# Patient Record
Sex: MALE | Race: ASIAN | Employment: FULL TIME | URBAN - METROPOLITAN AREA
[De-identification: names, ages, dates, MRNs, and addresses within clinical notes are randomized per-mention and may not be internally consistent; named-entity substitution may affect disease eponyms.]

---

## 2017-01-20 ENCOUNTER — TELEPHONE (OUTPATIENT)
Dept: ONCOLOGY | Age: 57
End: 2017-01-20

## 2017-01-20 ENCOUNTER — TELEPHONE (OUTPATIENT)
Dept: CARDIOLOGY CLINIC | Age: 57
End: 2017-01-20

## 2017-01-20 NOTE — TELEPHONE ENCOUNTER
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 Patient said he was hoping to take care of this by next Tue, if possible.   When form is ready, pt said call him at home [phone]

## 2017-05-12 ENCOUNTER — OFFICE VISIT (OUTPATIENT)
Dept: ONCOLOGY | Age: 57
End: 2017-05-12

## 2017-05-12 ENCOUNTER — HOSPITAL ENCOUNTER (OUTPATIENT)
Dept: LAB | Age: 57
Discharge: HOME | End: 2017-05-12
Payer: COMMERCIAL

## 2017-05-12 ENCOUNTER — HOSPITAL ENCOUNTER (OUTPATIENT)
Dept: ONCOLOGY | Age: 57
Discharge: HOME | End: 2017-05-12

## 2017-05-12 VITALS
HEART RATE: 66 BPM | DIASTOLIC BLOOD PRESSURE: 69 MMHG | SYSTOLIC BLOOD PRESSURE: 138 MMHG | TEMPERATURE: 98.3 F | HEIGHT: 69 IN | BODY MASS INDEX: 27.4 KG/M2 | WEIGHT: 185 LBS

## 2017-05-12 DIAGNOSIS — E55.9 VITAMIN D DEFICIENCY: ICD-10-CM

## 2017-05-12 DIAGNOSIS — D72.820 LYMPHOCYTOSIS: ICD-10-CM

## 2017-05-12 DIAGNOSIS — C91.10 CLL (CHRONIC LYMPHOCYTIC LEUKEMIA) (HCC): ICD-10-CM

## 2017-05-12 DIAGNOSIS — C91.10 CLL (CHRONIC LYMPHOCYTIC LEUKEMIA) (HCC): Primary | ICD-10-CM

## 2017-05-12 LAB
ALBUMIN SERPL BCP-MCNC: 4.3 G/DL (ref 3.4–5)
ALBUMIN/GLOB SERPL: 1.3 {RATIO} (ref 0.8–1.7)
ALP SERPL-CCNC: 61 U/L (ref 45–117)
ALT SERPL-CCNC: 23 U/L (ref 16–61)
ANION GAP BLD CALC-SCNC: 7 MMOL/L (ref 3–18)
AST SERPL W P-5'-P-CCNC: 19 U/L (ref 15–37)
BASOPHILS # BLD AUTO: 0 K/UL (ref 0–0.06)
BASOPHILS # BLD: 0 % (ref 0–3)
BILIRUB SERPL-MCNC: 0.3 MG/DL (ref 0.2–1)
BUN SERPL-MCNC: 16 MG/DL (ref 7–18)
BUN/CREAT SERPL: 16 (ref 12–20)
CALCIUM SERPL-MCNC: 9.3 MG/DL (ref 8.5–10.1)
CHLORIDE SERPL-SCNC: 101 MMOL/L (ref 100–108)
CO2 SERPL-SCNC: 31 MMOL/L (ref 21–32)
CREAT SERPL-MCNC: 1 MG/DL (ref 0.6–1.3)
DIFFERENTIAL METHOD BLD: ABNORMAL
EOSINOPHIL # BLD: 0.1 K/UL (ref 0–0.4)
EOSINOPHIL NFR BLD: 1 % (ref 0–5)
ERYTHROCYTE [DISTWIDTH] IN BLOOD BY AUTOMATED COUNT: 16.1 % (ref 11.6–14.5)
ERYTHROCYTE [SEDIMENTATION RATE] IN BLOOD: 14 MM/HR (ref 0–20)
GLOBULIN SER CALC-MCNC: 3.4 G/DL (ref 2–4)
GLUCOSE SERPL-MCNC: 108 MG/DL (ref 74–99)
HCT VFR BLD AUTO: 38.4 % (ref 36–48)
HGB BLD-MCNC: 12.4 G/DL (ref 13–16)
LYMPHOCYTES # BLD AUTO: 76 % (ref 20–51)
LYMPHOCYTES # BLD: 11.2 K/UL (ref 0.8–3.5)
MCH RBC QN AUTO: 25.5 PG (ref 24–34)
MCHC RBC AUTO-ENTMCNC: 32.3 G/DL (ref 31–37)
MCV RBC AUTO: 79 FL (ref 74–97)
MONOCYTES # BLD: 0.3 K/UL (ref 0–1)
MONOCYTES NFR BLD AUTO: 2 % (ref 2–9)
NEUTS BAND NFR BLD MANUAL: 1 % (ref 0–5)
NEUTS SEG # BLD: 3.1 K/UL (ref 1.8–8)
NEUTS SEG NFR BLD AUTO: 20 % (ref 42–75)
PLATELET # BLD AUTO: 187 K/UL (ref 135–420)
PLATELET COMMENTS,PCOM: ABNORMAL
PMV BLD AUTO: 11.4 FL (ref 9.2–11.8)
POTASSIUM SERPL-SCNC: 4.5 MMOL/L (ref 3.5–5.5)
PROT SERPL-MCNC: 7.7 G/DL (ref 6.4–8.2)
RBC # BLD AUTO: 4.86 M/UL (ref 4.7–5.5)
RBC MORPH BLD: ABNORMAL
SODIUM SERPL-SCNC: 139 MMOL/L (ref 136–145)
WBC # BLD AUTO: 14.7 K/UL (ref 4.6–13.2)
WBC MORPH BLD: ABNORMAL

## 2017-05-12 PROCEDURE — 36415 COLL VENOUS BLD VENIPUNCTURE: CPT | Performed by: NURSE PRACTITIONER

## 2017-05-12 PROCEDURE — 82306 VITAMIN D 25 HYDROXY: CPT | Performed by: NURSE PRACTITIONER

## 2017-05-12 PROCEDURE — 80053 COMPREHEN METABOLIC PANEL: CPT | Performed by: NURSE PRACTITIONER

## 2017-05-12 PROCEDURE — 85652 RBC SED RATE AUTOMATED: CPT | Performed by: NURSE PRACTITIONER

## 2017-05-12 NOTE — MR AVS SNAPSHOT
Visit Information Date & Time Provider Department Dept. Phone Encounter #  
 5/12/2017  3:15 PM [facility] Office [phone] Follow-up Instructions Return in about 4 months (around 9/12/2017). Your Appointments 6/29/2017  8:20 AM  
Follow Up with Dina Koehler MD  
[facility] ([address]) Appt Note: 1 year with an ekg Luis Alberto Finn [phone] [facility] [place] [address] 9/15/2017  4:15 PM  
Office Visit with Andrea Brunson MD  
[place] [address] ([address]) Appt Note: 4 mo fu  
 [facility] [address] [facility] [address] [place] [address]  
[phone]  
  
   
 [place] [address] [place] [address] [place] Upcoming Health Maintenance Date Due Hepatitis C Screening 1960 Pneumococcal 19-64 Highest Risk (1 of 3 - PCV13) 11/17/1978 DTaP/Tdap/Td series (1 - Tdap) 11/17/1980 FOBT Q 1 YEAR AGE 50-75 11/17/2009 INFLUENZA AGE 9 TO ADULT 8/1/2017 Allergies as of 5/12/2017  Review Complete On: 5/12/2017 By: Andrea Brunson MD  
 No Known Allergies Current Immunizations  Never Reviewed No immunizations on file. Not reviewed this visit You Were Diagnosed With   
  
 Codes Comments CLL (chronic lymphocytic leukemia) ([facility]    -  Primary ICD-10-CM: C91.10 ICD-9-CM: 204.10 Lymphocytosis     ICD-10-CM: E98.901 ICD-9-CM: 288.61 Vitamin D deficiency     ICD-10-CM: E55.9 ICD-9-CM: 268.9 Vitals BP Pulse Temp Height(growth percentile) Weight(growth percentile) BMI  
 138/69 66 98.3 °F (36.8 °C) 5' 9\" (1.753 m) 185 lb (83.9 kg) 27.32 kg/m2 Smoking Status Former Smoker BMI and BSA Data Body Mass Index Body Surface Area  
 27.32 kg/m 2 2.02 m 2 Preferred Pharmacy Pharmacy Name Phone [facility] [address], [address] [place] [address] [phone] Your Updated Medication List  
  
   
This list is accurate as of: 5/12/17  4:11 PM.  Always use your most recent med list.  
  
  
  
  
 aspirin delayed-release 81 mg tablet Take 81 mg by mouth daily. carvedilol 12.5 mg tablet Commonly known as:  Ciaraalexi Philippehire Take 1 Tab by mouth two (2) times a day. ergocalciferol 50,000 unit capsule Commonly known as:  ERGOCALCIFEROL Take 1 Cap by mouth every seven (7) days. Indications: VITAMIN D DEFICIENCY  
  
 lisinopril 5 mg tablet Commonly known as:  Marcelo Apo Take 1 Tab by mouth daily. simvastatin 10 mg tablet Commonly known as:  ZOCOR Take  by mouth daily. We Performed the Following COMPLETE CBC & AUTO DIFF WBC [65210 CPT(R)] Follow-up Instructions Return in about 4 months (around 9/12/2017). To-Do List   
 05/12/2017 Lab:  CBC WITH 3 PART DIFF   
  
 05/12/2017 Lab:  SED RATE (ESR)   
  
 05/12/2017 Lab:  VITAMIN D, 25 HYDROXY   
  
 05/13/2017 Lab:  METABOLIC PANEL, COMPREHENSIVE Patient Instructions Chronic Lymphocytic Leukemia: Care Instructions Your Care Instructions Leukemia is a type of cancer that causes your body to make too many blood cells, especially white blood cells. White blood cells are a part of your immune system, which helps protect you from infection and disease. In chronic lymphocytic leukemia (CLL), your body makes large numbers of white blood cells called lymphocytes. The cells may work as they should, but your body makes too many of them. Over time, these cells may not work as well, and they may cause symptoms as they begin to crowd out healthy white blood cells and other parts of your blood. There are several treatments for CLL, including chemotherapy, targeted therapy, or radiation therapy.  But because CLL may get worse slowly, sometimes treatment can wait. Your doctor will follow your progress and let you know if or when you need treatment. When you find out that you have cancer, you may feel many emotions and may need some help coping. Seek out family, friends, and counselors for support. You also can do things at home to make yourself feel better while you go through treatment. Call the Radha Mach[facility] (3-816.684.1378) or visit its website at 0359 Takes for more information. Follow-up care is a key part of your treatment and safety. Be sure to make and go to all appointments, and call your doctor if you are having problems. It's also a good idea to know your test results and keep a list of the medicines you take. How can you care for yourself at home? · You may get medicine for nausea, vomiting, or pain (although leukemia rarely causes pain). Take your medicines exactly as prescribed. Call your doctor if you think you are having a problem with your medicine. You will get more details on the specific medicines your doctor prescribes. · Eat healthy food. If you do not feel like eating, try to eat food that has protein and extra calories to keep up your strength and prevent weight loss. Drink liquid meal replacements for extra calories and protein. Try to eat your main meal early. · Get some physical activity every day, but do not get too tired. Keep doing the hobbies you enjoy as your energy allows. · Take steps to control your stress and workload. Learn relaxation techniques. ¨ Share your feelings. Stress and tension affect our emotions. By expressing your feelings to others, you may be able to understand and cope with them. ¨ Consider joining a support group. Talking about a problem with your spouse, a good friend, or other people with similar problems is a good way to reduce tension and stress. ¨ Express yourself through art.  Try writing, dance, art, or crafts to relieve tension. Some dance, writing, or art groups may be available just for people who have cancer. ¨ Be kind to your body and mind. Getting enough sleep, eating a nutritious diet, and taking time to do things you enjoy can contribute to an overall feeling of balance in your life and help reduce stress. ¨ Get help if you need it. Discuss your concerns with your doctor or counselor. · If you are vomiting or have diarrhea: ¨ Drink plenty of fluids (enough so that your urine is light yellow or clear like water) to prevent dehydration. Choose water and other caffeine-free clear liquids. If you have kidney, heart, or liver disease and have to limit fluids, talk with your doctor before you increase the amount of fluids you drink. ¨ When you are able to eat, try clear soups, mild foods, and liquids until all symptoms are gone for 12 to 48 hours. Other good choices include dry toast, crackers, cooked cereal, and gelatin dessert, such as Jell-O. · Avoid colds and flu. Get a pneumococcal vaccine shot. If you have had one before, ask your doctor whether you need another dose. Get a flu shot every year. If you must be around people with colds or flu, wash your hands often. · Do not smoke. If you need help quitting, talk to your doctor about stop-smoking programs and medicines. These can increase your chances of quitting for good. When should you call for help? Call 911 anytime you think you may need emergency care. For example, call if: 
· You passed out (lost consciousness). · You vomit blood or what looks like coffee grounds. · You pass maroon or very bloody stools. · You have a fever and feel very weak. Call your doctor now or seek immediate medical care if: 
· You have any unusual bleeding, such as: ¨ Blood spots under the skin. ¨ A nosebleed that you cannot stop. ¨ Bleeding gums when you brush your teeth. ¨ Blood in your urine.  
¨ Vaginal bleeding when you are not having your period, or heavy period bleeding. · You are dizzy or lightheaded, or you feel like you may faint. · You have signs of infection, such as a fever or chills. · Your stools are black and tarlike or have streaks of blood. · You have signs of needing more fluids. You have sunken eyes and a dry mouth, and you pass only a little dark urine. · Vomiting has lasted longer than 24 hours and you are not able to keep fluids down. · You have severe diarrhea (large, loose bowel movements every 1 to 2 hours). Watch closely for changes in your health, and be sure to contact your doctor if: 
· You feel much more tired than usual. 
· You have weakness that is getting worse. · You feel sad or anxious for a long period of time, or your feelings interfere with your normal activities or relationships. Where can you learn more? Go to http://yajaira-jose.info/. Enter R901 in the search box to learn more about \"Chronic Lymphocytic Leukemia: Care Instructions. \" Current as of: July 26, 2016 Content Version: 11.2 © 4894-3462 [employer]. Care instructions adapted under license by [employer] (which disclaims liability or warranty for this information). If you have questions about a medical condition or this instruction, always ask your healthcare professional. [address] any warranty or liability for your use of this information. Introducing [facility] [place] [facility]! George Bertrand introduces [employer] patient portal. Now you can access parts of your medical record, email your doctor's office, and request medication refills online. 1. In your internet browser, go to [email or web address] 2. Click on the First Time User? Click Here link in the Sign In box. You will see the New Member Sign Up page. 3. Enter your [employer] Access Code exactly as it appears below. You will not need to use this code after youve completed the sign-up process.  If you do not sign up before the expiration date, you must request a new code. · [employer] Access Code: [email or web address] Expires: 8/10/2017  4:11 PM 
 
4. Enter the last four digits of your Social Security Number (xxxx) and Date of Birth (mm/dd/yyyy) as indicated and click Submit. You will be taken to the next sign-up page. 5. Create a [employer] ID. This will be your [employer] login ID and cannot be changed, so think of one that is secure and easy to remember. 6. Create a [employer] password. You can change your password at any time. 7. Enter your Password Reset Question and Answer. This can be used at a later time if you forget your password. 8. Enter your e-mail address. You will receive e-mail notification when new information is available in [address] 9. Click Sign Up. You can now view and download portions of your medical record. 10. Click the Download Summary menu link to download a portable copy of your medical information. If you have questions, please visit the Frequently Asked Questions section of the [employer] website. Remember, [employer] is NOT to be used for urgent needs. For medical emergencies, dial 911. Now available from your iPhone and Android! Please provide this summary of care documentation to your next provider. Your primary care clinician is listed as Nette Valdivia. If you have any questions after today's visit, please call [phone].

## 2017-05-12 NOTE — PATIENT INSTRUCTIONS
Chronic Lymphocytic Leukemia: Care Instructions  Your Care Instructions  Leukemia is a type of cancer that causes your body to make too many blood cells, especially white blood cells. White blood cells are a part of your immune system, which helps protect you from infection and disease. In chronic lymphocytic leukemia (CLL), your body makes large numbers of white blood cells called lymphocytes. The cells may work as they should, but your body makes too many of them. Over time, these cells may not work as well, and they may cause symptoms as they begin to crowd out healthy white blood cells and other parts of your blood. There are several treatments for CLL, including chemotherapy, targeted therapy, or radiation therapy. But because CLL may get worse slowly, sometimes treatment can wait. Your doctor will follow your progress and let you know if or when you need treatment. When you find out that you have cancer, you may feel many emotions and may need some help coping. Seek out family, friends, and counselors for support. You also can do things at home to make yourself feel better while you go through treatment. Call the [address] [place], [place] [address]alaina [employer] ([phone]) or visit its website at 2973 frenting. KaChing! for more information. Follow-up care is a key part of your treatment and safety. Be sure to make and go to all appointments, and call your doctor if you are having problems. It's also a good idea to know your test results and keep a list of the medicines you take. How can you care for yourself at home? · You may get medicine for nausea, vomiting, or pain (although leukemia rarely causes pain). Take your medicines exactly as prescribed. Call your doctor if you think you are having a problem with your medicine. You will get more details on the specific medicines your doctor prescribes. · Eat healthy food.  If you do not feel like eating, try to eat food that has protein and extra calories to keep up your strength and prevent weight loss. Drink liquid meal replacements for extra calories and protein. Try to eat your main meal early. · Get some physical activity every day, but do not get too tired. Keep doing the hobbies you enjoy as your energy allows. · Take steps to control your stress and workload. Learn relaxation techniques. ¨ Share your feelings. Stress and tension affect our emotions. By expressing your feelings to others, you may be able to understand and cope with them. ¨ Consider joining a support group. Talking about a problem with your spouse, a good friend, or other people with similar problems is a good way to reduce tension and stress. ¨ Express yourself through art. Try writing, dance, art, or crafts to relieve tension. Some dance, writing, or art groups may be available just for people who have cancer. ¨ Be kind to your body and mind. Getting enough sleep, eating a nutritious diet, and taking time to do things you enjoy can contribute to an overall feeling of balance in your life and help reduce stress. ¨ Get help if you need it. Discuss your concerns with your doctor or counselor. · If you are vomiting or have diarrhea:  ¨ Drink plenty of fluids (enough so that your urine is light yellow or clear like water) to prevent dehydration. Choose water and other caffeine-free clear liquids. If you have kidney, heart, or liver disease and have to limit fluids, talk with your doctor before you increase the amount of fluids you drink. ¨ When you are able to eat, try clear soups, mild foods, and liquids until all symptoms are gone for 12 to 48 hours. Other good choices include dry toast, crackers, cooked cereal, and gelatin dessert, such as Jell-O.  · Avoid colds and flu. Get a pneumococcal vaccine shot. If you have had one before, ask your doctor whether you need another dose. Get a flu shot every year. If you must be around people with colds or flu, wash your hands often. · Do not smoke.  If you need help quitting, talk to your doctor about stop-smoking programs and medicines. These can increase your chances of quitting for good. When should you call for help? Call 911 anytime you think you may need emergency care. For example, call if:  · You passed out (lost consciousness). · You vomit blood or what looks like coffee grounds. · You pass maroon or very bloody stools. · You have a fever and feel very weak. Call your doctor now or seek immediate medical care if:  · You have any unusual bleeding, such as:  ¨ Blood spots under the skin. ¨ A nosebleed that you cannot stop. ¨ Bleeding gums when you brush your teeth. ¨ Blood in your urine. ¨ Vaginal bleeding when you are not having your period, or heavy period bleeding. · You are dizzy or lightheaded, or you feel like you may faint. · You have signs of infection, such as a fever or chills. · Your stools are black and tarlike or have streaks of blood. · You have signs of needing more fluids. You have sunken eyes and a dry mouth, and you pass only a little dark urine. · Vomiting has lasted longer than 24 hours and you are not able to keep fluids down. · You have severe diarrhea (large, loose bowel movements every 1 to 2 hours). Watch closely for changes in your health, and be sure to contact your doctor if:  · You feel much more tired than usual.  · You have weakness that is getting worse. · You feel sad or anxious for a long period of time, or your feelings interfere with your normal activities or relationships. Where can you learn more? Go to http://yajaira-jose.info/. Enter Z331 in the search box to learn more about \"Chronic Lymphocytic Leukemia: Care Instructions. \"  Current as of: July 26, 2016  Content Version: 11.2  © 7983-1869 [employer]. Care instructions adapted under license by [employer] (which disclaims liability or warranty for this information).  If you have questions about a medical condition or this instruction, always ask your healthcare professional. [place] [address] any warranty or liability for your use of this information.

## 2017-05-12 NOTE — PROGRESS NOTES
Hematology/Oncology  Progress Note    Name: Kari Reid  Date: 2017  : 1960    PCP: Imelda Barlow MD     Mr. Africa Berg is a 61year old male who was seen for management of his chronic lymphocytic leukemia. Current therapy: Surveillance    Subjective:     Mr. Africa Berg is a 48-year-old [place] American American man who has a stage I chronic lymphocytic leukemia. The patient reports that he has been doing well. He has no complaints of fevers, night sweats, or weight loss. The patient states that his energy level has been good. His appetite is excellent and he has no new physical complaints or concerns to report. Additionally he denies having any problems with unexplained bruising or bleeding. Past medical history, family history, and social history: these were reviewed and remains unchanged. Past Medical History:   Diagnosis Date    Abnormal nuclear cardiac imaging test 2006    Mild anterior ischemia. Inferior scar w/border zone ischemia. LVE. EF 26%. (Gated imaging may be inaccurate.)  Global hypk. Neg EKG on max EST. Ex time 10:30.  Aorto-iliac duplex 09/10/2012    No AAA identified.  Carotid duplex 09/10/2012    No significant occlusive disease bilaterallly.  Echocardiogram 2015    EF 55%. No WMA. Mild LVH. Gr 1 DDfx. IMELDA. No significant chg from study of 5/17/10.  History of echocardiogram 2011    EF 50-55%. Gr 2 DDfx. Mild RVE. Mild LAE. Mild-mod IMELDA.  Hypercholesterolemia     Nonischemic cardiomyopathy (HCC)     s/p right coronary cusp PVC ablation  () without complication    S/P cardiac cath 2006    Patent coronary arteries. LVEDP 12.  EF 25-30%. Mod-significant global hypk. Past Surgical History:   Procedure Laterality Date    HX HEART CATHETERIZATION  06    The right coronary artery is dominant. It is widely patent. The left main coronary artery is widely patent. The circumflex artery is widely patent. The left anterior descending coronary artery is widely patent. The LVEDP is 12 mmHg. The overall left ventricular systolic function is significantly diminished with an estimated ejection fraction of 25-30%. ** See report. Social History     Social History    Marital status:      Spouse name: N/A    Number of children: N/A    Years of education: N/A     Occupational History    Not on file. Social History Main Topics    Smoking status: Former Smoker     Packs/day: 1.00    Smokeless tobacco: Not on file    Alcohol use Yes    Drug use: Not on file    Sexual activity: Not on file     Other Topics Concern    Not on file     Social History Narrative     No family history on file. Current Outpatient Prescriptions   Medication Sig Dispense Refill    ergocalciferol (ERGOCALCIFEROL) 50,000 unit capsule Take 1 Cap by mouth every seven (7) days. Indications: VITAMIN D DEFICIENCY 12 Cap 1    lisinopril (PRINIVIL, ZESTRIL) 5 mg tablet Take 1 Tab by mouth daily. 30 Tab 6    carvedilol (COREG) 12.5 mg tablet Take 1 Tab by mouth two (2) times a day. 60 Tab 6    simvastatin (ZOCOR) 10 mg tablet Take  by mouth daily.  aspirin delayed-release 81 mg tablet Take 81 mg by mouth daily. Review of Systems  Constitutional: The patient has no acute distress or discomfort. HEENT: The patient denies recent head trauma, eye pain, blurred vision,  hearing deficit, oropharyngeal mucosal pain or lesions, and the patient denies throat pain or discomfort. Lymphatics: The patient denies palpable peripheral lymphadenopathy. Hematologic: The patient denies having bruising, bleeding, or progressive fatigue. Respiratory: Patient denies having shortness of breath, cough, sputum production, fever, or dyspnea on exertion. Cardiovascular: The patient denies having leg pain, leg swelling, heart palpitations, chest permit, chest pain, or lightheadedness.   The patient denies having dyspnea on exertion. Gastrointestinal: The patient denies having nausea, emesis, or diarrhea. The patient denies having any hematemesis or blood in the stool. Genitourinary: Patient denies having urinary urgency, frequency, or dysuria. The patient denies having blood in the urine. Psychological: The patient denies having symptoms of nervousness, anxiety, depression, or thoughts of harming himself some of this. Skin: Patient denies having skin ulcerations. The patient is complaining of a new rash on his arms and back. He also complains of some itching. Musculoskeletal: The patient denies having pain in the joints or bones. Objective:     Visit Vitals    /69    Pulse 66    Temp 98.3 °F (36.8 °C)    Ht 5' 9\" (1.753 m)    Wt 83.9 kg (185 lb)    BMI 27.32 kg/m2     ECOG PS=0     Physical Exam:   Gen. Appearance: The patient is in no acute distress. Skin: There is a macular and papular scaling rash on her arms and back concerning for possible psoriasis. HEENT: The exam is unremarkable. Neck: Supple without lymphadenopathy or thyromegaly. Lungs: Clear to auscultation and percussion; there are no wheezes or rhonchi. Heart: Regular rate and rhythm; there are no murmurs, gallops, or rubs. Abdomen: Bowel sounds are present and normal.  There is no guarding, tenderness, or hepatosplenomegaly. Extremities: There is no clubbing, cyanosis, or edema. Neurologic: There are no focal neurologic deficits. Lymphatics: There is palpable cervical and supraclavicular lymphadenopathy only. Musculoskeletal: The patient has full range of motion at all joints. There is no evidence of joint deformity or effusions. There is no focal joint tenderness. Psychological/psychiatric: There is no clinical evidence of anxiety, depression, or melancholy.     Lab data:      Results for orders placed or performed during the hospital encounter of 12/16/16   CBC WITH 3 PART DIFF     Status: Abnormal   Result Value Ref Range Status WBC 11.1 4.5 - 13.0 K/uL Final    RBC 5.04 4.10 - 5.10 M/uL Final    HGB 12.5 12.0 - 16.0 g/dL Final    HCT 40.4 36 - 48 % Final    MCV 80.2 78 - 102 FL Final    MCH 24.8 (L) 25.0 - 35.0 PG Final    MCHC 30.9 (L) 31 - 37 g/dL Final    RDW 15.2 (H) 11.5 - 14.5 % Final    PLATELET 457 219 - 589 K/uL Final    NEUTROPHILS 22 (L) 40 - 70 % Final    MIXED CELLS 5 0.1 - 17 % Final    LYMPHOCYTES 73 (H) 14 - 44 % Final    ABS. NEUTROPHILS 2.4 1.8 - 9.5 K/UL Final    ABS. MIXED CELLS 0.6 0.0 - 2.3 K/uL Final    ABS. LYMPHOCYTES 8.1 (H) 1.1 - 5.9 K/UL Final     Comment: Test performed at [address]. Results Reviewed by Medical Director. DF AUTOMATED   Final           Assessment:     1. CLL (chronic lymphocytic leukemia) ([facility] [address]      Plan:   CLL: I have explained to the patient and his current clinical status is stable. He has stable lymphadenopathy. The CBC from today shows that his lymphocyte count is 76.1%. His total WBC count is 14.1. He has a normal hemoglobin of 12.5 g/dL with hematocrit of 39.4%. We will continue active surveillance. Systemic chemotherapy is still not recommended at this stage of his illness. Lymphocytosis: I have explained to the patient that he has 76.1% lymphocytes on his peripheral blood count. The absolute lymphocyte count is elevated at 11 with a reference range of 1.1-5.9. These will continue to be monitored every 4 months. Vitamin d deficiency: I will check a vitamin D level today. If deficient, we will start a weekly supplement for 8-12 weeks,       The patient will return to the clinic for a complete assessment again in 4 months.   Orders Placed This Encounter    COMPLETE CBC & AUTO DIFF WBC    InHouse CBC ("[facility]")     Standing Status:   Future     Number of Occurrences:   1     Standing Expiration Date:   5/19/2017       Angela Manriquez MD  5/12/2017

## 2017-05-13 LAB — 25(OH)D3 SERPL-MCNC: 13.7 NG/ML (ref 30–100)

## 2017-06-29 ENCOUNTER — OFFICE VISIT (OUTPATIENT)
Dept: CARDIOLOGY CLINIC | Age: 57
End: 2017-06-29

## 2017-06-29 VITALS
SYSTOLIC BLOOD PRESSURE: 120 MMHG | OXYGEN SATURATION: 95 % | BODY MASS INDEX: 27.99 KG/M2 | HEART RATE: 65 BPM | WEIGHT: 189 LBS | HEIGHT: 69 IN | DIASTOLIC BLOOD PRESSURE: 78 MMHG

## 2017-06-29 DIAGNOSIS — Z86.79 S/P ABLATION OPERATION FOR ARRHYTHMIA: Primary | ICD-10-CM

## 2017-06-29 DIAGNOSIS — I42.8 NONISCHEMIC CARDIOMYOPATHY (HCC): ICD-10-CM

## 2017-06-29 DIAGNOSIS — I10 ESSENTIAL HYPERTENSION: ICD-10-CM

## 2017-06-29 DIAGNOSIS — I49.3 PVC (PREMATURE VENTRICULAR CONTRACTION): ICD-10-CM

## 2017-06-29 DIAGNOSIS — Z98.890 S/P ABLATION OPERATION FOR ARRHYTHMIA: Primary | ICD-10-CM

## 2017-06-29 NOTE — PROGRESS NOTES
1. Have you been to the ER, urgent care clinic since your last visit? Hospitalized since your last visit? No     2. Have you seen or consulted any other health care providers outside of the [address] [place] [address] since your last visit? Include any pap smears or colon screening.  No

## 2017-06-29 NOTE — PROGRESS NOTES
History of Present Illness: A 61 y.o. male here for follow up. 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 Monday through Friday he does get a bit tired due to his sleep habits but on weekends he does well. He denies any new chest pain, dyspnea, presyncope, syncope, PND, orthopnea or edema. He had a PVC ablation January 0061 without complications. Impression:   History of right coronary cusp PVC ablation January 2010 with 1% burden after follow up, doing well. History of transient nonischemic cardiomyopathy 2006, improving to normal by most recent echocardiogram June 2015. Remote alcohol use. History of familial aortic aneurysm with abdominal ultrasound a few years ago, unremarkable. History of hypertension controlled on ACE inhibitor and beta-blocker. Borderline dyslipidemia on Zocor. From a cardiac standpoint, he is doing well. He has no evidence of decompensated heart failure. His blood pressure is well controlled. No evidence of PVCs on EKG. An echocardiogram a few years ago showed normal function. He does have some issues with sleep hygiene due to his occupation and this was discussed. He was screened for abdominal aortic aneurysm a few years ago and that was normal.  I will see him back in one year. Past Medical History:   Diagnosis Date    Abnormal nuclear cardiac imaging test 11/30/2006    Mild anterior ischemia. Inferior scar w/border zone ischemia. LVE. EF 26%. (Gated imaging may be inaccurate.)  Global hypk. Neg EKG on max EST. Ex time 10:30.  Aorto-iliac duplex 09/10/2012    No AAA identified.  Carotid duplex 09/10/2012    No significant occlusive disease bilaterallly.  Echocardiogram 06/02/2015    EF 55%. No WMA. Mild LVH. Gr 1 DDfx. IMELDA. No significant chg from study of 5/17/10.  History of echocardiogram 06/20/2011    EF 50-55%. Gr 2 DDfx. Mild RVE. Mild LAE. Mild-mod IMELDA.      Hypercholesterolemia     Nonischemic cardiomyopathy ([address] s/p right coronary cusp PVC ablation  (8/06/11) without complication    S/P cardiac cath 12/11/2006    Patent coronary arteries. LVEDP 12.  EF 25-30%. Mod-significant global hypk. Current Outpatient Prescriptions   Medication Sig Dispense Refill    ergocalciferol (ERGOCALCIFEROL) 50,000 unit capsule Take 1 Cap by mouth every seven (7) days. Indications: VITAMIN D DEFICIENCY 12 Cap 1    lisinopril (PRINIVIL, ZESTRIL) 5 mg tablet Take 1 Tab by mouth daily. 30 Tab 6    carvedilol (COREG) 12.5 mg tablet Take 1 Tab by mouth two (2) times a day. 60 Tab 6    simvastatin (ZOCOR) 10 mg tablet Take  by mouth daily.  aspirin delayed-release 81 mg tablet Take 81 mg by mouth daily. Social History   reports that he has quit smoking. He smoked 1.00 pack per day. He does not have any smokeless tobacco history on file. reports that he drinks alcohol. Family History  family history is not on file. Review of Systems  Except as stated above include:  Constitutional: Negative for fever, chills and malaise/fatigue. HEENT: No congestion or recent URI. Gastrointestinal: No nausea, vomiting, abdominal pain, bloody stools. Pulmonary:  Negative except as stated above. Cardiac:  Negative except as stated above. Musculoskeletal: Negative except as stated above. Neurological:  No localized symptoms. Skin:  Negative except as stated above. Psych:  No mood swings. Endocrine:  No heat/cold intolerance. PHYSICAL EXAM  BP Readings from Last 3 Encounters:   06/29/17 120/78   05/12/17 138/69   12/16/16 139/86     Pulse Readings from Last 3 Encounters:   06/29/17 65   05/12/17 66   12/16/16 (!) 58     Wt Readings from Last 3 Encounters:   06/29/17 85.7 kg (189 lb)   05/12/17 83.9 kg (185 lb)   12/16/16 84.8 kg (187 lb)     General:   Well developed, well groomed. Head/Neck:   No jugular venous distention     No carotid bruits. No evidence of xanthelasma. Lungs:   No respiratory distress. Clear bilaterally. Heart:    Regular rate and rhythm. Normal S1/S2. Palpation of heart with normal point of maximum impulse. No significant murmurs, rubs or gallops. Abdomen:   Soft and nontender. No palpable abdominal mass or bruits. Extremities:   Intact peripheral pulses. No significant edema. Neurological:   Alert and oriented to person, place, time. No focal neurological deficit visually.

## 2017-06-29 NOTE — MR AVS SNAPSHOT
Visit Information Date & Time Provider Department Dept. Phone Encounter #  
 6/29/2017  8:20 AM Baylee Johnson MD [facility] [id number] Follow-up Instructions Return in about 1 year (around 6/29/2018). Your Appointments 9/15/2017  4:15 PM  
Office Visit with MD Naida Rodríguezjarethfarooq  ([facility]) Appt Note: 4 mo fu  
 [facility] [address] [place] [address]  
[phone]  
  
   
 [address] Upcoming Health Maintenance Date Due Hepatitis C Screening 1960 Pneumococcal 19-64 Highest Risk (1 of 3 - PCV13) 11/17/1978 DTaP/Tdap/Td series (1 - Tdap) 11/17/1980 FOBT Q 1 YEAR AGE 50-75 11/17/2009 INFLUENZA AGE 9 TO ADULT 8/1/2017 Allergies as of 6/29/2017  Review Complete On: 6/29/2017 By: Baylee Johnson MD  
 No Known Allergies Current Immunizations  Never Reviewed No immunizations on file. Not reviewed this visit You Were Diagnosed With   
  
 Codes Comments S/P ablation operation for arrhythmia    -  Primary ICD-10-CM: Z98.890, Z86.79 
ICD-9-CM: V45.89 PVC (premature ventricular contraction)     ICD-10-CM: I49.3 ICD-9-CM: 427.69 Nonischemic cardiomyopathy ([facility] [address]     ICD-10-CM: I42.8 ICD-9-CM: 425.4 Essential hypertension     ICD-10-CM: I10 
ICD-9-CM: 401.9 Vitals BP Pulse Height(growth percentile) Weight(growth percentile) SpO2 BMI  
 120/78 65 5' 9\" (1.753 m) 189 lb (85.7 kg) 95% 27.91 kg/m2 Smoking Status Former Smoker Vitals History BMI and BSA Data Body Mass Index Body Surface Area  
 27.91 kg/m 2 2.04 m 2 Preferred Pharmacy Pharmacy Name Phone [facility] [address] [place] [address] [phone] Your Updated Medication List  
  
   
This list is accurate as of: 6/29/17  8:52 AM.  Always use your most recent med list.  
  
  
  
  
 aspirin delayed-release 81 mg tablet Take 81 mg by mouth daily. carvedilol 12.5 mg tablet Commonly known as:  Pardeep Bloom Take 1 Tab by mouth two (2) times a day. ergocalciferol 50,000 unit capsule Commonly known as:  ERGOCALCIFEROL Take 1 Cap by mouth every seven (7) days. Indications: VITAMIN D DEFICIENCY  
  
 lisinopril 5 mg tablet Commonly known as:  Joie Gear Take 1 Tab by mouth daily. simvastatin 10 mg tablet Commonly known as:  ZOCOR Take  by mouth daily. We Performed the Following AMB POC EKG ROUTINE W/ 12 LEADS, INTER & REP [08144 CPT(R)] Follow-up Instructions Return in about 1 year (around 6/29/2018). Introducing [facility]! [facility] introduces [employer] patient portal. Now you can access parts of your medical record, email your doctor's office, and request medication refills online. 1. In your internet browser, go to [email or web address] 2. Click on the First Time User? Click Here link in the Sign In box. You will see the New Member Sign Up page. 3. Enter your Fairphone Access Code exactly as it appears below. You will not need to use this code after youve completed the sign-up process. If you do not sign up before the expiration date, you must request a new code. · Fairphone Access Code: [email or web address] Expires: 8/10/2017  4:11 PM 
 
4. Enter the last four digits of your Social Security Number (xxxx) and Date of Birth (mm/dd/yyyy) as indicated and click Submit. You will be taken to the next sign-up page. 5. Create a Fairphone ID. This will be your Fairphone login ID and cannot be changed, so think of one that is secure and easy to remember. 6. Create a Fairphone password. You can change your password at any time. 7. Enter your Password Reset Question and Answer. This can be used at a later time if you forget your password. 8. Enter your e-mail address. You will receive e-mail notification when new information is available in [address] 9. Click Sign Up. You can now view and download portions of your medical record. 10. Click the Download Summary menu link to download a portable copy of your medical information. If you have questions, please visit the Frequently Asked Questions section of the Zite website. Remember, Zite is NOT to be used for urgent needs. For medical emergencies, dial 911. Now available from your iPhone and Android! Please provide this summary of care documentation to your next provider. Your primary care clinician is listed as Uyen Jain. If you have any questions after today's visit, please call [phone].

## 2018-01-31 ENCOUNTER — TELEPHONE (OUTPATIENT)
Dept: ONCOLOGY | Age: 58
End: 2018-01-31

## 2018-01-31 NOTE — TELEPHONE ENCOUNTER
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 He needs the letter by Friday 2/2/17. He will come pick it up when I call him at [phone].     Placed a copy of his NOW CARE paperwork in the [place] inbox up front, which directs the patient on what is needed from us and from his cardiologist.

## 2018-04-17 ENCOUNTER — OFFICE VISIT (OUTPATIENT)
Dept: ONCOLOGY | Age: 58
End: 2018-04-17

## 2018-04-17 ENCOUNTER — HOSPITAL ENCOUNTER (OUTPATIENT)
Dept: ONCOLOGY | Age: 58
Discharge: HOME | End: 2018-04-17

## 2018-04-17 VITALS
WEIGHT: 184 LBS | HEART RATE: 66 BPM | SYSTOLIC BLOOD PRESSURE: 106 MMHG | BODY MASS INDEX: 27.17 KG/M2 | TEMPERATURE: 99.2 F | DIASTOLIC BLOOD PRESSURE: 64 MMHG

## 2018-04-17 DIAGNOSIS — D72.820 LYMPHOCYTOSIS: ICD-10-CM

## 2018-04-17 DIAGNOSIS — C91.10 CLL (CHRONIC LYMPHOCYTIC LEUKEMIA) (HCC): ICD-10-CM

## 2018-04-17 DIAGNOSIS — C91.10 CLL (CHRONIC LYMPHOCYTIC LEUKEMIA) (HCC): Primary | ICD-10-CM

## 2018-04-17 DIAGNOSIS — E55.9 VITAMIN D DEFICIENCY: ICD-10-CM

## 2018-04-17 LAB
BASOPHILS # BLD: 0 K/UL (ref 0–0.06)
BASOPHILS NFR BLD: 0 % (ref 0–2)
DIFFERENTIAL METHOD BLD: ABNORMAL
EOSINOPHIL # BLD: 0.1 K/UL (ref 0–0.4)
EOSINOPHIL NFR BLD: 1 % (ref 0–5)
ERYTHROCYTE [DISTWIDTH] IN BLOOD BY AUTOMATED COUNT: 15.7 % (ref 11.6–14.5)
HCT VFR BLD AUTO: 38.2 % (ref 36–48)
HGB BLD-MCNC: 12.3 G/DL (ref 13–16)
LYMPHOCYTES # BLD: 10.6 K/UL (ref 0.9–3.6)
LYMPHOCYTES NFR BLD: 76 % (ref 21–52)
MCH RBC QN AUTO: 25.1 PG (ref 24–34)
MCHC RBC AUTO-ENTMCNC: 32.2 G/DL (ref 31–37)
MCV RBC AUTO: 78 FL (ref 74–97)
MONOCYTES # BLD: 0.4 K/UL (ref 0.05–1.2)
MONOCYTES NFR BLD: 3 % (ref 3–10)
NEUTS SEG # BLD: 2.8 K/UL (ref 1.8–8)
NEUTS SEG NFR BLD: 20 % (ref 40–73)
PLATELET # BLD AUTO: 176 K/UL (ref 135–420)
PMV BLD AUTO: 11.5 FL (ref 9.2–11.8)
RBC # BLD AUTO: 4.9 M/UL (ref 4.7–5.5)
WBC # BLD AUTO: 13.9 K/UL (ref 4.6–13.2)

## 2018-04-17 NOTE — PATIENT INSTRUCTIONS
Chronic Lymphocytic Leukemia: Care Instructions  Your Care Instructions    Leukemia is a type of cancer that causes your body to make too many blood cells, especially white blood cells. White blood cells are a part of your immune system, which helps protect you from infection and disease. In chronic lymphocytic leukemia (CLL), your body makes large numbers of white blood cells called lymphocytes. The cells may work as they should, but your body makes too many of them. Over time, these cells may not work as well, and they may cause symptoms as they begin to crowd out healthy white blood cells and other parts of your blood. There are several treatments for CLL, including chemotherapy, targeted therapy, or radiation therapy. But because CLL may get worse slowly, sometimes treatment can wait. Your doctor will follow your progress and let you know if or when you need treatment. When you find out that you have cancer, you may feel many emotions and may need some help coping. Seek out family, friends, and counselors for support. You also can do things at home to make yourself feel better while you go through treatment. Call the [employer] (0-559.785.8639) or visit its website at [address] for more information. Follow-up care is a key part of your treatment and safety. Be sure to make and go to all appointments, and call your doctor if you are having problems. It's also a good idea to know your test results and keep a list of the medicines you take. How can you care for yourself at home? · You may get medicine for nausea, vomiting, or pain (although leukemia rarely causes pain). Take your medicines exactly as prescribed. Call your doctor if you think you are having a problem with your medicine. You will get more details on the specific medicines your doctor prescribes. · Eat healthy food.  If you do not feel like eating, try to eat food that has protein and extra calories to keep up your strength and prevent weight loss. Drink liquid meal replacements for extra calories and protein. Try to eat your main meal early. · Get some physical activity every day, but do not get too tired. Keep doing the hobbies you enjoy as your energy allows. · Take steps to control your stress and workload. Learn relaxation techniques. ¨ Share your feelings. Stress and tension affect our emotions. By expressing your feelings to others, you may be able to understand and cope with them. ¨ Consider joining a support group. Talking about a problem with your spouse, a good friend, or other people with similar problems is a good way to reduce tension and stress. ¨ Express yourself through art. Try writing, dance, art, or crafts to relieve tension. Some dance, writing, or art groups may be available just for people who have cancer. ¨ Be kind to your body and mind. Getting enough sleep, eating a nutritious diet, and taking time to do things you enjoy can contribute to an overall feeling of balance in your life and help reduce stress. ¨ Get help if you need it. Discuss your concerns with your doctor or counselor. · If you are vomiting or have diarrhea:  ¨ Drink plenty of fluids (enough so that your urine is light yellow or clear like water) to prevent dehydration. Choose water and other caffeine-free clear liquids. If you have kidney, heart, or liver disease and have to limit fluids, talk with your doctor before you increase the amount of fluids you drink. ¨ When you are able to eat, try clear soups, mild foods, and liquids until all symptoms are gone for 12 to 48 hours. Other good choices include dry toast, crackers, cooked cereal, and gelatin dessert, such as Jell-O.  · Avoid colds and flu. Get a pneumococcal vaccine shot. If you have had one before, ask your doctor whether you need another dose. Get a flu shot every year. If you must be around people with colds or flu, wash your hands often. · Do not smoke.  If you need help quitting, talk to your doctor about stop-smoking programs and medicines. These can increase your chances of quitting for good. When should you call for help? Call 911 anytime you think you may need emergency care. For example, call if:  · You passed out (lost consciousness). Call your doctor now or seek immediate medical care if:  · You have a fever. · You have abnormal bleeding. · You think you have an infection. · You have new or worse pain. · You have new symptoms, such as a cough, belly pain, vomiting, diarrhea, or a rash. Watch closely for changes in your health, and be sure to contact your doctor if:  · You are much more tired than usual.  · You have swollen glands in your armpits, groin, or neck. · You do not get better as expected. Where can you learn more? Go to http://yajaira-jose.info/. Enter R599 in the search box to learn more about \"Chronic Lymphocytic Leukemia: Care Instructions. \"  Current as of: May 12, 2017  Content Version: 11.4  © 6617-5586 [employer]. Care instructions adapted under license by [employer] (which disclaims liability or warranty for this information). If you have questions about a medical condition or this instruction, always ask your healthcare professional. [address] any warranty or liability for your use of this information.

## 2018-04-17 NOTE — PROGRESS NOTES
Hematology/Oncology  Progress Note    Name: Jose Manuel Lopez  Date: 2018  : 1960    PCP: Kingsley Dolan MD     Mr. Fany Selby is a 61year old male who was seen for management of his chronic lymphocytic leukemia. Current therapy: Surveillance    Subjective:     Mr. Fany Selby is a 58-year-old [place] American American man who has a stage I chronic lymphocytic leukemia. The patient reports that he has been doing well. He has no complaints of fevers, night sweats, or weight loss. The patient states that his energy level has been good. His appetite is excellent and he has no new physical complaints or concerns to report. Additionally he denies having any problems with unexplained bruising or bleeding. Past medical history, family history, and social history: these were reviewed and remains unchanged. Past Medical History:   Diagnosis Date    Abnormal nuclear cardiac imaging test 2006    Mild anterior ischemia. Inferior scar w/border zone ischemia. LVE. EF 26%. (Gated imaging may be inaccurate.)  Global hypk. Neg EKG on max EST. Ex time 10:30.  Aorto-iliac duplex 09/10/2012    No AAA identified.  Carotid duplex 09/10/2012    No significant occlusive disease bilaterallly.  Echocardiogram 2015    EF 55%. No WMA. Mild LVH. Gr 1 DDfx. IMELDA. No significant chg from study of 5/17/10.  History of echocardiogram 2011    EF 50-55%. Gr 2 DDfx. Mild RVE. Mild LAE. Mild-mod IMELDA.  Hypercholesterolemia     Nonischemic cardiomyopathy (HCC)     s/p right coronary cusp PVC ablation  () without complication    S/P cardiac cath 2006    Patent coronary arteries. LVEDP 12.  EF 25-30%. Mod-significant global hypk. Past Surgical History:   Procedure Laterality Date    HX HEART CATHETERIZATION  06    The right coronary artery is dominant. It is widely patent. The left main coronary artery is widely patent. The circumflex artery is widely patent. The left anterior descending coronary artery is widely patent. The LVEDP is 12 mmHg. The overall left ventricular systolic function is significantly diminished with an estimated ejection fraction of 25-30%. ** See report. Social History     Social History    Marital status:      Spouse name: N/A    Number of children: N/A    Years of education: N/A     Occupational History    Not on file. Social History Main Topics    Smoking status: Former Smoker     Packs/day: 1.00    Smokeless tobacco: Not on file    Alcohol use Yes    Drug use: Not on file    Sexual activity: Not on file     Other Topics Concern    Not on file     Social History Narrative     No family history on file. Current Outpatient Prescriptions   Medication Sig Dispense Refill    ergocalciferol (ERGOCALCIFEROL) 50,000 unit capsule Take 1 Cap by mouth every seven (7) days. Indications: VITAMIN D DEFICIENCY 12 Cap 1    lisinopril (PRINIVIL, ZESTRIL) 5 mg tablet Take 1 Tab by mouth daily. 30 Tab 6    carvedilol (COREG) 12.5 mg tablet Take 1 Tab by mouth two (2) times a day. 60 Tab 6    simvastatin (ZOCOR) 10 mg tablet Take  by mouth daily.  aspirin delayed-release 81 mg tablet Take 81 mg by mouth daily. Review of Systems  Constitutional: The patient has no acute distress or discomfort. HEENT: The patient denies recent head trauma, eye pain, blurred vision,  hearing deficit, oropharyngeal mucosal pain or lesions, and the patient denies throat pain or discomfort. Lymphatics: The patient denies palpable peripheral lymphadenopathy. Hematologic: The patient denies having bruising, bleeding, or progressive fatigue. Respiratory: Patient denies having shortness of breath, cough, sputum production, fever, or dyspnea on exertion. Cardiovascular: The patient denies having leg pain, leg swelling, heart palpitations, chest permit, chest pain, or lightheadedness.   The patient denies having dyspnea on exertion. Gastrointestinal: The patient denies having nausea, emesis, or diarrhea. The patient denies having any hematemesis or blood in the stool. Genitourinary: Patient denies having urinary urgency, frequency, or dysuria. The patient denies having blood in the urine. Psychological: The patient denies having symptoms of nervousness, anxiety, depression, or thoughts of harming himself some of this. Skin: Patient denies having skin ulcerations. The patient is complaining of a new rash on his arms and back. He also complains of some itching. Musculoskeletal: The patient denies having pain in the joints or bones. Objective:     Visit Vitals    /64    Pulse 66    Temp 99.2 °F (37.3 °C) (Oral)    Wt 83.5 kg (184 lb)    BMI 27.17 kg/m2     ECOG PS=0     Physical Exam:   Gen. Appearance: The patient is in no acute distress. Skin: There is a macular and papular scaling rash on her arms and back concerning for possible psoriasis. HEENT: The exam is unremarkable. Neck: Supple without lymphadenopathy or thyromegaly. Lungs: Clear to auscultation and percussion; there are no wheezes or rhonchi. Heart: Regular rate and rhythm; there are no murmurs, gallops, or rubs. Abdomen: Bowel sounds are present and normal.  There is no guarding, tenderness, or hepatosplenomegaly. Extremities: There is no clubbing, cyanosis, or edema. Neurologic: There are no focal neurologic deficits. Lymphatics: There is palpable cervical and supraclavicular lymphadenopathy only. Musculoskeletal: The patient has full range of motion at all joints. There is no evidence of joint deformity or effusions. There is no focal joint tenderness. Psychological/psychiatric: There is no clinical evidence of anxiety, depression, or melancholy.     Lab data:      Results for orders placed or performed during the hospital encounter of 12/16/16   CBC WITH 3 PART DIFF     Status: Abnormal   Result Value Ref Range Status    WBC 11.1 4.5 - 13.0 K/uL Final    RBC 5.04 4.10 - 5.10 M/uL Final    HGB 12.5 12.0 - 16.0 g/dL Final    HCT 40.4 36 - 48 % Final    MCV 80.2 78 - 102 FL Final    MCH 24.8 (L) 25.0 - 35.0 PG Final    MCHC 30.9 (L) 31 - 37 g/dL Final    RDW 15.2 (H) 11.5 - 14.5 % Final    PLATELET 222 954 - 453 K/uL Final    NEUTROPHILS 22 (L) 40 - 70 % Final    MIXED CELLS 5 0.1 - 17 % Final    LYMPHOCYTES 73 (H) 14 - 44 % Final    ABS. NEUTROPHILS 2.4 1.8 - 9.5 K/UL Final    ABS. MIXED CELLS 0.6 0.0 - 2.3 K/uL Final    ABS. LYMPHOCYTES 8.1 (H) 1.1 - 5.9 K/UL Final     Comment: Test performed at [address] Results Reviewed by Medical Director. DF AUTOMATED   Final           Assessment:     1. CLL (chronic lymphocytic leukemia) (HCC)    2. Lymphocytosis    3. Vitamin D deficiency      Plan:   CLL: I have explained to the patient and his current clinical status is stable. He has stable lymphadenopathy. The CBC from today shows that his lymphocyte count is 77.3 %. His total WBC count is 13.4. He has a normal hemoglobin of 12.5 g/dL with hematocrit of 39.4%. We will continue active surveillance. Systemic chemotherapy is still not recommended at this stage of his illness. Lymphocytosis: I have explained to the patient that he has 77.3 % lymphocytes on his peripheral blood count. The absolute lymphocyte count is elevated at 10.4 with a reference range of 1.1-5.9. These will continue to be monitored every 4 months. Vitamin d deficiency: I will check a vitamin D level today. If deficient, we will start a weekly supplement for 8-12 weeks,       The patient will return to the clinic for a complete assessment again in 4 months.   Orders Placed This Encounter    COMPLETE CBC & AUTO DIFF WBC    InHouse CBC ([email or web address])     Standing Status:   Future     Number of Occurrences:   1     Standing Expiration Date:   4/24/2018    VITAMIN D, 25 HYDROXY     Standing Status:   Future     Number of Occurrences:   1     Standing Expiration Date:   6/49/4094    METABOLIC PANEL, COMPREHENSIVE     Standing Status:   Future     Number of Occurrences:   1     Standing Expiration Date:   4/18/2019       Jacek Miller MD  4/17/2018

## 2018-04-18 LAB
25(OH)D3+25(OH)D2 SERPL-MCNC: 44.3 NG/ML (ref 30–100)
ALBUMIN SERPL-MCNC: 4.5 G/DL (ref 3.5–5.5)
ALBUMIN/GLOB SERPL: 1.7 {RATIO} (ref 1.2–2.2)
ALP SERPL-CCNC: 47 IU/L (ref 39–117)
ALT SERPL-CCNC: 16 IU/L (ref 0–44)
AST SERPL-CCNC: 13 IU/L (ref 0–40)
BILIRUB SERPL-MCNC: 0.3 MG/DL (ref 0–1.2)
BUN SERPL-MCNC: 10 MG/DL (ref 6–24)
BUN/CREAT SERPL: 10 (ref 9–20)
CALCIUM SERPL-MCNC: 9.1 MG/DL (ref 8.7–10.2)
CHLORIDE SERPL-SCNC: 99 MMOL/L (ref 96–106)
CO2 SERPL-SCNC: 22 MMOL/L (ref 18–29)
CREAT SERPL-MCNC: 1.03 MG/DL (ref 0.76–1.27)
GFR SERPLBLD CREATININE-BSD FMLA CKD-EPI: 80 ML/MIN/1.73
GFR SERPLBLD CREATININE-BSD FMLA CKD-EPI: 92 ML/MIN/1.73
GLOBULIN SER CALC-MCNC: 2.6 G/DL (ref 1.5–4.5)
GLUCOSE SERPL-MCNC: 171 MG/DL (ref 65–99)
POTASSIUM SERPL-SCNC: 4.2 MMOL/L (ref 3.5–5.2)
PROT SERPL-MCNC: 7.1 G/DL (ref 6–8.5)
SODIUM SERPL-SCNC: 140 MMOL/L (ref 134–144)

## 2018-06-28 ENCOUNTER — OFFICE VISIT (OUTPATIENT)
Dept: CARDIOLOGY CLINIC | Age: 58
End: 2018-06-28

## 2018-06-28 VITALS
BODY MASS INDEX: 26.96 KG/M2 | HEIGHT: 69 IN | DIASTOLIC BLOOD PRESSURE: 80 MMHG | WEIGHT: 182 LBS | OXYGEN SATURATION: 98 % | HEART RATE: 63 BPM | SYSTOLIC BLOOD PRESSURE: 110 MMHG

## 2018-06-28 DIAGNOSIS — I42.8 NONISCHEMIC CARDIOMYOPATHY (HCC): ICD-10-CM

## 2018-06-28 DIAGNOSIS — Z98.890 S/P ABLATION OPERATION FOR ARRHYTHMIA: ICD-10-CM

## 2018-06-28 DIAGNOSIS — I49.3 PVC (PREMATURE VENTRICULAR CONTRACTION): Primary | ICD-10-CM

## 2018-06-28 DIAGNOSIS — I10 ESSENTIAL HYPERTENSION: ICD-10-CM

## 2018-06-28 DIAGNOSIS — Z86.79 S/P ABLATION OPERATION FOR ARRHYTHMIA: ICD-10-CM

## 2018-06-28 NOTE — PROGRESS NOTES
History of Present Illness: A 61 y.o. male here for follow up. 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 He does get a bit tired at times but doing quite well. He has lot 5-6 pounds. He remains active. He denies any new chest pain, palpitations, dyspnea, presyncope, syncope, PND, orthopnea or edema. He had a PVC ablation January 2010 and has done very well.      Impression:   History of right coronary cusp PVC ablation January 2010 with follow up 1% burden, doing well. History of transient nonischemic cardiomyopathy improving to normal by most recent echocardiogram June 2016. No new symptoms of congestive heart failure. Remote alcohol use, discontinued. History of familial aortic aneurysm with abdominal ultrasound a few years ago, unremarkable. History of hypertension controlled on ACE inhibitor and beta-blocker. Borderline dyslipidemia on Zocor. His blood pressure is controlled. He has lost some weight. He remains active. He continues to work without any limitations as a . He gets a good night's sleep at this point and his sleep hygiene has improved through the years. He is doing quite well. I will plan to see him back in one year unless he has any changes clinically.        Past Medical History:   Diagnosis Date    Abnormal nuclear cardiac imaging test 11/30/2006    Mild anterior ischemia. Inferior scar w/border zone ischemia. LVE. EF 26%. (Gated imaging may be inaccurate.)  Global hypk. Neg EKG on max EST. Ex time 10:30.  Aorto-iliac duplex 09/10/2012    No AAA identified.  Carotid duplex 09/10/2012    No significant occlusive disease bilaterallly.  Echocardiogram 06/02/2015    EF 55%. No WMA. Mild LVH. Gr 1 DDfx. IMELDA. No significant chg from study of 5/17/10.  History of echocardiogram 06/20/2011    EF 50-55%. Gr 2 DDfx. Mild RVE. Mild LAE. Mild-mod IMELDA.      Hypercholesterolemia     Nonischemic cardiomyopathy [facility])     s/p right coronary cusp PVC ablation  (2/88/41) without complication    S/P cardiac cath 12/11/2006    Patent coronary arteries. LVEDP 12.  EF 25-30%. Mod-significant global hypk. Current Outpatient Prescriptions   Medication Sig Dispense Refill    ergocalciferol (ERGOCALCIFEROL) 50,000 unit capsule Take 1 Cap by mouth every seven (7) days. Indications: VITAMIN D DEFICIENCY 12 Cap 1    lisinopril (PRINIVIL, ZESTRIL) 5 mg tablet Take 1 Tab by mouth daily. 30 Tab 6    carvedilol (COREG) 12.5 mg tablet Take 1 Tab by mouth two (2) times a day. 60 Tab 6    simvastatin (ZOCOR) 10 mg tablet Take  by mouth daily.  aspirin delayed-release 81 mg tablet Take 81 mg by mouth daily. Social History   reports that he has quit smoking. He smoked 1.00 pack per day. He does not have any smokeless tobacco history on file. reports that he drinks alcohol. Family History  family history is not on file. Review of Systems  Except as stated above include:  Constitutional: Negative for fever, chills and malaise/fatigue. HEENT: No congestion or recent URI. Gastrointestinal: No nausea, vomiting, abdominal pain, bloody stools. Pulmonary:  Negative except as stated above. Cardiac:  Negative except as stated above. Musculoskeletal: Negative except as stated above. Neurological:  No localized symptoms. Skin:  Negative except as stated above. Psych:  Negative except as stated above. Endocrine:  Negative except as stated above. PHYSICAL EXAM  BP Readings from Last 3 Encounters:   06/28/18 110/80   04/17/18 106/64   06/29/17 120/78     Pulse Readings from Last 3 Encounters:   06/28/18 63   04/17/18 66   06/29/17 65     Wt Readings from Last 3 Encounters:   06/28/18 82.6 kg (182 lb)   04/17/18 83.5 kg (184 lb)   06/29/17 85.7 kg (189 lb)     General:   Well developed, well groomed. Head/Neck:   No jugular venous distention     No carotid bruits.     No evidence of xanthelasma. Lungs:   No respiratory distress. Clear bilaterally. Heart:    Regular rate and rhythm. Normal S1/S2. Palpation of heart with normal point of maximum impulse. No significant murmurs, rubs or gallops. Abdomen:   Soft and nontender. No palpable abdominal mass or bruits. Extremities:   Intact peripheral pulses. No significant edema. Neurological:   Alert and oriented to person, place, time. No focal neurological deficit visually.   Skin:   No obvious rash    Blood Pressure Metric:  Mayra Henson has been given the following recommendations today due to his elevated BP reading: controlled

## 2018-06-28 NOTE — PROGRESS NOTES
1. Have you been to the ER, urgent care clinic since your last visit? Hospitalized since your last visit? No     2. Have you seen or consulted any other health care providers outside of the [facility] since your last visit? Include any pap smears or colon screening.  No

## 2018-06-28 NOTE — MR AVS SNAPSHOT
[address] [address] [address] [address] [phone] Patient: Titi Miller MRN: [id number] Visit Information Date & Time Provider Department Dept. Phone Encounter #  
 6/28/2018  8:40 AM Rico Leyden, MD [facility] [phone] Your Appointments 8/17/2018  1:15 PM  
Office Visit with Mitzy Mcfarlane MD  
[address] [facility]) Appt Note: [address] [place] [address]  
[phone]  
  
   
 [facility] [address] [facility] [address] [place] [place] Upcoming Health Maintenance Date Due Hepatitis C Screening 1960 Pneumococcal 19-64 Highest Risk (1 of 3 - PCV13) 11/17/1978 DTaP/Tdap/Td series (1 - Tdap) 11/17/1980 FOBT Q 1 YEAR AGE 50-75 11/17/2009 Influenza Age 5 to Adult 8/1/2018 Allergies as of 6/28/2018  Review Complete On: 6/28/2018 By: Rico Leyden, MD  
 No Known Allergies Current Immunizations  Never Reviewed No immunizations on file. Not reviewed this visit You Were Diagnosed With   
  
 Codes Comments Nonischemic cardiomyopathy ([facility] [address]    -  Primary ICD-10-CM: I42.8 ICD-9-CM: 425.4 PVC (premature ventricular contraction)     ICD-10-CM: I49.3 ICD-9-CM: 427.69 S/P ablation operation for arrhythmia     ICD-10-CM: Z98.890, Z86.79 
ICD-9-CM: V45.89 Essential hypertension     ICD-10-CM: I10 
ICD-9-CM: 401.9 Vitals BP Pulse Height(growth percentile) Weight(growth percentile) SpO2 BMI  
 110/80 63 5' 9\" (1.753 m) 182 lb (82.6 kg) 98% 26.88 kg/m2 Smoking Status Former Smoker Vitals History BMI and BSA Data Body Mass Index Body Surface Area  
 26.88 kg/m 2 2.01 m 2 Preferred Pharmacy Pharmacy Name Phone [address] [facility] [address] [place], [address] [facility] [phone] Your Updated Medication List  
  
   
This list is accurate as of 6/28/18  9:08 AM.  Always use your most recent med list.  
  
  
  
  
 aspirin delayed-release 81 mg tablet Take 81 mg by mouth daily. carvedilol 12.5 mg tablet Commonly known as:  Kristen Ready Take 1 Tab by mouth two (2) times a day. ergocalciferol 50,000 unit capsule Commonly known as:  ERGOCALCIFEROL Take 1 Cap by mouth every seven (7) days. Indications: VITAMIN D DEFICIENCY  
  
 lisinopril 5 mg tablet Commonly known as:  Lollie Jump Take 1 Tab by mouth daily. simvastatin 10 mg tablet Commonly known as:  ZOCOR Take  by mouth daily. We Performed the Following AMB POC EKG ROUTINE W/ 12 LEADS, INTER & REP [98013 CPT(R)] Introducing [facility]! [employer] introduces [employer] patient portal. Now you can access parts of your medical record, email your doctor's office, and request medication refills online. 1. In your internet browser, go to [email or web address] 2. Click on the First Time User? Click Here link in the Sign In box. You will see the New Member Sign Up page. 3. Enter your Book[employer] Access Code exactly as it appears below. You will not need to use this code after youve completed the sign-up process. If you do not sign up before the expiration date, you must request a new code. · Book[employer] Access Code: [email or web address] Expires: 9/26/2018  9:08 AM 
 
4. Enter the last four digits of your Social Security Number (xxxx) and Date of Birth (mm/dd/yyyy) as indicated and click Submit. You will be taken to the next sign-up page. 5. Create a [employer] ID. This will be your Book[employer] login ID and cannot be changed, so think of one that is secure and easy to remember. 6. Create a [employer] password. You can change your password at any time. 7. Enter your Password Reset Question and Answer. This can be used at a later time if you forget your password. 8. Enter your e-mail address. You will receive e-mail notification when new information is available in [address] 9. Click Sign Up. You can now view and download portions of your medical record. 10. Click the Download Summary menu link to download a portable copy of your medical information. If you have questions, please visit the Frequently Asked Questions section of the [employer] website. Remember, [employer]verse is NOT to be used for urgent needs. For medical emergencies, dial 911. Now available from your iPhone and Android! Please provide this summary of care documentation to your next provider. Your primary care clinician is listed as Purvis Lennox. If you have any questions after today's visit, please call [phone].

## 2018-09-07 ENCOUNTER — HOSPITAL ENCOUNTER (OUTPATIENT)
Dept: ONCOLOGY | Age: 58
Discharge: HOME | End: 2018-09-07

## 2018-09-07 ENCOUNTER — OFFICE VISIT (OUTPATIENT)
Dept: ONCOLOGY | Age: 58
End: 2018-09-07

## 2018-09-07 VITALS
DIASTOLIC BLOOD PRESSURE: 66 MMHG | HEART RATE: 77 BPM | BODY MASS INDEX: 27.4 KG/M2 | SYSTOLIC BLOOD PRESSURE: 111 MMHG | WEIGHT: 185 LBS | TEMPERATURE: 98.5 F | RESPIRATION RATE: 18 BRPM | HEIGHT: 69 IN

## 2018-09-07 DIAGNOSIS — C91.10 CLL (CHRONIC LYMPHOCYTIC LEUKEMIA) (HCC): Primary | ICD-10-CM

## 2018-09-07 DIAGNOSIS — D72.820 LYMPHOCYTOSIS: ICD-10-CM

## 2018-09-07 DIAGNOSIS — E55.9 VITAMIN D DEFICIENCY: ICD-10-CM

## 2018-09-07 DIAGNOSIS — C91.10 CLL (CHRONIC LYMPHOCYTIC LEUKEMIA) (HCC): ICD-10-CM

## 2018-09-07 LAB
BASOPHILS # BLD: 0 K/UL (ref 0–0.06)
BASOPHILS NFR BLD: 0 % (ref 0–3)
DIFFERENTIAL METHOD BLD: ABNORMAL
EOSINOPHIL # BLD: 0 K/UL (ref 0–0.4)
EOSINOPHIL NFR BLD: 0 % (ref 0–5)
ERYTHROCYTE [DISTWIDTH] IN BLOOD BY AUTOMATED COUNT: 15.5 % (ref 11.6–14.5)
HCT VFR BLD AUTO: 39.3 % (ref 36–48)
HGB BLD-MCNC: 12.9 G/DL (ref 13–16)
LYMPHOCYTES # BLD: 12.7 K/UL (ref 0.8–3.5)
LYMPHOCYTES NFR BLD: 74 % (ref 20–51)
MCH RBC QN AUTO: 25.8 PG (ref 24–34)
MCHC RBC AUTO-ENTMCNC: 32.8 G/DL (ref 31–37)
MCV RBC AUTO: 78.6 FL (ref 74–97)
MONOCYTES # BLD: 0.3 K/UL (ref 0–1)
MONOCYTES NFR BLD: 2 % (ref 2–9)
NEUTS SEG # BLD: 4.1 K/UL (ref 1.8–8)
NEUTS SEG NFR BLD: 24 % (ref 42–75)
PLATELET # BLD AUTO: 166 K/UL (ref 135–420)
PLATELET COMMENTS,PCOM: ABNORMAL
PMV BLD AUTO: 11 FL (ref 9.2–11.8)
RBC # BLD AUTO: 5 M/UL (ref 4.7–5.5)
RBC MORPH BLD: ABNORMAL
WBC # BLD AUTO: 17.1 K/UL (ref 4.6–13.2)

## 2018-09-07 NOTE — MR AVS SNAPSHOT
[address] [facility] [address] [facility] [address] 
[phone] Patient: Avis Bhagat MRN: [id number] NYP:[id number]/[id number] Visit Information Date & Time Provider Department Dept. Phone Encounter #  
 9/7/2018  2:30 PM MD [facility]  [phone] [id number] Follow-up Instructions Return in about 4 months (around 1/7/2019). Your Appointments 1/11/2019  3:00 PM  
Office Visit with MD [facility]  [facility]) Appt Note: [address] [facility] [address] [facility] [address]  
[phone]  
  
   
 [facility] [address] [facility] [address]  
  
    
 6/27/2019  8:20 AM  
Follow Up with Keyonna Fraga MD  
[facility] Appt Note: 1 yr f/up [facility] [address] [address] [address] [phone] [address] [place] [address] [address] Upcoming Health Maintenance Date Due Hepatitis C Screening 1960 Pneumococcal 19-64 Highest Risk (1 of 3 - PCV13) 11/17/1978 DTaP/Tdap/Td series (1 - Tdap) 11/17/1980 FOBT Q 1 YEAR AGE 50-75 11/17/2009 Influenza Age 5 to Adult 8/1/2018 Allergies as of 9/7/2018  Review Complete On: 9/7/2018 By: Jabari Torres MD  
 No Known Allergies Current Immunizations  Never Reviewed No immunizations on file. Not reviewed this visit You Were Diagnosed With   
  
 Codes Comments [facility] lymph[facility]    -  Primary ICD-10-CM: C91.90 ICD-9-CM: 204.10 Vitamin D deficiency     ICD-10-CM: E55.9 ICD-9-CM: 268.9 Lymphocytosis     ICD-10-CM: G86.333 ICD-9-CM: 288.61 Vitals BP Pulse Temp Resp Height(growth percentile) Weight(growth percentile) 111/66 (BP 1 Location: Left arm, BP Patient Position: Sitting) 77 98.5 °F (36.9 °C) (Oral) 18 5' 9\" (1.753 m) 185 lb (83.9 kg) BMI Smoking Status 27.32 kg/m2 Former Smoker BMI and BSA Data Body Mass Index Body Surface Area  
 27.32 kg/m 2 2.02 m 2 Preferred Pharmacy Pharmacy Name Phone [address] [place] [address] [phone] Your Updated Medication List  
  
   
This list is accurate as of 9/7/18  3:20 PM.  Always use your most recent med list.  
  
  
  
  
 aspirin delayed-release 81 mg tablet Take 81 mg by mouth daily. carvedilol 12.5 mg tablet Commonly known as:  Jestine Pellet Take 1 Tab by mouth two (2) times a day. ergocalciferol 50,000 unit capsule Commonly known as:  ERGOCALCIFEROL Take 1 Cap by mouth every seven (7) days. Indications: VITAMIN D DEFICIENCY  
  
 lisinopril 5 mg tablet Commonly known as:  Patricia Loss Take 1 Tab by mouth daily. simvastatin 10 mg tablet Commonly known as:  ZOCOR Take  by mouth daily. We Performed the Following COMPLETE CBC & AUTO DIFF WBC [09076 CPT(R)] Follow-up Instructions Return in about 4 months (around 1/7/2019). To-Do List   
 09/07/2018 Lab:  CBC WITH 3 PART DIFF Patient Instructions Chronic Lymphocytic Leukemia: Care Instructions Your Care Instructions Leukemia is a type of cancer that causes your body to make too many blood cells, especially white blood cells. White blood cells are a part of your immune system, which helps protect you from infection and disease. In chronic lymphocytic leukemia (CLL), your body makes large numbers of white blood cells called lymphocytes. The cells may work as they should, but your body makes too many of them.  Over time, these cells may not work as well, and they may cause symptoms as they begin to crowd out healthy white blood cells and other parts of your blood. There are several treatments for CLL, including chemotherapy, targeted therapy, or radiation therapy. But because CLL may get worse slowly, sometimes treatment can wait. Your doctor will follow your progress and let you know if or when you need treatment. When you find out that you have cancer, you may feel many emotions and may need some help coping. Seek out family, friends, and counselors for support. You also can do things at home to make yourself feel better while you go through treatment. Call the [employer] [facility] [facility] ([phone]) or visit its website at 26[address] [employer]. [employer] for more information. Follow-up care is a key part of your treatment and safety. Be sure to make and go to all appointments, and call your doctor if you are having problems. It's also a good idea to know your test results and keep a list of the medicines you take. How can you care for yourself at home? · You may get medicine for nausea, vomiting, or pain (although leukemia rarely causes pain). Take your medicines exactly as prescribed. Call your doctor if you think you are having a problem with your medicine. You will get more details on the specific medicines your doctor prescribes. · Eat healthy food. If you do not feel like eating, try to eat food that has protein and extra calories to keep up your strength and prevent weight loss. Drink liquid meal replacements for extra calories and protein. Try to eat your main meal early. · Get some physical activity every day, but do not get too tired. Keep doing the hobbies you enjoy as your energy allows. · Take steps to control your stress and workload. Learn relaxation techniques. ¨ Share your feelings. Stress and tension affect our emotions. By expressing your feelings to others, you may be able to understand and cope with them. ¨ Consider joining a support group.  Talking about a problem with your spouse, a good friend, or other people with similar problems is a good way to reduce tension and stress. ¨ Express yourself through art. Try writing, dance, art, or crafts to relieve tension. Some dance, writing, or art groups may be available just for people who have cancer. ¨ Be kind to your body and mind. Getting enough sleep, eating a nutritious diet, and taking time to do things you enjoy can contribute to an overall feeling of balance in your life and help reduce stress. ¨ Get help if you need it. Discuss your concerns with your doctor or counselor. · If you are vomiting or have diarrhea: ¨ Drink plenty of fluids (enough so that your urine is light yellow or clear like water) to prevent dehydration. Choose water and other caffeine-free clear liquids. If you have kidney, heart, or liver disease and have to limit fluids, talk with your doctor before you increase the amount of fluids you drink. ¨ When you are able to eat, try clear soups, mild foods, and liquids until all symptoms are gone for 12 to 48 hours. Other good choices include dry toast, crackers, cooked cereal, and gelatin dessert, such as Jell-O. · Avoid colds and flu. Get a pneumococcal vaccine shot. If you have had one before, ask your doctor whether you need another dose. Get a flu shot every year. If you must be around people with colds or flu, wash your hands often. · Do not smoke. If you need help quitting, talk to your doctor about stop-smoking programs and medicines. These can increase your chances of quitting for good. When should you call for help? Call 911 anytime you think you may need emergency care.  For example, call if: 
  · You passed out (lost consciousness).  
 Call your doctor now or seek immediate medical care if: 
  · You have a fever.  
  · You have abnormal bleeding.  
  · You think you have an infection.  
  · You have new or worse pain.  
  · You have new symptoms, such as a cough, belly pain, vomiting, diarrhea, or a rash.  
 Watch closely for changes in your health, and be sure to contact your doctor if: 
  · You are much more tired than usual.  
  · You have swollen glands in your armpits, groin, or neck.  
  · You do not get better as expected. Where can you learn more? Go to http://yajaira-jose.info/. Enter V912 in the search box to learn more about \"Chronic Lymphocytic Leukemia: Care Instructions. \" Current as of: May 12, 2017 Content Version: 11.7 © 6426-4364 LookAcross. Care instructions adapted under license by [employer] (which disclaims liability or warranty for this information). If you have questions about a medical condition or this instruction, always ask your healthcare professional. [address] any warranty or liability for your use of this information. Introducing [facility]! Haris Morris introduces [employer] patient portal. Now you can access parts of your medical record, email your doctor's office, and request medication refills online. 1. In your internet browser, go to [email or web address] 2. Click on the First Time User? Click Here link in the Sign In box. You will see the New Member Sign Up page. 3. Enter your [employer] Access Code exactly as it appears below. You will not need to use this code after youve completed the sign-up process. If you do not sign up before the expiration date, you must request a new code. · [employer] Access Code: [email or web address] Expires: 9/26/2018  9:08 AM 
 
4. Enter the last four digits of your Social Security Number (xxxx) and Date of Birth (mm/dd/yyyy) as indicated and click Submit. You will be taken to the next sign-up page. 5. Create a Handyt ID. This will be your [employer] login ID and cannot be changed, so think of one that is secure and easy to remember. 6. Create a Handyt password. You can change your password at any time. 7. Enter your Password Reset Question and Answer. This can be used at a later time if you forget your password. 8. Enter your e-mail address. You will receive e-mail notification when new information is available in [address] 9. Click Sign Up. You can now view and download portions of your medical record. 10. Click the Download Summary menu link to download a portable copy of your medical information. If you have questions, please visit the Frequently Asked Questions section of the [employer] website. Remember, [employer] is NOT to be used for urgent needs. For medical emergencies, dial 911. Now available from your iPhone and Android! Please provide this summary of care documentation to your next provider. Your primary care clinician is listed as Charles Nolan. If you have any questions after today's visit, please call [phone].

## 2018-09-07 NOTE — PROGRESS NOTES
Hematology/Oncology  Progress Note    Name: Shelby Devi  Date: 2018  : 1960    PCP: Barbraa Omer MD     Mr. Deysi Vizcarra is a 61year old male who was seen for management of his chronic lymphocytic leukemia. Current therapy: Surveillance    Subjective:     Mr. Deysi Vizcarra is a 50-year-old [place] American American man who has a stage I chronic lymphocytic leukemia. The patient reports that he has been doing well. He has no complaints of fevers, night sweats, or weight loss. The patient states that his energy level has been good. His appetite is excellent and he has no new physical complaints or concerns to report. Additionally he denies having any problems with unexplained bruising or bleeding. Past medical history, family history, and social history: these were reviewed and remains unchanged. Past Medical History:   Diagnosis Date    Abnormal nuclear cardiac imaging test 2006    Mild anterior ischemia. Inferior scar w/border zone ischemia. LVE. EF 26%. (Gated imaging may be inaccurate.)  Global hypk. Neg EKG on max EST. Ex time 10:30.  Aorto-iliac duplex 09/10/2012    No AAA identified.  Carotid duplex 09/10/2012    No significant occlusive disease bilaterallly.  Echocardiogram 2015    EF 55%. No WMA. Mild LVH. Gr 1 DDfx. IMELDA. No significant chg from study of 5/17/10.  History of echocardiogram 2011    EF 50-55%. Gr 2 DDfx. Mild RVE. Mild LAE. Mild-mod IMELDA.  Hypercholesterolemia     Nonischemic cardiomyopathy (HCC)     s/p right coronary cusp PVC ablation  (1/15/45) without complication    S/P cardiac cath 2006    Patent coronary arteries. LVEDP 12.  EF 25-30%. Mod-significant global hypk. Past Surgical History:   Procedure Laterality Date    HX HEART CATHETERIZATION  06    The right coronary artery is dominant. It is widely patent. The left main coronary artery is widely patent. The circumflex artery is widely patent. The left anterior descending coronary artery is widely patent. The LVEDP is 12 mmHg. The overall left ventricular systolic function is significantly diminished with an estimated ejection fraction of 25-30%. ** See report. Social History     Social History    Marital status:      Spouse name: N/A    Number of children: N/A    Years of education: N/A     Occupational History    Not on file. Social History Main Topics    Smoking status: Former Smoker     Packs/day: 1.00    Smokeless tobacco: Not on file    Alcohol use Yes    Drug use: Not on file    Sexual activity: Not on file     Other Topics Concern    Not on file     Social History Narrative     No family history on file. Current Outpatient Prescriptions   Medication Sig Dispense Refill    ergocalciferol (ERGOCALCIFEROL) 50,000 unit capsule Take 1 Cap by mouth every seven (7) days. Indications: VITAMIN D DEFICIENCY 12 Cap 1    lisinopril (PRINIVIL, ZESTRIL) 5 mg tablet Take 1 Tab by mouth daily. 30 Tab 6    carvedilol (COREG) 12.5 mg tablet Take 1 Tab by mouth two (2) times a day. 60 Tab 6    simvastatin (ZOCOR) 10 mg tablet Take  by mouth daily.  aspirin delayed-release 81 mg tablet Take 81 mg by mouth daily. Review of Systems  Constitutional: The patient has no acute distress or discomfort. HEENT: The patient denies recent head trauma, eye pain, blurred vision,  hearing deficit, oropharyngeal mucosal pain or lesions, and the patient denies throat pain or discomfort. Lymphatics: The patient denies palpable peripheral lymphadenopathy. Hematologic: The patient denies having bruising, bleeding, or progressive fatigue. Respiratory: Patient denies having shortness of breath, cough, sputum production, fever, or dyspnea on exertion. Cardiovascular: The patient denies having leg pain, leg swelling, heart palpitations, chest permit, chest pain, or lightheadedness.   The patient denies having dyspnea on exertion. Gastrointestinal: The patient denies having nausea, emesis, or diarrhea. The patient denies having any hematemesis or blood in the stool. Genitourinary: Patient denies having urinary urgency, frequency, or dysuria. The patient denies having blood in the urine. Psychological: The patient denies having symptoms of nervousness, anxiety, depression, or thoughts of harming himself some of this. Skin: Patient denies having skin ulcerations. The patient is complaining of a new rash on his arms and back. He also complains of some itching. Musculoskeletal: The patient denies having pain in the joints or bones. Objective:     Visit Vitals    /66 (BP 1 Location: Left arm, BP Patient Position: Sitting)    Pulse 77    Temp 98.5 °F (36.9 °C) (Oral)    Resp 18    Ht 5' 9\" (1.753 m)    Wt 83.9 kg (185 lb)    BMI 27.32 kg/m2     ECOG PS=0     Physical Exam:   Gen. Appearance: The patient is in no acute distress. Skin: There is a macular and papular scaling rash on her arms and back concerning for possible psoriasis. HEENT: The exam is unremarkable. Neck: Supple without lymphadenopathy or thyromegaly. Lungs: Clear to auscultation and percussion; there are no wheezes or rhonchi. Heart: Regular rate and rhythm; there are no murmurs, gallops, or rubs. Abdomen: Bowel sounds are present and normal.  There is no guarding, tenderness, or hepatosplenomegaly. Extremities: There is no clubbing, cyanosis, or edema. Neurologic: There are no focal neurologic deficits. Lymphatics: There is palpable cervical and supraclavicular lymphadenopathy only. Musculoskeletal: The patient has full range of motion at all joints. There is no evidence of joint deformity or effusions. There is no focal joint tenderness. Psychological/psychiatric: There is no clinical evidence of anxiety, depression, or melancholy.     Lab data:      Results for orders placed or performed during the hospital encounter of 12/16/16   CBC WITH 3 PART DIFF     Status: Abnormal   Result Value Ref Range Status    WBC 11.1 4.5 - 13.0 K/uL Final    RBC 5.04 4.10 - 5.10 M/uL Final    HGB 12.5 12.0 - 16.0 g/dL Final    HCT 40.4 36 - 48 % Final    MCV 80.2 78 - 102 FL Final    MCH 24.8 (L) 25.0 - 35.0 PG Final    MCHC 30.9 (L) 31 - 37 g/dL Final    RDW 15.2 (H) 11.5 - 14.5 % Final    PLATELET 089 664 - 275 K/uL Final    NEUTROPHILS 22 (L) 40 - 70 % Final    MIXED CELLS 5 0.1 - 17 % Final    LYMPHOCYTES 73 (H) 14 - 44 % Final    ABS. NEUTROPHILS 2.4 1.8 - 9.5 K/UL Final    ABS. MIXED CELLS 0.6 0.0 - 2.3 K/uL Final    ABS. LYMPHOCYTES 8.1 (H) 1.1 - 5.9 K/UL Final     Comment: Test performed at [place] [address] Location. Results Reviewed by Medical Director. DF AUTOMATED   Final           Assessment:     1. CLL (chronic lymphocytic leukemia) ([facility]    2. Vitamin D deficiency    3. Lymphocytosis      Plan:   CLL: I have explained to the patient and his current clinical status is stable. He has stable lymphadenopathy. The CBC from today shows that his lymphocyte count is 79.6 %. His total WBC count is 16.9. He has a normal hemoglobin of 12.6 g/dL with hematocrit of 39.5 %. We will continue active surveillance. Systemic chemotherapy is still not recommended at this stage of his illness. Lymphocytosis: I have explained to the patient that he has 79.6 % lymphocytes on his peripheral blood count. The absolute lymphocyte count is elevated at 13.5 with a reference range of 1.1-5.9. These will continue to be monitored every 4 months. Vitamin d deficiency: I will check a vitamin D level today. If deficient, we will start a weekly supplement for 8-12 weeks,       The patient will return to the clinic for a complete assessment again in 4 months.   Orders Placed This Encounter    COMPLETE CBC & AUTO DIFF WBC    InHouse CBC (Balloon)     Standing Status:   Future     Number of Occurrences:   1     Standing Expiration Date:   9/14/2018   Destiney Avalos METABOLIC PANEL, COMPREHENSIVE     Standing Status:   Future     Number of Occurrences:   1     Standing Expiration Date:   9/8/2019    VITAMIN D, 25 HYDROXY     Standing Status:   Future     Number of Occurrences:   1     Standing Expiration Date:   9/8/2019       Gina Be MD  9/7/2018

## 2018-09-07 NOTE — PATIENT INSTRUCTIONS
Chronic Lymphocytic Leukemia: Care Instructions Your Care Instructions Leukemia is a type of cancer that causes your body to make too many blood cells, especially white blood cells. White blood cells are a part of your immune system, which helps protect you from infection and disease. In chronic lymphocytic leukemia (CLL), your body makes large numbers of white blood cells called lymphocytes. The cells may work as they should, but your body makes too many of them. Over time, these cells may not work as well, and they may cause symptoms as they begin to crowd out healthy white blood cells and other parts of your blood. There are several treatments for CLL, including chemotherapy, targeted therapy, or radiation therapy. But because CLL may get worse slowly, sometimes treatment can wait. Your doctor will follow your progress and let you know if or when you need treatment. When you find out that you have cancer, you may feel many emotions and may need some help coping. Seek out family, friends, and counselors for support. You also can do things at home to make yourself feel better while you go through treatment. Call the [address] [place], [place] [address] ([phone]) or visit its website at 9737 [employer]. SpotFodo for more information. Follow-up care is a key part of your treatment and safety. Be sure to make and go to all appointments, and call your doctor if you are having problems. It's also a good idea to know your test results and keep a list of the medicines you take. How can you care for yourself at home? · You may get medicine for nausea, vomiting, or pain (although leukemia rarely causes pain). Take your medicines exactly as prescribed. Call your doctor if you think you are having a problem with your medicine. You will get more details on the specific medicines your doctor prescribes. · Eat healthy food.  If you do not feel like eating, try to eat food that has protein and extra calories to keep up your strength and prevent weight loss. Drink liquid meal replacements for extra calories and protein. Try to eat your main meal early. · Get some physical activity every day, but do not get too tired. Keep doing the hobbies you enjoy as your energy allows. · Take steps to control your stress and workload. Learn relaxation techniques. ¨ Share your feelings. Stress and tension affect our emotions. By expressing your feelings to others, you may be able to understand and cope with them. ¨ Consider joining a support group. Talking about a problem with your spouse, a good friend, or other people with similar problems is a good way to reduce tension and stress. ¨ Express yourself through art. Try writing, dance, art, or crafts to relieve tension. Some dance, writing, or art groups may be available just for people who have cancer. ¨ Be kind to your body and mind. Getting enough sleep, eating a nutritious diet, and taking time to do things you enjoy can contribute to an overall feeling of balance in your life and help reduce stress. ¨ Get help if you need it. Discuss your concerns with your doctor or counselor. · If you are vomiting or have diarrhea: ¨ Drink plenty of fluids (enough so that your urine is light yellow or clear like water) to prevent dehydration. Choose water and other caffeine-free clear liquids. If you have kidney, heart, or liver disease and have to limit fluids, talk with your doctor before you increase the amount of fluids you drink. ¨ When you are able to eat, try clear soups, mild foods, and liquids until all symptoms are gone for 12 to 48 hours. Other good choices include dry toast, crackers, cooked cereal, and gelatin dessert, such as Jell-O. · Avoid colds and flu. Get a pneumococcal vaccine shot. If you have had one before, ask your doctor whether you need another dose.  Get a flu shot every year. If you must be around people with colds or flu, wash your hands often. · Do not smoke. If you need help quitting, talk to your doctor about stop-smoking programs and medicines. These can increase your chances of quitting for good. When should you call for help? Call 911 anytime you think you may need emergency care. For example, call if: 
  · You passed out (lost consciousness).  
 Call your doctor now or seek immediate medical care if: 
  · You have a fever.  
  · You have abnormal bleeding.  
  · You think you have an infection.  
  · You have new or worse pain.  
  · You have new symptoms, such as a cough, belly pain, vomiting, diarrhea, or a rash.  
 Watch closely for changes in your health, and be sure to contact your doctor if: 
  · You are much more tired than usual.  
  · You have swollen glands in your armpits, groin, or neck.  
  · You do not get better as expected. Where can you learn more? Go to http://yajaira-jose.info/. Enter G179 in the search box to learn more about \"Chronic Lymphocytic Leukemia: Care Instructions. \" Current as of: May 12, 2017 Content Version: 11.7 © 3237-9205 [employer]. Care instructions adapted under license by [employer] (which disclaims liability or warranty for this information). If you have questions about a medical condition or this instruction, always ask your healthcare professional. [address] any warranty or liability for your use of this information.

## 2018-09-10 LAB
25(OH)D3+25(OH)D2 SERPL-MCNC: 19.8 NG/ML (ref 30–100)
ALBUMIN SERPL-MCNC: 4.7 G/DL (ref 3.5–5.5)
ALBUMIN/GLOB SERPL: 1.6 {RATIO} (ref 1.2–2.2)
ALP SERPL-CCNC: 55 IU/L (ref 39–117)
ALT SERPL-CCNC: 17 IU/L (ref 0–44)
AST SERPL-CCNC: 18 IU/L (ref 0–40)
BILIRUB SERPL-MCNC: 0.3 MG/DL (ref 0–1.2)
BUN SERPL-MCNC: 21 MG/DL (ref 6–24)
BUN/CREAT SERPL: 16 (ref 9–20)
CALCIUM SERPL-MCNC: 9.7 MG/DL (ref 8.7–10.2)
CHLORIDE SERPL-SCNC: 99 MMOL/L (ref 96–106)
CO2 SERPL-SCNC: 22 MMOL/L (ref 20–29)
CREAT SERPL-MCNC: 1.3 MG/DL (ref 0.76–1.27)
GLOBULIN SER CALC-MCNC: 2.9 G/DL (ref 1.5–4.5)
GLUCOSE SERPL-MCNC: 124 MG/DL (ref 65–99)
POTASSIUM SERPL-SCNC: 4.6 MMOL/L (ref 3.5–5.2)
PROT SERPL-MCNC: 7.6 G/DL (ref 6–8.5)
SODIUM SERPL-SCNC: 140 MMOL/L (ref 134–144)

## 2018-09-11 DIAGNOSIS — E55.9 VITAMIN D DEFICIENCY: Primary | ICD-10-CM

## 2018-09-11 RX ORDER — ERGOCALCIFEROL 1.25 MG/1
50000 CAPSULE ORAL
Qty: 12 CAP | Refills: 1 | Status: SHIPPED | OUTPATIENT
Start: 2018-09-11 | End: 2019-01-07 | Stop reason: SDUPTHER

## 2018-10-09 ENCOUNTER — OFFICE VISIT (OUTPATIENT)
Dept: UROLOGY | Age: 58
End: 2018-10-09

## 2018-10-09 ENCOUNTER — HOSPITAL ENCOUNTER (OUTPATIENT)
Dept: LAB | Age: 58
Discharge: HOME | End: 2018-10-09
Payer: COMMERCIAL

## 2018-10-09 VITALS
SYSTOLIC BLOOD PRESSURE: 137 MMHG | BODY MASS INDEX: 27.4 KG/M2 | HEIGHT: 69 IN | HEART RATE: 82 BPM | DIASTOLIC BLOOD PRESSURE: 84 MMHG | OXYGEN SATURATION: 95 % | WEIGHT: 185 LBS

## 2018-10-09 DIAGNOSIS — N40.0 BENIGN PROSTATIC HYPERPLASIA WITHOUT LOWER URINARY TRACT SYMPTOMS: ICD-10-CM

## 2018-10-09 DIAGNOSIS — R97.20 PSA ELEVATION: ICD-10-CM

## 2018-10-09 DIAGNOSIS — R97.20 PSA ELEVATION: Primary | ICD-10-CM

## 2018-10-09 LAB — PSA SERPL-MCNC: 5.1 NG/ML (ref 0–4)

## 2018-10-09 PROCEDURE — 84153 ASSAY OF PSA TOTAL: CPT | Performed by: UROLOGY

## 2018-10-09 NOTE — PROGRESS NOTES
Mr. Christina Munroe has a reminder for a \"due or due soon\" health maintenance. I have asked that he contact his primary care provider for follow-up on this health maintenance.

## 2018-10-09 NOTE — PATIENT INSTRUCTIONS

## 2018-10-09 NOTE — MR AVS SNAPSHOT
[address] [facility] [place] [address] [address] 
[phone] Patient: Reyna Marquez MRN: [id number] Visit Information Date & Time Provider Department Dept. Phone Encounter #  
 10/9/2018  2:15 PM Rico Gomes, [facility] [phone] [id number] Your Appointments 1/11/2019  3:00 PM  
Office Visit with Daniel Childers MD  
[address] [facility] Appt Note: 100[address] [facility] [address] [facility] [address]  
[phone]  
  
   
 [place] [address] [facility] [address]  
  
    
 6/27/2019  8:20 AM  
Follow Up with Dae Ga MD  
[facility] Appt Note: 1 yr f/up Luis Alberto [place] Flow [id number] [phone] [address] Upcoming Health Maintenance Date Due Hepatitis C Screening 1960 Pneumococcal 19-64 Highest Risk (1 of 3 - PCV13) 11/17/1978 DTaP/Tdap/Td series (1 - Tdap) 11/17/1980 Shingrix Vaccine Age 50> (1 of 2) 11/17/2009 FOBT Q 1 YEAR AGE 50-75 11/17/2009 Influenza Age 5 to Adult 8/1/2018 Allergies as of 10/9/2018  Review Complete On: 10/9/2018 By: Jer Yanes LPN No Known Allergies Current Immunizations  Never Reviewed No immunizations on file. Not reviewed this visit You Were Diagnosed With   
  
 Codes Comments Benign prostatic hyperplasia without lower urinary tract symptoms    -  Primary ICD-10-CM: N40.0 ICD-9-CM: 600.00   
 PSA elevation     ICD-10-CM: R97.20 ICD-9-CM: 790.93 Vitals BP Pulse Height(growth percentile) Weight(growth percentile) SpO2 BMI  
 137/84 (BP 1 Location: Left arm, BP Patient Position: Sitting) 82 5' 9\" (1.753 m) 185 lb (83.9 kg) 95% 27.32 kg/m2 Smoking Status Former Smoker Vitals History BMI and BSA Data Body Mass Index Body Surface Area  
 27.32 kg/m 2 2.02 m 2 Preferred Pharmacy Pharmacy Name Phone [address] [facility] [address] [phone] Your Updated Medication List  
  
   
This list is accurate as of 10/9/18  4:06 PM.  Always use your most recent med list.  
  
  
  
  
 aspirin delayed-release 81 mg tablet Take 81 mg by mouth daily. carvedilol 12.5 mg tablet Commonly known as:  Elijah Gram Take 1 Tab by mouth two (2) times a day. ergocalciferol 50,000 unit capsule Commonly known as:  ERGOCALCIFEROL Take 1 Cap by mouth every seven (7) days. Indications: Vitamin D Deficiency JANUMET PO Take  by mouth.  
  
 lisinopril 5 mg tablet Commonly known as:  Elnita Waelder Take 1 Tab by mouth daily. simvastatin 10 mg tablet Commonly known as:  ZOCOR Take  by mouth daily. We Performed the Following AMB POC URINALYSIS DIP STICK AUTO W/O MICRO [94226 CPT(R)] Patient Instructions Benign Prostatic Hyperplasia: Care Instructions Your Care Instructions Benign prostatic hyperplasia, or BPH, is an enlarged prostate gland. The prostate is a small gland that makes some of the fluid in semen. Prostate enlargement happens to almost all men as they age. It is usually not serious. BPH does not cause prostate cancer. As the prostate gets bigger, it may partly block the flow of urine. You may have a hard time getting a urine stream started or completely stopped. BPH can cause dribbling. You may have a weak urine stream, or you may have to urinate more often than you used to, especially at night. Most men find these problems easy to manage. You do not need treatment unless your symptoms bother you a lot or you have other problems, such as bladder infections or stones.  In these cases, medicines may help. Surgery is not needed unless the urine flow is blocked or the symptoms do not get better with medicine. Follow-up care is a key part of your treatment and safety. Be sure to make and go to all appointments, and call your doctor if you are having problems. It's also a good idea to know your test results and keep a list of the medicines you take. How can you care for yourself at home? · Take plenty of time to urinate. Try to relax. · Try \"double voiding. \" Urinate as much you can, relax for a few moments, and then try to urinate again. · Sit on the toilet to urinate. · Read or think of other things while you are waiting. · Turn on a faucet, or try to picture running water. Some men find that this helps get their urine flowing. · If dribbling is a problem, wash your penis daily to avoid skin irritation and infection. · Avoid caffeine and alcohol. These drinks will increase how often you need to urinate. Spread your fluid intake throughout the day. If the urge to urinate often wakes you at night, limit your fluid intake in the evening. Urinate right before you go to bed. · Many over-the-counter cold and allergy medicines can make the symptoms of BPH worse. Avoid antihistamines, decongestants, and allergy pills, if you can. Read the warnings on the package. · If you take any prescription medicines, especially tranquilizers or antidepressants, ask your doctor or pharmacist whether they can cause urination problems. There may be other medicines you can use that do not cause urinary problems. · Be safe with medicines. Take your medicines exactly as prescribed. Call your doctor if you think you are having a problem with your medicine. When should you call for help? Call your doctor now or seek immediate medical care if: 
  · You cannot urinate at all.  
  · You have symptoms of a urinary infection. For example: ¨ You have blood or pus in your urine. ¨ You have pain in your back just below your rib cage. This is called flank pain. ¨ You have a fever, chills, or body aches. ¨ It hurts to urinate. ¨ You have groin or belly pain.  
 Watch closely for changes in your health, and be sure to contact your doctor if: 
  · It hurts when you ejaculate.  
  · Your urinary problems get a lot worse or bother you a lot. Where can you learn more? Go to http://yajaira-jose.info/. Enter R980 in the search box to learn more about \"Benign Prostatic Hyperplasia: Care Instructions. \" Current as of: December 3, 2017 Content Version: 11.8 © 0067-1940 [employer]. Care instructions adapted under license by [employer] (which disclaims liability or warranty for this information). If you have questions about a medical condition or this instruction, always ask your healthcare professional. [place] [address] any warranty or liability for your use of this information. Introducing [facility]! Cecil Corral introduces Sprio patient portal. Now you can access parts of your medical record, email your doctor's office, and request medication refills online. 1. In your internet browser, go to [email or web address] 2. Click on the First Time User? Click Here link in the Sign In box. You will see the New Member Sign Up page. 3. Enter your Sprio Access Code exactly as it appears below. You will not need to use this code after youve completed the sign-up process. If you do not sign up before the expiration date, you must request a new code. · Sprio Access Code: [email or web address] Expires: 1/7/2019  4:06 PM 
 
4. Enter the last four digits of your Social Security Number (xxxx) and Date of Birth (mm/dd/yyyy) as indicated and click Submit. You will be taken to the next sign-up page. 5. Create a Sprio ID.  This will be your Sprio login ID and cannot be changed, so think of one that is secure and easy to remember. 6. Create a stylemarks password. You can change your password at any time. 7. Enter your Password Reset Question and Answer. This can be used at a later time if you forget your password. 8. Enter your e-mail address. You will receive e-mail notification when new information is available in [address] 9. Click Sign Up. You can now view and download portions of your medical record. 10. Click the Download Summary menu link to download a portable copy of your medical information. If you have questions, please visit the Frequently Asked Questions section of the stylemarks website. Remember, stylemarks is NOT to be used for urgent needs. For medical emergencies, dial 911. Now available from your iPhone and Android! Please provide this summary of care documentation to your next provider. Your primary care clinician is listed as Karen Amato. If you have any questions after today's visit, please call [phone].

## 2018-10-10 LAB
BILIRUB UR QL STRIP: NEGATIVE
GLUCOSE UR-MCNC: NEGATIVE MG/DL
KETONES P FAST UR STRIP-MCNC: NEGATIVE MG/DL
PH UR STRIP: 5.5 [PH] (ref 4.6–8)
PROT UR QL STRIP: NORMAL
SP GR UR STRIP: 1.03 (ref 1–1.03)
UA UROBILINOGEN AMB POC: NORMAL (ref 0.2–1)
URINALYSIS CLARITY POC: CLEAR
URINALYSIS COLOR POC: YELLOW
URINE BLOOD POC: NEGATIVE
URINE LEUKOCYTES POC: NEGATIVE
URINE NITRITES POC: NEGATIVE

## 2018-10-10 NOTE — PROGRESS NOTES
PSA 5.1 on 9 October 2018 Prostate biopsy is indicated and recommended patient is scheduled to come back to the office later this month for scheduling of prostate biopsy Darrius Gomez MD

## 2018-10-10 NOTE — PROGRESS NOTES
Ana Garcia 61 y.o. male Mr. Velasquez Best seen today for evaluation of elevated PSA found on routine testing Medical record shows no PSA testing results Review of Systems:  
CNS: No seizure syncope headaches dizziness or visual changes Respiratory: No wheezing shortness of breath chest pain or coughing Cardiovascular: No angina no palpitations Intestinal: No dyspepsia diarrhea or constipation Urinary: No urgency frequency or dysuria no hematuria Skeletal: No bone or joint pain Endocrine: Diabetes Other: 
 
Allergies: No Known Allergies Medications:   
Current Outpatient Prescriptions Medication Sig Dispense Refill  sitagliptin phos/metformin HCl (JANUMET PO) Take  by mouth.  ergocalciferol (ERGOCALCIFEROL) 50,000 unit capsule Take 1 Cap by mouth every seven (7) days. Indications: Vitamin D Deficiency 12 Cap 1  
 lisinopril (PRINIVIL, ZESTRIL) 5 mg tablet Take 1 Tab by mouth daily. 30 Tab 6  carvedilol (COREG) 12.5 mg tablet Take 1 Tab by mouth two (2) times a day. 60 Tab 6  
 simvastatin (ZOCOR) 10 mg tablet Take  by mouth daily.  aspirin delayed-release 81 mg tablet Take 81 mg by mouth daily. Past Medical History:  
Diagnosis Date  Abnormal nuclear cardiac imaging test 11/30/2006 Mild anterior ischemia. Inferior scar w/border zone ischemia. LVE. EF 26%. (Gated imaging may be inaccurate.)  Global hypk. Neg EKG on max EST. Ex time 10:30.  Aorto-iliac duplex 09/10/2012 No AAA identified.  Carotid duplex 09/10/2012 No significant occlusive disease bilaterallly.  Echocardiogram 06/02/2015 EF 55%. No WMA. Mild LVH. Gr 1 DDfx. IMELDA. No significant chg from study of 5/17/10.  History of echocardiogram 06/20/2011 EF 50-55%. Gr 2 DDfx. Mild RVE. Mild LAE. Mild-mod IMELDA.  Hypercholesterolemia  Nonischemic cardiomyopathy (HCC)   
 s/p right coronary cusp PVC ablation  (8/71/49) without complication  S/P cardiac cath 12/11/2006 Patent coronary arteries. LVEDP 12.  EF 25-30%. Mod-significant global hypk. Past Surgical History:  
Procedure Laterality Date  HX HEART CATHETERIZATION  12/11/06 The right coronary artery is dominant. It is widely patent. The left main coronary artery is widely patent. The circumflex artery is widely patent. The left anterior descending coronary artery is widely patent. The LVEDP is 12 mmHg. The overall left ventricular systolic function is significantly diminished with an estimated ejection fraction of 25-30%. ** See report. Social History Social History  Marital status:  Spouse name: N/A  
 Number of children: N/A  
 Years of education: N/A Occupational History  Not on file. Social History Main Topics  Smoking status: Former Smoker Packs/day: 1.00  Smokeless tobacco: Never Used  Alcohol use Yes  Drug use: Not on file  Sexual activity: Not on file Other Topics Concern  Not on file Social History Narrative History reviewed. No pertinent family history. Physical Examination: Well-nourished mature male in no apparent distress Abdomen is nontender no palpable masses no organomegaly Back-no percussion CVA tenderness on either side No inguinal hernia or adenopathy Penis is normal 
Testes are normal in size shape and consistency bilaterally-no epididymal induration or tenderness on either side Spermatic cords are palpably normal bilaterally Scrotum is normal 
Prostate by CHINO is rounded, smooth, benign in consistency and nontender-no induration no nodularity No rectal masses tenderness or induration Urinalysis: Negative dipstick/nitrite negative/heme-negative Impression: History of elevated PSA Plan: PSA testing today Described discussed indications for prostate biopsy RTC 4 weeks follow-up repeat PSA testing Annice Galeazzi, MD 
-electronically signed- 
 
 PLEASE NOTE: 
This document has been produced using voice recognition software. Unrecognized errors in transcription may be present.

## 2018-10-12 ENCOUNTER — DOCUMENTATION ONLY (OUTPATIENT)
Dept: UROLOGY | Age: 58
End: 2018-10-12

## 2018-11-06 ENCOUNTER — OFFICE VISIT (OUTPATIENT)
Dept: UROLOGY | Age: 58
End: 2018-11-06

## 2018-11-06 ENCOUNTER — HOSPITAL ENCOUNTER (OUTPATIENT)
Dept: LAB | Age: 58
Discharge: HOME | End: 2018-11-06
Payer: COMMERCIAL

## 2018-11-06 VITALS
BODY MASS INDEX: 27.4 KG/M2 | WEIGHT: 185 LBS | DIASTOLIC BLOOD PRESSURE: 80 MMHG | OXYGEN SATURATION: 95 % | HEART RATE: 65 BPM | HEIGHT: 69 IN | SYSTOLIC BLOOD PRESSURE: 120 MMHG | TEMPERATURE: 98 F

## 2018-11-06 DIAGNOSIS — R97.20 PSA ELEVATION: Primary | ICD-10-CM

## 2018-11-06 PROCEDURE — 84153 ASSAY OF PSA TOTAL: CPT | Performed by: UROLOGY

## 2018-11-06 NOTE — PATIENT INSTRUCTIONS
Prostate-Specific Antigen (PSA) Test: About This Test 
What is it? A prostate-specific antigen (PSA) test measures the amount of PSA in your blood. PSA is released by a man's prostate gland into his blood. A high PSA level may mean that you have an enlargement, infection, or cancer of the prostate. Why is this test done? You may have this test to: · Check for prostate cancer. · Watch prostate cancer and see if treatment is working. How can you prepare for the test? 
Do not ejaculate during the 2 days before your PSA blood test, either during sex or masturbation. What happens before the test? 
Tell your doctor if you have had a: 
· Test to look at your bladder (cystoscopy) in the past several weeks. · Prostate biopsy in the past several weeks. · Prostate infection or urinary tract infection that has not gone away. · Tube (catheter) inserted into your bladder to drain urine recently. What happens during the test? 
A health professional takes a sample of your blood. What happens after the test? 
You can go back to your usual activities right away. When should you call for help? Watch closely for changes in your health, and be sure to contact your doctor if you have any questions about this test. 
Follow-up care is a key part of your treatment and safety. Be sure to make and go to all appointments, and call your doctor if you are having problems. It's also a good idea to keep a list of the medicines you take. Ask your doctor when you can expect to have your test results. Where can you learn more? Go to http://yajaira-jose.info/. Enter U797 in the search box to learn more about \"Prostate-Specific Antigen (PSA) Test: About This Test.\" Current as of: March 28, 2018 Content Version: 11.8 © 4682-3211 Healthwise, [employer].  Care instructions adapted under license by [employer] (which disclaims liability or warranty for this information). If you have questions about a medical condition or this instruction, always ask your healthcare professional. [place] [address] any warranty or liability for your use of this information.

## 2018-11-06 NOTE — PROGRESS NOTES
Mr. Afsaneh Maldonado has a reminder for a \"due or due soon\" health maintenance. I have asked that he contact his primary care provider for follow-up on this health maintenance.

## 2018-11-07 ENCOUNTER — HOSPITAL ENCOUNTER (OUTPATIENT)
Dept: LAB | Age: 58
Discharge: HOME | End: 2018-11-07

## 2018-11-07 DIAGNOSIS — R97.20 PSA ELEVATION: ICD-10-CM

## 2018-11-07 LAB
BILIRUB UR QL STRIP: NEGATIVE
GLUCOSE UR-MCNC: NORMAL MG/DL
KETONES P FAST UR STRIP-MCNC: NEGATIVE MG/DL
PH UR STRIP: 6 [PH] (ref 4.6–8)
PROT UR QL STRIP: NORMAL
SP GR UR STRIP: 1.02 (ref 1–1.03)
UA UROBILINOGEN AMB POC: NORMAL (ref 0.2–1)
URINALYSIS CLARITY POC: CLEAR
URINALYSIS COLOR POC: YELLOW
URINE BLOOD POC: NEGATIVE
URINE LEUKOCYTES POC: NEGATIVE
URINE NITRITES POC: NEGATIVE

## 2018-11-07 NOTE — PROGRESS NOTES
Shekhar Gonzales 61 y.o. male Mr. Domenica Noble seen today for counseling and scheduling prostate biopsy assessing elevated PSA found on routine testing PSA 5.1 on 9 October 2018 Review of Systems:  
CNS: No seizure syncope headaches dizziness or visual changes Respiratory: No wheezing shortness of breath chest pain or coughing Cardiovascular: No angina no palpitations Intestinal: No dyspepsia diarrhea or constipation Urinary: No urgency frequency or dysuria no hematuria Skeletal: No bone or joint pain Endocrine: Diabetes Other: 
 
 
Allergies: No Known Allergies Medications:   
Current Outpatient Medications Medication Sig Dispense Refill  sitagliptin phos/metformin HCl (JANUMET PO) Take  by mouth.  ergocalciferol (ERGOCALCIFEROL) 50,000 unit capsule Take 1 Cap by mouth every seven (7) days. Indications: Vitamin D Deficiency 12 Cap 1  
 lisinopril (PRINIVIL, ZESTRIL) 5 mg tablet Take 1 Tab by mouth daily. 30 Tab 6  carvedilol (COREG) 12.5 mg tablet Take 1 Tab by mouth two (2) times a day. 60 Tab 6  
 simvastatin (ZOCOR) 10 mg tablet Take  by mouth daily.  aspirin delayed-release 81 mg tablet Take 81 mg by mouth daily. Past Medical History:  
Diagnosis Date  Abnormal nuclear cardiac imaging test 11/30/2006 Mild anterior ischemia. Inferior scar w/border zone ischemia. LVE. EF 26%. (Gated imaging may be inaccurate.)  Global hypk. Neg EKG on max EST. Ex time 10:30.  Aorto-iliac duplex 09/10/2012 No AAA identified.  Carotid duplex 09/10/2012 No significant occlusive disease bilaterallly.  Echocardiogram 06/02/2015 EF 55%. No WMA. Mild LVH. Gr 1 DDfx. IMELDA. No significant chg from study of 5/17/10.  History of echocardiogram 06/20/2011 EF 50-55%. Gr 2 DDfx. Mild RVE. Mild LAE. Mild-mod IMELDA.  Hypercholesterolemia  Nonischemic cardiomyopathy (HCC)   
 s/p right coronary cusp PVC ablation  (9/37/59) without complication  S/P cardiac cath 12/11/2006 Patent coronary arteries. LVEDP 12.  EF 25-30%. Mod-significant global hypk. Past Surgical History:  
Procedure Laterality Date  HX HEART CATHETERIZATION  12/11/06 The right coronary artery is dominant. It is widely patent. The left main coronary artery is widely patent. The circumflex artery is widely patent. The left anterior descending coronary artery is widely patent. The LVEDP is 12 mmHg. The overall left ventricular systolic function is significantly diminished with an estimated ejection fraction of 25-30%. ** See report. Social History Socioeconomic History  Marital status:  Spouse name: Not on file  Number of children: Not on file  Years of education: Not on file  Highest education level: Not on file Social Needs  Financial resource strain: Not on file  Food insecurity - worry: Not on file  Food insecurity - inability: Not on file  Transportation needs - medical: Not on file  Transportation needs - non-medical: Not on file Occupational History  Not on file Tobacco Use  Smoking status: Former Smoker Packs/day: 1.00  Smokeless tobacco: Never Used Substance and Sexual Activity  Alcohol use: Yes  Drug use: Not on file  Sexual activity: Not on file Other Topics Concern  Not on file Social History Narrative  Not on file History reviewed. No pertinent family history. Physical Examination: Well-nourished mature male in no apparent distress Normal prostate by CHINO 9 October 2018 Urinalysis: Negative dipstick/nitrite negative/heme-negative Impression: Elevated PSA with normal prostate on CHINO Plan: Prostate biopsy is indicated and recommended Counseled today regarding technique of transperineal prostate biopsy including risk of bleeding, infection, urinary retention Rx Cipro 500 mg twice daily times 3 days prep prophylaxis/attendant required RTC 3 weeks prostate biopsy More than 1/2 of this 25 minute visit was spent in counselling and coordination of care, as described above. Kedar Boyle MD 
-electronically signed- 
 
PLEASE NOTE: 
This document has been produced using voice recognition software. Unrecognized errors in transcription may be present.

## 2018-11-08 LAB — PSA SERPL-MCNC: 4.8 NG/ML (ref 0–4)

## 2018-11-09 NOTE — PROGRESS NOTES
PSA 4.8 on 7 November 2018 PSA 5.1 in October 2018 Impression elevated PSA 
 
: Prostate biopsy already scheduled Luis Arita MD

## 2018-12-13 ENCOUNTER — HOSPITAL ENCOUNTER (OUTPATIENT)
Dept: LAB | Age: 58
Discharge: HOME | End: 2018-12-13
Payer: COMMERCIAL

## 2018-12-13 ENCOUNTER — OFFICE VISIT (OUTPATIENT)
Dept: UROLOGY | Age: 58
End: 2018-12-13

## 2018-12-13 VITALS
HEART RATE: 76 BPM | DIASTOLIC BLOOD PRESSURE: 81 MMHG | WEIGHT: 188 LBS | SYSTOLIC BLOOD PRESSURE: 136 MMHG | HEIGHT: 69 IN | BODY MASS INDEX: 27.85 KG/M2 | OXYGEN SATURATION: 94 %

## 2018-12-13 DIAGNOSIS — L40.50 PSA (PSORIATIC ARTHRITIS) (HCC): Primary | ICD-10-CM

## 2018-12-13 LAB
BILIRUB UR QL STRIP: NEGATIVE
GLUCOSE UR-MCNC: NEGATIVE MG/DL
KETONES P FAST UR STRIP-MCNC: NEGATIVE MG/DL
PH UR STRIP: 5 [PH] (ref 4.6–8)
PROT UR QL STRIP: NEGATIVE
SP GR UR STRIP: 1.03 (ref 1–1.03)
UA UROBILINOGEN AMB POC: NORMAL (ref 0.2–1)
URINALYSIS CLARITY POC: CLEAR
URINALYSIS COLOR POC: YELLOW
URINE BLOOD POC: NORMAL
URINE LEUKOCYTES POC: NEGATIVE
URINE NITRITES POC: NEGATIVE

## 2018-12-13 PROCEDURE — G0416 PROSTATE BIOPSY, ANY MTHD: HCPCS

## 2018-12-13 RX ORDER — CIPROFLOXACIN 500 MG/1
500 TABLET ORAL 2 TIMES DAILY
Qty: 20 TAB | Refills: 0 | Status: SHIPPED | OUTPATIENT
Start: 2018-12-13 | End: 2018-12-20 | Stop reason: ALTCHOICE

## 2018-12-13 NOTE — PATIENT INSTRUCTIONS
Prostate Biopsy and Ultrasound: About This Test  What is it? A prostate biopsy is a type of test. Your doctor takes small tissue samples from your prostate gland. Then another doctor looks at the tissue under a microscope to see if there are cancer cells. This test is done by a doctor who specializes in men's genital and urinary problems (urologist). It can be done in your doctor's office, a day surgery clinic, or a hospital operating room. To get the tissue samples from the prostate, the doctor inserts a thin needle through the rectum, the urethra, or the area between the anus and scrotum (perineum). The most common method is through the rectum. Your doctor may use ultrasound to help guide the needle. Why is this test done? You may need a prostate biopsy if your doctor found something of concern during a digital rectal exam. Or you may need it if a blood test showed a high level of prostate-specific antigen (PSA). A biopsy can help find out if you have prostate cancer. How can you prepare for this test?  Before you have a prostate biopsy, tell your doctor if:  · You are taking any medicines or using any herbal supplements. · You are allergic to any medicines. · You have had bleeding problems, or you take aspirin or some other blood thinner. What happens before the test?  · You may need to have an enema before the test.  · You will need to take off all or most of your clothes. You will be given a cloth or paper gown to use during the test.  · You may be given a sedative through a vein (IV) in your arm. The sedative will help you relax and stay still. What happens during the test?  Through the rectum  · You may be asked to kneel, lie on your side, or lie on your back. · Your doctor may inject an anesthetic around the prostate to numb the area before the sample is taken. · Ultrasound is often used to guide the needle to the correct spot. · Your doctor may choose to use a needle guide for the biopsy. He or she will attach the guide to a finger. Your doctor will insert the finger into your rectum. · The needle will enter the prostate and take 6 to 12 samples. Through the urethra  · You will lie on your back. Your feet will rest in stirrups. · You will get anesthesia. The anesthesia may make you sleep. Or it may just numb the area being worked on.  · Your doctor will insert a lighted scope (cystoscope) into your urethra. The scope allows your doctor to look directly at the prostate. Your doctor will pass a cutting loop through the scope to remove samples of prostate tissue. Through the perineum  · You will lie on an exam table either on your side or on your back with your knees bent. You will get anesthesia. The anesthesia may make you sleep. Or it may just numb the area being worked on.  · Your doctor will make a small cut in your perineum. Then he or she will insert a finger into the rectum to hold the prostate. · Your doctor will then insert the needle through the cut and into the prostate. · The needle collects samples of tissue and is then pulled out. What else should you know about this test?  · A prostate biopsy has a slight risk of causing problems such as infection or bleeding. · If the biopsy went through your rectum, you may have a small amount of bleeding from your rectum for 2 to 3 days after the biopsy. · You may have a little pain in your pelvic area. You may also have a little blood in your urine for 1 to 5 days. · You may have some blood in your semen for a week or longer. How long will the test take? · This test will take about 15 to 45 minutes. What happens after the test?  Your doctor will tell you what to expect and watch for after your test. In general:  · If you have anesthesia that makes you sleep, you will be in a recovery room for a few hours. You will need someone to drive you home. You may feel tired for the rest of the day.   · You will probably be able to go home right away.  · If your doctor had you stop taking any medicine for the biopsy, ask him or her when you can start taking it again. If you were given antibiotics, take them as directed. · Do not do heavy work or exercise for 4 hours after the test.  · Your doctor will tell you how long it may take to get your results back. Follow-up care is a key part of your treatment and safety. Be sure to make and go to all appointments, and call your doctor if you are having problems. It's also a good idea to keep a list of the medicines you take. Ask your doctor when you can expect to have your test results. Where can you learn more? Go to http://yajaira-jose.info/. Enter Y504 in the search box to learn more about \"Prostate Biopsy and Ultrasound: About This Test.\"  Current as of: March 28, 2018  Content Version: 11.8  © 7092-1344 Healthwise, Incorporated. Care instructions adapted under license by [employer] (which disclaims liability or warranty for this information). If you have questions about a medical condition or this instruction, always ask your healthcare professional. [address] any warranty or liability for your use of this information.

## 2018-12-13 NOTE — PROGRESS NOTES
Mr. Malik Scruggs has a reminder for a \"due or due soon\" health maintenance. I have asked that he contact his primary care provider for follow-up on this health maintenance. [facility]  OFFICE PROCEDURE PROGRESS NOTE        Chart reviewed for the following:   Jovi DE SANTIAGO LPN, have reviewed the History, Physical and updated the Allergic reactions for [address] performed immediately prior to start of procedure:   Jovi DE SANTIAGO LPN, have performed the following reviews on Ynacho Waldrop prior to the start of the procedure:            * Patient was identified by name and date of birth   * Agreement on procedure being performed was verified  * Risks and Benefits explained to the patient  * Procedure site verified and marked as necessary  * Patient was positioned for comfort  * Consent was signed and verified     Time: 13:15      Date of procedure: 12/13/2018    Procedure performed by:  Dann Sahni MD    Provider assisted by: Sudha Golden LPN    Patient assisted by: wife    How tolerated by patient: tolerated the procedure well with no complications    Post Procedural Pain Scale: 0 - No Hurt    Comments: Patient verbalized understanding of procedure and post procedure instructions. RBV Per Dr. Shelton Mcmahon patient to have prostate tissue which was biopsied in office today sent out for pathology.

## 2018-12-13 NOTE — PROGRESS NOTES
Molina Melvin 60 y.o. male     Mr. Reynaldo Camejo seen today for prostate biopsy assessing elevated PSA    Patient has no irritative or obstructive voiding symptoms  No family history of prostate malignancy    PSA 5.1 on 9 October 2018   18 core transperineal prostate biopsy on 13 December 2018  volume 81.3 cc PSA density 0.06       Review of Systems:   CNS: No seizure syncope headaches dizziness or visual changes  Respiratory: No wheezing shortness of breath chest pain or coughing  Cardiovascular: No angina no palpitations  Intestinal: No dyspepsia diarrhea or constipation  Urinary: No urgency frequency or dysuria no hematuria  Skeletal: No bone or joint pain  Endocrine: Diabetes  Other:      Allergies: No Known Allergies   Medications:    Current Outpatient Medications   Medication Sig Dispense Refill    ciprofloxacin HCl (CIPRO) 500 mg tablet Take 1 Tab by mouth two (2) times a day for 10 days. 20 Tab 0    sitagliptin phos/metformin HCl (JANUMET PO) Take  by mouth.  ergocalciferol (ERGOCALCIFEROL) 50,000 unit capsule Take 1 Cap by mouth every seven (7) days. Indications: Vitamin D Deficiency 12 Cap 1    lisinopril (PRINIVIL, ZESTRIL) 5 mg tablet Take 1 Tab by mouth daily. 30 Tab 6    carvedilol (COREG) 12.5 mg tablet Take 1 Tab by mouth two (2) times a day. 60 Tab 6    simvastatin (ZOCOR) 10 mg tablet Take  by mouth daily.  aspirin delayed-release 81 mg tablet Take 81 mg by mouth daily. Past Medical History:   Diagnosis Date    Abnormal nuclear cardiac imaging test 11/30/2006    Mild anterior ischemia. Inferior scar w/border zone ischemia. LVE. EF 26%. (Gated imaging may be inaccurate.)  Global hypk. Neg EKG on max EST. Ex time 10:30.  Aorto-iliac duplex 09/10/2012    No AAA identified.  Carotid duplex 09/10/2012    No significant occlusive disease bilaterallly.  Echocardiogram 06/02/2015    EF 55%. No WMA. Mild LVH. Gr 1 DDfx. IMELDA.   No significant chg from study of 5/17/10.  History of echocardiogram 06/20/2011    EF 50-55%. Gr 2 DDfx. Mild RVE. Mild LAE. Mild-mod IMELDA.  Hypercholesterolemia     Nonischemic cardiomyopathy (HCC)     s/p right coronary cusp PVC ablation  (5/09/83) without complication    S/P cardiac cath 12/11/2006    Patent coronary arteries. LVEDP 12.  EF 25-30%. Mod-significant global hypk. Past Surgical History:   Procedure Laterality Date    HX HEART CATHETERIZATION  12/11/06    The right coronary artery is dominant. It is widely patent. The left main coronary artery is widely patent. The circumflex artery is widely patent. The left anterior descending coronary artery is widely patent. The LVEDP is 12 mmHg. The overall left ventricular systolic function is significantly diminished with an estimated ejection fraction of 25-30%. ** See report. Social History     Socioeconomic History    Marital status:      Spouse name: Not on file    Number of children: Not on file    Years of education: Not on file    Highest education level: Not on file   Social Needs    Financial resource strain: Not on file    Food insecurity - worry: Not on file    Food insecurity - inability: Not on file    Transportation needs - medical: Not on file   [employer] needs - non-medical: Not on file   Occupational History    Not on file   Tobacco Use    Smoking status: Former Smoker     Packs/day: 1.00    Smokeless tobacco: Never Used   Substance and Sexual Activity    Alcohol use: Yes    Drug use: Not on file    Sexual activity: Not on file   Other Topics Concern    Not on file   Social History Narrative    Not on file      History reviewed. No pertinent family history.        Urinalysis: Negative dipstick/nitrite negative/heme-negative    PROSTATE BIOPSY REPORT    Patient ID confirmed prior to procedure: Yes    Indications for prostate biopsy: PSA 5.1    Pre biopsy preparations:         ___x____ Cipro 500 mg PO       ________ Levaquin 500 mg PO                                                   ________ Ancef 1000mg PO      _________Valium 10 mg PO      TRANSRECTAL ULTRASOUND IMAGING OF THE PROSTATE:    Multiple transverse and longitudinal images of the prostate, seminal vesicles, and bladder revealed: Homogeneous echo pattern and outlines bilaterally-no stone densities, cysts, or hypodense segments evident on either side    Prostate Dimensions: 5.2 cm x 5.3 cm x 5.9 cm    Prostate Volume = 0.5(H)(L)(W) =   81.3  cc    PSA Density: (PSA)/Prostate Volume = 5.1/81.3  =  PSAD  =    0.06                    Biopsy Procedure    Local anesthesia of the perineum was obtained by injecting 1% Lidocaine into the skin of the perineum 1 cm anterior to the anal verge and then into the deep perineum in the direction of the prostate. Guided by a gloved finger in the rectum, a 14 gauge needle was then passed into the anesthetized region and advanced in the direction of the prostate. An 18 gauge spinal needle was then passed through the 14 gauge needle and 10 cc's of 1% Lidocaine was infiltrated periprostatically with needle placement guided by ultrasound imaging obtained by transrectal positioning of the ultrasound transducer. An 18 gauge Biopty needle was then passed through the 14 gauge conduit needle multiple times obtaining  9  cores of tissue from the left lobe and   9   cores of tissue from the right lobe, biopsy needle placement guided by real time transrectal ultrasound imaging of the needle along its path through the perineum into the prostate. The conduit needle was then removed and pressure was applied to the perineum for one minute. The patient was then given oral and written instructions regarding post biopsy precautions as well as instructions regarding activity, medication, and follow up.     Comments: Procedure was uncomplicated and well-tolerated    EBL: Minimal  Specimen removed: 9 cores of tissue from the left lobe/9 cores of tissue from the right lobe sampling the apex x3, mid x3, and base x3 on each side        Impression elevated PSA-stable status post transperineal prostate biopsy    Plan: Postbiopsy precautions           Cipro 500 mg twice daily times 2 days    RTC 1 week for prostate biopsy pathology report    Ijeoma Arita MD  -Electronically signed-    PLEASE NOTE:  This document has been produced using voice recognition software. Unrecognized errors in transcription may be present. Ijeoma Arita MD  -electronically signed-    PLEASE NOTE:  This document has been produced using voice recognition software. Unrecognized errors in transcription may be present.

## 2018-12-20 ENCOUNTER — OFFICE VISIT (OUTPATIENT)
Dept: UROLOGY | Age: 58
End: 2018-12-20

## 2018-12-20 VITALS
SYSTOLIC BLOOD PRESSURE: 129 MMHG | OXYGEN SATURATION: 95 % | HEIGHT: 69 IN | WEIGHT: 188 LBS | BODY MASS INDEX: 27.85 KG/M2 | HEART RATE: 68 BPM | DIASTOLIC BLOOD PRESSURE: 79 MMHG

## 2018-12-20 DIAGNOSIS — N41.1 CHRONIC PROSTATITIS: Primary | ICD-10-CM

## 2018-12-20 DIAGNOSIS — N40.0 BENIGN PROSTATIC HYPERPLASIA WITHOUT LOWER URINARY TRACT SYMPTOMS: ICD-10-CM

## 2018-12-20 DIAGNOSIS — R97.20 ELEVATED PSA: ICD-10-CM

## 2018-12-20 NOTE — PROGRESS NOTES
Mr. Hamlet Meade has a reminder for a \"due or due soon\" health maintenance. I have asked that he contact his primary care provider for follow-up on this health maintenance.

## 2018-12-20 NOTE — PATIENT INSTRUCTIONS
Prostate Cancer Screening: Care Instructions  Your Care Instructions    The prostate gland is an organ found just below a man's bladder. It is the size and shape of a walnut. It surrounds the tube that carries urine from the bladder out of the body through the penis. This tube is called the urethra. Prostate cancer is the abnormal growth of cells in the prostate. It is the second most common type of cancer in men. (Skin cancer is the most common.)  Most cases of prostate cancer occur in men older than 72. The disease runs in families. And it's more common in -American men. When it's found and treated early, prostate cancer may be cured. But it is not always treated. This is because prostate cancer may not shorten your life, especially if you are older and the cancer is growing slowly. Follow-up care is a key part of your treatment and safety. Be sure to make and go to all appointments, and call your doctor if you are having problems. It's also a good idea to know your test results and keep a list of the medicines you take. What are the screening tests for prostate cancer? The main screening test for prostate cancer is the prostate-specific antigen (PSA) test. This is a blood test that measures how much PSA is in your blood. A high level may mean that you have an enlargement, an infection, or cancer. Along with the PSA test, you may have a digital rectal exam. The digital (finger) rectal exam checks for anything abnormal in your prostate. To do the exam, the doctor puts a lubricated, gloved finger into your rectum. If these tests suggest cancer, you may need a prostate biopsy. How is prostate cancer diagnosed? In a biopsy, the doctor takes small tissue samples from your prostate gland. Another doctor then looks at the tissue under a microscope to see if there are cancer cells, signs of infection, or other problems. The results help diagnose prostate cancer.   What are the pros and cons of screening? Neither a PSA test nor a digital rectal exam can tell you for sure that you do or do not have cancer. But they can help you decide if you need more tests, such as a prostate biopsy. Screening tests may be useful because most men with prostate cancer don't have symptoms. It can be hard to know if you have cancer until it is more advanced. And then it's harder to treat. But having a PSA test can also cause harm. The test may show high levels of PSA that aren't caused by cancer. So you could have a prostate biopsy you didn't need. Or the PSA test might be normal when there is cancer, so a cancer might not be found early. The test can also find cancers that would never have caused a problem during your lifetime. So you might have treatment that was not needed. Prostate cancer usually develops late in life and grows slowly. For many men, it does not shorten their lives. Some experts advise screening only for men who are at high risk. Talk with your doctor to see if screening is right for you. Where can you learn more? Go to http://yajaira-jose.info/. Enter R550 in the search box to learn more about \"Prostate Cancer Screening: Care Instructions. \"  Current as of: March 28, 2018  Content Version: 11.8  © 5672-7039 Healthwise, Incorporated. Care instructions adapted under license by [employer] (which disclaims liability or warranty for this information). If you have questions about a medical condition or this instruction, always ask your healthcare professional. [place] [address] any warranty or liability for your use of this information.

## 2018-12-21 NOTE — PROGRESS NOTES
Yovany Mabry 60 y.o. male     Mr. Ej Adam seen today for prostate biopsy pathology report      prostate biopsy prompted by  finding of elevated PSA     Patient has no irritative or obstructive voiding symptoms  No family history of prostate malignancy     PSA 5.1 on 9 October 2018   18 core transperineal prostate biopsy on 13 December 2018  volume 81.3 cc PSA density 0.06     benign histology        Review of Systems:   CNS: No seizure syncope headaches dizziness or visual changes  Respiratory: No wheezing shortness of breath chest pain or coughing  Cardiovascular: No angina no palpitations  Intestinal: No dyspepsia diarrhea or constipation  Urinary: No urgency frequency or dysuria no hematuria  Skeletal: No bone or joint pain  Endocrine: Diabetes  Other:      Allergies: No Known Allergies   Medications:    Current Outpatient Medications   Medication Sig Dispense Refill    sitagliptin phos/metformin HCl (JANUMET PO) Take  by mouth.  ergocalciferol (ERGOCALCIFEROL) 50,000 unit capsule Take 1 Cap by mouth every seven (7) days. Indications: Vitamin D Deficiency 12 Cap 1    lisinopril (PRINIVIL, ZESTRIL) 5 mg tablet Take 1 Tab by mouth daily. 30 Tab 6    carvedilol (COREG) 12.5 mg tablet Take 1 Tab by mouth two (2) times a day. 60 Tab 6    simvastatin (ZOCOR) 10 mg tablet Take  by mouth daily.  aspirin delayed-release 81 mg tablet Take 81 mg by mouth daily. Past Medical History:   Diagnosis Date    Abnormal nuclear cardiac imaging test 11/30/2006    Mild anterior ischemia. Inferior scar w/border zone ischemia. LVE. EF 26%. (Gated imaging may be inaccurate.)  Global hypk. Neg EKG on max EST. Ex time 10:30.  Aorto-iliac duplex 09/10/2012    No AAA identified.  Carotid duplex 09/10/2012    No significant occlusive disease bilaterallly.  Echocardiogram 06/02/2015    EF 55%. No WMA. Mild LVH. Gr 1 DDfx. IMELDA. No significant chg from study of 5/17/10.     History of echocardiogram 06/20/2011    EF 50-55%. Gr 2 DDfx. Mild RVE. Mild LAE. Mild-mod IMELDA.  Hypercholesterolemia     Nonischemic cardiomyopathy (HCC)     s/p right coronary cusp PVC ablation  (8/72/55) without complication    S/P cardiac cath 12/11/2006    Patent coronary arteries. LVEDP 12.  EF 25-30%. Mod-significant global hypk. Past Surgical History:   Procedure Laterality Date    HX HEART CATHETERIZATION  12/11/06    The right coronary artery is dominant. It is widely patent. The left main coronary artery is widely patent. The circumflex artery is widely patent. The left anterior descending coronary artery is widely patent. The LVEDP is 12 mmHg. The overall left ventricular systolic function is significantly diminished with an estimated ejection fraction of 25-30%. ** See report. Social History     Socioeconomic History    Marital status:      Spouse name: Not on file    Number of children: Not on file    Years of education: Not on file    Highest education level: Not on file   Social Needs    Financial resource strain: Not on file    Food insecurity - worry: Not on file    Food insecurity - inability: Not on file    Transportation needs - medical: Not on file   [employer] needs - non-medical: Not on file   Occupational History    Not on file   Tobacco Use    Smoking status: Former Smoker     Packs/day: 1.00    Smokeless tobacco: Never Used   Substance and Sexual Activity    Alcohol use: Yes    Drug use: Not on file    Sexual activity: Not on file   Other Topics Concern    Not on file   Social History Narrative    Not on file      History reviewed. No pertinent family history. Physical Examination: Well - nourished mature male in no apparent distress. Pathology Report: MS  benign histologic findings, no evidence for prostate malignancy.      A. PROSTATE, LEFT APEX X 3:   - BENIGN PROSTATIC TISSUE WITH ATROPHIC CHANGES AND PATCHY CHRONIC PROSTATITIS.   - NO MALIGNANCY IS IDENTIFIED. B. PROSTATE, LEFT MID X 3:   - BENIGN PROSTATIC TISSUE WITH ATROPHIC CHANGES AND PATCHY CHRONIC   PROSTATITIS.   - NO MALIGNANCY IS IDENTIFIED. C. PROSTATE, LEFT BASE X 3:   - BENIGN PROSTATIC TISSUE WITH ATROPHIC CHANGES AND PATCHY CHRONIC   PROSTATITIS.   - NO MALIGNANCY IS IDENTIFIED. D. PROSTATE, RIGHT APEX X 3:   - BENIGN PROSTATIC TISSUE WITH ATROPHIC CHANGES AND PATCHY CHRONIC   PROSTATITIS.   - NO MALIGNANCY IS IDENTIFIED. E. PROSTATE, RIGHT MID X 3:   - BENIGN PROSTATIC TISSUE WITH ATROPHIC CHANGES AND PATCHY CHRONIC   PROSTATITIS.   - NO MALIGNANCY IS IDENTIFIED. F. PROSTATE, RIGHT BASE X 3:   - BENIGN PROSTATIC TISSUE WITH ATROPHIC CHANGES AND PATCHY CHRONIC   PROSTATITIS.   - NO MALIGNANCY IS IDENTIFIED. Impression: Elevated PSA with negative CHINO and negative prostate biopsy    Plan:  Repeat PSA in 6 months    Described and discussed indications for repeat prostate biopsy:  Elevated PSA in this case for by large prostate size and underlying chronic inflammation-normal PSAD/chronic prostatitis        More than 1/2 of this 25 minute visit was spent in counselling and coordination of care, as described above. Ree Del Castillo MD  -electronically signed-    PLEASE NOTE:  This document has been produced using voice recognition software. Unrecognized errors in transcription may be present.

## 2019-01-05 LAB
25(OH)D3+25(OH)D2 SERPL-MCNC: 12.4 NG/ML (ref 30–100)
ALBUMIN SERPL-MCNC: 4.8 G/DL (ref 3.5–5.5)
ALBUMIN/GLOB SERPL: 1.6 {RATIO} (ref 1.2–2.2)
ALP SERPL-CCNC: 52 IU/L (ref 39–117)
ALT SERPL-CCNC: 27 IU/L (ref 0–44)
AST SERPL-CCNC: 24 IU/L (ref 0–40)
BASOPHILS # BLD AUTO: 0 X10E3/UL (ref 0–0.2)
BASOPHILS NFR BLD AUTO: 0 %
BILIRUB SERPL-MCNC: 0.2 MG/DL (ref 0–1.2)
BUN SERPL-MCNC: 16 MG/DL (ref 6–24)
BUN/CREAT SERPL: 14 (ref 9–20)
CALCIUM SERPL-MCNC: 9.3 MG/DL (ref 8.7–10.2)
CHLORIDE SERPL-SCNC: 101 MMOL/L (ref 96–106)
CO2 SERPL-SCNC: 23 MMOL/L (ref 20–29)
CREAT SERPL-MCNC: 1.15 MG/DL (ref 0.76–1.27)
EOSINOPHIL # BLD AUTO: 0.1 X10E3/UL (ref 0–0.4)
EOSINOPHIL NFR BLD AUTO: 1 %
ERYTHROCYTE [DISTWIDTH] IN BLOOD BY AUTOMATED COUNT: 16.4 % (ref 12.3–15.4)
GLOBULIN SER CALC-MCNC: 3 G/DL (ref 1.5–4.5)
GLUCOSE SERPL-MCNC: 152 MG/DL (ref 65–99)
HCT VFR BLD AUTO: 39.6 % (ref 37.5–51)
HGB BLD-MCNC: 12.9 G/DL (ref 13–17.7)
IMM GRANULOCYTES # BLD AUTO: 0 X10E3/UL (ref 0–0.1)
IMM GRANULOCYTES NFR BLD AUTO: 0 %
LYMPHOCYTES # BLD AUTO: 12 X10E3/UL (ref 0.7–3.1)
LYMPHOCYTES NFR BLD AUTO: 79 %
MCH RBC QN AUTO: 25 PG (ref 26.6–33)
MCHC RBC AUTO-ENTMCNC: 32.6 G/DL (ref 31.5–35.7)
MCV RBC AUTO: 77 FL (ref 79–97)
MONOCYTES # BLD AUTO: 0.5 X10E3/UL (ref 0.1–0.9)
MONOCYTES NFR BLD AUTO: 3 %
NEUTROPHILS # BLD AUTO: 2.6 X10E3/UL (ref 1.4–7)
NEUTROPHILS NFR BLD AUTO: 17 %
PLATELET # BLD AUTO: 193 X10E3/UL (ref 150–379)
POTASSIUM SERPL-SCNC: 4.8 MMOL/L (ref 3.5–5.2)
PROT SERPL-MCNC: 7.8 G/DL (ref 6–8.5)
RBC # BLD AUTO: 5.16 X10E6/UL (ref 4.14–5.8)
SODIUM SERPL-SCNC: 140 MMOL/L (ref 134–144)
WBC # BLD AUTO: 15.3 X10E3/UL (ref 3.4–10.8)

## 2019-01-07 DIAGNOSIS — E55.9 VITAMIN D DEFICIENCY: ICD-10-CM

## 2019-01-07 RX ORDER — ERGOCALCIFEROL 1.25 MG/1
50000 CAPSULE ORAL
Qty: 24 CAP | Refills: 1 | Status: SHIPPED | OUTPATIENT
Start: 2019-01-07 | End: 2020-05-06

## 2019-01-07 NOTE — PROGRESS NOTES
Lab Results;    Vitamin D   12.4 ng/mL    Sent Rx for vitamin D 50,000 units to pharmacy. Patient notified.

## 2019-01-11 ENCOUNTER — HOSPITAL ENCOUNTER (OUTPATIENT)
Dept: ONCOLOGY | Age: 59
Discharge: HOME | End: 2019-01-11

## 2019-01-11 ENCOUNTER — OFFICE VISIT (OUTPATIENT)
Dept: ONCOLOGY | Age: 59
End: 2019-01-11

## 2019-01-11 VITALS
BODY MASS INDEX: 28.44 KG/M2 | SYSTOLIC BLOOD PRESSURE: 123 MMHG | HEIGHT: 69 IN | OXYGEN SATURATION: 100 % | RESPIRATION RATE: 16 BRPM | WEIGHT: 192 LBS | HEART RATE: 77 BPM | DIASTOLIC BLOOD PRESSURE: 79 MMHG | TEMPERATURE: 98.6 F

## 2019-01-11 DIAGNOSIS — D72.820 LYMPHOCYTOSIS: ICD-10-CM

## 2019-01-11 DIAGNOSIS — E55.9 VITAMIN D DEFICIENCY: ICD-10-CM

## 2019-01-11 DIAGNOSIS — C91.10 CLL (CHRONIC LYMPHOCYTIC LEUKEMIA) (HCC): Primary | ICD-10-CM

## 2019-01-11 DIAGNOSIS — C91.10 CLL (CHRONIC LYMPHOCYTIC LEUKEMIA) (HCC): ICD-10-CM

## 2019-01-11 NOTE — PATIENT INSTRUCTIONS
Chronic Lymphocytic Leukemia: Care Instructions Your Care Instructions Leukemia is a type of cancer that causes your body to make too many blood cells, especially white blood cells. White blood cells are a part of your immune system, which helps protect you from infection and disease. In chronic lymphocytic leukemia (CLL), your body makes large numbers of white blood cells called lymphocytes. The cells may work as they should, but your body makes too many of them. Over time, these cells may not work as well, and they may cause symptoms as they begin to crowd out healthy white blood cells and other parts of your blood. There are several treatments for CLL, including chemotherapy, targeted therapy, or radiation therapy. But because CLL may get worse slowly, sometimes treatment can wait. Your doctor will follow your progress and let you know if or when you need treatment. When you find out that you have cancer, you may feel many emotions and may need some help coping. Seek out family, friends, and counselors for support. You also can do things at home to make yourself feel better while you go through treatment. Call the [employer] (0-513.554.4267) or visit its website at 8998 ED01. [employer] for more information. Follow-up care is a key part of your treatment and safety. Be sure to make and go to all appointments, and call your doctor if you are having problems. It's also a good idea to know your test results and keep a list of the medicines you take. How can you care for yourself at home? · You may get medicine for nausea, vomiting, or pain (although leukemia rarely causes pain). Take your medicines exactly as prescribed. Call your doctor if you think you are having a problem with your medicine. You will get more details on the specific medicines your doctor prescribes. · Eat healthy food.  If you do not feel like eating, try to eat food that has protein and extra calories to keep up your strength and prevent weight loss. Drink liquid meal replacements for extra calories and protein. Try to eat your main meal early. · Get some physical activity every day, but do not get too tired. Keep doing the hobbies you enjoy as your energy allows. · Take steps to control your stress and workload. Learn relaxation techniques. ? Share your feelings. Stress and tension affect our emotions. By expressing your feelings to others, you may be able to understand and cope with them. ? Consider joining a support group. Talking about a problem with your spouse, a good friend, or other people with similar problems is a good way to reduce tension and stress. ? Express yourself through art. Try writing, dance, art, or crafts to relieve tension. Some dance, writing, or art groups may be available just for people who have cancer. ? Be kind to your body and mind. Getting enough sleep, eating a nutritious diet, and taking time to do things you enjoy can contribute to an overall feeling of balance in your life and help reduce stress. ? Get help if you need it. Discuss your concerns with your doctor or counselor. · If you are vomiting or have diarrhea: ? Drink plenty of fluids (enough so that your urine is light yellow or clear like water) to prevent dehydration. Choose water and other caffeine-free clear liquids. If you have kidney, heart, or liver disease and have to limit fluids, talk with your doctor before you increase the amount of fluids you drink. ? When you are able to eat, try clear soups, mild foods, and liquids until all symptoms are gone for 12 to 48 hours. Other good choices include dry toast, crackers, cooked cereal, and gelatin dessert, such as Jell-O. · Avoid colds and flu. Get a pneumococcal vaccine shot. If you have had one before, ask your doctor whether you need another dose.  Get a flu shot every year. If you must be around people with colds or flu, wash your hands often. · Do not smoke. If you need help quitting, talk to your doctor about stop-smoking programs and medicines. These can increase your chances of quitting for good. When should you call for help? Call 911 anytime you think you may need emergency care. For example, call if: 
  · You passed out (lost consciousness).  
 Call your doctor now or seek immediate medical care if: 
  · You have a fever.  
  · You have abnormal bleeding.  
  · You think you have an infection.  
  · You have new or worse pain.  
  · You have new symptoms, such as a cough, belly pain, vomiting, diarrhea, or a rash.  
 Watch closely for changes in your health, and be sure to contact your doctor if: 
  · You are much more tired than usual.  
  · You have swollen glands in your armpits, groin, or neck.  
  · You do not get better as expected. Where can you learn more? Go to http://yajaira-jose.info/. Enter Y137 in the search box to learn more about \"Chronic Lymphocytic Leukemia: Care Instructions. \" Current as of: March 28, 2018 Content Version: 11.8 © 5539-7153 Healthwise, Incorporated. Care instructions adapted under license by [employer] (which disclaims liability or warranty for this information). If you have questions about a medical condition or this instruction, always ask your healthcare professional. [place] [address] any warranty or liability for your use of this information.

## 2019-01-11 NOTE — PROGRESS NOTES
Hematology/Oncology  Progress Note    Name: Ami Perera  Date: 2019  : 1960    PCP: Purvis Lennox, MD     Mr. Nicola Guerrero is a 60year old male who was seen for management of his chronic lymphocytic leukemia. Current therapy: Surveillance    Subjective:     Mr. Nicola Guerrero is a 58-year-old [place] American American man who has a stage I chronic lymphocytic leukemia. The patient reports that he has been doing well. He has no complaints of fevers, night sweats, or weight loss. The patient states that his energy level has been good. His appetite is excellent and he has no new physical complaints or concerns to report. Additionally he denies having any problems with unexplained bruising or bleeding. Past medical history, family history, and social history: these were reviewed and remains unchanged. Past Medical History:   Diagnosis Date    Abnormal nuclear cardiac imaging test 2006    Mild anterior ischemia. Inferior scar w/border zone ischemia. LVE. EF 26%. (Gated imaging may be inaccurate.)  Global hypk. Neg EKG on max EST. Ex time 10:30.  Aorto-iliac duplex 09/10/2012    No AAA identified.  Carotid duplex 09/10/2012    No significant occlusive disease bilaterallly.  Echocardiogram 2015    EF 55%. No WMA. Mild LVH. Gr 1 DDfx. IMELDA. No significant chg from study of 5/17/10.  History of echocardiogram 2011    EF 50-55%. Gr 2 DDfx. Mild RVE. Mild LAE. Mild-mod IMELDA.  Hypercholesterolemia     Nonischemic cardiomyopathy (HCC)     s/p right coronary cusp PVC ablation  () without complication    S/P cardiac cath 2006    Patent coronary arteries. LVEDP 12.  EF 25-30%. Mod-significant global hypk. Past Surgical History:   Procedure Laterality Date    HX HEART CATHETERIZATION  06    The right coronary artery is dominant. It is widely patent. The left main coronary artery is widely patent.  The circumflex artery is widely patent. The left anterior descending coronary artery is widely patent. The LVEDP is 12 mmHg. The overall left ventricular systolic function is significantly diminished with an estimated ejection fraction of 25-30%. ** See report. Social History     Socioeconomic History    Marital status:      Spouse name: Not on file    Number of children: Not on file    Years of education: Not on file    Highest education level: Not on file   Social Needs    Financial resource strain: Not on file    Food insecurity - worry: Not on file    Food insecurity - inability: Not on file    Transportation needs - medical: Not on file   [employer] needs - non-medical: Not on file   Occupational History    Not on file   Tobacco Use    Smoking status: Former Smoker     Packs/day: 1.00    Smokeless tobacco: Never Used   Substance and Sexual Activity    Alcohol use: Yes    Drug use: Not on file    Sexual activity: Not on file   Other Topics Concern    Not on file   Social History Narrative    Not on file     History reviewed. No pertinent family history. Current Outpatient Medications   Medication Sig Dispense Refill    ergocalciferol (ERGOCALCIFEROL) 50,000 unit capsule Take 1 Cap by mouth every seven (7) days. 24 Cap 1    sitagliptin phos/metformin HCl (JANUMET PO) Take  by mouth.  lisinopril (PRINIVIL, ZESTRIL) 5 mg tablet Take 1 Tab by mouth daily. 30 Tab 6    carvedilol (COREG) 12.5 mg tablet Take 1 Tab by mouth two (2) times a day. 60 Tab 6    simvastatin (ZOCOR) 10 mg tablet Take  by mouth daily.  aspirin delayed-release 81 mg tablet Take 81 mg by mouth daily. Review of Systems  Constitutional: The patient has no acute distress or discomfort. HEENT: The patient denies recent head trauma, eye pain, blurred vision,  hearing deficit, oropharyngeal mucosal pain or lesions, and the patient denies throat pain or discomfort. Lymphatics:  The patient denies palpable peripheral lymphadenopathy. Hematologic: The patient denies having bruising, bleeding, or progressive fatigue. Respiratory: Patient denies having shortness of breath, cough, sputum production, fever, or dyspnea on exertion. Cardiovascular: The patient denies having leg pain, leg swelling, heart palpitations, chest permit, chest pain, or lightheadedness. The patient denies having dyspnea on exertion. Gastrointestinal: The patient denies having nausea, emesis, or diarrhea. The patient denies having any hematemesis or blood in the stool. Genitourinary: Patient denies having urinary urgency, frequency, or dysuria. The patient denies having blood in the urine. Psychological: The patient denies having symptoms of nervousness, anxiety, depression, or thoughts of harming himself some of this. Skin: Patient denies having skin ulcerations. The patient is complaining of a new rash on his arms and back. He also complains of some itching. Musculoskeletal: The patient denies having pain in the joints or bones. Objective:     Visit Vitals  /79   Pulse 77   Temp 98.6 °F (37 °C) (Oral)   Resp 16   Ht 5' 9\" (1.753 m)   Wt 87.1 kg (192 lb)   SpO2 100%   BMI 28.35 kg/m²     ECOG PS=0     Physical Exam:   Gen. Appearance: The patient is in no acute distress. Skin: There is a macular and papular scaling rash on her arms and back concerning for possible psoriasis. HEENT: The exam is unremarkable. Neck: Supple without lymphadenopathy or thyromegaly. Lungs: Clear to auscultation and percussion; there are no wheezes or rhonchi. Heart: Regular rate and rhythm; there are no murmurs, gallops, or rubs. Abdomen: Bowel sounds are present and normal.  There is no guarding, tenderness, or hepatosplenomegaly. Extremities: There is no clubbing, cyanosis, or edema. Neurologic: There are no focal neurologic deficits. Lymphatics: There is palpable cervical and supraclavicular lymphadenopathy only.  Musculoskeletal: The patient has full range of motion at all joints. There is no evidence of joint deformity or effusions. There is no focal joint tenderness. Psychological/psychiatric: There is no clinical evidence of anxiety, depression, or melancholy. Lab data:      Results for orders placed or performed during the hospital encounter of 12/16/16   CBC WITH 3 PART DIFF     Status: Abnormal   Result Value Ref Range Status    WBC 11.1 4.5 - 13.0 K/uL Final    RBC 5.04 4.10 - 5.10 M/uL Final    HGB 12.5 12.0 - 16.0 g/dL Final    HCT 40.4 36 - 48 % Final    MCV 80.2 78 - 102 FL Final    MCH 24.8 (L) 25.0 - 35.0 PG Final    MCHC 30.9 (L) 31 - 37 g/dL Final    RDW 15.2 (H) 11.5 - 14.5 % Final    PLATELET 064 211 - 018 K/uL Final    NEUTROPHILS 22 (L) 40 - 70 % Final    MIXED CELLS 5 0.1 - 17 % Final    LYMPHOCYTES 73 (H) 14 - 44 % Final    ABS. NEUTROPHILS 2.4 1.8 - 9.5 K/UL Final    ABS. MIXED CELLS 0.6 0.0 - 2.3 K/uL Final    ABS. LYMPHOCYTES 8.1 (H) 1.1 - 5.9 K/UL Final     Comment: Test performed at [address]. Results Reviewed by Medical Director. DF AUTOMATED   Final           Assessment:     1. CLL (chronic lymphocytic leukemia) ([address]    2. Vitamin D deficiency      Plan:   CLL: I have explained to the patient and his current clinical status is stable. He has stable lymphadenopathy. The CBC from today shows that his lymphocyte count is 79% %. His total WBC count is 15.3. He has a normal hemoglobin of 12.9 g/dL with hematocrit of 39.6 %. We will continue active surveillance. Systemic chemotherapy is still not recommended at this stage of his illness. Lymphocytosis: I have explained to the patient that he has 79 % lymphocytes on his peripheral blood count. The absolute lymphocyte count is elevated at 12 with a reference range of 1.1-5.9. These will continue to be monitored every 4 months. Vitamin d deficiency: I will check a vitamin D level today.  If deficient, we will start a weekly supplement for 8-12 weeks,       The patient will return to the clinic for a complete assessment again in 4 months.   Orders Placed This Encounter    COMPLETE CBC & AUTO DIFF WBC    CBC WITH 3 PART DIFF     Standing Status:   Future     Standing Expiration Date:   3/2/4715    METABOLIC PANEL, COMPREHENSIVE     Standing Status:   Future     Standing Expiration Date:   1/5/2020    VITAMIN D, 25 HYDROXY     Standing Status:   Future     Standing Expiration Date:   1/5/2020    CBC/DIFF AMBIGUOUS DEFAULT    METABOLIC PANEL, COMPREHENSIVE    VITAMIN D, 25 HYDROXY    InHouse CBC ([facility])     Standing Status:   Future     Number of Occurrences:   1     Standing Expiration Date:   1/18/2019       Liza Watts MD  1/11/2019

## 2019-01-24 ENCOUNTER — TELEPHONE (OUTPATIENT)
Dept: CARDIOLOGY CLINIC | Age: 59
End: 2019-01-24

## 2019-01-24 NOTE — LETTER
1/24/2019 Mr. 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 If you have any questions, please do not hesitate to have the patient call the office. Sincerely, Hayden Hill MD

## 2019-05-24 ENCOUNTER — HOSPITAL ENCOUNTER (OUTPATIENT)
Dept: ONCOLOGY | Age: 59
Discharge: HOME | End: 2019-05-24

## 2019-05-24 ENCOUNTER — HOSPITAL ENCOUNTER (OUTPATIENT)
Dept: LAB | Age: 59
Discharge: HOME | End: 2019-05-24
Payer: COMMERCIAL

## 2019-05-24 ENCOUNTER — OFFICE VISIT (OUTPATIENT)
Dept: ONCOLOGY | Age: 59
End: 2019-05-24

## 2019-05-24 VITALS
HEART RATE: 74 BPM | WEIGHT: 192 LBS | BODY MASS INDEX: 28.44 KG/M2 | TEMPERATURE: 98.6 F | SYSTOLIC BLOOD PRESSURE: 104 MMHG | HEIGHT: 69 IN | RESPIRATION RATE: 16 BRPM | OXYGEN SATURATION: 100 % | DIASTOLIC BLOOD PRESSURE: 68 MMHG

## 2019-05-24 DIAGNOSIS — D72.820 LYMPHOCYTOSIS: ICD-10-CM

## 2019-05-24 DIAGNOSIS — C91.10 CLL (CHRONIC LYMPHOCYTIC LEUKEMIA) (HCC): ICD-10-CM

## 2019-05-24 DIAGNOSIS — E55.9 VITAMIN D DEFICIENCY: ICD-10-CM

## 2019-05-24 DIAGNOSIS — C91.10 CLL (CHRONIC LYMPHOCYTIC LEUKEMIA) (HCC): Primary | ICD-10-CM

## 2019-05-24 LAB
ALBUMIN SERPL-MCNC: 4.5 G/DL (ref 3.4–5)
ALBUMIN/GLOB SERPL: 1.5 {RATIO} (ref 0.8–1.7)
ALP SERPL-CCNC: 55 U/L (ref 45–117)
ALT SERPL-CCNC: 32 U/L (ref 16–61)
ANION GAP SERPL CALC-SCNC: 11 MMOL/L (ref 3–18)
AST SERPL-CCNC: 20 U/L (ref 15–37)
BASO+EOS+MONOS # BLD AUTO: 0.6 K/UL (ref 0–2.3)
BASO+EOS+MONOS NFR BLD AUTO: 4 % (ref 0.1–17)
BILIRUB SERPL-MCNC: 0.5 MG/DL (ref 0.2–1)
BUN SERPL-MCNC: 25 MG/DL (ref 7–18)
BUN/CREAT SERPL: 19 (ref 12–20)
CALCIUM SERPL-MCNC: 9.3 MG/DL (ref 8.5–10.1)
CHLORIDE SERPL-SCNC: 103 MMOL/L (ref 100–108)
CO2 SERPL-SCNC: 24 MMOL/L (ref 21–32)
CREAT SERPL-MCNC: 1.34 MG/DL (ref 0.6–1.3)
DIFFERENTIAL METHOD BLD: ABNORMAL
ERYTHROCYTE [DISTWIDTH] IN BLOOD BY AUTOMATED COUNT: 16.2 % (ref 11.5–14.5)
ERYTHROCYTE [SEDIMENTATION RATE] IN BLOOD: 9 MM/HR (ref 0–20)
GLOBULIN SER CALC-MCNC: 3 G/DL (ref 2–4)
GLUCOSE SERPL-MCNC: 104 MG/DL (ref 74–99)
HCT VFR BLD AUTO: 39.6 % (ref 36–48)
HGB BLD-MCNC: 12.3 G/DL (ref 12–16)
LYMPHOCYTES # BLD: 11.5 K/UL (ref 1.1–5.9)
LYMPHOCYTES NFR BLD: 74 % (ref 14–44)
MCH RBC QN AUTO: 25.1 PG (ref 25–35)
MCHC RBC AUTO-ENTMCNC: 31.1 G/DL (ref 31–37)
MCV RBC AUTO: 80.7 FL (ref 78–102)
NEUTS SEG # BLD: 3.4 K/UL (ref 1.8–9.5)
NEUTS SEG NFR BLD: 22 % (ref 40–70)
PLATELET # BLD AUTO: 170 K/UL (ref 140–440)
POTASSIUM SERPL-SCNC: 4.3 MMOL/L (ref 3.5–5.5)
PROT SERPL-MCNC: 7.5 G/DL (ref 6.4–8.2)
RBC # BLD AUTO: 4.91 M/UL (ref 4.1–5.1)
SODIUM SERPL-SCNC: 138 MMOL/L (ref 136–145)
WBC # BLD AUTO: 15.5 K/UL (ref 4.5–13)

## 2019-05-24 PROCEDURE — 80053 COMPREHEN METABOLIC PANEL: CPT

## 2019-05-24 PROCEDURE — 85652 RBC SED RATE AUTOMATED: CPT

## 2019-05-24 PROCEDURE — 36415 COLL VENOUS BLD VENIPUNCTURE: CPT

## 2019-05-24 PROCEDURE — 82306 VITAMIN D 25 HYDROXY: CPT

## 2019-05-24 NOTE — PROGRESS NOTES
Hematology/Oncology  Progress Note    Name: Marvin Huggins  Date: 2019  : 1960    PCP: Thiago Oconnor MD     Mr. Vedia Lundborg is a 60year old male who was seen for management of his chronic lymphocytic leukemia. Current therapy: Surveillance    Subjective:     Mr. Vedia Lundborg is a 13-year-old [employer] to [facility] [place] American man who has a stage I chronic lymphocytic leukemia. The patient reports that he has been doing well. He has no complaints of fevers, night sweats, or weight loss. The patient states that his energy level has been good. His appetite is excellent and he has no new physical complaints or concerns to report. Additionally he denies having any problems with unexplained bruising or bleeding. Past medical history, family history, and social history: these were reviewed and remains unchanged. Past Medical History:   Diagnosis Date    Abnormal nuclear cardiac imaging test 2006    Mild anterior ischemia. Inferior scar w/border zone ischemia. LVE. EF 26%. (Gated imaging may be inaccurate.)  Global hypk. Neg EKG on max EST. Ex time 10:30.  Aorto-iliac duplex 09/10/2012    No AAA identified.  Carotid duplex 09/10/2012    No significant occlusive disease bilaterallly.  Echocardiogram 2015    EF 55%. No WMA. Mild LVH. Gr 1 DDfx. IMELDA. No significant chg from study of 5/17/10.  History of echocardiogram 2011    EF 50-55%. Gr 2 DDfx. Mild RVE. Mild LAE. Mild-mod IMELDA.  Hypercholesterolemia     Nonischemic cardiomyopathy (HCC)     s/p right coronary cusp PVC ablation  (3/90/63) without complication    S/P cardiac cath 2006    Patent coronary arteries. LVEDP 12.  EF 25-30%. Mod-significant global hypk. Past Surgical History:   Procedure Laterality Date    HX HEART CATHETERIZATION  06    The right coronary artery is dominant. It is widely patent. The left main coronary artery is widely patent.  The circumflex artery is widely patent. The left anterior descending coronary artery is widely patent. The LVEDP is 12 mmHg. The overall left ventricular systolic function is significantly diminished with an estimated ejection fraction of 25-30%. ** See report. Social History     Socioeconomic History    Marital status:      Spouse name: Not on file    Number of children: Not on file    Years of education: Not on file    Highest education level: Not on file   Occupational History    Not on file   Social Needs    Financial resource strain: Not on file    Food insecurity:     Worry: Not on file     Inability: Not on file    Transportation needs:     Medical: Not on file     Non-medical: Not on file   Tobacco Use    Smoking status: Former Smoker     Packs/day: 1.00    Smokeless tobacco: Never Used   Substance and Sexual Activity    Alcohol use: Yes    Drug use: Not on file    Sexual activity: Not on file   Lifestyle    Physical activity:     Days per week: Not on file     Minutes per session: Not on file    Stress: Not on file   Relationships    Social connections:     Talks on phone: Not on file     Gets together: Not on file     Attends [employer] service: Not on file     Active member of club or organization: Not on file     Attends meetings of clubs or organizations: Not on file     Relationship status: Not on file    Intimate partner violence:     Fear of current or ex partner: Not on file     Emotionally abused: Not on file     Physically abused: Not on file     Forced sexual activity: Not on file   Other Topics Concern    Not on file   Social History Narrative    Not on file     History reviewed. No pertinent family history. Current Outpatient Medications   Medication Sig Dispense Refill    ergocalciferol (ERGOCALCIFEROL) 50,000 unit capsule Take 1 Cap by mouth every seven (7) days. 24 Cap 1    sitagliptin phos/metformin HCl (JANUMET PO) Take  by mouth.       lisinopril (PRINIVIL, ZESTRIL) 5 mg tablet Take 1 Tab by mouth daily. 30 Tab 6    carvedilol (COREG) 12.5 mg tablet Take 1 Tab by mouth two (2) times a day. 60 Tab 6    simvastatin (ZOCOR) 10 mg tablet Take  by mouth daily.  aspirin delayed-release 81 mg tablet Take 81 mg by mouth daily. Review of Systems  Constitutional: The patient has no acute distress or discomfort. HEENT: The patient denies recent head trauma, eye pain, blurred vision,  hearing deficit, oropharyngeal mucosal pain or lesions, and the patient denies throat pain or discomfort. Lymphatics: The patient denies palpable peripheral lymphadenopathy. Hematologic: The patient denies having bruising, bleeding, or progressive fatigue. Respiratory: Patient denies having shortness of breath, cough, sputum production, fever, or dyspnea on exertion. Cardiovascular: The patient denies having leg pain, leg swelling, heart palpitations, chest permit, chest pain, or lightheadedness. The patient denies having dyspnea on exertion. Gastrointestinal: The patient denies having nausea, emesis, or diarrhea. The patient denies having any hematemesis or blood in the stool. Genitourinary: Patient denies having urinary urgency, frequency, or dysuria. The patient denies having blood in the urine. Psychological: The patient denies having symptoms of nervousness, anxiety, depression, or thoughts of harming himself some of this. Skin: Patient denies having skin ulcerations. The patient is complaining of a new rash on his arms and back. He also complains of some itching. Musculoskeletal: The patient denies having pain in the joints or bones. Objective:     Visit Vitals  /68   Pulse 74   Temp 98.6 °F (37 °C) (Oral)   Resp 16   Ht 5' 9\" (1.753 m)   Wt 87.1 kg (192 lb)   SpO2 100%   BMI 28.35 kg/m²     ECOG PS=0     Physical Exam:   Gen. Appearance: The patient is in no acute distress. Skin: There is a macular and papular scaling rash on her arms and back concerning for possible psoriasis. HEENT: The exam is unremarkable. Neck: Supple without lymphadenopathy or thyromegaly. Lungs: Clear to auscultation and percussion; there are no wheezes or rhonchi. Heart: Regular rate and rhythm; there are no murmurs, gallops, or rubs. Abdomen: Bowel sounds are present and normal.  There is no guarding, tenderness, or hepatosplenomegaly. Extremities: There is no clubbing, cyanosis, or edema. Neurologic: There are no focal neurologic deficits. Lymphatics: There is palpable cervical and supraclavicular lymphadenopathy only. Musculoskeletal: The patient has full range of motion at all joints. There is no evidence of joint deformity or effusions. There is no focal joint tenderness. Psychological/psychiatric: There is no clinical evidence of anxiety, depression, or melancholy. Lab data:      Results for orders placed or performed during the hospital encounter of 05/24/19   CBC WITH 3 PART DIFF     Status: Abnormal   Result Value Ref Range Status    WBC 15.5 (H) 4.5 - 13.0 K/uL Final    RBC 4.91 4.10 - 5.10 M/uL Final    HGB 12.3 12.0 - 16.0 g/dL Final    HCT 39.6 36 - 48 % Final    MCV 80.7 78 - 102 FL Final    MCH 25.1 25.0 - 35.0 PG Final    MCHC 31.1 31 - 37 g/dL Final    RDW 16.2 (H) 11.5 - 14.5 % Final    PLATELET 466 779 - 492 K/uL Final    NEUTROPHILS 22 (L) 40 - 70 % Final    MIXED CELLS 4 0.1 - 17 % Final    LYMPHOCYTES 74 (H) 14 - 44 % Final    ABS. NEUTROPHILS 3.4 1.8 - 9.5 K/UL Final    ABS. MIXED CELLS 0.6 0.0 - 2.3 K/uL Final    ABS. LYMPHOCYTES 11.5 (H) 1.1 - 5.9 K/UL Final     Comment: Test performed at [address] [place] [address] or [facility]patient [facility]fusion [facility] Location. Reviewed by Medical Director. DF AUTOMATED   Final           Assessment:     1. CLL (chronic lymphocytic leukemia) ([facility] [address] [address]    2. Vitamin D deficiency    3. Lymphocytosis      Plan:   CLL: I have explained to the patient and his current clinical status is stable. He has stable lymphadenopathy.   The CBC from today shows that his lymphocyte count is 74. His total WBC count is 15.5. He has a normal hemoglobin of 12.3 g/dL with hematocrit of 39.6 %. We will continue active surveillance. Systemic chemotherapy is still not recommended at this stage of his illness. Lymphocytosis: I have explained to the patient that he has 74 % lymphocytes on his peripheral blood count. The absolute lymphocyte count is elevated at 11.5 with a reference range of 1.1-5.9. These will continue to be monitored every 4 months. Vitamin d deficiency: I will check a vitamin D level today. If deficient, we will start a weekly supplement for 8-12 weeks,       The patient will return to the clinic for a complete assessment again in 4 months.   Orders Placed This Encounter    COMPLETE CBC & AUTO DIFF WBC    InHouse CBC ([facility])     Standing Status:   Future     Number of Occurrences:   1     Standing Expiration Date:   6/17/2903    METABOLIC PANEL, COMPREHENSIVE     Standing Status:   Future     Standing Expiration Date:   5/24/2020    VITAMIN D, 25 HYDROXY     Standing Status:   Future     Standing Expiration Date:   5/24/2020    SED RATE (ESR)     Standing Status:   Future     Standing Expiration Date:   5/24/2020       Haven Ghosh MD  5/24/2019

## 2019-05-25 LAB — 25(OH)D3 SERPL-MCNC: 62.4 NG/ML (ref 30–100)

## 2019-06-20 ENCOUNTER — OFFICE VISIT (OUTPATIENT)
Dept: UROLOGY | Age: 59
End: 2019-06-20

## 2019-06-20 ENCOUNTER — HOSPITAL ENCOUNTER (OUTPATIENT)
Dept: LAB | Age: 59
Discharge: HOME | End: 2019-06-20
Payer: COMMERCIAL

## 2019-06-20 VITALS
HEART RATE: 72 BPM | BODY MASS INDEX: 27.85 KG/M2 | DIASTOLIC BLOOD PRESSURE: 82 MMHG | OXYGEN SATURATION: 98 % | HEIGHT: 69 IN | SYSTOLIC BLOOD PRESSURE: 130 MMHG | WEIGHT: 188 LBS

## 2019-06-20 DIAGNOSIS — R97.20 ELEVATED PSA: Primary | ICD-10-CM

## 2019-06-20 DIAGNOSIS — R97.20 ELEVATED PSA: ICD-10-CM

## 2019-06-20 LAB
BILIRUB UR QL STRIP: NEGATIVE
GLUCOSE UR-MCNC: NEGATIVE MG/DL
KETONES P FAST UR STRIP-MCNC: NEGATIVE MG/DL
PH UR STRIP: 5.5 [PH] (ref 4.6–8)
PROT UR QL STRIP: NEGATIVE
PSA SERPL-MCNC: 4.3 NG/ML (ref 0–4)
SP GR UR STRIP: 1.02 (ref 1–1.03)
UA UROBILINOGEN AMB POC: NORMAL (ref 0.2–1)
URINALYSIS CLARITY POC: CLEAR
URINALYSIS COLOR POC: YELLOW
URINE BLOOD POC: NEGATIVE
URINE LEUKOCYTES POC: NEGATIVE
URINE NITRITES POC: NEGATIVE

## 2019-06-20 PROCEDURE — 84153 ASSAY OF PSA TOTAL: CPT

## 2019-06-20 RX ORDER — EXENATIDE 2 MG/.85ML
INJECTION, SUSPENSION, EXTENDED RELEASE SUBCUTANEOUS
Refills: 0 | COMMUNITY
Start: 2019-06-07

## 2019-06-20 NOTE — PROGRESS NOTES
Carla Brooke 60 y.o. male     Mr. Jon Bates seen today for elevated PSA surveillance    Voiding with a forceful stream good control nocturia 0-1 episodes per night    Patient has history of elevated PSA with negative prostate biopsy x1      prostate biopsy prompted by  finding of elevated PSA     Patient has no irritative or obstructive voiding symptoms  No family history of prostate malignancy     PSA 5.1 on 9 October 2018   18 core transperineal prostate biopsy on 13 December 2018  volume 81.3 cc PSA density 0.06     benign histology        Review of Systems:   CNS: No seizure syncope headaches dizziness or visual changes  Respiratory: No wheezing shortness of breath chest pain or coughing  Cardiovascular: No angina no palpitations  Intestinal: No dyspepsia diarrhea or constipation  Urinary: No urgency frequency or dysuria no hematuria  Skeletal: No bone or joint pain  Endocrine: Diabetes  Other:     Allergies: No Known Allergies   Medications:    Current Outpatient Medications   Medication Sig Dispense Refill    BYDUREON BCISE 2 mg/0.85 mL atIn INJECT 2MG EVERY WEEK UNDER THE SKIN  0    ergocalciferol (ERGOCALCIFEROL) 50,000 unit capsule Take 1 Cap by mouth every seven (7) days. 24 Cap 1    sitagliptin phos/metformin HCl (JANUMET PO) Take  by mouth.  lisinopril (PRINIVIL, ZESTRIL) 5 mg tablet Take 1 Tab by mouth daily. 30 Tab 6    carvedilol (COREG) 12.5 mg tablet Take 1 Tab by mouth two (2) times a day. 60 Tab 6    simvastatin (ZOCOR) 10 mg tablet Take  by mouth daily.  aspirin delayed-release 81 mg tablet Take 81 mg by mouth daily. Past Medical History:   Diagnosis Date    Abnormal nuclear cardiac imaging test 11/30/2006    Mild anterior ischemia. Inferior scar w/border zone ischemia. LVE. EF 26%. (Gated imaging may be inaccurate.)  Global hypk. Neg EKG on max EST. Ex time 10:30.  Aorto-iliac duplex 09/10/2012    No AAA identified.     Carotid duplex 09/10/2012    No significant occlusive disease bilaterallly.  Echocardiogram 06/02/2015    EF 55%. No WMA. Mild LVH. Gr 1 DDfx. IMELDA. No significant chg from study of 5/17/10.  History of echocardiogram 06/20/2011    EF 50-55%. Gr 2 DDfx. Mild RVE. Mild LAE. Mild-mod IMELDA.  Hypercholesterolemia     Nonischemic cardiomyopathy (HCC)     s/p right coronary cusp PVC ablation  (5/88/42) without complication    S/P cardiac cath 12/11/2006    Patent coronary arteries. LVEDP 12.  EF 25-30%. Mod-significant global hypk. Past Surgical History:   Procedure Laterality Date    HX HEART CATHETERIZATION  12/11/06    The right coronary artery is dominant. It is widely patent. The left main coronary artery is widely patent. The circumflex artery is widely patent. The left anterior descending coronary artery is widely patent. The LVEDP is 12 mmHg. The overall left ventricular systolic function is significantly diminished with an estimated ejection fraction of 25-30%. ** See report. Social History     Socioeconomic History    Marital status:      Spouse name: Not on file    Number of children: Not on file    Years of education: Not on file    Highest education level: Not on file   Occupational History    Not on file   Social Needs    Financial resource strain: Not on file    Food insecurity:     Worry: Not on file     Inability: Not on file    Transportation needs:     Medical: Not on file     Non-medical: Not on file   Tobacco Use    Smoking status: Former Smoker     Packs/day: 1.00    Smokeless tobacco: Never Used   Substance and Sexual Activity    Alcohol use:  Yes    Drug use: Not on file    Sexual activity: Not on file   Lifestyle    Physical activity:     Days per week: Not on file     Minutes per session: Not on file    Stress: Not on file   Relationships    Social connections:     Talks on phone: Not on file     Gets together: Not on file     Attends Christianity service: Not on file Active member of club or organization: Not on file     Attends meetings of clubs or organizations: Not on file     Relationship status: Not on file    Intimate partner violence:     Fear of current or ex partner: Not on file     Emotionally abused: Not on file     Physically abused: Not on file     Forced sexual activity: Not on file   Other Topics Concern    Not on file   Social History Narrative    Not on file      History reviewed. No pertinent family history. Physical Examination: Nourished mature male in no apparent distress  Normal prostate by CHINO    Urinalysis: Negative heme/negative nitrite    Impression: Elevated PSA with negative prostate biopsy x1        Plan: PSA today    Described discussed prospect of repeat prostate biopsy for elevated and rising PSA    RTC 6 months PSA testing       More than 1/2 of this 15 minute visit was spent in counselling and coordination of care, as described above. Angel Mariee MD  -electronically signed-    PLEASE NOTE:  This document has been produced using voice recognition software. Unrecognized errors in transcription may be present.

## 2019-06-20 NOTE — PATIENT INSTRUCTIONS
Prostate Cancer Screening: Care Instructions Your Care Instructions The prostate gland is an organ found just below a man's bladder. It is the size and shape of a walnut. It surrounds the tube that carries urine from the bladder out of the body through the penis. This tube is called the urethra. Prostate cancer is the abnormal growth of cells in the prostate. It is the second most common type of cancer in men. (Skin cancer is the most common.) Most cases of prostate cancer occur in men older than 72. The disease runs in families. And it's more common in -American men. When it's found and treated early, prostate cancer may be cured. But it is not always treated. This is because prostate cancer may not shorten your life, especially if you are older and the cancer is growing slowly. Follow-up care is a key part of your treatment and safety. Be sure to make and go to all appointments, and call your doctor if you are having problems. It's also a good idea to know your test results and keep a list of the medicines you take. What are the screening tests for prostate cancer? The main screening test for prostate cancer is the prostate-specific antigen (PSA) test. This is a blood test that measures how much PSA is in your blood. A high level may mean that you have an enlargement, an infection, or cancer. Along with the PSA test, you may have a digital rectal exam. The digital (finger) rectal exam checks for anything abnormal in your prostate. To do the exam, the doctor puts a lubricated, gloved finger into your rectum. If these tests suggest cancer, you may need a prostate biopsy. How is prostate cancer diagnosed? In a biopsy, the doctor takes small tissue samples from your prostate gland. Another doctor then looks at the tissue under a microscope to see if there are cancer cells, signs of infection, or other problems. The results help diagnose prostate cancer. What are the pros and cons of screening? Neither a PSA test nor a digital rectal exam can tell you for sure that you do or do not have cancer. But they can help you decide if you need more tests, such as a prostate biopsy. Screening tests may be useful because most men with prostate cancer don't have symptoms. It can be hard to know if you have cancer until it is more advanced. And then it's harder to treat. But having a PSA test can also cause harm. The test may show high levels of PSA that aren't caused by cancer. So you could have a prostate biopsy you didn't need. Or the PSA test might be normal when there is cancer, so a cancer might not be found early. The test can also find cancers that would never have caused a problem during your lifetime. So you might have treatment that was not needed. Prostate cancer usually develops late in life and grows slowly. For many men, it does not shorten their lives. Some experts advise screening only for men who are at high risk. Talk with your doctor to see if screening is right for you. Where can you learn more? Go to http://yajaira-jose.info/. Enter R550 in the search box to learn more about \"Prostate Cancer Screening: Care Instructions. \" Current as of: March 27, 2018 Content Version: 11.9 © 0752-6577 [employer]. Care instructions adapted under license by [employer] (which disclaims liability or warranty for this information). If you have questions about a medical condition or this instruction, always ask your healthcare professional. [place] [address] any warranty or liability for your use of this information.

## 2019-06-20 NOTE — PROGRESS NOTES
Mr. Gilberto Barrera has a reminder for a \"due or due soon\" health maintenance. I have asked that he contact his primary care provider for follow-up on this health maintenance.

## 2019-06-25 NOTE — PROGRESS NOTES
PSA 4.3 19 June 2019        PSA 5.1 on 9 October 2018   18 core transperineal prostate biopsy on 13 December 2018  volume 81.3 cc PSA density 0.06     benign histology      Impression declining PSA elevation    : Repeat PSA testing in 6 months    Justo Prado MD

## 2019-06-27 ENCOUNTER — OFFICE VISIT (OUTPATIENT)
Dept: CARDIOLOGY CLINIC | Age: 59
End: 2019-06-27

## 2019-06-27 VITALS
HEIGHT: 69 IN | HEART RATE: 73 BPM | OXYGEN SATURATION: 98 % | BODY MASS INDEX: 27.7 KG/M2 | SYSTOLIC BLOOD PRESSURE: 132 MMHG | WEIGHT: 187 LBS | DIASTOLIC BLOOD PRESSURE: 76 MMHG

## 2019-06-27 DIAGNOSIS — Z86.79 S/P ABLATION OPERATION FOR ARRHYTHMIA: ICD-10-CM

## 2019-06-27 DIAGNOSIS — I49.3 PVC (PREMATURE VENTRICULAR CONTRACTION): ICD-10-CM

## 2019-06-27 DIAGNOSIS — I42.8 NONISCHEMIC CARDIOMYOPATHY (HCC): Primary | ICD-10-CM

## 2019-06-27 DIAGNOSIS — Z98.890 S/P ABLATION OPERATION FOR ARRHYTHMIA: ICD-10-CM

## 2019-06-27 DIAGNOSIS — I10 ESSENTIAL HYPERTENSION: ICD-10-CM

## 2019-06-27 NOTE — PROGRESS NOTES
HPI: A 63-year old male here for follow up. Overall, he is doing quite well in regard to his heart. No significant palpitations, chest pain, dyspnea, PND, orthopnea, edema. 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 He had PVC ablation January 2010 and since has been doing quite well. He was under a lot of stress as he had a death in his family including his mother last year. He admits to being depressed as well as having a difficult time concentrating. He denies any suicidal ideation. He is not on any medications. Impression/Plan:  1. Suspected depression due to recent loss of his mother last year. We talked about following with Dr. Leigh Dominique to discuss an antidepressant. He is not suicidal.  2. History of right coronary cusp PVC ablation January 2010, follow up 1% burden, doing well. 3. Transient nonischemic cardiomyopathy improving to normal by echocardiogram June 2016.  4. Remote alcohol use discontinued. 5. History of familial aortic aneurysm with abdominal ultrasound a number of years ago, unremarkable without symptoms. 6. Hypertension controlled. 7. Dyslipidemia on Zocor. Overall, he is doing well. No symptoms to suggest heart failure or worsening palpitations. Blood pressure is reasonable, and weight is stable. We talked about seeing Dr. Leigh Dominique to discuss depression treatment options. I will see him back in one year. Past Medical History:   Diagnosis Date    Abnormal nuclear cardiac imaging test 11/30/2006    Mild anterior ischemia. Inferior scar w/border zone ischemia. LVE. EF 26%. (Gated imaging may be inaccurate.)  Global hypk. Neg EKG on max EST. Ex time 10:30.  Aorto-iliac duplex 09/10/2012    No AAA identified.  Carotid duplex 09/10/2012    No significant occlusive disease bilaterallly.  Echocardiogram 06/02/2015    EF 55%. No WMA. Mild LVH. Gr 1 DDfx. IMELDA. No significant chg from study of 5/17/10.     History of echocardiogram 06/20/2011    EF 50-55%. Gr 2 DDfx. Mild RVE. Mild LAE. Mild-mod IMELDA.  Hypercholesterolemia     Nonischemic cardiomyopathy (HCC)     s/p right coronary cusp PVC ablation  (8/01/86) without complication    S/P cardiac cath 12/11/2006    Patent coronary arteries. LVEDP 12.  EF 25-30%. Mod-significant global hypk. Current Outpatient Medications   Medication Sig Dispense Refill    BYDUREON BCISE 2 mg/0.85 mL atIn INJECT 2MG EVERY WEEK UNDER THE SKIN  0    ergocalciferol (ERGOCALCIFEROL) 50,000 unit capsule Take 1 Cap by mouth every seven (7) days. 24 Cap 1    sitagliptin phos/metformin HCl (JANUMET PO) Take  by mouth.  lisinopril (PRINIVIL, ZESTRIL) 5 mg tablet Take 1 Tab by mouth daily. 30 Tab 6    carvedilol (COREG) 12.5 mg tablet Take 1 Tab by mouth two (2) times a day. 60 Tab 6    simvastatin (ZOCOR) 10 mg tablet Take  by mouth daily.  aspirin delayed-release 81 mg tablet Take 81 mg by mouth daily. Social History   reports that he has quit smoking. He smoked 1.00 pack per day. He has never used smokeless tobacco.   reports that he drinks alcohol. Family History  family history is not on file. Review of Systems  Except as stated above include:  Constitutional: Negative for fever, chills and malaise/fatigue. HEENT: No congestion or recent URI. Gastrointestinal: No nausea, vomiting, abdominal pain, bloody stools. Pulmonary:  Negative except as stated above. Cardiac:  Negative except as stated above. Musculoskeletal: Negative except as stated above. Neurological:  No localized symptoms. Skin:  Negative except as stated above. Psych:  Negative except as stated above. Endocrine:  Negative except as stated above.     PHYSICAL EXAM  BP Readings from Last 3 Encounters:   06/27/19 132/76   06/20/19 130/82   05/24/19 104/68     Pulse Readings from Last 3 Encounters:   06/27/19 73   06/20/19 72   05/24/19 74     Wt Readings from Last 3 Encounters: 06/27/19 84.8 kg (187 lb)   06/20/19 85.3 kg (188 lb)   05/24/19 87.1 kg (192 lb)     General:   Well developed, well groomed. Head/Neck:   No jugular venous distention     No carotid bruits. No evidence of xanthelasma. Lungs:   No respiratory distress. Clear bilaterally. Heart:    Regular rate and rhythm. Normal S1/S2. Palpation of heart with normal point of maximum impulse. No significant murmurs, rubs or gallops. Abdomen:   Soft and nontender. No palpable abdominal mass or bruits. Extremities:   Intact peripheral pulses. No significant edema. Neurological:   Alert and oriented to person, place, time. No focal neurological deficit visually. Skin:   No obvious rash    Blood Pressure Metric:  Monitor recommended and adjustments stated if needed.

## 2019-09-20 ENCOUNTER — OFFICE VISIT (OUTPATIENT)
Dept: ONCOLOGY | Age: 59
End: 2019-09-20

## 2019-09-20 VITALS
RESPIRATION RATE: 18 BRPM | TEMPERATURE: 98.4 F | WEIGHT: 189 LBS | BODY MASS INDEX: 27.99 KG/M2 | HEART RATE: 80 BPM | DIASTOLIC BLOOD PRESSURE: 70 MMHG | SYSTOLIC BLOOD PRESSURE: 118 MMHG | HEIGHT: 69 IN | OXYGEN SATURATION: 96 %

## 2019-09-20 DIAGNOSIS — E55.9 VITAMIN D DEFICIENCY: ICD-10-CM

## 2019-09-20 DIAGNOSIS — D72.820 LYMPHOCYTOSIS: ICD-10-CM

## 2019-09-20 DIAGNOSIS — C91.10 CLL (CHRONIC LYMPHOCYTIC LEUKEMIA) (HCC): Primary | ICD-10-CM

## 2019-09-20 NOTE — PROGRESS NOTES
Gerri Guevara is a 60 y.o. male presents in office for    Chief Complaint   Patient presents with    Chronic Lymphocytic Leukemia        Visit Vitals  /70 (BP 1 Location: Left arm, BP Patient Position: Sitting)   Pulse 80   Temp 98.4 °F (36.9 °C) (Oral)   Resp 18   Ht 5' 9\" (1.753 m)   Wt 85.7 kg (189 lb)   SpO2 96%   BMI 27.91 kg/m²         Health Maintenance Due   Topic Date Due    Hepatitis C Screening  1960    Pneumococcal 0-64 years (1 of 3 - PCV13) 11/17/1965    DTaP/Tdap/Td series (1 - Tdap) 11/17/1980    Shingrix Vaccine Age 50> (1 of 2) 11/17/2009    FOBT Q 1 YEAR AGE 50-75  11/17/2009    Influenza Age 9 to Adult  08/01/2019         1. Have you been to the ER, urgent care clinic since your last visit? Hospitalized since your last visit? No    2. Have you seen or consulted any other health care providers outside of the [address] [place] [address] since your last visit? Include any pap smears or colon screening.  No     Learning Assessment 6/27/2019   PRIMARY LEARNER Patient   PRIMARY LANGUAGE ENGLISH   LEARNER PREFERENCE PRIMARY DEMONSTRATION     -   ANSWERED BY patient   RELATIONSHIP SELF

## 2019-09-20 NOTE — PROGRESS NOTES
Hematology/Oncology  Progress Note    Name: Amanda Mckeon  Date: 2019  : 1960    PCP: Frandy Grewal MD     Mr. Mark Rodriguez is a 60year old male who was seen for management of his chronic lymphocytic leukemia. Current therapy: Surveillance    Subjective:     Mr. Mark Rodriguez is a 58-year-old [facility] [place] man who has a stage I chronic lymphocytic leukemia. The patient reports that he has been doing well. He has no complaints of fevers, night sweats, or weight loss. The patient states that his energy level has been good. His appetite is excellent and he has no new physical complaints or concerns to report. Additionally he denies having any problems with unexplained bruising or bleeding. Since his last clinic visit he was seen by urologist who is monitoring his PSA. He has a follow-up in 4 months with plans for biopsies. Past medical history, family history, and social history: these were reviewed and remains unchanged. Past Medical History:   Diagnosis Date    Abnormal nuclear cardiac imaging test 2006    Mild anterior ischemia. Inferior scar w/border zone ischemia. LVE. EF 26%. (Gated imaging may be inaccurate.)  Global hypk. Neg EKG on max EST. Ex time 10:30.  Aorto-iliac duplex 09/10/2012    No AAA identified.  Carotid duplex 09/10/2012    No significant occlusive disease bilaterallly.  Echocardiogram 2015    EF 55%. No WMA. Mild LVH. Gr 1 DDfx. IMELDA. No significant chg from study of 5/17/10.  History of echocardiogram 2011    EF 50-55%. Gr 2 DDfx. Mild RVE. Mild LAE. Mild-mod IMELDA.  Hypercholesterolemia     Nonischemic cardiomyopathy (HCC)     s/p right coronary cusp PVC ablation  () without complication    S/P cardiac cath 2006    Patent coronary arteries. LVEDP 12.  EF 25-30%. Mod-significant global hypk.        Past Surgical History:   Procedure Laterality Date    HX HEART CATHETERIZATION  06    The right coronary artery is dominant. It is widely patent. The left main coronary artery is widely patent. The circumflex artery is widely patent. The left anterior descending coronary artery is widely patent. The LVEDP is 12 mmHg. The overall left ventricular systolic function is significantly diminished with an estimated ejection fraction of 25-30%. ** See report. Social History     Socioeconomic History    Marital status:      Spouse name: Not on file    Number of children: Not on file    Years of education: Not on file    Highest education level: Not on file   Occupational History    Not on file   Social Needs    Financial resource strain: Not on file    Food insecurity:     Worry: Not on file     Inability: Not on file    Transportation needs:     Medical: Not on file     Non-medical: Not on file   Tobacco Use    Smoking status: Former Smoker     Packs/day: 1.00    Smokeless tobacco: Never Used   Substance and Sexual Activity    Alcohol use: Yes    Drug use: Not on file    Sexual activity: Not on file   Lifestyle    Physical activity:     Days per week: Not on file     Minutes per session: Not on file    Stress: Not on file   Relationships    Social connections:     Talks on phone: Not on file     Gets together: Not on file     Attends Anabaptist service: Not on file     Active member of club or organization: Not on file     Attends meetings of clubs or organizations: Not on file     Relationship status: Not on file    Intimate partner violence:     Fear of current or ex partner: Not on file     Emotionally abused: Not on file     Physically abused: Not on file     Forced sexual activity: Not on file   Other Topics Concern    Not on file   Social History Narrative    Not on file     History reviewed. No pertinent family history.   Current Outpatient Medications   Medication Sig Dispense Refill    BYDUREON BCISE 2 mg/0.85 mL atIn INJECT 2MG EVERY WEEK UNDER THE SKIN  0    ergocalciferol (ERGOCALCIFEROL) 50,000 unit capsule Take 1 Cap by mouth every seven (7) days. 24 Cap 1    sitagliptin phos/metformin HCl (JANUMET PO) Take  by mouth.  lisinopril (PRINIVIL, ZESTRIL) 5 mg tablet Take 1 Tab by mouth daily. 30 Tab 6    carvedilol (COREG) 12.5 mg tablet Take 1 Tab by mouth two (2) times a day. 60 Tab 6    simvastatin (ZOCOR) 10 mg tablet Take  by mouth daily.  aspirin delayed-release 81 mg tablet Take 81 mg by mouth daily. Review of Systems  Constitutional: The patient has no acute distress or discomfort. HEENT: The patient denies recent head trauma, eye pain, blurred vision,  hearing deficit, oropharyngeal mucosal pain or lesions, and the patient denies throat pain or discomfort. Lymphatics: The patient denies palpable peripheral lymphadenopathy. Hematologic: The patient denies having bruising, bleeding, or progressive fatigue. Respiratory: Patient denies having shortness of breath, cough, sputum production, fever, or dyspnea on exertion. Cardiovascular: The patient denies having leg pain, leg swelling, heart palpitations, chest permit, chest pain, or lightheadedness. The patient denies having dyspnea on exertion. Gastrointestinal: The patient denies having nausea, emesis, or diarrhea. The patient denies having any hematemesis or blood in the stool. Genitourinary: Patient denies having urinary urgency, frequency, or dysuria. The patient denies having blood in the urine. Psychological: The patient denies having symptoms of nervousness, anxiety, depression, or thoughts of harming himself some of this. Skin: Patient denies having skin ulcerations. The patient is complaining of a new rash on his arms and back. He also complains of some itching. Musculoskeletal: The patient denies having pain in the joints or bones.       Objective:     Visit Vitals  /70 (BP 1 Location: Left arm, BP Patient Position: Sitting)   Pulse 80   Temp 98.4 °F (36.9 °C) (Oral) Resp 18   Ht 5' 9\" (1.753 m)   Wt 85.7 kg (189 lb)   SpO2 96%   BMI 27.91 kg/m²     ECOG PS=0     Physical Exam:   Gen. Appearance: The patient is in no acute distress. Skin: There is a macular and papular scaling rash on her arms and back concerning for possible psoriasis. HEENT: The exam is unremarkable. Neck: Supple without lymphadenopathy or thyromegaly. Lungs: Clear to auscultation and percussion; there are no wheezes or rhonchi. Heart: Regular rate and rhythm; there are no murmurs, gallops, or rubs. Abdomen: Bowel sounds are present and normal.  There is no guarding, tenderness, or hepatosplenomegaly. Extremities: There is no clubbing, cyanosis, or edema. Neurologic: There are no focal neurologic deficits. Lymphatics: There is palpable cervical and supraclavicular lymphadenopathy only. Musculoskeletal: The patient has full range of motion at all joints. There is no evidence of joint deformity or effusions. There is no focal joint tenderness. Psychological/psychiatric: There is no clinical evidence of anxiety, depression, or melancholy. Lab data:      Results for orders placed or performed during the hospital encounter of 05/24/19   CBC WITH 3 PART DIFF     Status: Abnormal   Result Value Ref Range Status    WBC 15.5 (H) 4.5 - 13.0 K/uL Final    RBC 4.91 4.10 - 5.10 M/uL Final    HGB 12.3 12.0 - 16.0 g/dL Final    HCT 39.6 36 - 48 % Final    MCV 80.7 78 - 102 FL Final    MCH 25.1 25.0 - 35.0 PG Final    MCHC 31.1 31 - 37 g/dL Final    RDW 16.2 (H) 11.5 - 14.5 % Final    PLATELET 081 885 - 702 K/uL Final    NEUTROPHILS 22 (L) 40 - 70 % Final    MIXED CELLS 4 0.1 - 17 % Final    LYMPHOCYTES 74 (H) 14 - 44 % Final    ABS. NEUTROPHILS 3.4 1.8 - 9.5 K/UL Final    ABS. MIXED CELLS 0.6 0.0 - 2.3 K/uL Final    ABS. LYMPHOCYTES 11.5 (H) 1.1 - 5.9 K/UL Final     Comment: Test performed at [address] or Outpatient Infusion Center Location.  Reviewed by [facility] Director. DF AUTOMATED   Final           Assessment:     1. CLL (chronic lymphocytic leukemia) ([facility] [address]    2. Vitamin D deficiency    3. Lymphocytosis      Plan:   CLL: I have explained to the patient and his current clinical status is stable. He has stable lymphadenopathy. The CBC from today is pending. I informed the patient that his last CBC from 5/24/2019 showed that his WBC count was 15.5, the hemoglobin was 12.3, hematocrit was 39.6, and the platelet count was [phone]. The CBC including a sedimentation rate will be obtained at this time. A comprehensive metabolic panel will also be requested. Vitamin D deficiency: The vitamin D level will be ordered and if the vitamin D level is low he will be offered vitamin D 50,000 units weekly for 12 weeks. Lymphocytosis: I have explained to the patient that with CLL the predominant cell type is usually lymphocytes. Fortunately he is remaining in stage 0 with no significant change in his leukocyte count. The results from today's blood draw is pending and I will contact him if we need to discuss therapeutic intervention. The patient will return to the clinic for a complete assessment again in 4 months.   Orders Placed This Encounter    CBC WITH AUTOMATED DIFF     Standing Status:   Future     Number of Occurrences:   1     Standing Expiration Date:   5/20/2751    METABOLIC PANEL, COMPREHENSIVE     Standing Status:   Future     Number of Occurrences:   1     Standing Expiration Date:   9/20/2020    VITAMIN D, 25 HYDROXY     Standing Status:   Future     Number of Occurrences:   1     Standing Expiration Date:   9/20/2020    SED RATE (ESR)     Standing Status:   Future     Number of Occurrences:   1     Standing Expiration Date:   9/20/2020       Diana Valentine MD  9/20/2019

## 2019-09-21 LAB
25(OH)D3+25(OH)D2 SERPL-MCNC: 43 NG/ML (ref 30–100)
ALBUMIN SERPL-MCNC: 4.5 G/DL (ref 3.5–5.5)
ALBUMIN/GLOB SERPL: 1.9 {RATIO} (ref 1.2–2.2)
ALP SERPL-CCNC: 66 IU/L (ref 39–117)
ALT SERPL-CCNC: 14 IU/L (ref 0–44)
AST SERPL-CCNC: 22 IU/L (ref 0–40)
BASOPHILS # BLD AUTO: 0 X10E3/UL (ref 0–0.2)
BASOPHILS NFR BLD AUTO: 0 %
BILIRUB SERPL-MCNC: 0.2 MG/DL (ref 0–1.2)
BUN SERPL-MCNC: 23 MG/DL (ref 6–24)
BUN/CREAT SERPL: 20 (ref 9–20)
CALCIUM SERPL-MCNC: 9.3 MG/DL (ref 8.7–10.2)
CHLORIDE SERPL-SCNC: 101 MMOL/L (ref 96–106)
CO2 SERPL-SCNC: 22 MMOL/L (ref 20–29)
CREAT SERPL-MCNC: 1.13 MG/DL (ref 0.76–1.27)
EOSINOPHIL # BLD AUTO: 0.1 X10E3/UL (ref 0–0.4)
EOSINOPHIL NFR BLD AUTO: 0 %
ERYTHROCYTE [DISTWIDTH] IN BLOOD BY AUTOMATED COUNT: 16.8 % (ref 12.3–15.4)
ERYTHROCYTE [SEDIMENTATION RATE] IN BLOOD BY WESTERGREN METHOD: 30 MM/HR (ref 0–30)
GLOBULIN SER CALC-MCNC: 2.4 G/DL (ref 1.5–4.5)
GLUCOSE SERPL-MCNC: 168 MG/DL (ref 65–99)
HCT VFR BLD AUTO: 36.3 % (ref 37.5–51)
HGB BLD-MCNC: 11.5 G/DL (ref 13–17.7)
IMM GRANULOCYTES # BLD AUTO: 0 X10E3/UL (ref 0–0.1)
IMM GRANULOCYTES NFR BLD AUTO: 0 %
LYMPHOCYTES # BLD AUTO: 13.9 X10E3/UL (ref 0.7–3.1)
LYMPHOCYTES NFR BLD AUTO: 81 %
MCH RBC QN AUTO: 24.7 PG (ref 26.6–33)
MCHC RBC AUTO-ENTMCNC: 31.7 G/DL (ref 31.5–35.7)
MCV RBC AUTO: 78 FL (ref 79–97)
MONOCYTES # BLD AUTO: 0.4 X10E3/UL (ref 0.1–0.9)
MONOCYTES NFR BLD AUTO: 3 %
NEUTROPHILS # BLD AUTO: 2.8 X10E3/UL (ref 1.4–7)
NEUTROPHILS NFR BLD AUTO: 16 %
PLATELET # BLD AUTO: 180 X10E3/UL (ref 150–450)
POTASSIUM SERPL-SCNC: 4.4 MMOL/L (ref 3.5–5.2)
PROT SERPL-MCNC: 6.9 G/DL (ref 6–8.5)
RBC # BLD AUTO: 4.65 X10E6/UL (ref 4.14–5.8)
SODIUM SERPL-SCNC: 142 MMOL/L (ref 134–144)
WBC # BLD AUTO: 17.1 X10E3/UL (ref 3.4–10.8)

## 2020-01-24 ENCOUNTER — HOSPITAL ENCOUNTER (OUTPATIENT)
Dept: ONCOLOGY | Age: 60
Discharge: HOME | End: 2020-01-24

## 2020-01-24 ENCOUNTER — OFFICE VISIT (OUTPATIENT)
Dept: ONCOLOGY | Age: 60
End: 2020-01-24

## 2020-01-24 VITALS
SYSTOLIC BLOOD PRESSURE: 138 MMHG | OXYGEN SATURATION: 99 % | DIASTOLIC BLOOD PRESSURE: 73 MMHG | TEMPERATURE: 98 F | HEART RATE: 78 BPM

## 2020-01-24 DIAGNOSIS — E55.9 VITAMIN D DEFICIENCY: ICD-10-CM

## 2020-01-24 DIAGNOSIS — D72.820 LYMPHOCYTOSIS: ICD-10-CM

## 2020-01-24 DIAGNOSIS — C91.10 CLL (CHRONIC LYMPHOCYTIC LEUKEMIA) (HCC): ICD-10-CM

## 2020-01-24 DIAGNOSIS — C91.10 CLL (CHRONIC LYMPHOCYTIC LEUKEMIA) (HCC): Primary | ICD-10-CM

## 2020-01-24 NOTE — PATIENT INSTRUCTIONS
Chronic Lymphocytic Leukemia: Care Instructions Your Care Instructions Leukemia is a type of cancer that causes your body to make too many blood cells, especially white blood cells. White blood cells are a part of your immune system, which helps protect you from infection and disease. In chronic lymphocytic leukemia (CLL), your body makes large numbers of white blood cells called lymphocytes. The cells may work as they should, but your body makes too many of them. Over time, these cells may not work as well, and they may cause symptoms as they begin to crowd out healthy white blood cells and other parts of your blood. There are several treatments for CLL, including chemotherapy, targeted therapy, or radiation therapy. But because CLL may get worse slowly, sometimes treatment can wait. Your doctor will follow your progress and let you know if or when you need treatment. When you find out that you have cancer, you may feel many emotions and may need some help coping. Seek out family, friends, and counselors for support. You also can do things at home to make yourself feel better while you go through treatment. Call the [employer] (5-321.430.8691) or visit its website at [address] [employer] for more information. Follow-up care is a key part of your treatment and safety. Be sure to make and go to all appointments, and call your doctor if you are having problems. It's also a good idea to know your test results and keep a list of the medicines you take. How can you care for yourself at home? · You may get medicine for nausea, vomiting, or pain (although leukemia rarely causes pain). Take your medicines exactly as prescribed. Call your doctor if you think you are having a problem with your medicine. You will get more details on the specific medicines your doctor prescribes. · Eat healthy food.  If you do not feel like eating, try to eat food that has protein and extra calories to keep up your strength and prevent weight loss. Drink liquid meal replacements for extra calories and protein. Try to eat your main meal early. · Get some physical activity every day, but do not get too tired. Keep doing the hobbies you enjoy as your energy allows. · Take steps to control your stress and workload. Learn relaxation techniques. ? Share your feelings. Stress and tension affect our emotions. By expressing your feelings to others, you may be able to understand and cope with them. ? Consider joining a support group. Talking about a problem with your spouse, a good friend, or other people with similar problems is a good way to reduce tension and stress. ? Express yourself through art. Try writing, dance, art, or crafts to relieve tension. Some dance, writing, or art groups may be available just for people who have cancer. ? Be kind to your body and mind. Getting enough sleep, eating a nutritious diet, and taking time to do things you enjoy can contribute to an overall feeling of balance in your life and help reduce stress. ? Get help if you need it. Discuss your concerns with your doctor or counselor. · If you are vomiting or have diarrhea: ? Drink plenty of fluids (enough so that your urine is light yellow or clear like water) to prevent dehydration. Choose water and other caffeine-free clear liquids. If you have kidney, heart, or liver disease and have to limit fluids, talk with your doctor before you increase the amount of fluids you drink. ? When you are able to eat, try clear soups, mild foods, and liquids until all symptoms are gone for 12 to 48 hours. Other good choices include dry toast, crackers, cooked cereal, and gelatin dessert, such as Jell-O. · Avoid colds and flu. Get a pneumococcal vaccine shot. If you have had one before, ask your doctor whether you need another dose.  Get a flu shot every year. If you must be around people with colds or flu, wash your hands often. · Do not smoke. If you need help quitting, talk to your doctor about stop-smoking programs and medicines. These can increase your chances of quitting for good. When should you call for help? Call 911 anytime you think you may need emergency care. For example, call if: 
  · You passed out (lost consciousness).  
 Call your doctor now or seek immediate medical care if: 
  · You have a fever.  
  · You have abnormal bleeding.  
  · You think you have an infection.  
  · You have new or worse pain.  
  · You have new symptoms, such as a cough, belly pain, vomiting, diarrhea, or a rash.  
 Watch closely for changes in your health, and be sure to contact your doctor if: 
  · You are much more tired than usual.  
  · You have swollen glands in your armpits, groin, or neck.  
  · You do not get better as expected. Where can you learn more? Go to http://yajaira-jose.info/. Enter S885 in the search box to learn more about \"Chronic Lymphocytic Leukemia: Care Instructions. \" Current as of: December 19, 2018 Content Version: 12.2 © 7574-4992 [employer]. Care instructions adapted under license by [employer] (which disclaims liability or warranty for this information). If you have questions about a medical condition or this instruction, always ask your healthcare professional. [address] any warranty or liability for your use of this information.

## 2020-01-24 NOTE — PROGRESS NOTES
Hematology/Oncology  Progress Note    Name: Nataly Sung  Date: 2020  : 1960    PCP: Huy Ram MD     Mr. Homero Portillo is a 60year old male who was seen for management of his chronic lymphocytic leukemia. Current therapy: Surveillance    Subjective:     Mr. Homero Portillo is a 60-year-old [facility] [employer] man who has a stage I chronic lymphocytic leukemia. He was diagnosed in 2014. The patient reports that he has been doing well. He has no complaints of fevers, night sweats, or weight loss. The patient states that his energy level has been good. His appetite is excellent and he has no new physical complaints or concerns to report. Additionally he denies having any problems with unexplained bruising or bleeding. He reports he was seen by his urologist who is monitoring his PSA. He does not have any concerns or complaints to report at this time. Past medical history, family history, and social history: these were reviewed and remains unchanged. Past Medical History:   Diagnosis Date    Abnormal nuclear cardiac imaging test 2006    Mild anterior ischemia. Inferior scar w/border zone ischemia. LVE. EF 26%. (Gated imaging may be inaccurate.)  Global hypk. Neg EKG on max EST. Ex time 10:30.  Aorto-iliac duplex 09/10/2012    No AAA identified.  Carotid duplex 09/10/2012    No significant occlusive disease bilaterallly.  Echocardiogram 2015    EF 55%. No WMA. Mild LVH. Gr 1 DDfx. IMELDA. No significant chg from study of 5/17/10.  History of echocardiogram 2011    EF 50-55%. Gr 2 DDfx. Mild RVE. Mild LAE. Mild-mod IMELDA.  Hypercholesterolemia     Nonischemic cardiomyopathy (HCC)     s/p right coronary cusp PVC ablation  () without complication    S/P cardiac cath 2006    Patent coronary arteries. LVEDP 12.  EF 25-30%. Mod-significant global hypk.        Past Surgical History:   Procedure Laterality Date    HX HEART CATHETERIZATION  12/11/06    The right coronary artery is dominant. It is widely patent. The left main coronary artery is widely patent. The circumflex artery is widely patent. The left anterior descending coronary artery is widely patent. The LVEDP is 12 mmHg. The overall left ventricular systolic function is significantly diminished with an estimated ejection fraction of 25-30%. ** See report. Social History     Socioeconomic History    Marital status:      Spouse name: Not on file    Number of children: Not on file    Years of education: Not on file    Highest education level: Not on file   Occupational History    Not on file   Social Needs    Financial resource strain: Not on file    Food insecurity:     Worry: Not on file     Inability: Not on file    Transportation needs:     Medical: Not on file     Non-medical: Not on file   Tobacco Use    Smoking status: Former Smoker     Packs/day: 1.00    Smokeless tobacco: Never Used   Substance and Sexual Activity    Alcohol use: Yes    Drug use: Not on file    Sexual activity: Not on file   Lifestyle    Physical activity:     Days per week: Not on file     Minutes per session: Not on file    Stress: Not on file   Relationships    Social connections:     Talks on phone: Not on file     Gets together: Not on file     Attends Religion service: Not on file     Active member of club or organization: Not on file     Attends meetings of clubs or organizations: Not on file     Relationship status: Not on file    Intimate partner violence:     Fear of current or ex partner: Not on file     Emotionally abused: Not on file     Physically abused: Not on file     Forced sexual activity: Not on file   Other Topics Concern    Not on file   Social History Narrative    Not on file     No family history on file.   Current Outpatient Medications   Medication Sig Dispense Refill    BYDUREON BCISE 2 mg/0.85 mL atIn INJECT 2MG EVERY WEEK UNDER THE SKIN  0    ergocalciferol (ERGOCALCIFEROL) 50,000 unit capsule Take 1 Cap by mouth every seven (7) days. 24 Cap 1    sitagliptin phos/metformin HCl (JANUMET PO) Take  by mouth.  lisinopril (PRINIVIL, ZESTRIL) 5 mg tablet Take 1 Tab by mouth daily. 30 Tab 6    carvedilol (COREG) 12.5 mg tablet Take 1 Tab by mouth two (2) times a day. 60 Tab 6    simvastatin (ZOCOR) 10 mg tablet Take  by mouth daily.  aspirin delayed-release 81 mg tablet Take 81 mg by mouth daily. Review of Systems  Constitutional: The patient has no acute distress or discomfort. HEENT: The patient denies recent head trauma, eye pain, blurred vision,  hearing deficit, oropharyngeal mucosal pain or lesions, and the patient denies throat pain or discomfort. Lymphatics: The patient denies palpable peripheral lymphadenopathy. Hematologic: The patient denies having bruising, bleeding, or progressive fatigue. Respiratory: Patient denies having shortness of breath, cough, sputum production, fever, or dyspnea on exertion. Cardiovascular: The patient denies having leg pain, leg swelling, heart palpitations, chest permit, chest pain, or lightheadedness. The patient denies having dyspnea on exertion. Gastrointestinal: The patient denies having nausea, emesis, or diarrhea. The patient denies having any hematemesis or blood in the stool. Genitourinary: Patient denies having urinary urgency, frequency, or dysuria. The patient denies having blood in the urine. Psychological: The patient denies having symptoms of nervousness, anxiety, depression, or thoughts of harming himself some of this. Skin: Patient denies having skin ulcerations. The patient is complaining of a new rash on his arms and back. He also complains of some itching. Musculoskeletal: The patient denies having pain in the joints or bones. Objective:     Visit Vitals  /73   Pulse 78   Temp 98 °F (36.7 °C)   SpO2 99%     ECOG PS=0     Physical Exam:   Gen.  Appearance: The patient is in no acute distress. Skin: There is a macular and papular scaling rash on her arms and back concerning for possible psoriasis. HEENT: The exam is unremarkable. Neck: Supple without lymphadenopathy or thyromegaly. Lungs: Clear to auscultation and percussion; there are no wheezes or rhonchi. Heart: Regular rate and rhythm; there are no murmurs, gallops, or rubs. Abdomen: Bowel sounds are present and normal.  There is no guarding, tenderness, or hepatosplenomegaly. Extremities: There is no clubbing, cyanosis, or edema. Neurologic: There are no focal neurologic deficits. Lymphatics: There is palpable cervical and supraclavicular lymphadenopathy only. Musculoskeletal: The patient has full range of motion at all joints. There is no evidence of joint deformity or effusions. There is no focal joint tenderness. Psychological/psychiatric: There is no clinical evidence of anxiety, depression, or melancholy. Lab data:      Results for orders placed or performed during the hospital encounter of 05/24/19   CBC WITH 3 PART DIFF     Status: Abnormal   Result Value Ref Range Status    WBC 15.5 (H) 4.5 - 13.0 K/uL Final    RBC 4.91 4.10 - 5.10 M/uL Final    HGB 12.3 12.0 - 16.0 g/dL Final    HCT 39.6 36 - 48 % Final    MCV 80.7 78 - 102 FL Final    MCH 25.1 25.0 - 35.0 PG Final    MCHC 31.1 31 - 37 g/dL Final    RDW 16.2 (H) 11.5 - 14.5 % Final    PLATELET 350 727 - 591 K/uL Final    NEUTROPHILS 22 (L) 40 - 70 % Final    MIXED CELLS 4 0.1 - 17 % Final    LYMPHOCYTES 74 (H) 14 - 44 % Final    ABS. NEUTROPHILS 3.4 1.8 - 9.5 K/UL Final    ABS. MIXED CELLS 0.6 0.0 - 2.3 K/uL Final    ABS. LYMPHOCYTES 11.5 (H) 1.1 - 5.9 K/UL Final     Comment: Test performed at [address] [place] [address] or [facility]patient [facility]fusion [facility] Location. Reviewed by Medical Director. DF AUTOMATED   Final           Assessment:     1. CLL (chronic lymphocytic leukemia) ([facility] [address]    2. Vitamin D deficiency    3. Lymphocytosis      Plan:   CLL: I have explained to the patient that his preliminary CBC from today shows that his WBC count was 17.3K/uL, his hemoglobin is 12.7g/dL with hematocrit of 40.9%. His platelet count is 052,402. His sedimentation rate in Sept 2019 was normal at 30mm/hr. CMP and sedimentation rate will be obtained at this time. Vitamin D deficiency: On 09/20/2019his Vitamin D level was normal at 43ng/mL. Vitamin D level will be ordered at this time. Lymphocytosis: I have explained to the patient that with CLL the predominant cell type is usually lymphocytes. Fortunately he remains at stage 1 with no significant change in his leukocyte count. Lymphocyte count is still pending. The patient will return to the clinic for a complete assessment again in 4 months or sooner if indicated. Orders Placed This Encounter    COMPLETE CBC & AUTO DIFF WBC    InHouse CBC (Get[employer])     Standing Status:   Future     Number of Occurrences:   1     Standing Expiration Date:   9/85/4448    METABOLIC PANEL, COMPREHENSIVE     Standing Status:   Future     Number of Occurrences:   1     Standing Expiration Date:   1/24/2021    VITAMIN D, 25 HYDROXY     Standing Status:   Future     Number of Occurrences:   1     Standing Expiration Date:   1/24/2021    SED RATE (ESR)     Standing Status:   Future     Number of Occurrences:   1     Standing Expiration Date:   1/24/2021       Keshav Riddle NP  1/24/2020     I have assessed the patient independently and  agree with the full assessment as outlined.   Beata Morfin MD, [address]

## 2020-01-25 LAB
25(OH)D3+25(OH)D2 SERPL-MCNC: 40.9 NG/ML (ref 30–100)
ALBUMIN SERPL-MCNC: 4.7 G/DL (ref 3.8–4.9)
ALBUMIN/GLOB SERPL: 1.8 {RATIO} (ref 1.2–2.2)
ALP SERPL-CCNC: 50 IU/L (ref 39–117)
ALT SERPL-CCNC: 13 IU/L (ref 0–44)
AST SERPL-CCNC: 14 IU/L (ref 0–40)
BASOPHILS # BLD: 0 K/UL (ref 0–0.06)
BASOPHILS NFR BLD: 0 % (ref 0–3)
BILIRUB SERPL-MCNC: <0.2 MG/DL (ref 0–1.2)
BUN SERPL-MCNC: 18 MG/DL (ref 8–27)
BUN/CREAT SERPL: 15 (ref 10–24)
CALCIUM SERPL-MCNC: 9.1 MG/DL (ref 8.6–10.2)
CHLORIDE SERPL-SCNC: 101 MMOL/L (ref 96–106)
CO2 SERPL-SCNC: 23 MMOL/L (ref 20–29)
CREAT SERPL-MCNC: 1.18 MG/DL (ref 0.76–1.27)
DIFFERENTIAL METHOD BLD: ABNORMAL
EOSINOPHIL # BLD: 0 K/UL (ref 0–0.4)
EOSINOPHIL NFR BLD: 0 % (ref 0–5)
ERYTHROCYTE [DISTWIDTH] IN BLOOD BY AUTOMATED COUNT: 16.5 % (ref 11.6–14.5)
ERYTHROCYTE [SEDIMENTATION RATE] IN BLOOD BY WESTERGREN METHOD: 26 MM/HR (ref 0–30)
GLOBULIN SER CALC-MCNC: 2.6 G/DL (ref 1.5–4.5)
GLUCOSE SERPL-MCNC: 119 MG/DL (ref 65–99)
HCT VFR BLD AUTO: 39.1 % (ref 36–48)
HGB BLD-MCNC: 12.4 G/DL (ref 13–16)
LYMPHOCYTES # BLD: 15.4 K/UL (ref 0.8–3.5)
LYMPHOCYTES NFR BLD: 85 % (ref 20–51)
MCH RBC QN AUTO: 24.8 PG (ref 24–34)
MCHC RBC AUTO-ENTMCNC: 31.7 G/DL (ref 31–37)
MCV RBC AUTO: 78 FL (ref 74–97)
MONOCYTES # BLD: 0.4 K/UL (ref 0–1)
MONOCYTES NFR BLD: 2 % (ref 2–9)
NEUTS SEG # BLD: 2.4 K/UL (ref 1.8–8)
NEUTS SEG NFR BLD: 13 % (ref 42–75)
PLATELET # BLD AUTO: 178 K/UL (ref 135–420)
PLATELET COMMENTS,PCOM: ABNORMAL
PMV BLD AUTO: 11.6 FL (ref 9.2–11.8)
POTASSIUM SERPL-SCNC: 4.3 MMOL/L (ref 3.5–5.2)
PROT SERPL-MCNC: 7.3 G/DL (ref 6–8.5)
RBC # BLD AUTO: 5.01 M/UL (ref 4.7–5.5)
RBC MORPH BLD: ABNORMAL
RBC MORPH BLD: ABNORMAL
SODIUM SERPL-SCNC: 140 MMOL/L (ref 134–144)
WBC # BLD AUTO: 18.2 K/UL (ref 4.6–13.2)

## 2020-04-16 ENCOUNTER — APPOINTMENT (OUTPATIENT)
Dept: GENERAL RADIOLOGY | Age: 60
DRG: 871 | End: 2020-04-16
Attending: EMERGENCY MEDICINE
Payer: COMMERCIAL

## 2020-04-16 ENCOUNTER — HOSPITAL ENCOUNTER (INPATIENT)
Age: 60
LOS: 20 days | Discharge: HOME | DRG: 871 | End: 2020-05-06
Attending: EMERGENCY MEDICINE | Admitting: HOSPITALIST
Payer: COMMERCIAL

## 2020-04-16 ENCOUNTER — APPOINTMENT (OUTPATIENT)
Dept: GENERAL RADIOLOGY | Age: 60
DRG: 871 | End: 2020-04-16
Attending: FAMILY MEDICINE
Payer: COMMERCIAL

## 2020-04-16 DIAGNOSIS — J18.9 PNEUMONIA DUE TO INFECTIOUS ORGANISM, UNSPECIFIED LATERALITY, UNSPECIFIED PART OF LUNG: ICD-10-CM

## 2020-04-16 DIAGNOSIS — U07.1 COVID-19 VIRUS INFECTION: Primary | ICD-10-CM

## 2020-04-16 PROBLEM — Z20.822 SUSPECTED COVID-19 VIRUS INFECTION: Status: ACTIVE | Noted: 2020-04-16

## 2020-04-16 LAB
ALBUMIN SERPL-MCNC: 3.2 G/DL (ref 3.4–5)
ALBUMIN SERPL-MCNC: 3.3 G/DL (ref 3.4–5)
ALBUMIN SERPL-MCNC: 3.8 G/DL (ref 3.4–5)
ALBUMIN/GLOB SERPL: 0.8 {RATIO} (ref 0.8–1.7)
ALBUMIN/GLOB SERPL: 0.9 {RATIO} (ref 0.8–1.7)
ALP SERPL-CCNC: 37 U/L (ref 45–117)
ALP SERPL-CCNC: 39 U/L (ref 45–117)
ALT SERPL-CCNC: 15 U/L (ref 16–61)
ALT SERPL-CCNC: 20 U/L (ref 16–61)
ANION GAP SERPL CALC-SCNC: 7 MMOL/L (ref 3–18)
ANION GAP SERPL CALC-SCNC: 9 MMOL/L (ref 3–18)
AST SERPL-CCNC: 17 U/L (ref 10–38)
AST SERPL-CCNC: 18 U/L (ref 10–38)
ATRIAL RATE: 97 BPM
B PERT DNA SPEC QL NAA+PROBE: NOT DETECTED
BASOPHILS # BLD: 0 K/UL (ref 0–0.1)
BASOPHILS NFR BLD: 0 % (ref 0–3)
BILIRUB DIRECT SERPL-MCNC: 0.1 MG/DL (ref 0–0.2)
BILIRUB SERPL-MCNC: 0.4 MG/DL (ref 0.2–1)
BILIRUB SERPL-MCNC: 0.5 MG/DL (ref 0.2–1)
BNP SERPL-MCNC: 28 PG/ML (ref 0–900)
BORDETELLA PARAPERTUSSIS PCR, BORPAR: NOT DETECTED
BUN SERPL-MCNC: 21 MG/DL (ref 7–18)
BUN SERPL-MCNC: 24 MG/DL (ref 7–18)
BUN/CREAT SERPL: 11 (ref 12–20)
BUN/CREAT SERPL: 15 (ref 12–20)
C PNEUM DNA SPEC QL NAA+PROBE: NOT DETECTED
CALCIUM SERPL-MCNC: 7.4 MG/DL (ref 8.5–10.1)
CALCIUM SERPL-MCNC: 8.7 MG/DL (ref 8.5–10.1)
CALCULATED P AXIS, ECG09: 52 DEGREES
CALCULATED R AXIS, ECG10: 20 DEGREES
CALCULATED T AXIS, ECG11: 50 DEGREES
CHLORIDE SERPL-SCNC: 105 MMOL/L (ref 100–111)
CHLORIDE SERPL-SCNC: 99 MMOL/L (ref 100–111)
CK SERPL-CCNC: 180 U/L (ref 39–308)
CO2 SERPL-SCNC: 25 MMOL/L (ref 21–32)
CO2 SERPL-SCNC: 27 MMOL/L (ref 21–32)
CREAT SERPL-MCNC: 1.36 MG/DL (ref 0.6–1.3)
CREAT SERPL-MCNC: 2.19 MG/DL (ref 0.6–1.3)
CRP SERPL HS-MCNC: >9.5 MG/L
CRP SERPL-MCNC: 6.3 MG/DL (ref 0–0.3)
D DIMER PPP FEU-MCNC: 0.75 UG/ML(FEU)
DIAGNOSIS, 93000: NORMAL
DIFFERENTIAL METHOD BLD: ABNORMAL
EOSINOPHIL # BLD: 0 K/UL (ref 0–0.4)
EOSINOPHIL NFR BLD: 0 % (ref 0–5)
ERYTHROCYTE [DISTWIDTH] IN BLOOD BY AUTOMATED COUNT: 15.9 % (ref 11.6–14.5)
FERRITIN SERPL-MCNC: 233 NG/ML (ref 8–388)
FLUAV AG NPH QL IA: NEGATIVE
FLUAV H1 2009 PAND RNA SPEC QL NAA+PROBE: NOT DETECTED
FLUAV H1 RNA SPEC QL NAA+PROBE: NOT DETECTED
FLUAV H3 RNA SPEC QL NAA+PROBE: NOT DETECTED
FLUAV SUBTYP SPEC NAA+PROBE: NOT DETECTED
FLUBV AG NOSE QL IA: NEGATIVE
FLUBV RNA SPEC QL NAA+PROBE: NOT DETECTED
GLOBULIN SER CALC-MCNC: 4.1 G/DL (ref 2–4)
GLOBULIN SER CALC-MCNC: 4.4 G/DL (ref 2–4)
GLUCOSE BLD STRIP.AUTO-MCNC: 110 MG/DL (ref 70–110)
GLUCOSE BLD STRIP.AUTO-MCNC: 120 MG/DL (ref 70–110)
GLUCOSE BLD STRIP.AUTO-MCNC: 90 MG/DL (ref 70–110)
GLUCOSE SERPL-MCNC: 143 MG/DL (ref 74–99)
GLUCOSE SERPL-MCNC: 83 MG/DL (ref 74–99)
HADV DNA SPEC QL NAA+PROBE: NOT DETECTED
HCOV 229E RNA SPEC QL NAA+PROBE: NOT DETECTED
HCOV HKU1 RNA SPEC QL NAA+PROBE: NOT DETECTED
HCOV NL63 RNA SPEC QL NAA+PROBE: NOT DETECTED
HCOV OC43 RNA SPEC QL NAA+PROBE: NOT DETECTED
HCT VFR BLD AUTO: 40.7 % (ref 36–48)
HGB BLD-MCNC: 13.3 G/DL (ref 13–16)
HMPV RNA SPEC QL NAA+PROBE: NOT DETECTED
HPIV1 RNA SPEC QL NAA+PROBE: NOT DETECTED
HPIV2 RNA SPEC QL NAA+PROBE: NOT DETECTED
HPIV3 RNA SPEC QL NAA+PROBE: NOT DETECTED
HPIV4 RNA SPEC QL NAA+PROBE: NOT DETECTED
LACTATE SERPL-SCNC: 1.3 MMOL/L (ref 0.4–2)
LDH SERPL L TO P-CCNC: 190 U/L (ref 81–234)
LYMPHOCYTES # BLD: 11.9 K/UL (ref 0.8–3.5)
LYMPHOCYTES NFR BLD: 77 % (ref 20–51)
M PNEUMO DNA SPEC QL NAA+PROBE: NOT DETECTED
MCH RBC QN AUTO: 25.1 PG (ref 24–34)
MCHC RBC AUTO-ENTMCNC: 32.7 G/DL (ref 31–37)
MCV RBC AUTO: 76.8 FL (ref 74–97)
MONOCYTES # BLD: 0.6 K/UL (ref 0–1)
MONOCYTES NFR BLD: 4 % (ref 2–9)
NEUTS BAND NFR BLD MANUAL: 4 % (ref 0–5)
NEUTS SEG # BLD: 2.3 K/UL (ref 1.8–8)
NEUTS SEG NFR BLD: 15 % (ref 42–75)
P-R INTERVAL, ECG05: 164 MS
PHOSPHATE SERPL-MCNC: 3.7 MG/DL (ref 2.5–4.9)
PLATELET # BLD AUTO: 157 K/UL (ref 135–420)
PLATELET COMMENTS,PCOM: ABNORMAL
PMV BLD AUTO: 10.5 FL (ref 9.2–11.8)
POTASSIUM SERPL-SCNC: 4.2 MMOL/L (ref 3.5–5.5)
POTASSIUM SERPL-SCNC: 4.2 MMOL/L (ref 3.5–5.5)
PROCALCITONIN SERPL-MCNC: 0.14 NG/ML
PROT SERPL-MCNC: 7.3 G/DL (ref 6.4–8.2)
PROT SERPL-MCNC: 8.2 G/DL (ref 6.4–8.2)
Q-T INTERVAL, ECG07: 328 MS
QRS DURATION, ECG06: 86 MS
QTC CALCULATION (BEZET), ECG08: 416 MS
RBC # BLD AUTO: 5.3 M/UL (ref 4.7–5.5)
RBC MORPH BLD: ABNORMAL
RSV RNA SPEC QL NAA+PROBE: NOT DETECTED
RV+EV RNA SPEC QL NAA+PROBE: NOT DETECTED
SODIUM SERPL-SCNC: 133 MMOL/L (ref 136–145)
SODIUM SERPL-SCNC: 139 MMOL/L (ref 136–145)
VENTRICULAR RATE, ECG03: 97 BPM
WBC # BLD AUTO: 15.5 K/UL (ref 4.6–13.2)

## 2020-04-16 PROCEDURE — 85025 COMPLETE CBC W/AUTO DIFF WBC: CPT

## 2020-04-16 PROCEDURE — 74011250637 HC RX REV CODE- 250/637: Performed by: EMERGENCY MEDICINE

## 2020-04-16 PROCEDURE — 87635 SARS-COV-2 COVID-19 AMP PRB: CPT

## 2020-04-16 PROCEDURE — 65660000000 HC RM CCU STEPDOWN

## 2020-04-16 PROCEDURE — 86140 C-REACTIVE PROTEIN: CPT

## 2020-04-16 PROCEDURE — 74011000250 HC RX REV CODE- 250: Performed by: EMERGENCY MEDICINE

## 2020-04-16 PROCEDURE — 82728 ASSAY OF FERRITIN: CPT

## 2020-04-16 PROCEDURE — 36415 COLL VENOUS BLD VENIPUNCTURE: CPT

## 2020-04-16 PROCEDURE — 84145 PROCALCITONIN (PCT): CPT

## 2020-04-16 PROCEDURE — 82962 GLUCOSE BLOOD TEST: CPT

## 2020-04-16 PROCEDURE — 87804 INFLUENZA ASSAY W/OPTIC: CPT

## 2020-04-16 PROCEDURE — 71045 X-RAY EXAM CHEST 1 VIEW: CPT

## 2020-04-16 PROCEDURE — 74011250637 HC RX REV CODE- 250/637: Performed by: INTERNAL MEDICINE

## 2020-04-16 PROCEDURE — 85379 FIBRIN DEGRADATION QUANT: CPT

## 2020-04-16 PROCEDURE — 99285 EMERGENCY DEPT VISIT HI MDM: CPT

## 2020-04-16 PROCEDURE — 82550 ASSAY OF CK (CPK): CPT

## 2020-04-16 PROCEDURE — 93005 ELECTROCARDIOGRAM TRACING: CPT

## 2020-04-16 PROCEDURE — 80076 HEPATIC FUNCTION PANEL: CPT

## 2020-04-16 PROCEDURE — 83605 ASSAY OF LACTIC ACID: CPT

## 2020-04-16 PROCEDURE — 0100U RESPIRATORY PANEL,PCR,NASOPHARYNGEAL: CPT

## 2020-04-16 PROCEDURE — 80053 COMPREHEN METABOLIC PANEL: CPT

## 2020-04-16 PROCEDURE — 74011250636 HC RX REV CODE- 250/636: Performed by: EMERGENCY MEDICINE

## 2020-04-16 PROCEDURE — 86141 C-REACTIVE PROTEIN HS: CPT

## 2020-04-16 PROCEDURE — 74011250637 HC RX REV CODE- 250/637: Performed by: FAMILY MEDICINE

## 2020-04-16 PROCEDURE — 83880 ASSAY OF NATRIURETIC PEPTIDE: CPT

## 2020-04-16 PROCEDURE — 83615 LACTATE (LD) (LDH) ENZYME: CPT

## 2020-04-16 PROCEDURE — 74011000250 HC RX REV CODE- 250: Performed by: HOSPITALIST

## 2020-04-16 PROCEDURE — 74011000258 HC RX REV CODE- 258: Performed by: INTERNAL MEDICINE

## 2020-04-16 PROCEDURE — 80069 RENAL FUNCTION PANEL: CPT

## 2020-04-16 PROCEDURE — 74011250636 HC RX REV CODE- 250/636: Performed by: HOSPITALIST

## 2020-04-16 PROCEDURE — 74011250637 HC RX REV CODE- 250/637: Performed by: HOSPITALIST

## 2020-04-16 PROCEDURE — 74011250636 HC RX REV CODE- 250/636: Performed by: FAMILY MEDICINE

## 2020-04-16 PROCEDURE — 77010033678 HC OXYGEN DAILY

## 2020-04-16 PROCEDURE — 87040 BLOOD CULTURE FOR BACTERIA: CPT

## 2020-04-16 RX ORDER — HYDROXYCHLOROQUINE SULFATE 200 MG/1
400 TABLET, FILM COATED ORAL 2 TIMES DAILY WITH MEALS
Status: COMPLETED | OUTPATIENT
Start: 2020-04-16 | End: 2020-04-16

## 2020-04-16 RX ORDER — DEXTROSE MONOHYDRATE 100 MG/ML
125-250 INJECTION, SOLUTION INTRAVENOUS AS NEEDED
Status: DISCONTINUED | OUTPATIENT
Start: 2020-04-16 | End: 2020-05-07 | Stop reason: HOSPADM

## 2020-04-16 RX ORDER — ASPIRIN 81 MG/1
81 TABLET ORAL DAILY
Status: DISCONTINUED | OUTPATIENT
Start: 2020-04-17 | End: 2020-05-07 | Stop reason: HOSPADM

## 2020-04-16 RX ORDER — INSULIN LISPRO 100 [IU]/ML
INJECTION, SOLUTION INTRAVENOUS; SUBCUTANEOUS
Status: DISCONTINUED | OUTPATIENT
Start: 2020-04-16 | End: 2020-04-17

## 2020-04-16 RX ORDER — CYANOCOBALAMIN (VITAMIN B-12) 500 MCG
400 TABLET ORAL DAILY
Status: DISCONTINUED | OUTPATIENT
Start: 2020-04-17 | End: 2020-05-07 | Stop reason: HOSPADM

## 2020-04-16 RX ORDER — ZINC SULFATE 50(220)MG
1 CAPSULE ORAL DAILY
Status: DISCONTINUED | OUTPATIENT
Start: 2020-04-16 | End: 2020-04-30

## 2020-04-16 RX ORDER — SODIUM CHLORIDE 0.9 % (FLUSH) 0.9 %
5-40 SYRINGE (ML) INJECTION EVERY 8 HOURS
Status: DISCONTINUED | OUTPATIENT
Start: 2020-04-16 | End: 2020-05-07 | Stop reason: HOSPADM

## 2020-04-16 RX ORDER — ACETAMINOPHEN 325 MG/1
650 TABLET ORAL
Status: DISCONTINUED | OUTPATIENT
Start: 2020-04-16 | End: 2020-05-07 | Stop reason: HOSPADM

## 2020-04-16 RX ORDER — HYDROXYCHLOROQUINE SULFATE 200 MG/1
200 TABLET, FILM COATED ORAL 2 TIMES DAILY WITH MEALS
Status: COMPLETED | OUTPATIENT
Start: 2020-04-17 | End: 2020-04-20

## 2020-04-16 RX ORDER — ATORVASTATIN CALCIUM 10 MG/1
10 TABLET, FILM COATED ORAL DAILY
Status: DISCONTINUED | OUTPATIENT
Start: 2020-04-17 | End: 2020-04-18

## 2020-04-16 RX ORDER — ACETAMINOPHEN 500 MG
1000 TABLET ORAL
Status: COMPLETED | OUTPATIENT
Start: 2020-04-16 | End: 2020-04-16

## 2020-04-16 RX ORDER — MIDODRINE HYDROCHLORIDE 2.5 MG/1
2.5 TABLET ORAL
Status: DISCONTINUED | OUTPATIENT
Start: 2020-04-16 | End: 2020-04-17

## 2020-04-16 RX ORDER — SODIUM CHLORIDE 9 MG/ML
30 INJECTION, SOLUTION INTRAVENOUS ONCE
Status: COMPLETED | OUTPATIENT
Start: 2020-04-16 | End: 2020-04-16

## 2020-04-16 RX ORDER — CARVEDILOL 12.5 MG/1
12.5 TABLET ORAL 2 TIMES DAILY
Status: DISCONTINUED | OUTPATIENT
Start: 2020-04-16 | End: 2020-05-07 | Stop reason: HOSPADM

## 2020-04-16 RX ORDER — AZITHROMYCIN 250 MG/1
500 TABLET, FILM COATED ORAL
Status: COMPLETED | OUTPATIENT
Start: 2020-04-16 | End: 2020-04-16

## 2020-04-16 RX ORDER — DEXTROSE 50 % IN WATER (D50W) INTRAVENOUS SYRINGE
25-50 AS NEEDED
Status: DISCONTINUED | OUTPATIENT
Start: 2020-04-16 | End: 2020-04-16 | Stop reason: CLARIF

## 2020-04-16 RX ORDER — ASCORBIC ACID 250 MG
500 TABLET ORAL 2 TIMES DAILY
Status: DISCONTINUED | OUTPATIENT
Start: 2020-04-16 | End: 2020-05-07 | Stop reason: HOSPADM

## 2020-04-16 RX ORDER — CEFTRIAXONE 1 G/1
1 INJECTION, POWDER, FOR SOLUTION INTRAMUSCULAR; INTRAVENOUS
Status: DISCONTINUED | OUTPATIENT
Start: 2020-04-16 | End: 2020-04-16

## 2020-04-16 RX ORDER — HEPARIN SODIUM 5000 [USP'U]/ML
5000 INJECTION, SOLUTION INTRAVENOUS; SUBCUTANEOUS EVERY 8 HOURS
Status: DISCONTINUED | OUTPATIENT
Start: 2020-04-16 | End: 2020-04-18

## 2020-04-16 RX ORDER — MAGNESIUM SULFATE 100 %
4 CRYSTALS MISCELLANEOUS AS NEEDED
Status: DISCONTINUED | OUTPATIENT
Start: 2020-04-16 | End: 2020-05-07 | Stop reason: HOSPADM

## 2020-04-16 RX ORDER — LANOLIN ALCOHOL/MO/W.PET/CERES
3 CREAM (GRAM) TOPICAL
Status: DISCONTINUED | OUTPATIENT
Start: 2020-04-16 | End: 2020-04-19

## 2020-04-16 RX ADMIN — AZITHROMYCIN MONOHYDRATE 500 MG: 250 TABLET ORAL at 02:45

## 2020-04-16 RX ADMIN — Medication 10 ML: at 23:32

## 2020-04-16 RX ADMIN — CARVEDILOL 12.5 MG: 12.5 TABLET, FILM COATED ORAL at 17:08

## 2020-04-16 RX ADMIN — AZITHROMYCIN MONOHYDRATE 500 MG: 500 INJECTION, POWDER, LYOPHILIZED, FOR SOLUTION INTRAVENOUS at 17:08

## 2020-04-16 RX ADMIN — ACETAMINOPHEN 650 MG: 325 TABLET ORAL at 12:44

## 2020-04-16 RX ADMIN — HYDROXYCHLOROQUINE SULFATE 400 MG: 200 TABLET, FILM COATED ORAL at 09:43

## 2020-04-16 RX ADMIN — HEPARIN SODIUM 5000 UNITS: 5000 INJECTION INTRAVENOUS; SUBCUTANEOUS at 19:50

## 2020-04-16 RX ADMIN — ZINC SULFATE 220 MG (50 MG) CAPSULE 1 CAPSULE: CAPSULE at 09:43

## 2020-04-16 RX ADMIN — MELATONIN TAB 3 MG 3 MG: 3 TAB at 22:46

## 2020-04-16 RX ADMIN — CEFTRIAXONE SODIUM 2 G: 2 INJECTION, POWDER, FOR SOLUTION INTRAMUSCULAR; INTRAVENOUS at 17:09

## 2020-04-16 RX ADMIN — WATER 1 G: 1 INJECTION INTRAMUSCULAR; INTRAVENOUS; SUBCUTANEOUS at 02:41

## 2020-04-16 RX ADMIN — Medication 500 MG: at 17:09

## 2020-04-16 RX ADMIN — ACETAMINOPHEN 1000 MG: 500 TABLET ORAL at 00:52

## 2020-04-16 RX ADMIN — MIDODRINE HYDROCHLORIDE 2.5 MG: 2.5 TABLET ORAL at 17:09

## 2020-04-16 RX ADMIN — Medication 500 MG: at 09:43

## 2020-04-16 RX ADMIN — ACETAMINOPHEN 650 MG: 325 TABLET ORAL at 20:07

## 2020-04-16 RX ADMIN — SODIUM CHLORIDE 2544 ML: 900 INJECTION, SOLUTION INTRAVENOUS at 01:02

## 2020-04-16 RX ADMIN — SODIUM CHLORIDE, SODIUM ACETATE ANHYDROUS, SODIUM GLUCONATE, POTASSIUM CHLORIDE, AND MAGNESIUM CHLORIDE 1000 ML: 526; 222; 502; 37; 30 INJECTION, SOLUTION INTRAVENOUS at 12:09

## 2020-04-16 RX ADMIN — MIDODRINE HYDROCHLORIDE 2.5 MG: 2.5 TABLET ORAL at 12:09

## 2020-04-16 NOTE — ROUTINE PROCESS
TRANSFER - OUT REPORT: 
 
Verbal report given to Jamila Caba RN  on [place]  being transferred to [address] [place] [address] [address] for routine progression of care Report consisted of patients Situation, Background, Assessment and  
Recommendations(SBAR). Information from the following report(s) SBAR, ED Summary, Procedure Summary, Intake/Output, MAR, Recent Results, Med Rec Status and Cardiac Rhythm Sinus Rhyhtm was reviewed with the receiving nurse. Lines:  
Peripheral IV 04/16/20 Left Antecubital (Active) Peripheral IV 04/16/20 Right Forearm (Active) Opportunity for questions and clarification was provided. Patient transported with: 
 Monitor O2 @ 2 liters

## 2020-04-16 NOTE — ED PROVIDER NOTES
HPI Pt is a 61 yo male who reports fever with shortness of breath and palpitations that began 5 days ago. Pt reports his sense of taste has gone away. His wife and daughter had similar symptoms prior to him getting sick. Past Medical History:   Diagnosis Date    Abnormal nuclear cardiac imaging test 11/30/2006    Mild anterior ischemia. Inferior scar w/border zone ischemia. LVE. EF 26%. (Gated imaging may be inaccurate.)  Global hypk. Neg EKG on max EST. Ex time 10:30.  Aorto-iliac duplex 09/10/2012    No AAA identified.  Carotid duplex 09/10/2012    No significant occlusive disease bilaterallly.  [facility]LL [facility]chronic lymphocytic [facility]) ([facility] [address]     Diabetes ([facility] [address]     Echocardiogram 06/02/2015    EF 55%. No WMA. Mild LVH. Gr 1 DDfx. IMELDA. No significant chg from study of 5/17/10.  History of echocardiogram 06/20/2011    EF 50-55%. Gr 2 DDfx. Mild RVE. Mild LAE. Mild-mod IMELDA.  Hypercholesterolemia     Hypertension     Nonischemic cardiomyopathy ([facility] [address]     s/p right coronary cusp PVC ablation  (6/46/15) without complication    Prostate CA ([facility] [address]     S/P cardiac cath 12/11/2006    Patent coronary arteries. LVEDP 12.  EF 25-30%. Mod-significant global hypk. Past Surgical History:   Procedure Laterality Date    HX HEART CATHETERIZATION  12/11/06    The right coronary artery is dominant. It is widely patent. The left main coronary artery is widely patent. The circumflex artery is widely patent. The left anterior descending coronary artery is widely patent. The LVEDP is 12 mmHg. The overall left ventricular systolic function is significantly diminished with an estimated ejection fraction of 25-30%. ** See report. History reviewed. No pertinent family history.     Social History     Socioeconomic History    Marital status: UNKNOWN     Spouse name: Not on file    Number of children: Not on file    Years of education: Not on file    Highest education level: Not on file Occupational History    Not on file   Social Needs    Financial resource strain: Not on file    Food insecurity     Worry: Not on file     Inability: Not on file    Transportation needs     Medical: Not on file     Non-medical: Not on file   Tobacco Use    Smoking status: Former Smoker     Packs/day: 1.00    Smokeless tobacco: Never Used   Substance and Sexual Activity    Alcohol use: Yes    Drug use: Not on file    Sexual activity: Not on file   Lifestyle    Physical activity     Days per week: Not on file     Minutes per session: Not on file    Stress: Not on file   Relationships    Social connections     Talks on phone: Not on file     Gets together: Not on file     Attends Denominational service: Not on file     Active member of club or organization: Not on file     Attends meetings of clubs or organizations: Not on file     Relationship status: Not on file    Intimate partner violence     Fear of current or ex partner: Not on file     Emotionally abused: Not on file     Physically abused: Not on file     Forced sexual activity: Not on file   Other Topics Concern    Not on file   Social History Narrative    Not on file         ALLERGIES: Patient has no known allergies. Review of Systems   Constitutional: Positive for fatigue and fever. HENT: Negative. Eyes: Negative. Respiratory: Positive for shortness of breath. Cardiovascular: Negative. Gastrointestinal: Negative. Endocrine: Negative. Genitourinary: Negative. Musculoskeletal: Negative. Skin: Negative. Allergic/Immunologic: Negative. Neurological: Negative. Hematological: Negative. Psychiatric/Behavioral: Negative. All other systems reviewed and are negative.       Vitals:    04/16/20 0025 04/16/20 0030 04/16/20 0045 04/16/20 0100   BP:  108/67 115/62 112/64   Pulse:  97 94 93   Resp:  (!) 42 (!) 37 (!) 32   SpO2: (!) 87% 93% 95% 91%   Weight:       Height:                Physical Exam  Vitals signs and nursing note reviewed. Constitutional:       General: He is not in acute distress. Appearance: He is well-developed. HENT:      Head: Normocephalic. Eyes:      Conjunctiva/sclera: Conjunctivae normal.      Pupils: Pupils are equal, round, and reactive to light. Neck:      Musculoskeletal: Normal range of motion and neck supple. Cardiovascular:      Rate and Rhythm: Normal rate and regular rhythm. Heart sounds: Normal heart sounds. No murmur. Pulmonary:      Effort: Pulmonary effort is normal. No respiratory distress. Breath sounds: Normal breath sounds. No wheezing or rales. Chest:      Chest wall: No tenderness. Abdominal:      General: Bowel sounds are normal. There is no distension. Palpations: Abdomen is soft. Tenderness: There is no abdominal tenderness. There is no rebound. Musculoskeletal: Normal range of motion. General: No tenderness. Skin:     General: Skin is warm and dry. Findings: No rash. Neurological:      Mental Status: He is alert and oriented to person, place, and time. Cranial Nerves: No cranial nerve deficit. Motor: No abnormal muscle tone. Coordination: Coordination normal.   Psychiatric:         Behavior: Behavior normal.         Thought Content: Thought content normal.         Judgment: Judgment normal.          MDM  Number of Diagnoses or Management Options  Risk of Complications, Morbidity, and/or Mortality  Presenting problems: high  Diagnostic procedures: high  Management options: high      Procedures    Hospital course: Patient was worked up for sepsis and coronavirus 19. Patient symptoms are compatible with having coronavirus. He was started on  antibiotics for pneumonia - rocephin and  Zithromax. Consultation: Hospitalist  Dr. Harinder Terrazas accepted patient for admission to telemetry.     Diagnosis: Acute pneumonia    Disp: Admit

## 2020-04-16 NOTE — PROGRESS NOTES
Consult Note    Consult requested by: Corby Calderon DO    ADMIT DATE: 4/16/2020    CONSULT DATE: April 16, 2020           Admission diagnosis: Suspected Covid-19 Virus Infection   Reason for Nephrology Consultation: JOSE    Assessment and plan:  #1 acute kidney injury on chronic kidney disease stage III, etiology appears to be prerenal azotemia versus ATN in the setting of suspected   CO VID 19 infection versus community-acquired pneumonia, creatinine up to 2.19 up from a baseline of 1.1. Urine studies to be ordered,   volume status does appear to be dry.   Electrolytes and acid-base in good range  #2 acute hypoxic respiratory failure, thought to be secondary to pneumonia versus COVID-19 virus infection  #3 community-acquired pneumonia versus COVID-19 viral infection  #4 history of CLL follows with Dr. Carnella Rubinstein  #5 type 2 diabetes  #6 hypertension  #7 hyponatremia, mild    Plan:  #1 holding lisinopril and Coreg and soft blood pressures  #2 does appear to be on the dry side, but until his urine studies are back and COVID virus status is cleared will use fluid sparing resuscitation, 50 cc an hour of Normosol  #3 add low-dose Midodrin to augment blood pressures and maintain endorgan perfusion  #4 management of presumed COVID 19 viral infection per ID recommendations appreciated  #5 checking urine studies especially for any proteinuria or hematuria, also urine electrolytes, BNP for  monitoring volume status  #6 follow electrolytes closely, check renal panel every 12 hours  #7 renal US     Please call with questions,    Tim Kaur MD [facility]  Cell [phone]  Pager: [phone]  Glasses and urine  HPI:   Patient is a 59-year-old -American male with history of stage I diastolic dysfunction, history of atrial fibrillation with ablation done follows with Dr. Carlota Naoples , obesity, hypertension well controlled, diabetes mellitus without any evidence of nephropathy, baseline creatinine in the 1.1 range in January 2020, no evidence of proteinuria who is been admitted with fever hypoxia and cough and shortness of breath. Patient mentions that he started having symptoms about a week ago, he does not know any contacts with covid positive patient. Patient mentions that he started having dry cough, some shortness of breath. His daughter is also having similar symptoms although she has not been tested for COVID-19. He denies any diarrhea, abdominal pain, headaches vision changes weaknesses. Denies any myalgias nausea vomiting. He has had poor intake for the last several days. He also complains of increased frequency of urination but has been urinating very less. He does have problems with his prostate and was started with treatment for BPH in the past.       Past Medical History:   Diagnosis Date    Abnormal nuclear cardiac imaging test 11/30/2006    Mild anterior ischemia. Inferior scar w/border zone ischemia. LVE. EF 26%. (Gated imaging may be inaccurate.)  Global hypk. Neg EKG on max EST. Ex time 10:30.  Aorto-iliac duplex 09/10/2012    No AAA identified.  Carotid duplex 09/10/2012    No significant occlusive disease bilaterallly.  [facility]     Diabetes ([facility]     Echocardiogram 06/02/2015    EF 55%. No WMA. Mild LVH. Gr 1 DDfx. IMELDA. No significant chg from study of 5/17/10.  History of echocardiogram 06/20/2011    EF 50-55%. Gr 2 DDfx. Mild RVE. Mild LAE. Mild-mod IMELDA.  Hypercholesterolemia     Hypertension     Nonischemic cardiomyopathy ([facility]     s/p right coronary cusp PVC ablation  (7/99/51) without complication    Prostate CA ([facility]     S/P cardiac cath 12/11/2006    Patent coronary arteries. LVEDP 12.  EF 25-30%. Mod-significant global hypk. Past Surgical History:   Procedure Laterality Date    HX HEART CATHETERIZATION  12/11/06    The right coronary artery is dominant. It is widely patent.  The left main coronary artery is widely patent. The circumflex artery is widely patent. The left anterior descending coronary artery is widely patent. The LVEDP is 12 mmHg. The overall left ventricular systolic function is significantly diminished with an estimated ejection fraction of 25-30%. ** See report. Social History     Socioeconomic History    Marital status: UNKNOWN     Spouse name: Not on file    Number of children: Not on file    Years of education: Not on file    Highest education level: Not on file   Occupational History    Not on file   Social Needs    Financial resource strain: Not on file    Food insecurity     Worry: Not on file     Inability: Not on file    Transportation needs     Medical: Not on file     Non-medical: Not on file   Tobacco Use    Smoking status: Former Smoker     Packs/day: 1.00    Smokeless tobacco: Never Used   Substance and Sexual Activity    Alcohol use: Yes    Drug use: Not on file    Sexual activity: Not on file   Lifestyle    Physical activity     Days per week: Not on file     Minutes per session: Not on file    Stress: Not on file   Relationships    Social connections     Talks on phone: Not on file     Gets together: Not on file     Attends Jain service: Not on file     Active member of club or organization: Not on file     Attends meetings of clubs or organizations: Not on file     Relationship status: Not on file    Intimate partner violence     Fear of current or ex partner: Not on file     Emotionally abused: Not on file     Physically abused: Not on file     Forced sexual activity: Not on file   Other Topics Concern    Not on file   Social History Narrative    Not on file       History reviewed. No pertinent family history.   No Known Allergies     Home Medications:     Medications Prior to Admission   Medication Sig    BYDUREON BCISE 2 mg/0.85 mL atIn INJECT 2MG EVERY WEEK UNDER THE SKIN    ergocalciferol (ERGOCALCIFEROL) 50,000 unit capsule Take 1 Cap by mouth every seven (7) days.  sitagliptin phos/metformin HCl (JANUMET PO) Take  by mouth.  lisinopril (PRINIVIL, ZESTRIL) 5 mg tablet Take 1 Tab by mouth daily.  carvedilol (COREG) 12.5 mg tablet Take 1 Tab by mouth two (2) times a day.  simvastatin (ZOCOR) 10 mg tablet Take  by mouth daily.  aspirin delayed-release 81 mg tablet Take 81 mg by mouth daily. Current Inpatient Medications:     Current Facility-Administered Medications   Medication Dose Route Frequency    sodium chloride (NS) flush 5-40 mL  5-40 mL IntraVENous Q8H    [START ON 4/17/2020] hydroxychloroquine (PLAQUENIL) tablet 200 mg  200 mg Oral BID WITH MEALS    zinc sulfate (ZINCATE) 220 mg capsule 1 Cap  1 Cap Oral DAILY    ascorbic acid (vitamin C) (VITAMIN C) tablet 500 mg  500 mg Oral BID    insulin lispro (HUMALOG) injection   SubCUTAneous ACB&D    glucose chewable tablet 16 g  4 Tab Oral PRN    glucagon (GLUCAGEN) injection 1 mg  1 mg IntraMUSCular PRN    dextrose 10% infusion 125-250 mL  125-250 mL IntraVENous PRN    midodrine (PROAMATINE) tablet 2.5 mg  2.5 mg Oral TID WITH MEALS    electrolyte-r (NORMOSOL R) infusion   IntraVENous CONTINUOUS    acetaminophen (TYLENOL) tablet 650 mg  650 mg Oral Q6H PRN       Review of Systems:    No sore throat. Nohemoptysis. No nausea, vomiting, abdominal pain, melena or hematochezia. No constipation or diarrhea. No dysuria, no gross hematuria  No ankle swelling, no joint paints. No muscle aches. No skin changes. No dizziness or lightheadedness. No headaches.        Physical Assessment:     Vitals:    04/16/20 0345 04/16/20 0544 04/16/20 0732 04/16/20 1141   BP: 106/60 106/60 136/70 119/74   Pulse: 72 73 76 85   Resp: 21 19 19 18   Temp:  98.9 °F (37.2 °C) 97.4 °F (36.3 °C) (!) 102 °F (38.9 °C)   SpO2: 94% 95% 95% 95%   Weight:       Height:         Last 3 Recorded Weights in this Encounter    04/16/20 0013   Weight: 84.8 kg (187 lb)     Admission weight: Weight: 84.8 kg (187 lb) (04/16/20 0013)      Intake/Output Summary (Last 24 hours) at 4/16/2020 1237  Last data filed at 4/16/2020 0208  Gross per 24 hour   Intake 2544 ml   Output    Net 2544 ml     Patient is in no apparent distress. NS O2   HEENT: dry mucosa   Lungs: sylvie coarse BS   Cardiovascular system: S1, S2, regular rate and rhythm. Abdomen: soft, non tender, non distended. BS+  Extremities: no edema   Integumentary: skin is grossly intact. Neurologic: Alert, oriented time three. Data Review:    Labs: Results:       Chemistry Recent Labs     04/16/20  1002 04/16/20  0032   GLU  --  143*   NA  --  133*   K  --  4.2   CL  --  99*   CO2  --  27   BUN  --  24*   CREA  --  2.19*   CA  --  8.7   AGAP  --  7   BUCR  --  11*   AP 39* 37*   TP 7.3 8.2   ALB 3.2* 3.8   GLOB 4.1* 4.4*   AGRAT 0.8 0.9         CBC w/Diff Recent Labs     04/16/20  0032   WBC 15.5*   RBC 5.30   HGB 13.3   HCT 40.7      GRANS 15*   LYMPH 77*   EOS 0         Iron/Ferritin No results for input(s): IRON in the last 72 hours. No lab exists for component: TIBCCALC   PTH/VIT D No results for input(s): PTH in the last 72 hours.     No lab exists for component: HOLLYE Richards MD  4/16/2020  12:37 PM      April 16, 2020

## 2020-04-16 NOTE — PROGRESS NOTES
Progress Note         Patient: Salima Yepez MRN: [id number]  CSN: [id number]    YOB: 1960  Age: 60 y.o. Sex: male    DOA: 4/16/2020 LOS:  LOS: 0 days                    Subjective:     Salima Yepez is a 60 y.o. male with a PMHx of CLL, Type II DM, HTN, and HLD who is admitted for community acquired pneumonia with suspicion for COVID-19 infection. C/o SOB and recurrent fever   In mild respiratory distress with increased WOB   Hypoxic w/o O2   Denies pain     Objective:     Physical Exam:  Visit Vitals  /72 (BP 1 Location: Right arm, BP Patient Position: At rest)   Pulse 81   Temp 99 °F (37.2 °C)   Resp 18   Ht 5' 9\" (1.753 m)   Wt 84.8 kg (187 lb)   SpO2 93%   BMI 27.62 kg/m²        General:         Alert, cooperative, no acute distress    HEENT: NC, Atraumatic. Anicteric sclerae. Lungs: Labored respirations, decreased sounds in bases with scattered rhonchi   Heart:  Regular  rhythm,  No murmur, No Rubs, No Gallops  Abdomen: Soft, Non distended, Non tender. +Bowel sounds, no HSM  Extremities: No c/c/e  Psych:   Not anxious or agitated. Neurologic:  Alert and oriented X 3. No acute neurological deficits. Intake and Output:  Current Shift:  No intake/output data recorded. Last three shifts:  04/14 1901 - 04/16 0700  In: 2544 [I.V.:2544]  Out: -     Labs: Results:       Chemistry Recent Labs     04/16/20  1002 04/16/20  0032   GLU 83 143*    133*   K 4.2 4.2    99*   CO2 25 27   BUN 21* 24*   CREA 1.36* 2.19*   CA 7.4* 8.7   AGAP 9 7   BUCR 15 11*   AP 39* 37*   TP 7.3 8.2   ALB 3.3*  3.2* 3.8   GLOB 4.1* 4.4*   AGRAT 0.8 0.9      CBC w/Diff Recent Labs     04/16/20  0032   WBC 15.5*   RBC 5.30   HGB 13.3   HCT 40.7      GRANS 15*   LYMPH 77*   EOS 0      Cardiac Enzymes Recent Labs     04/16/20  1002         Coagulation No results for input(s): PTP, INR, APTT, INREXT in the last 72 hours.     Lipid Panel No results found for: CHOL, CHOLPOCT, [facility], CHLST, CHOLV, [id number], HDL, HDLP, LDL, LDLC, DLDLP, [id number], VLDLC, VLDL, TGLX, TRIGL, TRIGP, TGLPOCT, CHHD, CHHDX   BNP No results for input(s): BNPP in the last 72 hours. Liver Enzymes Recent Labs     04/16/20  1002   TP 7.3   ALB 3.3*  3.2*   AP 39*   SGOT 18      Thyroid Studies No results found for: T4, T3U, TSH, TSHEXT             Assessment and Plan:     Rafat Hernandez is a 60 y.o. male with a PMHx of CLL, Type II DM, HTN, and HLD who is admitted for community acquired pneumonia with suspicion for COVID-19 infection. 1. Acute hypoxic respiratory failure- POA   2. Sepsis- POA, with fever, tachypnea, leukocytosis and JOSE   3. Community acquired pneumonia  4. Suspicion for COVID-19 infection   5. Hypoxia   6. JOSE on CKD stage III   7. Hyponatremia   8. Hx CLL   9. Type II DM   10. HTN   11. HLD     COVID-19 test sent to [facility] on 4/16/20; results should be available Sat   [facility] test ordered as well   Nephrology and ID consulted and following   Continue azithromycin, ceftriaxone   Continue hydroxychloroquine, Vit C, zinc, Vit D3, melatonin   O2 prn   Continue normosol at 50 mL/hr   Continue ASA, statin   Holding lisinopril and carvedilol   Add low-dose midodrine for soft BP's   SSI w/accuchecks   FU labs: CRP, CK, d-dimer, ferritin, LD, CMP   FU urine studies   Check HbA1c  Droplet plus isolation     Case discussed with:  [x]Patient  []Family  [x]Nursing  []Case Management  DVT prophylaxis: SQH  Diet: Diabetic   Code Status: FULL   Disposition: Continue current care; >2 nights       H.  Sharmila Schuster DO  4/16/2020

## 2020-04-16 NOTE — H&P
HISTORY & PHYSICAL      Patient: Manjit Molina MRN: [id number]  [facility]: [id number]    YOB: 1960  Age: 60 y.o. Sex: male    DOA: 4/16/2020 LOS:  LOS: 0 days        DOA: 4/16/2020        Assessment/Plan     Active Problems:    Pneumonia (4/16/2020)      COVID-19 virus infection (4/16/2020)        Plan:  1. Community-acquired pneumonia/rule out COVID-19 pneumonia broad-spectrum IV antibiotics Rocephin and Zithromax, ID consult, send stat labsferritin, CRP, pro-Spencer, LDH, LFTs. Will start hydroxychloroquine, zinc sulfate and vitamin C high-dose. 2. Supplemental oxygen for hypoxia  3. Acute renal failure gentle hydration, nephrology consult  4. History of CLL  5. Type 2 diabetesinsulin sliding scale, monitor blood sugars  6. History of hypertensionresume home medications, monitor blood pressure  DVT prophylaxis Heparin  Full code              HPI:     Manjit Molina is a 60 y.o. male who resented to the emergency room secondary to shortness of breath. Patient mentions that that he developed symptoms of cough and shortness of breath on 4/10/2020. Patient mentions that he has been at home and only went to [address] [place] [address] to do some grocery shopping. He does not remember getting in contact with anybody who has tested positive for COVID-19. No recent history of travel. Patient mentions that his wife and daughter are also sick at home. He says his daughter developed a cough with a fever but bounced back in 1 to 2 days. He mentions his wife is still sick has cough and fever but no shortness of breath. Past medical history CLL, is currently being evaluated for prostate cancer, history of atrial fibrillation with ablation, history of hypertension, history of type 2 diabetes. ER evaluation patient noted to have hypoxia which improved with 2 L nasal cannula, fever, acute renal failure.   Patient likely has symptoms of COVID-19 infection, will admit to hospital for further evaluation and treatment. Past Medical History:   Diagnosis Date    Abnormal nuclear cardiac imaging test 11/30/2006    Mild anterior ischemia. Inferior scar w/border zone ischemia. LVE. EF 26%. (Gated imaging may be inaccurate.)  Global hypk. Neg EKG on max EST. Ex time 10:30.  Aorto-iliac duplex 09/10/2012    No AAA identified.  Carotid duplex 09/10/2012    No significant occlusive disease bilaterallly.  CLL (chronic lymphocytic [facility]) ([facility]     Diabetes ([facility]     Echocardiogram 06/02/2015    EF 55%. No WMA. Mild LVH. Gr 1 DDfx. IMELDA. No significant chg from study of 5/17/10.  History of echocardiogram 06/20/2011    EF 50-55%. Gr 2 DDfx. Mild RVE. Mild LAE. Mild-mod IMELDA.  Hypercholesterolemia     Hypertension     Nonischemic cardiomyopathy ([facility]     s/p right coronary cusp PVC ablation  (0/21/29) without complication    Prostate CA ([facility]     S/P cardiac cath 12/11/2006    Patent coronary arteries. LVEDP 12.  EF 25-30%. Mod-significant global hypk. Past Surgical History:   Procedure Laterality Date    HX HEART CATHETERIZATION  12/11/06    The right coronary artery is dominant. It is widely patent. The left main coronary artery is widely patent. The circumflex artery is widely patent. The left anterior descending coronary artery is widely patent. The LVEDP is 12 mmHg. The overall left ventricular systolic function is significantly diminished with an estimated ejection fraction of 25-30%. ** See report. History reviewed. No pertinent family history.     Social History     Socioeconomic History    Marital status: UNKNOWN     Spouse name: Not on file    Number of children: Not on file    Years of education: Not on file    Highest education level: Not on file   Tobacco Use    Smoking status: Former Smoker     Packs/day: 1.00    Smokeless tobacco: Never Used   Substance and Sexual Activity    Alcohol use: Yes       Prior to Admission medications    Medication Sig Start Date End Date Taking? Authorizing Provider   SHELBY BCISE 2 mg/0.85 mL atIn INJECT 2MG EVERY WEEK UNDER THE SKIN 6/7/19   Provider, Historical   ergocalciferol (ERGOCALCIFEROL) 50,000 unit capsule Take 1 Cap by mouth every seven (7) days. 1/7/19   Ayleen MCCORD NP   sitagliptin phos/metformin HCl (JANUMET PO) Take  by mouth. Provider, Historical   lisinopril (PRINIVIL, ZESTRIL) 5 mg tablet Take 1 Tab by mouth daily. 6/29/15   Odilia Denise MD   carvedilol (COREG) 12.5 mg tablet Take 1 Tab by mouth two (2) times a day. 6/4/15   Odilia Denise MD   simvastatin (ZOCOR) 10 mg tablet Take  by mouth daily. Provider, Historical   aspirin delayed-release 81 mg tablet Take 81 mg by mouth daily. 6/9/11   Provider, Historical       No Known Allergies    Review of Systems  A comprehensive review of systems was negative except for that written in the History of Present Illness. Physical Exam:      Visit Vitals  /60 (BP 1 Location: Right arm, BP Patient Position: At rest)   Pulse 73   Temp 98.9 °F (37.2 °C)   Resp 19   Ht 5' 9\" (1.753 m)   Wt 84.8 kg (187 lb)   SpO2 95%   BMI 27.62 kg/m²       Physical Exam:    Gen: In general, this is a well nourished male in no acute distress  HEENT: Sclerae nonicteric. Oral mucous membranes moist. Dentition normal  Neck: Supple with midline trachea. CV: RRR without murmur or rub appreciated. Resp:Respirations are unlabored without use of accessory muscles. Decreased breath sounds in bases. Abd: Soft, nontender, nondistended. Extrem: Extremities are warm, without cyanosis or clubbing. No pitting pretibial edema. Neuro: Patient is alert, oriented, and cooperative. No obvious focal defects. Moves all 4 extremities.     Exam is limited since pt is PUI for COVID 19    Labs Reviewed:    Recent Results (from the past 24 hour(s))   EKG, 12 LEAD, INITIAL    Collection Time: 04/16/20 12:13 AM   Result Value Ref Range    Ventricular Rate 97 BPM    Atrial Rate 97 BPM    P-R Interval 164 ms    QRS Duration 86 ms    Q-T Interval 328 ms    QTC Calculation (Bezet) 416 ms    Calculated P Axis 52 degrees    Calculated R Axis 20 degrees    Calculated T Axis 50 degrees    Diagnosis       Normal sinus rhythm  Cannot rule out Anterior infarct , age undetermined  Abnormal ECG  When compared with ECG of 11-MAY-2010 13:34,  Vent. rate has increased BY  39 BPM  Nonspecific T wave abnormality now evident in Inferior leads     METABOLIC PANEL, COMPREHENSIVE    Collection Time: 04/16/20 12:32 AM   Result Value Ref Range    Sodium 133 (L) 136 - 145 mmol/L    Potassium 4.2 3.5 - 5.5 mmol/L    Chloride 99 (L) 100 - 111 mmol/L    CO2 27 21 - 32 mmol/L    Anion gap 7 3.0 - 18 mmol/L    Glucose 143 (H) 74 - 99 mg/dL    BUN 24 (H) 7.0 - 18 MG/DL    Creatinine 2.19 (H) 0.6 - 1.3 MG/DL    BUN/Creatinine ratio 11 (L) 12 - 20      GFR est AA 37 (L) >60 ml/min/1.73m2    GFR est non-AA 31 (L) >60 ml/min/1.73m2    Calcium 8.7 8.5 - 10.1 MG/DL    Bilirubin, total 0.5 0.2 - 1.0 MG/DL    ALT (SGPT) 20 16 - 61 U/L    AST (SGOT) 17 10 - 38 U/L    Alk. phosphatase 37 (L) 45 - 117 U/L    Protein, total 8.2 6.4 - 8.2 g/dL    Albumin 3.8 3.4 - 5.0 g/dL    Globulin 4.4 (H) 2.0 - 4.0 g/dL    A-G Ratio 0.9 0.8 - 1.7     LACTIC ACID    Collection Time: 04/16/20 12:32 AM   Result Value Ref Range    Lactic acid 1.3 0.4 - 2.0 MMOL/L   CBC WITH AUTOMATED DIFF    Collection Time: 04/16/20 12:32 AM   Result Value Ref Range    WBC 15.5 (H) 4.6 - 13.2 K/uL    RBC 5.30 4.70 - 5.50 M/uL    HGB 13.3 13.0 - 16.0 g/dL    HCT 40.7 36.0 - 48.0 %    MCV 76.8 74.0 - 97.0 FL    MCH 25.1 24.0 - 34.0 PG    MCHC 32.7 31.0 - 37.0 g/dL    RDW 15.9 (H) 11.6 - 14.5 %    PLATELET 418 409 - 858 K/uL    MPV 10.5 9.2 - 11.8 FL    NEUTROPHILS 15 (L) 42 - 75 %    BAND NEUTROPHILS 4 0 - 5 %    LYMPHOCYTES 77 (H) 20 - 51 %    MONOCYTES 4 2 - 9 %    EOSINOPHILS 0 0 - 5 %    BASOPHILS 0 0 - 3 %    ABS. NEUTROPHILS 2.3 1.8 - 8.0 K/UL    ABS. LYMPHOCYTES 11.9 (H) 0.8 - 3.5 K/UL    ABS. MONOCYTES 0.6 0 - 1.0 K/UL    ABS. EOSINOPHILS 0.0 0.0 - 0.4 K/UL    ABS. BASOPHILS 0.0 0.0 - 0.1 K/UL    PLATELET COMMENTS ADEQUATE PLATELETS      RBC COMMENTS ANISOCYTOSIS  1+        DF MANUAL     INFLUENZA A & B AG (RAPID TEST)    Collection Time: 04/16/20  1:04 AM   Result Value Ref Range    Influenza A Antigen Negative NEG      Influenza B Antigen Negative NEG         Imaging Reviewed:    Chest x-ray report pending          Apoorva Clark MD  4/16/2020, 7:29 AM        Disclaimer: Sections of this note are dictated using utilizing voice recognition software. Minor typographical errors may be present. If questions arise, please do not hesitate to contact me or call our department.

## 2020-04-16 NOTE — PROGRESS NOTES
Problem: Breathing Pattern - Ineffective  Goal: *Absence of hypoxia  Outcome: Progressing Towards Goal  Goal: *Use of effective breathing techniques  Outcome: Progressing Towards Goal  Goal: *PALLIATIVE CARE:  Alleviation of Dyspnea  Outcome: Progressing Towards Goal     Problem: Patient Education: Go to Patient Education Activity  Goal: Patient/Family Education  Outcome: Progressing Towards Goal

## 2020-04-16 NOTE — PROGRESS NOTES
Liaison for COVID Testing    Received call from Dr. Rick Barahona regarding requested outreach to [address] [place] [address]) given concern for coronavirus infection. Online [facility] request form completed. Perico Negron does meet criteria for priority testing through [address] [place] [address]. In accordance with these recommendations coronavirus testing sent to [address] [place] [address] results under send out test emergent disease panel and associated reference number for test completion is [id number]. [employer] form completed and faxed to [facility], receipt confirmed at 0932.     Diane Ward [facility]

## 2020-04-16 NOTE — PROGRESS NOTES
Obtained verbal consent from patient to allow wife, Carmen Wise, access to information/updates regarding his care. 1700 - Spoke with  in Pharmacy to explain drug interaction between Azithromycin and Plaquenil; was told that it cn cause QT prolongation but informed me that medication is okay to give due to benefit outweighing risks. 1915 - Verbal shift change report given to Isabel Hall (oncoming nurse) by Karena Chiang (offgoing nurse). Report included the following information SBAR, Kardex, ED Summary, Intake/Output, MAR and Recent Results.

## 2020-04-16 NOTE — CONSULTS
Infectious Disease Consultation Note        Reason: sepsis, COVID-19 infection    Current abx Prior abx   Ceftriaxone, azithromycin since 4/16/20  Hydroxychloroquine since 4/16/20      Lines:       Assessment :    60 y.o. male  with history of CLL, type 2 diabetes, prostate cancer, history of atrial fibrillation with ablation, hypertension, CKD stage 3 who presented to the emergency room on 4/16/2020 secondary to shortness of breath. CXR: mild opacities at lung bases. Now with increasing shortness of breath, hypoxia requiring oxygen, worsening renal function    Patient presents with a highly complex clinical picture. Clinical picture consistent with sepsis (present on admission ) due to bilateral community-acquired pneumonia - ?viral versus atypical bacterial. Clinical presentation highly suspicious for covid-19 infection. Labs 4/16 reviewed-ferritin 233, C-reactive protein 6.3, , , d-dimer 0.75    Patient does not have leukopenia or significantly elevated inflammatory markers. Concurrent immunosuppression from CLL would likely interfere with the clinical presentation. Looking at the history of sick contacts, history of CLL, diabetes, chronic renal insufficiency, hypertension which would predispose patient to clinical deterioration if he has COVID-19 infection, I agree with starting empiric treatment. Acute on chronic renal insufficiency-could be prerenal versus ATN secondary to sepsis. Nephrology consult appreciated. Recommendations:    1. Continue ceftriaxone, azithromycin, hydroxychloroquine  2. Monitor ferritin, CRP, CPK, LDH, d-dimer daily  3. Recommend daily EKG  4. Continue zinc, ascorbic acid. Add vitamin D3, melatonin  5. Recommend prone position ventilation. I discussed this with patient. 6.  Follow-up emergent disease panel  7. Follow-up nephrology recommendations  8.   Titrate oxygen as tolerated      Thank you for consultation request. Above plan was discussed in details with patient. Please call me if any further questions or concerns. Will continue to participate in the care of this patient. HPI:    60 y.o. male  with history of CLL, type 2 diabetes, prostate cancer, history of atrial fibrillation with ablation, hypertension who presented to the emergency room on 4/16/2020 secondary to shortness of breath. Patient mentions that that he developed symptoms of cough and shortness of breath on 4/10/2020. Patient mentions that he has been at home and only went to [facility] to do some grocery shopping. No recent history of travel. Patient mentions that his wife and daughter are also sick at home. He says his daughter developed a cough with a fever but bounced back in 1 to 2 days. He mentions his wife is still sick has cough and fever but no shortness of breath. ER evaluation patient noted to have hypoxia which improved with 2 L nasal cannula, fever, acute renal failure. His WBC count was 18 K. It was felt that Patient likely has symptoms of COVID-19 infection. was admitted for further evaluation and management further evaluation and treatment. He does not remember getting in contact with anybody who has tested positive for COVID-19. Past Medical History:   Diagnosis Date    Abnormal nuclear cardiac imaging test 11/30/2006    Mild anterior ischemia. Inferior scar w/border zone ischemia. LVE. EF 26%. (Gated imaging may be inaccurate.)  Global hypk. Neg EKG on max EST. Ex time 10:30.  Aorto-iliac duplex 09/10/2012    No AAA identified.  Carotid duplex 09/10/2012    No significant occlusive disease bilaterallly.  [facility]chronic [facility]ocytic [facility]) [facility] [address]     Diabetes ([facility] [address]     Echocardiogram 06/02/2015    EF 55%. No WMA. Mild LVH. Gr 1 DDfx. IMELDA. No significant chg from study of 5/17/10.  History of echocardiogram 06/20/2011    EF 50-55%. Gr 2 DDfx. Mild RVE. Mild LAE. Mild-mod IMELDA.      Hypercholesterolemia     Hypertension     Nonischemic cardiomyopathy (HCC)     s/p right coronary cusp PVC ablation  (8/88/79) without complication    Prostate CA ([facility] [address]     S/P cardiac cath 12/11/2006    Patent coronary arteries. LVEDP 12.  EF 25-30%. Mod-significant global hypk. Past Surgical History:   Procedure Laterality Date    HX HEART CATHETERIZATION  12/11/06    The right coronary artery is dominant. It is widely patent. The left main coronary artery is widely patent. The circumflex artery is widely patent. The left anterior descending coronary artery is widely patent. The LVEDP is 12 mmHg. The overall left ventricular systolic function is significantly diminished with an estimated ejection fraction of 25-30%. ** See report.       home Medication List    Details   BYDUREON BCISE 2 mg/0.85 mL atIn INJECT 2MG EVERY WEEK UNDER THE SKIN  Refills: 0      ergocalciferol (ERGOCALCIFEROL) 50,000 unit capsule Take 1 Cap by mouth every seven (7) days. Qty: 24 Cap, Refills: 1    Associated Diagnoses: Vitamin D deficiency      sitagliptin phos/metformin HCl (JANUMET PO) Take  by mouth.      lisinopril (PRINIVIL, ZESTRIL) 5 mg tablet Take 1 Tab by mouth daily. Qty: 30 Tab, Refills: 6      carvedilol (COREG) 12.5 mg tablet Take 1 Tab by mouth two (2) times a day. Qty: 60 Tab, Refills: 6      simvastatin (ZOCOR) 10 mg tablet Take  by mouth daily. aspirin delayed-release 81 mg tablet Take 81 mg by mouth daily. Current Facility-Administered Medications   Medication Dose Route Frequency    sodium chloride (NS) flush 5-40 mL  5-40 mL IntraVENous Q8H    [START ON 4/17/2020] hydroxychloroquine (PLAQUENIL) tablet 200 mg  200 mg Oral BID WITH MEALS    zinc sulfate (ZINCATE) 220 mg capsule 1 Cap  1 Cap Oral DAILY    ascorbic acid (vitamin C) (VITAMIN C) tablet 500 mg  500 mg Oral BID       Allergies: Patient has no known allergies. History reviewed. No pertinent family history.   Social History     Socioeconomic History    Marital status: UNKNOWN     Spouse name: Not on file    Number of children: Not on file    Years of education: Not on file    Highest education level: Not on file   Occupational History    Not on file   Social Needs    Financial resource strain: Not on file    Food insecurity     Worry: Not on file     Inability: Not on file    Transportation needs     Medical: Not on file     Non-medical: Not on file   Tobacco Use    Smoking status: Former Smoker     Packs/day: 1.00    Smokeless tobacco: Never Used   Substance and Sexual Activity    Alcohol use: Yes    Drug use: Not on file    Sexual activity: Not on file   Lifestyle    Physical activity     Days per week: Not on file     Minutes per session: Not on file    Stress: Not on file   Relationships    Social connections     Talks on phone: Not on file     Gets together: Not on file     Attends [employer] service: Not on file     Active member of club or organization: Not on file     Attends meetings of clubs or organizations: Not on file     Relationship status: Not on file    Intimate partner violence     Fear of current or ex partner: Not on file     Emotionally abused: Not on file     Physically abused: Not on file     Forced sexual activity: Not on file   Other Topics Concern    Not on file   Social History Narrative    Not on file     Social History     Tobacco Use   Smoking Status Former Smoker    Packs/day: 1.00   Smokeless Tobacco Never Used        Temp (24hrs), Av.8 °F (37.7 °C), Min:97.4 °F (36.3 °C), Max:102.1 °F (38.9 °C)    Visit Vitals  /70 (BP 1 Location: Left arm, BP Patient Position: At rest)   Pulse 76   Temp 97.4 °F (36.3 °C)   Resp 19   Ht 5' 9\" (1.753 m)   Wt 84.8 kg (187 lb)   SpO2 95%   BMI 27.62 kg/m²       ROS: 12 point ROS obtained in details. Pertinent positives as mentioned in HPI,   otherwise negative    Physical Exam:    General: Well developed, well nourished male laying on the bed AAOx3. Mildly dyspneic.      General: awake alert and oriented   HEENT:  Normocephalic, atraumatic, PERRL, EOMI, no scleral icterus or pallor; no conjunctival hemmohage;  nasal and oral mucous are moist and without evidence of lesions. Neck supple, no bruits. Lymph Nodes:   no cervical, axillary or inguinal adenopathy   Lungs:   non-labored, bilaterally clear to auscultation- no crackles wheezes rales or rhonchi   Heart:  RRR, s1 and s2; no rubs or gallops, no edema, + pedal pulses   Abdomen:  soft, non-distended, active bowel sounds, no hepatomegaly, no splenomegaly. Non-tender   Genitourinary:  deferred   Extremities:   no clubbing, cyanosis; no joint effusions or swelling; Full ROM of all large joints to the upper and lower extremities; muscle mass appropriate for age   Neurologic:  No gross focal sensory abnormalities; 5/5 muscle strength to upper and lower extremities. Speech appropriate. Cranial nerves intact                        Skin:  No rash or ulcers noted   Back:  no spinal or paraspinal muscle tenderness or rigidity, no CVA tenderness     Psychiatric:  No suicidal or homicidal ideations, appropriate mood and affect         Labs: Results:   Chemistry Recent Labs     04/16/20  0032   *   *   K 4.2   CL 99*   CO2 27   BUN 24*   CREA 2.19*   CA 8.7   AGAP 7   BUCR 11*   AP 37*   TP 8.2   ALB 3.8   GLOB 4.4*   AGRAT 0.9      CBC w/Diff Recent Labs     04/16/20  0032   WBC 15.5*   RBC 5.30   HGB 13.3   HCT 40.7      GRANS 15*   LYMPH 77*   EOS 0      Microbiology No results for input(s): CULT in the last 72 hours.        RADIOLOGY:    All available imaging studies/reports in [facility] for this admission were reviewed    Dr. Lucio Grossman, Infectious Disease Specialist  [phone]  April 16, 2020  9:57 AM

## 2020-04-16 NOTE — PROGRESS NOTES
Reason for Admission:  Pneumonia [J18.9]  COVID-19 virus infection [U07.1]                 RRAT Score:    11%            Plan for utilizing home health:    unlikely                      Likelihood of Readmission:   LOW                         Transition of Care Plan:              Initial assessment completed with spouse/SO, Lucila Borrego . Pt did not answer phone in room x2. Face sheet information confirmed:  yes. This patient lives in a single family home with patient, daughter and spouse. Patient is able to navigate steps as needed. Prior to hospitalization, patient was considered to be independent with ADLs/IADLS : yes . Prior to this admission, pt was working and driving. Patient has a current ACP document on file: no  The patient and wife will be available to transport patient home upon discharge. The patient already has none reported, and  medical equipment available in the home. Patient is not currently active with home health. Patient has not stayed in a skilled nursing facility or rehab. This patient is on dialysis :no     Freedom of choice signed: no. Currently, the discharge plan is Home. The patient states that he can obtain his medications from the pharmacy, and take his medications as directed. Patient's current insurance is [employer] [facility] [employer]  PCP Verified by CM:  Yes  Mode of Transport at Discharge: Self  Transition of Care Consult (CM Consult): Discharge Planning  Discharge Durable Medical Equipment: No  Physical Therapy Consult: No  Occupational Therapy Consult: No  Speech Therapy Consult: No  Current Support Network: Lives with Spouse  Confirm Follow Up Transport: Self  Discharge Location  Discharge Placement: Home with family assistance        SERENE Poole, [place] [facility]- [phone]

## 2020-04-16 NOTE — ED TRIAGE NOTES
Pt reports fever with shortness of breath and palpitations that began Friday night. Also reports pain down left leg into groin/ testicles. Pt reports his sense of taste has gone away.

## 2020-04-17 ENCOUNTER — APPOINTMENT (OUTPATIENT)
Dept: GENERAL RADIOLOGY | Age: 60
DRG: 871 | End: 2020-04-17
Attending: INTERNAL MEDICINE
Payer: COMMERCIAL

## 2020-04-17 ENCOUNTER — APPOINTMENT (OUTPATIENT)
Dept: ULTRASOUND IMAGING | Age: 60
DRG: 871 | End: 2020-04-17
Attending: INTERNAL MEDICINE
Payer: COMMERCIAL

## 2020-04-17 LAB
ALBUMIN SERPL-MCNC: 3 G/DL (ref 3.4–5)
ALBUMIN SERPL-MCNC: 3.3 G/DL (ref 3.4–5)
ALBUMIN SERPL-MCNC: 3.4 G/DL (ref 3.4–5)
ALBUMIN/GLOB SERPL: 0.8 {RATIO} (ref 0.8–1.7)
ALP SERPL-CCNC: 41 U/L (ref 45–117)
ALT SERPL-CCNC: 14 U/L (ref 16–61)
ANION GAP SERPL CALC-SCNC: 7 MMOL/L (ref 3–18)
ANION GAP SERPL CALC-SCNC: 9 MMOL/L (ref 3–18)
APPEARANCE UR: CLEAR
AST SERPL-CCNC: 18 U/L (ref 10–38)
ATRIAL RATE: 77 BPM
BACTERIA URNS QL MICRO: ABNORMAL /HPF
BILIRUB DIRECT SERPL-MCNC: <0.1 MG/DL (ref 0–0.2)
BILIRUB SERPL-MCNC: 0.6 MG/DL (ref 0.2–1)
BILIRUB UR QL: NEGATIVE
BUN SERPL-MCNC: 18 MG/DL (ref 7–18)
BUN SERPL-MCNC: 18 MG/DL (ref 7–18)
BUN/CREAT SERPL: 13 (ref 12–20)
BUN/CREAT SERPL: 14 (ref 12–20)
CALCIUM SERPL-MCNC: 7.2 MG/DL (ref 8.5–10.1)
CALCIUM SERPL-MCNC: 7.8 MG/DL (ref 8.5–10.1)
CALCULATED P AXIS, ECG09: 51 DEGREES
CALCULATED R AXIS, ECG10: 6 DEGREES
CALCULATED T AXIS, ECG11: 15 DEGREES
CHLORIDE SERPL-SCNC: 104 MMOL/L (ref 100–111)
CHLORIDE SERPL-SCNC: 104 MMOL/L (ref 100–111)
CK SERPL-CCNC: 205 U/L (ref 39–308)
CO2 SERPL-SCNC: 25 MMOL/L (ref 21–32)
CO2 SERPL-SCNC: 26 MMOL/L (ref 21–32)
COLOR UR: YELLOW
CREAT SERPL-MCNC: 1.25 MG/DL (ref 0.6–1.3)
CREAT SERPL-MCNC: 1.37 MG/DL (ref 0.6–1.3)
CREAT UR-MCNC: 208 MG/DL (ref 30–125)
CRP SERPL-MCNC: 8 MG/DL (ref 0–0.3)
D DIMER PPP FEU-MCNC: 1.2 UG/ML(FEU)
DIAGNOSIS, 93000: NORMAL
EPITH CASTS URNS QL MICRO: ABNORMAL /LPF (ref 0–5)
EST. AVERAGE GLUCOSE BLD GHB EST-MCNC: 189 MG/DL
FERRITIN SERPL-MCNC: 266 NG/ML (ref 8–388)
GLOBULIN SER CALC-MCNC: 4.2 G/DL (ref 2–4)
GLUCOSE BLD STRIP.AUTO-MCNC: 127 MG/DL (ref 70–110)
GLUCOSE BLD STRIP.AUTO-MCNC: 127 MG/DL (ref 70–110)
GLUCOSE BLD STRIP.AUTO-MCNC: 141 MG/DL (ref 70–110)
GLUCOSE BLD STRIP.AUTO-MCNC: 81 MG/DL (ref 70–110)
GLUCOSE SERPL-MCNC: 179 MG/DL (ref 74–99)
GLUCOSE SERPL-MCNC: 96 MG/DL (ref 74–99)
GLUCOSE UR STRIP.AUTO-MCNC: NEGATIVE MG/DL
HBA1C MFR BLD: 8.2 % (ref 4.2–5.6)
HGB UR QL STRIP: NEGATIVE
KETONES UR QL STRIP.AUTO: 40 MG/DL
LDH SERPL L TO P-CCNC: 219 U/L (ref 81–234)
LEUKOCYTE ESTERASE UR QL STRIP.AUTO: NEGATIVE
NITRITE UR QL STRIP.AUTO: NEGATIVE
P-R INTERVAL, ECG05: 186 MS
PH UR STRIP: 5 [PH] (ref 5–8)
PHOSPHATE SERPL-MCNC: 2.7 MG/DL (ref 2.5–4.9)
PHOSPHATE SERPL-MCNC: 3.1 MG/DL (ref 2.5–4.9)
POTASSIUM SERPL-SCNC: 4.2 MMOL/L (ref 3.5–5.5)
POTASSIUM SERPL-SCNC: 4.4 MMOL/L (ref 3.5–5.5)
PROT SERPL-MCNC: 7.6 G/DL (ref 6.4–8.2)
PROT UR STRIP-MCNC: 30 MG/DL
PROT UR-MCNC: 68 MG/DL
Q-T INTERVAL, ECG07: 364 MS
QRS DURATION, ECG06: 92 MS
QTC CALCULATION (BEZET), ECG08: 411 MS
RBC #/AREA URNS HPF: NEGATIVE /HPF (ref 0–5)
SODIUM SERPL-SCNC: 137 MMOL/L (ref 136–145)
SODIUM SERPL-SCNC: 138 MMOL/L (ref 136–145)
SODIUM UR-SCNC: 129 MMOL/L (ref 20–110)
SP GR UR REFRACTOMETRY: 1.03 (ref 1–1.03)
UROBILINOGEN UR QL STRIP.AUTO: 0.2 EU/DL (ref 0.2–1)
VENTRICULAR RATE, ECG03: 77 BPM
WBC URNS QL MICRO: ABNORMAL /HPF (ref 0–4)

## 2020-04-17 PROCEDURE — 84300 ASSAY OF URINE SODIUM: CPT

## 2020-04-17 PROCEDURE — 82962 GLUCOSE BLOOD TEST: CPT

## 2020-04-17 PROCEDURE — 85379 FIBRIN DEGRADATION QUANT: CPT

## 2020-04-17 PROCEDURE — 74011250637 HC RX REV CODE- 250/637: Performed by: INTERNAL MEDICINE

## 2020-04-17 PROCEDURE — 77010033678 HC OXYGEN DAILY

## 2020-04-17 PROCEDURE — 76770 US EXAM ABDO BACK WALL COMP: CPT

## 2020-04-17 PROCEDURE — 74011000258 HC RX REV CODE- 258: Performed by: INTERNAL MEDICINE

## 2020-04-17 PROCEDURE — 86140 C-REACTIVE PROTEIN: CPT

## 2020-04-17 PROCEDURE — 82570 ASSAY OF URINE CREATININE: CPT

## 2020-04-17 PROCEDURE — 74011000250 HC RX REV CODE- 250: Performed by: HOSPITALIST

## 2020-04-17 PROCEDURE — 74011250636 HC RX REV CODE- 250/636: Performed by: FAMILY MEDICINE

## 2020-04-17 PROCEDURE — 82550 ASSAY OF CK (CPK): CPT

## 2020-04-17 PROCEDURE — 82728 ASSAY OF FERRITIN: CPT

## 2020-04-17 PROCEDURE — 74011250637 HC RX REV CODE- 250/637: Performed by: FAMILY MEDICINE

## 2020-04-17 PROCEDURE — 83036 HEMOGLOBIN GLYCOSYLATED A1C: CPT

## 2020-04-17 PROCEDURE — 74011250636 HC RX REV CODE- 250/636: Performed by: HOSPITALIST

## 2020-04-17 PROCEDURE — 65660000000 HC RM CCU STEPDOWN

## 2020-04-17 PROCEDURE — 36600 WITHDRAWAL OF ARTERIAL BLOOD: CPT

## 2020-04-17 PROCEDURE — 82803 BLOOD GASES ANY COMBINATION: CPT

## 2020-04-17 PROCEDURE — 93005 ELECTROCARDIOGRAM TRACING: CPT

## 2020-04-17 PROCEDURE — 84156 ASSAY OF PROTEIN URINE: CPT

## 2020-04-17 PROCEDURE — 71045 X-RAY EXAM CHEST 1 VIEW: CPT

## 2020-04-17 PROCEDURE — 74011250637 HC RX REV CODE- 250/637: Performed by: HOSPITALIST

## 2020-04-17 PROCEDURE — 80069 RENAL FUNCTION PANEL: CPT

## 2020-04-17 PROCEDURE — 36415 COLL VENOUS BLD VENIPUNCTURE: CPT

## 2020-04-17 PROCEDURE — 80076 HEPATIC FUNCTION PANEL: CPT

## 2020-04-17 PROCEDURE — 81001 URINALYSIS AUTO W/SCOPE: CPT

## 2020-04-17 PROCEDURE — 83615 LACTATE (LD) (LDH) ENZYME: CPT

## 2020-04-17 RX ORDER — MIDODRINE HYDROCHLORIDE 5 MG/1
2.5 TABLET ORAL 2 TIMES DAILY WITH MEALS
Status: DISCONTINUED | OUTPATIENT
Start: 2020-04-17 | End: 2020-04-18

## 2020-04-17 RX ORDER — IBUPROFEN 600 MG/1
600 TABLET ORAL ONCE
Status: COMPLETED | OUTPATIENT
Start: 2020-04-17 | End: 2020-04-17

## 2020-04-17 RX ORDER — INSULIN LISPRO 100 [IU]/ML
INJECTION, SOLUTION INTRAVENOUS; SUBCUTANEOUS
Status: DISCONTINUED | OUTPATIENT
Start: 2020-04-17 | End: 2020-04-20

## 2020-04-17 RX ADMIN — ZINC SULFATE 220 MG (50 MG) CAPSULE 1 CAPSULE: CAPSULE at 09:01

## 2020-04-17 RX ADMIN — Medication 10 ML: at 09:02

## 2020-04-17 RX ADMIN — Medication 500 MG: at 09:01

## 2020-04-17 RX ADMIN — Medication 10 ML: at 04:00

## 2020-04-17 RX ADMIN — MELATONIN TAB 3 MG 3 MG: 3 TAB at 21:09

## 2020-04-17 RX ADMIN — SODIUM CHLORIDE, SODIUM ACETATE ANHYDROUS, SODIUM GLUCONATE, POTASSIUM CHLORIDE, AND MAGNESIUM CHLORIDE: 526; 222; 502; 37; 30 INJECTION, SOLUTION INTRAVENOUS at 11:43

## 2020-04-17 RX ADMIN — ASPIRIN 81 MG: 81 TABLET, COATED ORAL at 09:01

## 2020-04-17 RX ADMIN — CHOLECALCIFEROL (VITAMIN D3) 10 MCG (400 UNIT) TABLET 1 TABLET: at 09:01

## 2020-04-17 RX ADMIN — HEPARIN SODIUM 5000 UNITS: 5000 INJECTION INTRAVENOUS; SUBCUTANEOUS at 04:00

## 2020-04-17 RX ADMIN — HEPARIN SODIUM 5000 UNITS: 5000 INJECTION INTRAVENOUS; SUBCUTANEOUS at 11:43

## 2020-04-17 RX ADMIN — HEPARIN SODIUM 5000 UNITS: 5000 INJECTION INTRAVENOUS; SUBCUTANEOUS at 21:08

## 2020-04-17 RX ADMIN — Medication 10 ML: at 21:10

## 2020-04-17 RX ADMIN — MIDODRINE HYDROCHLORIDE 2.5 MG: 2.5 TABLET ORAL at 09:01

## 2020-04-17 RX ADMIN — AZITHROMYCIN MONOHYDRATE 500 MG: 500 INJECTION, POWDER, LYOPHILIZED, FOR SOLUTION INTRAVENOUS at 19:10

## 2020-04-17 RX ADMIN — HYDROXYCHLOROQUINE SULFATE 200 MG: 200 TABLET, FILM COATED ORAL at 09:01

## 2020-04-17 RX ADMIN — IBUPROFEN 600 MG: 600 TABLET, FILM COATED ORAL at 16:01

## 2020-04-17 RX ADMIN — MIDODRINE HYDROCHLORIDE 2.5 MG: 2.5 TABLET ORAL at 19:10

## 2020-04-17 RX ADMIN — Medication 500 MG: at 19:10

## 2020-04-17 RX ADMIN — ACETAMINOPHEN 650 MG: 325 TABLET ORAL at 09:04

## 2020-04-17 RX ADMIN — ATORVASTATIN CALCIUM 10 MG: 10 TABLET, FILM COATED ORAL at 09:01

## 2020-04-17 RX ADMIN — HYDROXYCHLOROQUINE SULFATE 200 MG: 200 TABLET, FILM COATED ORAL at 19:10

## 2020-04-17 RX ADMIN — CEFTRIAXONE SODIUM 2 G: 2 INJECTION, POWDER, FOR SOLUTION INTRAMUSCULAR; INTRAVENOUS at 19:10

## 2020-04-17 NOTE — PROGRESS NOTES
Progress Note         Patient: Carmen De Santiago MRN: [id number]  CSN: [id number]    YOB: 1960  Age: 60 y.o. Sex: male    DOA: 4/16/2020 LOS:  LOS: 1 day                    Subjective:     Carmen De Santiago is a 60 y.o. male with a PMHx of CLL, Type II DM, HTN, and HLD who is admitted for community acquired pneumonia with suspicion for COVID-19 infection. C/o SOB and recurrent fever  Feels much better than yesterday   Breathing much improved since yesterday    Hypoxic w/o O2   Denies pain     Objective:     Physical Exam:  Visit Vitals  /69 (BP 1 Location: Left arm, BP Patient Position: Sitting)   Pulse 97   Temp (!) 102.3 °F (39.1 °C)   Resp 18   Ht 5' 9\" (1.753 m)   Wt 84.8 kg (187 lb)   SpO2 97%   BMI 27.62 kg/m²        General:         Alert, cooperative, no acute distress    HEENT: NC, Atraumatic. Anicteric sclerae. Lungs: Decreased sounds in bases but clear   Heart:  Regular  rhythm,  No murmur, No Rubs, No Gallops  Abdomen: Soft, Non distended, Non tender. +Bowel sounds, no HSM  Extremities: No c/c/e  Psych:   Not anxious or agitated. Neurologic:  Alert and oriented X 3. No acute neurological deficits. Intake and Output:  Current Shift:  No intake/output data recorded.   Last three shifts:  04/15 1901 - 04/17 0700  In: 2544 [I.V.:2544]  Out: 007 [Urine:875]    Labs: Results:       Chemistry Recent Labs     04/17/20  0250 04/16/20  1002 04/16/20  0032   GLU 96 83 143*    139 133*   K 4.4 4.2 4.2    105 99*   CO2 25 25 27   BUN 18 21* 24*   CREA 1.37* 1.36* 2.19*   CA 7.8* 7.4* 8.7   AGAP 9 9 7   BUCR 13 15 11*   AP 41* 39* 37*   TP 7.6 7.3 8.2   ALB 3.3*  3.4 3.3*  3.2* 3.8   GLOB 4.2* 4.1* 4.4*   AGRAT 0.8 0.8 0.9      CBC w/Diff Recent Labs     04/16/20  0032   WBC 15.5*   RBC 5.30   HGB 13.3   HCT 40.7      GRANS 15*   LYMPH 77*   EOS 0      Cardiac Enzymes Recent Labs     04/17/20  0250 04/16/20  1002    180      Coagulation No results for input(s): PTP, INR, APTT, INREXT, INREXT in the last 72 hours. Lipid Panel No results found for: CHOL, CHOLPOCT, CHOLX, CHLST, CHOLV, 011499, HDL, HDLP, LDL, LDLC, DLDLP, 563095, VLDLC, VLDL, TGLX, TRIGL, TRIGP, TGLPOCT, CHHD, CHHDX   BNP No results for input(s): BNPP in the last 72 hours. Liver Enzymes Recent Labs     04/17/20  0250   TP 7.6   ALB 3.3*  3.4   AP 41*   SGOT 18      Thyroid Studies No results found for: T4, T3U, TSH, TSHEXT, TSHEXT             Assessment and Plan:     Nilsa Sheehan is a 60 y.o. male with a PMHx of CLL, Type II DM, HTN, and HLD who is admitted for community acquired pneumonia with suspicion for COVID-19 infection. 1. Acute hypoxic respiratory failure- POA   2. Sepsis- POA, with fever, tachypnea, leukocytosis and JOSE   3. Community acquired pneumonia  4. Suspicion for COVID-19 infection   5. Hypoxia   6. JOSE on CKD stage III   7. Hyponatremia   8. Hx CLL   9. Type II DM- HbA1c 8.2 on 4/17/20   10. HTN   11. HLD     COVID-19 test sent to [facility] on 4/16/20; results should be available Sat   [facility] test ordered as well   Nephrology and ID consulted and following   Daily EKG   Continue azithromycin, ceftriaxone   Continue hydroxychloroquine, Vit C, zinc, Vit D3, melatonin   O2 prn   Continue normosol at 50 mL/hr   Continue ASA, statin   Holding lisinopril and carvedilol   Continue low-dose midodrine for soft BP's   SSI w/accuchecks   FU labs: CRP, CK, d-dimer, ferritin, LD, CMP   FU urine studies   Droplet plus isolation     Case discussed with:  [x]Patient  []Family  [x]Nursing  []Case Management  DVT prophylaxis: SQH  Diet: Diabetic   Code Status: FULL   Disposition: Continue current care; >2 nights       H.  Ivette Wiley DO  4/17/2020

## 2020-04-17 NOTE — PROGRESS NOTES
04/16/20 2254   Vitals   Temp (!) 101.7 °F (38.7 °C)   Temp Source Oral   Pulse (Heart Rate) 85   Heart Rate Source Monitor   Resp Rate 19   O2 Sat (%) 94 %   Level of Consciousness Alert   /73   MAP (Calculated) 90   BP 1 Location Left arm   BP 1 Method Automatic   BP Patient Position At rest   MEWS Score 3   Ice packs given

## 2020-04-17 NOTE — PROGRESS NOTES
Infectious Disease progress Note        Reason: sepsis, COVID-19 infection    Current abx Prior abx   Ceftriaxone, azithromycin since 4/16/20  Hydroxychloroquine since 4/16/20      Lines:       Assessment :    60 y.o. male  with history of CLL, type 2 diabetes, prostate cancer, history of atrial fibrillation with ablation, hypertension, CKD stage 3 who presented to the emergency room on 4/16/2020 secondary to shortness of breath. CXR: mild opacities at lung bases. Now with increasing shortness of breath, hypoxia requiring oxygen, worsening renal function    Patient presents with a highly complex clinical picture. Clinical picture consistent with sepsis (present on admission ) due to bilateral community-acquired pneumonia - ?viral versus atypical bacterial. Clinical presentation highly suspicious for covid-19 infection. Labs 4/16 reviewed-ferritin 233, C-reactive protein 6.3, , , d-dimer 0.75  Labs 4/17 reviewed- ferritin 266, C-reactive protein 8.0, , LDH: 219, d-dimer 1.20     Patient does not have leukopenia or significantly elevated inflammatory markers. Concurrent immunosuppression from CLL would likely interfere with the clinical presentation. Looking at the history of sick contacts, history of CLL, diabetes, chronic renal insufficiency, hypertension which would predispose patient to clinical deterioration if he has COVID-19 infection, I agree with starting empiric treatment. Acute on chronic renal insufficiency-could be prerenal versus ATN secondary to sepsis. Nephrology consult appreciated. Creatinine stable    Subjective sense of improvement. However still with high fevers. Requiring oxygen. Oxygen dropped to 88% on 2 L. Oxygen saturation 95% on 4 L    EKG 4/16-QTc 416    Recommendations:    1. Continue ceftriaxone, azithromycin, hydroxychloroquine  2. Monitor ferritin, CRP, CPK, LDH, d-dimer daily  3. Recommend daily EKG  4. Continue zinc, ascorbic acid. vitamin D3, melatonin  5. Recommend prone position ventilation. I discussed this with patient. 6.  Follow-up emergent disease panel  7. Follow-up nephrology recommendations  8. Titrate oxygen as tolerated  9. Obtain chest x-ray  10. Will start steroids, anticoagulation if worsening hypoxia noted on subsequent exams      Above plan was discussed in details with patient, nursing staff,dr. rodriguez. Please call me if any further questions or concerns. Will continue to participate in the care of this patient. HPI:    Feels  better. Thinks that his fevers broke. Has cough with some sputum patient cannot tell the color of the sputum since his room was dark. Denies worsening shortness of breath. No chest pain, no myalgias  Patient denies headaches, visual disturbances, sore throat, runny nose, earaches, abdominal pain, diarrhea, burning micturition, pain or weakness in extremities. He denies back pain/flank pain. home Medication List    Details   BYDUREON BCISE 2 mg/0.85 mL atIn INJECT 2MG EVERY WEEK UNDER THE SKIN  Refills: 0      ergocalciferol (ERGOCALCIFEROL) 50,000 unit capsule Take 1 Cap by mouth every seven (7) days. Qty: 24 Cap, Refills: 1    Associated Diagnoses: Vitamin D deficiency      sitagliptin phos/metformin HCl (JANUMET PO) Take  by mouth.      lisinopril (PRINIVIL, ZESTRIL) 5 mg tablet Take 1 Tab by mouth daily. Qty: 30 Tab, Refills: 6      carvedilol (COREG) 12.5 mg tablet Take 1 Tab by mouth two (2) times a day. Qty: 60 Tab, Refills: 6      simvastatin (ZOCOR) 10 mg tablet Take  by mouth daily. aspirin delayed-release 81 mg tablet Take 81 mg by mouth daily.              Current Facility-Administered Medications   Medication Dose Route Frequency    sodium chloride (NS) flush 5-40 mL  5-40 mL IntraVENous Q8H    aspirin delayed-release tablet 81 mg  81 mg Oral DAILY    [Held by provider] carvediloL (COREG) tablet 12.5 mg  12.5 mg Oral BID    atorvastatin (LIPITOR) tablet 10 mg  10 mg Oral DAILY    cefTRIAXone (ROCEPHIN) 2 g in sterile water (preservative free) 20 mL IV syringe  2 g IntraVENous Q24H    azithromycin (ZITHROMAX) 500 mg in  mL  500 mg IntraVENous Q24H    hydroxychloroquine (PLAQUENIL) tablet 200 mg  200 mg Oral BID WITH MEALS    zinc sulfate (ZINCATE) 220 mg capsule 1 Cap  1 Cap Oral DAILY    ascorbic acid (vitamin C) (VITAMIN C) tablet 500 mg  500 mg Oral BID    insulin lispro (HUMALOG) injection   SubCUTAneous ACB&D    glucose chewable tablet 16 g  4 Tab Oral PRN    glucagon (GLUCAGEN) injection 1 mg  1 mg IntraMUSCular PRN    dextrose 10% infusion 125-250 mL  125-250 mL IntraVENous PRN    midodrine (PROAMATINE) tablet 2.5 mg  2.5 mg Oral TID WITH MEALS    electrolyte-r (NORMOSOL R) infusion   IntraVENous CONTINUOUS    acetaminophen (TYLENOL) tablet 650 mg  650 mg Oral Q6H PRN    cholecalciferol (VITAMIN D3) (400 Units /10 mcg) tablet 1 Tab  400 Units Oral DAILY    melatonin tablet 3 mg  3 mg Oral QHS    heparin (porcine) injection 5,000 Units  5,000 Units SubCUTAneous Q8H       Allergies: Patient has no known allergies. Temp (24hrs), Av.7 °F (38.7 °C), Min:99 °F (37.2 °C), Max:103.3 °F (39.6 °C)    Visit Vitals  /77 (BP 1 Location: Left arm, BP Patient Position: At rest)   Pulse 88   Temp (!) 102.8 °F (39.3 °C)   Resp 20   Ht 5' 9\" (1.753 m)   Wt 84.8 kg (187 lb)   SpO2 (!) 88%   BMI 27.62 kg/m²       ROS: 12 point ROS obtained in details. Pertinent positives as mentioned in HPI,   otherwise negative    Physical Exam:    General: Well developed, well nourished male sitting on the bed AAOx3. Mildly dyspneic. General:   awake alert and oriented   HEENT:  Normocephalic, atraumatic, PERRL, EOMI, no scleral icterus or pallor; no conjunctival hemmohage;  nasal and oral mucous are moist and without evidence of lesions. Neck supple, no bruits.    Lymph Nodes:   no cervical, axillary or inguinal adenopathy   Lungs:   non-labored, bilaterally clear to auscultation- no crackles wheezes rales or rhonchi   Heart:  RRR, s1 and s2; no rubs or gallops, no edema, + pedal pulses   Abdomen:  soft, non-distended, active bowel sounds, no hepatomegaly, no splenomegaly. Non-tender   Genitourinary:  deferred   Extremities:   no clubbing, cyanosis; no joint effusions or swelling; Full ROM of all large joints to the upper and lower extremities; muscle mass appropriate for age   Neurologic:  No gross focal sensory abnormalities; 5/5 muscle strength to upper and lower extremities. Speech appropriate.  Cranial nerves intact                        Skin:  No rash or ulcers noted   Back:  no spinal or paraspinal muscle tenderness or rigidity, no CVA tenderness     Psychiatric:  No suicidal or homicidal ideations, appropriate mood and affect         Labs: Results:   Chemistry Recent Labs     04/17/20  0250 04/16/20  1002 04/16/20  0032   GLU  --  83 143*   NA  --  139 133*   K  --  4.2 4.2   CL  --  105 99*   CO2  --  25 27   BUN  --  21* 24*   CREA  --  1.36* 2.19*   CA  --  7.4* 8.7   AGAP  --  9 7   BUCR  --  15 11*   AP 41* 39* 37*   TP 7.6 7.3 8.2   ALB 3.4 3.3*  3.2* 3.8   GLOB 4.2* 4.1* 4.4*   AGRAT 0.8 0.8 0.9      CBC w/Diff Recent Labs     04/16/20  0032   WBC 15.5*   RBC 5.30   HGB 13.3   HCT 40.7      GRANS 15*   LYMPH 77*   EOS 0      Microbiology Recent Labs     04/16/20  0032   CULT NO GROWTH 1 DAY  NO GROWTH 1 DAY          RADIOLOGY:    All available imaging studies/reports in [facility] for this admission were reviewed    Dr. Andriy Heard, Infectious Disease Specialist  [phone]  April 17, 2020  9:57 AM

## 2020-04-17 NOTE — PROGRESS NOTES
In Patient Progress note      Admit Date: 4/16/2020    Impression:     #1 acute kidney injury on chronic kidney disease stage III, etiology appears to be prerenal azotemia versus ATN in the setting of suspected   CO VID 19 infection ( test pending) versus community-acquired pneumonia,  baseline of 1.1, creat down to 1.3 this AM   Urine studies with minimal proteinuria , no hematuria   volume status does appear to be dry.   Electrolytes and acid-base in good range  #2 acute hypoxic respiratory failure, thought to be secondary to pneumonia versus COVID-19 virus infection  #3 community-acquired pneumonia versus COVID-19 viral infection  #4 history of CLL follows with Dr. Sandy Higgins  #5 type 2 diabetes  #6 hypertension , stable BP   #7 hyponatremia, resolved    Fever with chills overnight   Cough dry  Feels better this AM   Able to drink fluids , no Nausea vx abdominal symptoms  Urinating ok   CRP upto 8 range   Still hypoxic needing NC O2     Plan:  #1 continue holding lisinopril and Coreg and soft blood pressures  #2 gentle hydration with Normosol 50 cc/hrs   #3 wean midodrine to BID   #4 management of presumed COVID 19 viral infection per ID recommendations appreciated  #5 follow electrolytes closely, check renal panel daily  #6 renal US pending   #7 encourage po intake      Please call with questions,     Henry Beauchamp MD FASN  Cell [phone]  Pager: [phone]    Subjective:     Fever and chills overnight   Says feels better this AM   Still hypoxic   CRP trending up      Current Facility-Administered Medications:     insulin lispro (HUMALOG) injection, , SubCUTAneous, TIDAC, David, Uriel, DO    midodrine (PROAMATINE) tablet 2.5 mg, 2.5 mg, Oral, BID WITH MEALS, Quoc Roberts MD    sodium chloride (NS) flush 5-40 mL, 5-40 mL, IntraVENous, Q8H, Pamela Anderson MD, 10 mL at 04/17/20 0902    aspirin delayed-release tablet 81 mg, 81 mg, Oral, DAILY, Pamela Anderson MD, 81 mg at 04/17/20 0901   [Held by provider] carvediloL (COREG) tablet 12.5 mg, 12.5 mg, Oral, BID, Omaira Alvarado MD, 12.5 mg at 04/16/20 1708    atorvastatin (LIPITOR) tablet 10 mg, 10 mg, Oral, DAILY, Omaira Alvarado MD, 10 mg at 04/17/20 0901    cefTRIAXone (ROCEPHIN) 2 g in sterile water (preservative free) 20 mL IV syringe, 2 g, IntraVENous, Q24H, Omaira Alvarado MD, 2 g at 04/16/20 1709    azithromycin (ZITHROMAX) 500 mg in  mL, 500 mg, IntraVENous, Q24H, Omaira Alvarado MD, 500 mg at 04/16/20 1708    hydroxychloroquine (PLAQUENIL) tablet 200 mg, 200 mg, Oral, BID WITH MEALS, Omaira Alvarado MD, 200 mg at 04/17/20 0901    zinc sulfate (ZINCATE) 220 mg capsule 1 Cap, 1 Cap, Oral, DAILY, Omaira Alvarado MD, 1 Cap at 04/17/20 0901    ascorbic acid (vitamin C) (VITAMIN C) tablet 500 mg, 500 mg, Oral, BID, Omaira Alvarado MD, 500 mg at 04/17/20 0901    glucose chewable tablet 16 g, 4 Tab, Oral, PRN, Murphy Older, DO    glucagon (GLUCAGEN) injection 1 mg, 1 mg, IntraMUSCular, PRN, Garlan Older, DO    dextrose 10% infusion 125-250 mL, 125-250 mL, IntraVENous, PRN, Garlan Older, DO    acetaminophen (TYLENOL) tablet 650 mg, 650 mg, Oral, Q6H PRN, Garlan Older, DO, 650 mg at 04/17/20 7657    cholecalciferol (VITAMIN D3) (400 Units /10 mcg) tablet 1 Tab, 400 Units, Oral, DAILY, Skinny Vázquez MD, 1 Tab at 04/17/20 0901    melatonin tablet 3 mg, 3 mg, Oral, QHS, Luz COY MD, 3 mg at 04/16/20 2246    heparin (porcine) injection 5,000 Units, 5,000 Units, SubCUTAneous, Q8H, Garmichelle Larson, DO, 5,000 Units at 04/17/20 0400        Objective:     Visit Vitals  /77 (BP 1 Location: Left arm, BP Patient Position: At rest)   Pulse 88   Temp (!) 102.8 °F (39.3 °C)   Resp 20   Ht 5' 9\" (1.753 m)   Wt 84.8 kg (187 lb)   SpO2 (!) 88%   BMI 27.62 kg/m²         Intake/Output Summary (Last 24 hours) at 4/17/2020 1127  Last data filed at 4/17/2020 0500  Gross per 24 hour   Intake    Output 875 ml   Net -875 ml       Physical Exam:     Patient is in no apparent distress. NS O2   HEENT: dry mucosa   Lungs: sylvie coarse BS   Cardiovascular system: S1, S2, regular rate and rhythm. Abdomen: soft, non tender, non distended. BS+  Extremities: no edema   Integumentary: skin is grossly intact. Neurologic: Alert, oriented time three.      Data Review:    Recent Labs     04/16/20  0032   WBC 15.5*   RBC 5.30   HCT 40.7   MCV 76.8   MCH 25.1   MCHC 32.7   RDW 15.9*     Recent Labs     04/17/20  0250 04/16/20  1002 04/16/20  0032   BUN 18 21* 24*   CREA 1.37* 1.36* 2.19*   CA 7.8* 7.4* 8.7   ALB 3.3*  3.4 3.3*  3.2* 3.8   K 4.4 4.2 4.2    139 133*    105 99*   CO2 25 25 27   PHOS 2.7 3.7  --    GLU 96 83 143*       Fausto Stevens MD

## 2020-04-17 NOTE — DIABETES MGMT
GLYCEMIC CONTROL PLAN OF CARE     Assessment/Recommendations:  Pt is a 60year old male with a past medical history significant for diabetes, hypertension, hypercholesterolemia, and diabetes. Blood glucose within targets, continue correctional Lispro insulin coverage. Will continue inpatient monitoring and intervention. Most recent blood glucose values:  4/16/2020 15:51 4/16/2020 22:50 4/17/2020 08:38 4/17/2020 11:50   120 (H) 110 81 127 (H)     Current A1C of 8.2% is equivalent to average blood glucose of 189 mg/dl over the past 2-3 months. Current hospital diabetes medications:   Correctional Lispro insulin TID before meals     Previous day's insulin requirements: none    Home diabetes medications:  Bydureon once weekly   Janumet     Diet: Diabetic consistent carbohydrate     Education:  Pt is a COVID-19 ruleout, will follow up for education and assessment of home management via telephone at this time.        Jhon Byrne RD, CDE

## 2020-04-17 NOTE — ROUTINE PROCESS
Bedside shift change report given to [facility] (oncoming nurse) by Chesley Sever (offgoing nurse). Report included the following information SBAR, Intake/Output, MAR, Recent Results and Cardiac Rhythm Sinus Rhythm.

## 2020-04-18 LAB
ALBUMIN SERPL-MCNC: 3.1 G/DL (ref 3.4–5)
ALBUMIN SERPL-MCNC: 3.2 G/DL (ref 3.4–5)
ALBUMIN/GLOB SERPL: 0.8 {RATIO} (ref 0.8–1.7)
ALP SERPL-CCNC: 37 U/L (ref 45–117)
ALT SERPL-CCNC: 13 U/L (ref 16–61)
ANION GAP SERPL CALC-SCNC: 7 MMOL/L (ref 3–18)
ANION GAP SERPL CALC-SCNC: 9 MMOL/L (ref 3–18)
ARTERIAL PATENCY WRIST A: YES
AST SERPL-CCNC: 23 U/L (ref 10–38)
BASE DEFICIT BLD-SCNC: 1 MMOL/L
BASOPHILS # BLD: 0 K/UL (ref 0–0.06)
BASOPHILS NFR BLD: 0 % (ref 0–3)
BDY SITE: ABNORMAL
BILIRUB DIRECT SERPL-MCNC: 0.1 MG/DL (ref 0–0.2)
BILIRUB SERPL-MCNC: 0.5 MG/DL (ref 0.2–1)
BUN SERPL-MCNC: 15 MG/DL (ref 7–18)
BUN SERPL-MCNC: 16 MG/DL (ref 7–18)
BUN/CREAT SERPL: 14 (ref 12–20)
BUN/CREAT SERPL: 14 (ref 12–20)
CALCIUM SERPL-MCNC: 7.5 MG/DL (ref 8.5–10.1)
CALCIUM SERPL-MCNC: 7.9 MG/DL (ref 8.5–10.1)
CHLORIDE SERPL-SCNC: 103 MMOL/L (ref 100–111)
CHLORIDE SERPL-SCNC: 104 MMOL/L (ref 100–111)
CK SERPL-CCNC: 267 U/L (ref 39–308)
CO2 SERPL-SCNC: 25 MMOL/L (ref 21–32)
CO2 SERPL-SCNC: 26 MMOL/L (ref 21–32)
CREAT SERPL-MCNC: 1.07 MG/DL (ref 0.6–1.3)
CREAT SERPL-MCNC: 1.15 MG/DL (ref 0.6–1.3)
CRP SERPL-MCNC: 10.4 MG/DL (ref 0–0.3)
D DIMER PPP FEU-MCNC: 1.11 UG/ML(FEU)
DIFFERENTIAL METHOD BLD: ABNORMAL
EMERGENT DISEASE PANEL, EDPR: DETECTED
EOSINOPHIL # BLD: 0 K/UL (ref 0–0.4)
EOSINOPHIL NFR BLD: 0 % (ref 0–5)
ERYTHROCYTE [DISTWIDTH] IN BLOOD BY AUTOMATED COUNT: 15.7 % (ref 11.6–14.5)
FERRITIN SERPL-MCNC: 346 NG/ML (ref 8–388)
GAS FLOW.O2 O2 DELIVERY SYS: ABNORMAL L/MIN
GAS FLOW.O2 SETTING OXYMISER: 2 L/M
GLOBULIN SER CALC-MCNC: 4.1 G/DL (ref 2–4)
GLUCOSE BLD STRIP.AUTO-MCNC: 121 MG/DL (ref 70–110)
GLUCOSE BLD STRIP.AUTO-MCNC: 190 MG/DL (ref 70–110)
GLUCOSE BLD STRIP.AUTO-MCNC: 247 MG/DL (ref 70–110)
GLUCOSE BLD STRIP.AUTO-MCNC: 93 MG/DL (ref 70–110)
GLUCOSE SERPL-MCNC: 121 MG/DL (ref 74–99)
GLUCOSE SERPL-MCNC: 168 MG/DL (ref 74–99)
HCO3 BLD-SCNC: 23.3 MMOL/L (ref 22–26)
HCT VFR BLD AUTO: 34 % (ref 36–48)
HGB BLD-MCNC: 11 G/DL (ref 13–16)
LDH SERPL L TO P-CCNC: 256 U/L (ref 81–234)
LYMPHOCYTES # BLD: 6 K/UL (ref 0.8–3.5)
LYMPHOCYTES NFR BLD: 57 % (ref 20–51)
MCH RBC QN AUTO: 24.7 PG (ref 24–34)
MCHC RBC AUTO-ENTMCNC: 32.4 G/DL (ref 31–37)
MCV RBC AUTO: 76.4 FL (ref 74–97)
MONOCYTES # BLD: 0.3 K/UL (ref 0–1)
MONOCYTES NFR BLD: 3 % (ref 2–9)
NEUTS BAND NFR BLD MANUAL: 9 % (ref 0–5)
NEUTS SEG # BLD: 4.2 K/UL (ref 1.8–8)
NEUTS SEG NFR BLD: 31 % (ref 42–75)
PCO2 BLD: 35.8 MMHG (ref 35–45)
PH BLD: 7.42 [PH] (ref 7.35–7.45)
PHOSPHATE SERPL-MCNC: 3.1 MG/DL (ref 2.5–4.9)
PLATELET # BLD AUTO: 154 K/UL (ref 135–420)
PLATELET COMMENTS,PCOM: ABNORMAL
PMV BLD AUTO: 9.8 FL (ref 9.2–11.8)
PO2 BLD: 59 MMHG (ref 80–100)
POTASSIUM SERPL-SCNC: 4.3 MMOL/L (ref 3.5–5.5)
POTASSIUM SERPL-SCNC: 4.5 MMOL/L (ref 3.5–5.5)
PROT SERPL-MCNC: 7.2 G/DL (ref 6.4–8.2)
RBC # BLD AUTO: 4.45 M/UL (ref 4.7–5.5)
RBC MORPH BLD: ABNORMAL
RBC MORPH BLD: ABNORMAL
SAO2 % BLD: 91 % (ref 92–97)
SERVICE CMNT-IMP: ABNORMAL
SODIUM SERPL-SCNC: 136 MMOL/L (ref 136–145)
SODIUM SERPL-SCNC: 138 MMOL/L (ref 136–145)
SPECIMEN TYPE: ABNORMAL
WBC # BLD AUTO: 10.5 K/UL (ref 4.6–13.2)

## 2020-04-18 PROCEDURE — 74011250636 HC RX REV CODE- 250/636: Performed by: INTERNAL MEDICINE

## 2020-04-18 PROCEDURE — 94762 N-INVAS EAR/PLS OXIMTRY CONT: CPT

## 2020-04-18 PROCEDURE — 74011000258 HC RX REV CODE- 258: Performed by: INTERNAL MEDICINE

## 2020-04-18 PROCEDURE — 80076 HEPATIC FUNCTION PANEL: CPT

## 2020-04-18 PROCEDURE — 74011250637 HC RX REV CODE- 250/637: Performed by: HOSPITALIST

## 2020-04-18 PROCEDURE — 82728 ASSAY OF FERRITIN: CPT

## 2020-04-18 PROCEDURE — 74011000250 HC RX REV CODE- 250: Performed by: INTERNAL MEDICINE

## 2020-04-18 PROCEDURE — 36415 COLL VENOUS BLD VENIPUNCTURE: CPT

## 2020-04-18 PROCEDURE — 83615 LACTATE (LD) (LDH) ENZYME: CPT

## 2020-04-18 PROCEDURE — 87070 CULTURE OTHR SPECIMN AEROBIC: CPT

## 2020-04-18 PROCEDURE — 85379 FIBRIN DEGRADATION QUANT: CPT

## 2020-04-18 PROCEDURE — 82962 GLUCOSE BLOOD TEST: CPT

## 2020-04-18 PROCEDURE — 74011250636 HC RX REV CODE- 250/636: Performed by: FAMILY MEDICINE

## 2020-04-18 PROCEDURE — 74011250637 HC RX REV CODE- 250/637: Performed by: INTERNAL MEDICINE

## 2020-04-18 PROCEDURE — 77010033678 HC OXYGEN DAILY

## 2020-04-18 PROCEDURE — 74011250637 HC RX REV CODE- 250/637: Performed by: FAMILY MEDICINE

## 2020-04-18 PROCEDURE — 86140 C-REACTIVE PROTEIN: CPT

## 2020-04-18 PROCEDURE — 80069 RENAL FUNCTION PANEL: CPT

## 2020-04-18 PROCEDURE — 65660000000 HC RM CCU STEPDOWN

## 2020-04-18 PROCEDURE — 85025 COMPLETE CBC W/AUTO DIFF WBC: CPT

## 2020-04-18 PROCEDURE — 74011636637 HC RX REV CODE- 636/637: Performed by: FAMILY MEDICINE

## 2020-04-18 PROCEDURE — 80048 BASIC METABOLIC PNL TOTAL CA: CPT

## 2020-04-18 PROCEDURE — 82550 ASSAY OF CK (CPK): CPT

## 2020-04-18 RX ORDER — IBUPROFEN 400 MG/1
400 TABLET ORAL
Status: DISCONTINUED | OUTPATIENT
Start: 2020-04-18 | End: 2020-04-19

## 2020-04-18 RX ORDER — ATORVASTATIN CALCIUM 40 MG/1
40 TABLET, FILM COATED ORAL DAILY
Status: DISCONTINUED | OUTPATIENT
Start: 2020-04-19 | End: 2020-05-07 | Stop reason: HOSPADM

## 2020-04-18 RX ORDER — ENOXAPARIN SODIUM 100 MG/ML
80 INJECTION SUBCUTANEOUS EVERY 12 HOURS
Status: DISCONTINUED | OUTPATIENT
Start: 2020-04-18 | End: 2020-04-21

## 2020-04-18 RX ADMIN — Medication 10 ML: at 06:00

## 2020-04-18 RX ADMIN — HEPARIN SODIUM 5000 UNITS: 5000 INJECTION INTRAVENOUS; SUBCUTANEOUS at 04:02

## 2020-04-18 RX ADMIN — ACETAMINOPHEN 650 MG: 325 TABLET ORAL at 01:24

## 2020-04-18 RX ADMIN — MELATONIN TAB 3 MG 3 MG: 3 TAB at 22:26

## 2020-04-18 RX ADMIN — INSULIN LISPRO 2 UNITS: 100 INJECTION, SOLUTION INTRAVENOUS; SUBCUTANEOUS at 17:05

## 2020-04-18 RX ADMIN — ASPIRIN 81 MG: 81 TABLET, COATED ORAL at 08:25

## 2020-04-18 RX ADMIN — ENOXAPARIN SODIUM 80 MG: 80 INJECTION SUBCUTANEOUS at 14:23

## 2020-04-18 RX ADMIN — ACETAMINOPHEN 650 MG: 325 TABLET ORAL at 17:22

## 2020-04-18 RX ADMIN — Medication 10 ML: at 12:45

## 2020-04-18 RX ADMIN — CHOLECALCIFEROL (VITAMIN D3) 10 MCG (400 UNIT) TABLET 1 TABLET: at 08:25

## 2020-04-18 RX ADMIN — Medication 500 MG: at 22:26

## 2020-04-18 RX ADMIN — WATER 2 G: 1 INJECTION INTRAMUSCULAR; INTRAVENOUS; SUBCUTANEOUS at 23:41

## 2020-04-18 RX ADMIN — HYDROXYCHLOROQUINE SULFATE 200 MG: 200 TABLET, FILM COATED ORAL at 08:25

## 2020-04-18 RX ADMIN — ACETAMINOPHEN 650 MG: 325 TABLET ORAL at 08:25

## 2020-04-18 RX ADMIN — Medication 10 ML: at 22:53

## 2020-04-18 RX ADMIN — ATORVASTATIN CALCIUM 10 MG: 10 TABLET, FILM COATED ORAL at 08:25

## 2020-04-18 RX ADMIN — ZINC SULFATE 220 MG (50 MG) CAPSULE 1 CAPSULE: CAPSULE at 08:25

## 2020-04-18 RX ADMIN — SODIUM CHLORIDE, SODIUM ACETATE ANHYDROUS, SODIUM GLUCONATE, POTASSIUM CHLORIDE, AND MAGNESIUM CHLORIDE: 526; 222; 502; 37; 30 INJECTION, SOLUTION INTRAVENOUS at 04:01

## 2020-04-18 RX ADMIN — METHYLPREDNISOLONE SODIUM SUCCINATE 80 MG: 40 INJECTION, POWDER, FOR SOLUTION INTRAMUSCULAR; INTRAVENOUS at 11:18

## 2020-04-18 RX ADMIN — DOXYCYCLINE 100 MG: 100 INJECTION, POWDER, LYOPHILIZED, FOR SOLUTION INTRAVENOUS at 11:20

## 2020-04-18 RX ADMIN — Medication 500 MG: at 08:25

## 2020-04-18 RX ADMIN — WATER 2 G: 1 INJECTION INTRAMUSCULAR; INTRAVENOUS; SUBCUTANEOUS at 11:18

## 2020-04-18 RX ADMIN — HYDROXYCHLOROQUINE SULFATE 200 MG: 200 TABLET, FILM COATED ORAL at 22:26

## 2020-04-18 RX ADMIN — DOXYCYCLINE 100 MG: 100 INJECTION, POWDER, LYOPHILIZED, FOR SOLUTION INTRAVENOUS at 22:26

## 2020-04-18 NOTE — PROGRESS NOTES
Interim events noted. Patient has persistent high grade fevers. Vitals reviewed. Oxygen saturation 90% on 4L. Patient denies chest pain, increased chest discomfort. Cough with some blood streaked sputum. Labs reviewed- increasing ferritin, crp, d-dimer. Sputum cultures pending. Clinical picture likely suggestive of evolving cytokine storm. Underlying CLL also predisposes patient to thromboembolism. Positive COVID-19 test. Risk of pulmonary microthrombus due to COVID. Increasing d-dimer and worsening hypoxia concerning for this. Rec:  1. D/c ceftriaxone, azithromycin. Start cefepime, doxycycline. Continue hydroxychloroquine  2. Start therapeutic anticoagulation  3. Start iv solumedrol  4. Recommend prone position   5. Monitor inflammatory markers, oxygenation,sputum cx, clinically. Risk and benefits of above treatment was discussed with patient in details. discussed that current therapy is in view of the anecdotal evidence from case reports,clinical trials. He verbalized his understanding and wishes to proceed. Discussed with dr. Sher Otto Rn. Time spent > 25 min.

## 2020-04-18 NOTE — PROGRESS NOTES
Pt given abx and steroid before transfer.  Pt left unit in no distress with stable vital signs on 4L NC.

## 2020-04-18 NOTE — ROUTINE PROCESS
TRANSFER - IN REPORT: 
 
Verbal report received from Inessa Gardiner RN on Guerrero Gaffney  being received from [facility] for change in patient condition(covid positive and worsening respiratory condition) Report consisted of patients Situation, Background, Assessment and  
Recommendations(SBAR). Information from the following report(s) SBAR, Kardex, Med Rec Status and Alarm Parameters  was reviewed with the receiving nurse. Opportunity for questions and clarification was provided. Patient arrived on unit at 1210 am. Assessment completed upon patients arrival to unit and care assumed.

## 2020-04-18 NOTE — PROGRESS NOTES
Labs noted , creatinine stable/improved  Dr Mariana Almeida will be rounding on this patient today. Please call him with questions.     Please call with questions    Job Wei MD [facility]  Cell [phone]  Pager: [phone]

## 2020-04-18 NOTE — PROGRESS NOTES
04/18/20 0825   Vitals   Temp (!) 102.8 °F (39.3 °C)   Temp Source Oral   Pulse (Heart Rate) 90   Heart Rate Source Monitor   Resp Rate 16   O2 Sat (%) 90 %   Level of Consciousness Alert   /80   BP 1 Location Right arm   BP 1 Method Automatic   BP Patient Position At rest   MEWS Score 3     Tylenol given for fever, pt in no distress at this time.

## 2020-04-18 NOTE — PROGRESS NOTES
RENAL PROGRESS NOTE        Angeles Azalea         Assessment/Plan:     · JOSE (volume depletion/sepsis in a setting of covid pneumonia/sepsis). Resolving, continue ivf till oral intake is adequate. · COVID pneumonia/sepsis. Abx per ID. BP is stable, d/c midodrin. · Will s/o. Please call if any questions. Subjective:  Patient complaints off: Breathing is better. Is having chills. No CP/N/V. Appetite is better.        Patient Active Problem List   Diagnosis Code    Nonischemic cardiomyopathy ([facility] [address] I42.8    PVC (premature ventricular contraction) I49.3    S/P ablation operation for arrhythmia Z98.890, Z86.79    Lymphocytosis D72.820    CLL (chronic lymphocytic leukemia) ([facility] [address] C91.10    Skin rash R21    Vitamin D deficiency E55.9    Pneumonia J18.9    COVID-19 virus infection U07.1    Suspected Covid-19 Virus Infection R68.89       Current Facility-Administered Medications   Medication Dose Route Frequency Provider Last Rate Last Dose    ibuprofen (MOTRIN) tablet 400 mg  400 mg Oral Q4H PRN Dina Shabazz MD        [START ON 4/19/2020] atorvastatin (LIPITOR) tablet 40 mg  40 mg Oral DAILY Ananya Herrera MD        cefepime (MAXIPIME) 2 g in sterile water (preservative free) 10 mL IV syringe  2 g IntraVENous Q8H Gaby COY MD   2 g at 04/18/20 1118    doxycycline (VIBRAMYCIN) 100 mg in 0.9% sodium chloride (MBP/ADV) 100 mL MBP  100 mg IntraVENous Q12H Gaby COY  mL/hr at 04/18/20 1120 100 mg at 04/18/20 1120    methylPREDNISolone (PF) (SOLU-MEDROL) injection 80 mg  80 mg IntraVENous Q24H Gaby COY MD   80 mg at 04/18/20 1118    enoxaparin (LOVENOX) injection 80 mg  80 mg SubCUTAneous Q12H Tamara Curran DO   80 mg at 04/18/20 1423    insulin lispro (HUMALOG) injection   SubCUTAneous Jaycee Wagner DO   Stopped at 04/17/20 1130    midodrine (PROAMATINE) tablet 2.5 mg  2.5 mg Oral BID WITH MEALS Feli Roberts MD   Stopped at 04/18/20 0825    sodium chloride (NS) flush 5-40 mL  5-40 mL IntraVENous Q8H Bridgette Fulton MD   10 mL at 04/18/20 1245    aspirin delayed-release tablet 81 mg  81 mg Oral DAILY Bridgette Fulton MD   81 mg at 04/18/20 0825    [Held by provider] carvediloL (COREG) tablet 12.5 mg  12.5 mg Oral BID Bridgette Fulton MD   12.5 mg at 04/16/20 1708    hydroxychloroquine (PLAQUENIL) tablet 200 mg  200 mg Oral BID WITH MEALS Bridgette Fulton MD   200 mg at 04/18/20 0825    zinc sulfate (ZINCATE) 220 mg capsule 1 Cap  1 Cap Oral DAILY Bridgette Fulton MD   1 Cap at 04/18/20 0825    ascorbic acid (vitamin C) (VITAMIN C) tablet 500 mg  500 mg Oral BID Bridgette Fulton MD   500 mg at 04/18/20 0825    glucose chewable tablet 16 g  4 Tab Oral PRN Kimberley Ropes, DO        glucagon (GLUCAGEN) injection 1 mg  1 mg IntraMUSCular PRN Kimberley Ropes, DO        dextrose 10% infusion 125-250 mL  125-250 mL IntraVENous PRN Kimberley Ropes, DO        acetaminophen (TYLENOL) tablet 650 mg  650 mg Oral Q6H PRN Kimberley Ropes, DO   650 mg at 04/18/20 0825    cholecalciferol (VITAMIN D3) (400 Units /10 mcg) tablet 1 Tab  400 Units Oral DAILY Mauricio Corrales MD   1 Tab at 04/18/20 0825    melatonin tablet 3 mg  3 mg Oral QHS Epifanio COY MD   3 mg at 04/17/20 2109       Objective  Vitals:    04/18/20 0408 04/18/20 0825 04/18/20 1210 04/18/20 1415   BP: 113/72 138/80 116/63    Pulse: 86 90 88    Resp: 20 16 18    Temp: 100.2 °F (37.9 °C) (!) 102.8 °F (39.3 °C) (!) 100.8 °F (38.2 °C) 100.3 °F (37.9 °C)   SpO2: 91% 90% 97% 93%   Weight:       Height:             Intake/Output Summary (Last 24 hours) at 4/18/2020 1451  Last data filed at 4/18/2020 1210  Gross per 24 hour   Intake 1870 ml   Output 300 ml   Net 1570 ml           Admission weight: Weight: 84.8 kg (187 lb) (04/16/20 0013)  Last Weight Metrics:  Weight Loss Metrics 4/16/2020 9/20/2019 6/27/2019 6/20/2019 5/24/2019 1/11/2019 12/20/2018   Today's Wt 187 lb 189 lb 187 lb 188 lb 192 lb 192 lb 188 lb   BMI 27.62 kg/m2 27.91 kg/m2 27.62 kg/m2 27.76 kg/m2 28.35 kg/m2 28.35 kg/m2 27.76 kg/m2             Physical Assessment:     General: NAD, alert and oriented. Neck: No jvd. LUNGS: good air entry, bl exp rhonchi. CVS EXM: S1, S2  RRR, no murmurs/gallops/rubs. Abdomen: soft, non tender. Lower Extremities:  no edema. Lab    CBC w/Diff Recent Labs     04/18/20  0355 04/16/20  0032   WBC 10.5 15.5*   RBC 4.45* 5.30   HGB 11.0* 13.3   HCT 34.0* 40.7    157   GRANS 31* 15*   LYMPH 57* 77*   EOS 0 0        Chemistry Recent Labs     04/18/20  0355 04/17/20  1829 04/17/20  0250 04/16/20  1002   * 179* 96 83    137 138 139   K 4.3 4.2 4.4 4.2    104 104 105   CO2 25 26 25 25   BUN 16 18 18 21*   CREA 1.15 1.25 1.37* 1.36*   CA 7.5* 7.2* 7.8* 7.4*   AGAP 7 7 9 9   BUCR 14 14 13 15   AP 37*  --  41* 39*   TP 7.2  --  7.6 7.3   ALB 3.1* 3.0* 3.3*  3.4 3.3*  3.2*   GLOB 4.1*  --  4.2* 4.1*   AGRAT 0.8  --  0.8 0.8   PHOS  --  3.1 2.7 3.7         Lab Results   Component Value Date/Time    Ferritin 346 04/18/2020 03:55 AM      Lab Results   Component Value Date/Time    Calcium 7.5 (L) 04/18/2020 03:55 AM    Phosphorus 3.1 04/17/2020 06:29 PM        Robel Johnston M.D.   [facility]  Phone

## 2020-04-18 NOTE — ROUTINE PROCESS
Verbal shift change report given to [address] (oncoming nurse) by Francisco J Alcazar RN (offgoing nurse). Report included the following information SBAR, Kardex, Recent Results and Alarm Parameters .

## 2020-04-18 NOTE — PROGRESS NOTES
Patient is not available to be assessed at this time.       [facility].   [phone]

## 2020-04-18 NOTE — PROGRESS NOTES
Progress Note         Patient: Jose Petty MRN: [id number]  CSN: [id number]    YOB: 1960  Age: 60 y.o. Sex: male    DOA: 4/16/2020 LOS:  LOS: 2 days                    Subjective:     Jose Petty is a 60 y.o. male with a PMHx of CLL, Type II DM, HTN, and HLD who is admitted for community acquired pneumonia with suspicion for COVID-19 infection. Now having persistent high-grade fever  Spoke with pt on phone in an effort to limit exposure and conserve PPE   Feels better than yesterday   Feels like he is improving   Breathing improved since yesterday    Denies SOB, abd pain, nausea, diarrhea, pain   Hypoxic w/o O2, SpO2 in low 90's on 4 LPM     Objective:     Physical Exam:  Visit Vitals  /63 (BP 1 Location: Left arm, BP Patient Position: At rest)   Pulse 88   Temp 100.3 °F (37.9 °C)   Resp 18   Ht 5' 9\" (1.753 m)   Wt 84.8 kg (187 lb)   SpO2 93%   BMI 27.62 kg/m²        No PE today. Interview conducted by phone in an effort to limit provider exposure and conserve PPE. Intake and Output:  Current Shift:  04/18 0701 - 04/18 1900  In: 100 [I.V.:100]  Out: -   Last three shifts:  04/16 1901 - 04/18 0700  In: 3070 [P.O.:900;  I.V.:1530]  Out: 1825 [Urine:1825]    Labs: Results:       Chemistry Recent Labs     04/18/20  0355 04/17/20  1829 04/17/20  0250 04/16/20  1002   * 179* 96 83    137 138 139   K 4.3 4.2 4.4 4.2    104 104 105   CO2 25 26 25 25   BUN 16 18 18 21*   CREA 1.15 1.25 1.37* 1.36*   CA 7.5* 7.2* 7.8* 7.4*   AGAP 7 7 9 9   BUCR 14 14 13 15   AP 37*  --  41* 39*   TP 7.2  --  7.6 7.3   ALB 3.1* 3.0* 3.3*  3.4 3.3*  3.2*   GLOB 4.1*  --  4.2* 4.1*   AGRAT 0.8  --  0.8 0.8      CBC w/Diff Recent Labs     04/18/20  0355 04/16/20  0032   WBC 10.5 15.5*   RBC 4.45* 5.30   HGB 11.0* 13.3   HCT 34.0* 40.7    157   GRANS 31* 15*   LYMPH 57* 77*   EOS 0 0      Cardiac Enzymes Recent Labs     04/18/20  0355 04/17/20  0250    205      Coagulation No results for input(s): PTP, INR, APTT, INREXT, INREXT in the last 72 hours. Lipid Panel No results found for: CHOL, CHOLPOCT, CHOLX, CHLST, CHOLV, 078800, HDL, HDLP, LDL, LDLC, DLDLP, 531966, VLDLC, VLDL, TGLX, TRIGL, TRIGP, TGLPOCT, CHHD, CHHDX   BNP No results for input(s): BNPP in the last 72 hours. Liver Enzymes Recent Labs     04/18/20  0355   TP 7.2   ALB 3.1*   AP 37*   SGOT 23      Thyroid Studies No results found for: T4, T3U, TSH, TSHEXT, TSHEXT             Assessment and Plan:     Suzanne Matos is a 60 y.o. male with a PMHx of CLL, Type II DM, HTN, and HLD who is admitted for community acquired pneumonia with suspicion for COVID-19 infection. 1. COVID-19 pneumonia   2. Acute hypoxic respiratory failure- POA   3. Sepsis- POA, with fever, tachypnea, leukocytosis and JOSE   4. Hypoxia- worsening   5. Risk of pulmonary microthrombus  6. JOSE on CKD stage III   7. Hyponatremia   8. Hx CLL   9. Type II DM- HbA1c 8.2 on 4/17/20   10. HTN   11. HLD     COVID-19 test positive on 4/18/20   Nephrology and ID consulted and following   Daily EKG   Abx per ID- change cefepime and doxycycline   Continue hydroxychloroquine, Vit C, zinc, Vit D3, melatonin   Add solu-medrol   Add therapeutic lovenox as d-dimer is rising   O2 prn   Continue ASA, statin     Holding lisinopril and carvedilol   SSI w/accuchecks    FU sputum cx's   FU labs: CRP, CK, d-dimer, ferritin, LD, CMP   FU urine studies   Droplet plus isolation      Case discussed with:  [x]Patient  []Family  [x]Nursing  []Case Management  DVT prophylaxis: therapeutic lovenox   Diet: Diabetic   Code Status: FULL   Disposition: Continue current care; >2 nights       H.  Terry Chacon DO  4/18/2020

## 2020-04-18 NOTE — PROGRESS NOTES
Received report from [address] outside of room due to covid restrictions. Will do full assessment in room when clustering care. 0900 pt sat up at side of the bed to eat breakfast. CREWS. 02 sats will drop some with activity. inst pt to not try to ambulate to BR without assistance. Call bell at side. meds given. Assessment completed. remains febrile. Will medicated as ordered. MD's aware of elevated MEWS due to temp. Already being treated. Will monitor for any changes in status. 1500 sitting up in chair. Talked with pt about importance of lying prone 4 to 8 hours every 24 hrs  as instructed by MD. Also reminded not to lie prone until 2 hrs after eating. Pt also instructed on use of incentive spirometer. Expressed understanding and demonstrated use. 1600 lying in bed prone. Tolerating for short periods of time. 1920 lying prone in bed. Sat up for meds. Denied distress. 02 on. SATS 91%. Temp down to 101.3.      1945 Verbal shift change report given to Chiki Son (oncoming nurse) by Darby Vizcaino RN (offgoing nurse). Report given with Anh PERKINS and MAR.

## 2020-04-19 LAB
ALBUMIN SERPL-MCNC: 2.7 G/DL (ref 3.4–5)
ALBUMIN SERPL-MCNC: 2.9 G/DL (ref 3.4–5)
ALBUMIN/GLOB SERPL: 0.6 {RATIO} (ref 0.8–1.7)
ALP SERPL-CCNC: 37 U/L (ref 45–117)
ALT SERPL-CCNC: 13 U/L (ref 16–61)
ANION GAP SERPL CALC-SCNC: 7 MMOL/L (ref 3–18)
ANION GAP SERPL CALC-SCNC: 8 MMOL/L (ref 3–18)
AST SERPL-CCNC: 29 U/L (ref 10–38)
ATRIAL RATE: 69 BPM
BASOPHILS # BLD: 0 K/UL (ref 0–0.06)
BASOPHILS NFR BLD: 0 % (ref 0–3)
BILIRUB DIRECT SERPL-MCNC: <0.1 MG/DL (ref 0–0.2)
BILIRUB SERPL-MCNC: 0.4 MG/DL (ref 0.2–1)
BUN SERPL-MCNC: 17 MG/DL (ref 7–18)
BUN SERPL-MCNC: 22 MG/DL (ref 7–18)
BUN/CREAT SERPL: 17 (ref 12–20)
BUN/CREAT SERPL: 17 (ref 12–20)
CALCIUM SERPL-MCNC: 7.9 MG/DL (ref 8.5–10.1)
CALCIUM SERPL-MCNC: 8.2 MG/DL (ref 8.5–10.1)
CALCULATED P AXIS, ECG09: 52 DEGREES
CALCULATED R AXIS, ECG10: 14 DEGREES
CALCULATED T AXIS, ECG11: 18 DEGREES
CHLORIDE SERPL-SCNC: 104 MMOL/L (ref 100–111)
CHLORIDE SERPL-SCNC: 106 MMOL/L (ref 100–111)
CK SERPL-CCNC: 355 U/L (ref 39–308)
CO2 SERPL-SCNC: 24 MMOL/L (ref 21–32)
CO2 SERPL-SCNC: 25 MMOL/L (ref 21–32)
CREAT SERPL-MCNC: 1.02 MG/DL (ref 0.6–1.3)
CREAT SERPL-MCNC: 1.33 MG/DL (ref 0.6–1.3)
CRP SERPL-MCNC: 11.4 MG/DL (ref 0–0.3)
D DIMER PPP FEU-MCNC: 0.84 UG/ML(FEU)
DIAGNOSIS, 93000: NORMAL
DIFFERENTIAL METHOD BLD: ABNORMAL
EOSINOPHIL # BLD: 0 K/UL (ref 0–0.4)
EOSINOPHIL NFR BLD: 0 % (ref 0–5)
ERYTHROCYTE [DISTWIDTH] IN BLOOD BY AUTOMATED COUNT: 15.9 % (ref 11.6–14.5)
FERRITIN SERPL-MCNC: 514 NG/ML (ref 8–388)
GLOBULIN SER CALC-MCNC: 4.7 G/DL (ref 2–4)
GLUCOSE BLD STRIP.AUTO-MCNC: 143 MG/DL (ref 70–110)
GLUCOSE BLD STRIP.AUTO-MCNC: 209 MG/DL (ref 70–110)
GLUCOSE BLD STRIP.AUTO-MCNC: 219 MG/DL (ref 70–110)
GLUCOSE BLD STRIP.AUTO-MCNC: 247 MG/DL (ref 70–110)
GLUCOSE SERPL-MCNC: 168 MG/DL (ref 74–99)
GLUCOSE SERPL-MCNC: 168 MG/DL (ref 74–99)
HCT VFR BLD AUTO: 36.3 % (ref 36–48)
HGB BLD-MCNC: 11.4 G/DL (ref 13–16)
LDH SERPL L TO P-CCNC: 436 U/L (ref 81–234)
LYMPHOCYTES # BLD: 7.8 K/UL (ref 0.8–3.5)
LYMPHOCYTES NFR BLD: 67 % (ref 20–51)
MCH RBC QN AUTO: 24.2 PG (ref 24–34)
MCHC RBC AUTO-ENTMCNC: 31.4 G/DL (ref 31–37)
MCV RBC AUTO: 76.9 FL (ref 74–97)
MONOCYTES # BLD: 0.2 K/UL (ref 0–1)
MONOCYTES NFR BLD: 2 % (ref 2–9)
NEUTS BAND NFR BLD MANUAL: 9 % (ref 0–5)
NEUTS SEG # BLD: 3.6 K/UL (ref 1.8–8)
NEUTS SEG NFR BLD: 22 % (ref 42–75)
P-R INTERVAL, ECG05: 180 MS
PHOSPHATE SERPL-MCNC: 3.6 MG/DL (ref 2.5–4.9)
PLATELET # BLD AUTO: 191 K/UL (ref 135–420)
PLATELET COMMENTS,PCOM: ABNORMAL
PMV BLD AUTO: 10.1 FL (ref 9.2–11.8)
POTASSIUM SERPL-SCNC: 4.8 MMOL/L (ref 3.5–5.5)
POTASSIUM SERPL-SCNC: 5 MMOL/L (ref 3.5–5.5)
PROT SERPL-MCNC: 7.6 G/DL (ref 6.4–8.2)
Q-T INTERVAL, ECG07: 402 MS
QRS DURATION, ECG06: 88 MS
QTC CALCULATION (BEZET), ECG08: 430 MS
RBC # BLD AUTO: 4.72 M/UL (ref 4.7–5.5)
RBC MORPH BLD: ABNORMAL
SODIUM SERPL-SCNC: 136 MMOL/L (ref 136–145)
SODIUM SERPL-SCNC: 138 MMOL/L (ref 136–145)
VENTRICULAR RATE, ECG03: 69 BPM
WBC # BLD AUTO: 11.6 K/UL (ref 4.6–13.2)

## 2020-04-19 PROCEDURE — 74011250636 HC RX REV CODE- 250/636: Performed by: INTERNAL MEDICINE

## 2020-04-19 PROCEDURE — 80076 HEPATIC FUNCTION PANEL: CPT

## 2020-04-19 PROCEDURE — 94762 N-INVAS EAR/PLS OXIMTRY CONT: CPT

## 2020-04-19 PROCEDURE — 82550 ASSAY OF CK (CPK): CPT

## 2020-04-19 PROCEDURE — 80069 RENAL FUNCTION PANEL: CPT

## 2020-04-19 PROCEDURE — 80048 BASIC METABOLIC PNL TOTAL CA: CPT

## 2020-04-19 PROCEDURE — 83615 LACTATE (LD) (LDH) ENZYME: CPT

## 2020-04-19 PROCEDURE — 74011250637 HC RX REV CODE- 250/637: Performed by: FAMILY MEDICINE

## 2020-04-19 PROCEDURE — 65660000000 HC RM CCU STEPDOWN

## 2020-04-19 PROCEDURE — 74011000258 HC RX REV CODE- 258: Performed by: INTERNAL MEDICINE

## 2020-04-19 PROCEDURE — 74011250636 HC RX REV CODE- 250/636: Performed by: FAMILY MEDICINE

## 2020-04-19 PROCEDURE — 74011000250 HC RX REV CODE- 250: Performed by: INTERNAL MEDICINE

## 2020-04-19 PROCEDURE — 85025 COMPLETE CBC W/AUTO DIFF WBC: CPT

## 2020-04-19 PROCEDURE — 86140 C-REACTIVE PROTEIN: CPT

## 2020-04-19 PROCEDURE — 93005 ELECTROCARDIOGRAM TRACING: CPT

## 2020-04-19 PROCEDURE — 74011250637 HC RX REV CODE- 250/637: Performed by: INTERNAL MEDICINE

## 2020-04-19 PROCEDURE — 74011250637 HC RX REV CODE- 250/637: Performed by: HOSPITALIST

## 2020-04-19 PROCEDURE — 74011636637 HC RX REV CODE- 636/637: Performed by: FAMILY MEDICINE

## 2020-04-19 PROCEDURE — 77010033678 HC OXYGEN DAILY

## 2020-04-19 PROCEDURE — 82962 GLUCOSE BLOOD TEST: CPT

## 2020-04-19 PROCEDURE — 85379 FIBRIN DEGRADATION QUANT: CPT

## 2020-04-19 PROCEDURE — 82728 ASSAY OF FERRITIN: CPT

## 2020-04-19 PROCEDURE — 36415 COLL VENOUS BLD VENIPUNCTURE: CPT

## 2020-04-19 RX ORDER — LORAZEPAM 1 MG/1
1 TABLET ORAL ONCE
Status: COMPLETED | OUTPATIENT
Start: 2020-04-19 | End: 2020-04-19

## 2020-04-19 RX ORDER — LORAZEPAM 1 MG/1
1 TABLET ORAL
Status: DISCONTINUED | OUTPATIENT
Start: 2020-04-19 | End: 2020-05-07 | Stop reason: HOSPADM

## 2020-04-19 RX ORDER — GUAIFENESIN 600 MG/1
600 TABLET, EXTENDED RELEASE ORAL EVERY 12 HOURS
Status: DISCONTINUED | OUTPATIENT
Start: 2020-04-19 | End: 2020-05-07 | Stop reason: HOSPADM

## 2020-04-19 RX ORDER — LANOLIN ALCOHOL/MO/W.PET/CERES
6 CREAM (GRAM) TOPICAL
Status: DISCONTINUED | OUTPATIENT
Start: 2020-04-19 | End: 2020-05-07 | Stop reason: HOSPADM

## 2020-04-19 RX ORDER — FAMOTIDINE 20 MG/1
20 TABLET, FILM COATED ORAL 2 TIMES DAILY
Status: DISCONTINUED | OUTPATIENT
Start: 2020-04-19 | End: 2020-05-07 | Stop reason: HOSPADM

## 2020-04-19 RX ORDER — DICLOFENAC SODIUM 10 MG/G
4 GEL TOPICAL 4 TIMES DAILY
Status: DISCONTINUED | OUTPATIENT
Start: 2020-04-19 | End: 2020-05-07 | Stop reason: HOSPADM

## 2020-04-19 RX ADMIN — WATER 2 G: 1 INJECTION INTRAMUSCULAR; INTRAVENOUS; SUBCUTANEOUS at 12:09

## 2020-04-19 RX ADMIN — ENOXAPARIN SODIUM 80 MG: 80 INJECTION SUBCUTANEOUS at 03:03

## 2020-04-19 RX ADMIN — CHOLECALCIFEROL (VITAMIN D3) 10 MCG (400 UNIT) TABLET 1 TABLET: at 09:30

## 2020-04-19 RX ADMIN — MELATONIN TAB 3 MG 6 MG: 3 TAB at 21:05

## 2020-04-19 RX ADMIN — WATER 2 G: 1 INJECTION INTRAMUSCULAR; INTRAVENOUS; SUBCUTANEOUS at 21:08

## 2020-04-19 RX ADMIN — FAMOTIDINE 20 MG: 20 TABLET ORAL at 17:15

## 2020-04-19 RX ADMIN — TOCILIZUMAB 400 MG: 20 INJECTION, SOLUTION, CONCENTRATE INTRAVENOUS at 12:18

## 2020-04-19 RX ADMIN — GUAIFENESIN 600 MG: 600 TABLET, EXTENDED RELEASE ORAL at 21:07

## 2020-04-19 RX ADMIN — Medication 10 ML: at 12:30

## 2020-04-19 RX ADMIN — DICLOFENAC 4 G: 10 GEL TOPICAL at 22:00

## 2020-04-19 RX ADMIN — WATER 2 G: 1 INJECTION INTRAMUSCULAR; INTRAVENOUS; SUBCUTANEOUS at 09:30

## 2020-04-19 RX ADMIN — LORAZEPAM 1 MG: 1 TABLET ORAL at 09:33

## 2020-04-19 RX ADMIN — ATORVASTATIN CALCIUM 40 MG: 40 TABLET, FILM COATED ORAL at 09:33

## 2020-04-19 RX ADMIN — ASPIRIN 81 MG: 81 TABLET, COATED ORAL at 09:35

## 2020-04-19 RX ADMIN — INSULIN LISPRO 6 UNITS: 100 INJECTION, SOLUTION INTRAVENOUS; SUBCUTANEOUS at 12:30

## 2020-04-19 RX ADMIN — GUAIFENESIN 600 MG: 600 TABLET, EXTENDED RELEASE ORAL at 12:15

## 2020-04-19 RX ADMIN — DICLOFENAC 4 G: 10 GEL TOPICAL at 12:10

## 2020-04-19 RX ADMIN — Medication 10 ML: at 05:09

## 2020-04-19 RX ADMIN — Medication 10 ML: at 21:08

## 2020-04-19 RX ADMIN — DOXYCYCLINE 100 MG: 100 INJECTION, POWDER, LYOPHILIZED, FOR SOLUTION INTRAVENOUS at 09:32

## 2020-04-19 RX ADMIN — DOXYCYCLINE 100 MG: 100 INJECTION, POWDER, LYOPHILIZED, FOR SOLUTION INTRAVENOUS at 21:07

## 2020-04-19 RX ADMIN — CARVEDILOL 12.5 MG: 12.5 TABLET, FILM COATED ORAL at 21:06

## 2020-04-19 RX ADMIN — ACETAMINOPHEN 650 MG: 325 TABLET ORAL at 12:16

## 2020-04-19 RX ADMIN — ZINC SULFATE 220 MG (50 MG) CAPSULE 1 CAPSULE: CAPSULE at 09:30

## 2020-04-19 RX ADMIN — HYDROXYCHLOROQUINE SULFATE 200 MG: 200 TABLET, FILM COATED ORAL at 21:06

## 2020-04-19 RX ADMIN — Medication 500 MG: at 21:06

## 2020-04-19 RX ADMIN — DICLOFENAC 4 G: 10 GEL TOPICAL at 18:00

## 2020-04-19 RX ADMIN — INSULIN LISPRO 6 UNITS: 100 INJECTION, SOLUTION INTRAVENOUS; SUBCUTANEOUS at 17:24

## 2020-04-19 RX ADMIN — HYDROXYCHLOROQUINE SULFATE 200 MG: 200 TABLET, FILM COATED ORAL at 09:32

## 2020-04-19 RX ADMIN — METHYLPREDNISOLONE SODIUM SUCCINATE 80 MG: 40 INJECTION, POWDER, FOR SOLUTION INTRAMUSCULAR; INTRAVENOUS at 09:30

## 2020-04-19 RX ADMIN — ENOXAPARIN SODIUM 80 MG: 80 INJECTION SUBCUTANEOUS at 21:07

## 2020-04-19 RX ADMIN — Medication 500 MG: at 09:32

## 2020-04-19 NOTE — PROGRESS NOTES
Improved fevers s/p steroids. Still requiring 6L oxygen. Inflammatory parameters worsening. Will give tocilizumab. D/w dr. Herb Ga. He is in agreement. D/w dr. Lima Aiken. No contraindication to give tocilizumab with h/o CLL.

## 2020-04-19 NOTE — ROUTINE PROCESS
Verbal shift change report given to Roverto Harvey RN (oncoming nurse) by Zohra Akbar RN (offgoing nurse). Report included the following information SBAR, Kardex, Recent Results and Alarm Parameters .

## 2020-04-19 NOTE — ROUTINE PROCESS
Verbal shift change report given to Romana Son (oncoming nurse) by Deja Cedeño RN (offgoing nurse). Report included the following information SBAR, Kardex, Recent Results and Alarm Parameters .

## 2020-04-19 NOTE — ROUTINE PROCESS
1915 Bedside and Verbal shift change  Received from Ana Medeiros RN (outgoing nurse), to FE Perez (oncoming)  Pt. Is AOX 4. IV SL, Pt. denies  pain at this time. Report included the following information SBAR, Kardex, Procedure Summary, Intake/Output, MAR, Recent Lab Results, and  Cardiac Rhythm @ NSr. Will resume care and monitor Pt. Condition. Pt. Educated on call bell when in need of help and assistance. Pt. verbalized understanding. 1940  Pt. Head to toe Assessment Done and documented. 2030  Pt. OOB walking in room. 2130  Pt. Made no complaints, resting on reclining chair. 2230  Pt. Given night meds. No complaints made. 0000  Pt. Resting comfortably in bed, no sign of distress. 0200  Pt. Made no complaints. 0325  Pt. ECG done. 0530  Pt. Able to rest and sleep well. SOB when OOB notified RT Ed., RT came to assess Pt. 
 
0700  Pt. Made no complaints. Verbal and bedside Shift changed report given to Mehul Barajas RN (oncoming RN) on Pt. Condition. Report consisted of patients Situation, History, Activities, intake/output,  Background, Assessment and Recommendations(SBAR). Information from the following report(s) Kardex, order Summary, Lab results and MAR was reviewed with the receiving nurse. Opportunity for questions and clarification was provided.

## 2020-04-19 NOTE — ROUTINE PROCESS
1915 Bedside and Verbal shift change  Received from Sofia Mahan RN (outgoing nurse), to EF Perez (oncoming)  Pt. Is AOX 4. IV SL, Pt. denies  pain at this time. Report included the following information SBAR, Kardex, Procedure Summary, Intake/Output, MAR, Recent Lab Results, and  Cardiac Rhythm @ NSr. Will resume care and monitor Pt. Condition. Pt. Educated on call bell when in need of help and assistance. Pt. verbalized understanding. 1940  Pt. Head to toe Assessment Done and documented. 2030  Pt. OOB walking in room. 2130  Pt. Made no complaints, resting on reclining chair. 2230  Pt. Given night meds. No complaints made.

## 2020-04-19 NOTE — ROUTINE PROCESS
1915 Bedside and Verbal shift change  Received from Riley Clement, RN (outgoing nurse), to FE Wagner (oncoming)  Pt. Is AOX 4. IV SL, Pt. denies  pain at this time. Report included the following information SBAR, Kardex, Procedure Summary, Intake/Output, MAR, Recent Lab Results, and  Cardiac Rhythm @ NSr. Will resume care and monitor Pt. Condition. Pt. Educated on call bell when in need of help and assistance. Pt. verbalized understanding. 1940  Pt. Head to toe Assessment Done and documented. 2030  Pt. OOB walking in room. 2130  Pt. Made no complaints, resting on reclining chair. 2230  Pt. Given night meds. No complaints made.

## 2020-04-19 NOTE — PROGRESS NOTES
Hospitalist Progress Note    Patient: Rafat Hernandez Age: 60 y.o. : 1960 MR#: [id number] SSN: [id number]  Date/Time: 2020 9:04 AM    DOA: 2020  PCP: Normajean Apley., MD    Subjective:     Patient has more cough today, able to bring up sputum. No blood. Has not feel more shortness of breath but oxygen remains at 90% on 6 liters. Still has fever last night. Inflammatory markers increased. No chest pain. No diarrhea. Still ok with his appetite   He remains on Lovenox 80mg BID for elevated D-dimer. Cefepime/Doxycycline/Hydroxychloroquine/VitC/Zinc  Feels anxious this AM, still wants to prone         ROS: ++ fever/chills, no headache, no dizziness, no facial pain, no sinus congestion, +cough  No swallowing pain, No chest pain, no palpitation, + shortness of breath, no abd pain,  No diarrhea, no urinary complaint, no leg pain or swelling      Assessment/Plan:   1. COVID-19 pneumonia with suspected superimposed bacterial pneumonia   2. Sepsis POA due to COVID-19 infection   3. JOSE on CKD3 associated with sepsis, hypovolemia, resolved   4. Acute hypoxic respiratory failure due to COVID-19 infection   5. Risk of pulmonary microthrombus   6. Worsened inflammatory markers   7. H/O CLL, not treated previously   8. Hyperglycemia with DM2, HgbA1c 8.2%   9. Hyperlipidemia   10. Hypertension   11. Hypovolemic hyponatremia, resolved   12. Hypoalbuminemia     Spoke with ID for Tocilizumab as he seem worsend and at risk for cytokine storm   Continue cefepime/doxycycline/hydroxychloroquine/VitC/Zinc, +solumedrol   Educated on the need for prone and increase his oxygen needs. ICS encourage   Continue Lovenox for now, if will need to monitor his inflammatory markers. ID follow up. Nephrology follows   Nutrition support.    Pepcid     Full code     Additional Notes:    Time spent >30 minutes    Case discussed with:  [x]Patient  []Family  [x]Nursing  [x]Case Management  DVT Prophylaxis: [x]Lovenox  []Hep SQ  []SCDs  []Coumadin   []On Heparin gtt    Signed By: Huber Nicholas MD     2020 9:04 AM              Objective:   VS:   Visit Vitals  /63 (BP 1 Location: Left arm, BP Patient Position: At rest)   Pulse 72   Temp 99 °F (37.2 °C)   Resp 18   Ht 5' 9\" (1.753 m)   Wt 84.8 kg (187 lb)   SpO2 94%   BMI 27.62 kg/m²      Tmax/24hrs: Temp (24hrs), Av.6 °F (37.6 °C), Min:97.9 °F (36.6 °C), Max:100.9 °F (38.3 °C)      Intake/Output Summary (Last 24 hours) at 2020 0904  Last data filed at 2020 0400  Gross per 24 hour   Intake 340 ml   Output 1000 ml   Net -660 ml       Tele:   General:  Cooperative, Not in acute distress, speaks in short sentence while in bed  HEENT: PERRL, EOMI, supple neck, no JVD, dry oral mucosa  Cardiovascular: S1S2 regular, no rub/gallop   Pulmonary: air entry bilaterally, no wheezing, + crackle  GI:  Soft, non tender, non distended, +bs, no guarding   Extremities:  No pedal edema, +distal pulses appreciated   Neuro: AOx3, moving all extremities, no gross deficit.      Additional:       Current Facility-Administered Medications   Medication Dose Route Frequency    LORazepam (ATIVAN) tablet 1 mg  1 mg Oral ONCE    ibuprofen (MOTRIN) tablet 400 mg  400 mg Oral Q4H PRN    atorvastatin (LIPITOR) tablet 40 mg  40 mg Oral DAILY    cefepime (MAXIPIME) 2 g in sterile water (preservative free) 10 mL IV syringe  2 g IntraVENous Q8H    doxycycline (VIBRAMYCIN) 100 mg in 0.9% sodium chloride (MBP/ADV) 100 mL MBP  100 mg IntraVENous Q12H    methylPREDNISolone (PF) (SOLU-MEDROL) injection 80 mg  80 mg IntraVENous Q24H    enoxaparin (LOVENOX) injection 80 mg  80 mg SubCUTAneous Q12H    insulin lispro (HUMALOG) injection   SubCUTAneous TIDAC    sodium chloride (NS) flush 5-40 mL  5-40 mL IntraVENous Q8H    aspirin delayed-release tablet 81 mg  81 mg Oral DAILY    [Held by provider] carvediloL (COREG) tablet 12.5 mg  12.5 mg Oral BID    hydroxychloroquine (PLAQUENIL) tablet 200 mg  200 mg Oral BID WITH MEALS    zinc sulfate (ZINCATE) 220 mg capsule 1 Cap  1 Cap Oral DAILY    ascorbic acid (vitamin C) (VITAMIN C) tablet 500 mg  500 mg Oral BID    glucose chewable tablet 16 g  4 Tab Oral PRN    glucagon (GLUCAGEN) injection 1 mg  1 mg IntraMUSCular PRN    dextrose 10% infusion 125-250 mL  125-250 mL IntraVENous PRN    acetaminophen (TYLENOL) tablet 650 mg  650 mg Oral Q6H PRN    cholecalciferol (VITAMIN D3) (400 Units /10 mcg) tablet 1 Tab  400 Units Oral DAILY    melatonin tablet 3 mg  3 mg Oral QHS            Lab/Data Review:  Labs: Results:       Chemistry Recent Labs     04/19/20 0255 04/18/20 1732 04/18/20 0355 04/17/20  1829 04/17/20  0250   * 168* 121* 179* 96    138 136 137 138   K 5.0 4.5 4.3 4.2 4.4    103 104 104 104   CO2 24 26 25 26 25   BUN 17 15 16 18 18   CREA 1.02 1.07 1.15 1.25 1.37*   BUCR 17 14 14 14 13   AGAP 8 9 7 7 9   CA 7.9* 7.9* 7.5* 7.2* 7.8*   PHOS  --  3.1  --  3.1 2.7     Recent Labs     04/19/20 0255 04/18/20 1732 04/18/20 0355 04/17/20  0250   ALT 13*  --  13*  --  14*   SGOT 29  --  23  --  18   TP 7.6  --  7.2  --  7.6   ALB 2.9* 3.2* 3.1*   < > 3.3*  3.4   GLOB 4.7*  --  4.1*  --  4.2*   AGRAT 0.6*  --  0.8  --  0.8    < > = values in this interval not displayed. CBC w/Diff Recent Labs     04/19/20 0255 04/18/20 0355   WBC 11.6 10.5   RBC 4.72 4.45*   HGB 11.4* 11.0*   HCT 36.3 34.0*   MCV 76.9 76.4   MCH 24.2 24.7   MCHC 31.4 32.4   RDW 15.9* 15.7*    154   BANDS 9* 9*   GRANS 22* 31*   LYMPH 67* 57*   EOS 0 0      Coagulation No results for input(s): PTP, INR, APTT, INREXT in the last 72 hours.     Iron/Ferritin Lab Results   Component Value Date/Time    Ferritin 514 (H) 04/19/2020 02:55 AM       BNP    Cardiac Enzymes Lab Results   Component Value Date/Time     (H) 04/19/2020 02:55 AM        Lactic Acid    Thyroid Studies          All Micro Results     Procedure Component Value Units Date/Time    CULTURE, BLOOD [[id number]] Collected:  04/16/20 0032    Order Status:  Completed Specimen:  Blood Updated:  04/19/20 0622     Special Requests: NO SPECIAL REQUESTS        Culture result: NO GROWTH 3 DAYS       CULTURE, BLOOD [[id number]] Collected:  04/16/20 0032    Order Status:  Completed Specimen:  Blood Updated:  04/19/20 0622     Special Requests: NO SPECIAL REQUESTS        Culture result: NO GROWTH 3 DAYS       CULTURE, RESPIRATORY/SPUTUM/BRONCH Celso Suri STAIN [[id number]] Collected:  04/18/20 0125    Order Status:  Completed Specimen:  Sputum Updated:  04/18/20 1626     Special Requests: NO SPECIAL REQUESTS        GRAM STAIN FEW WBCS SEEN               RARE EPITHELIAL CELLS SEEN                  1+ APPARENT GRAM NEGATIVE RODS            FEW GRAM POSITIVE RODS               OCCASIONAL GRAM POSITIVE COCCI IN PAIRS AND CHAINS           Culture result: PENDING    EMERGENT DISEASE PANEL [[id number]]  (Abnormal) Collected:  04/16/20 0104    Order Status:  Completed Specimen:  Not Specified Updated:  04/18/20 0958     Emergent disease panel Detected        Comment: CALLED TO AND CORRECTLY REPEATED BY:  DR Suyapa Todd, 0950, 4/18/20 BY [id number]         RESPIRATORY PANEL,PCR,NASOPHARYNGEAL [[id number]] Collected:  04/16/20 0104    Order Status:  Completed Specimen:  Nasopharyngeal Updated:  04/16/20 1655     Adenovirus Not detected        Coronavirus 229E Not detected        Coronavirus HKU1 Not detected        Coronavirus CVNL63 Not detected        Coronavirus OC43 Not detected        Metapneumovirus Not detected        Rhinovirus and Enterovirus Not detected        Influenza A Not detected        Influenza A, subtype H1 Not detected        Influenza A, subtype H3 Not detected        INFLUENZA A H1N1 PCR Not detected        Influenza B Not detected        Parainfluenza 1 Not detected        Parainfluenza 2 Not detected        Parainfluenza 3 Not detected        Parainfluenza virus 4 Not detected        RSV by PCR Not detected        B. parapertussis, PCR Not detected        Bordetella pertussis - PCR Not detected        Chlamydophila pneumoniae DNA, QL, PCR Not detected        Mycoplasma pneumoniae DNA, QL, PCR Not detected       INFLUENZA A & B AG (RAPID TEST) [[id number]] Collected:  04/16/20 0104    Order Status:  Completed Specimen:  Nasopharyngeal from Nasal washing Updated:  04/16/20 0130     Influenza A Antigen Negative        Comment: A negative result does not exclude influenza virus infection, seasonal or H1N1 due to suboptimal sensitivity. If influenza is circulating in your community, a diagnosis of influenza should be considered based on a patients clinical presentation and empiric antiviral treatment should be considered, if indicated. Influenza B Antigen Negative       CULTURE, BLOOD, PAIRED [[id number]] Collected:  04/16/20 0100    Order Status:  Canceled Specimen:  Blood             Images:    CT (Most Recent). CT Results (most recent):  No results found for this or any previous visit. XRAY (Most Recent) XR Results (most recent):  Results from Hospital Encounter encounter on 04/16/20   XR CHEST PORT    Narrative CHEST TWO VIEWS    CPT CODE: 68266    INDICATION: Pneumonia. .    COMPARISON: Plain film 4/16. FINDINGS:   Patient rotated significantly for this exam. Heart size does not appear  enlarged. Minimally improved lung volumes. Persistent patchy airspace infiltrate in the lateral right upper lobe and in the  periphery of the left lower lobe with some probable basilar atelectasis on the  left. Subjectively this appears minimally improved on the right and slightly  increased on the left given difference in lung volumes. No new process. Impression IMPRESSION:  1. Persistent peripheral left lower lobe and lateral right midlung airspace  opacities given differential lung volumes as above.           EKG Results for orders placed or performed in visit on 06/27/19   AMB POC EKG ROUTINE W/ 12 LEADS, INTER & REP     Status: None (Preliminary result)    Impression    Normal sinus, normal intervals, normal ST segment/T-wave.           2D ECHO

## 2020-04-19 NOTE — PROGRESS NOTES
Problem: Breathing Pattern - Ineffective  Goal: *Absence of hypoxia  Outcome: Progressing Towards Goal  Goal: *Use of effective breathing techniques  Outcome: Progressing Towards Goal  Goal: *PALLIATIVE CARE:  Alleviation of Dyspnea  Outcome: Progressing Towards Goal     Problem: Patient Education: Go to Patient Education Activity  Goal: Patient/Family Education  Outcome: Progressing Towards Goal     Problem: Diabetes Self-Management  Goal: *Disease process and treatment process  Description: Define diabetes and identify own type of diabetes; list 3 options for treating diabetes. Outcome: Progressing Towards Goal  Goal: *Incorporating nutritional management into lifestyle  Description: Describe effect of type, amount and timing of food on blood glucose; list 3 methods for planning meals. Outcome: Progressing Towards Goal  Goal: *Incorporating physical activity into lifestyle  Description: State effect of exercise on blood glucose levels. Outcome: Progressing Towards Goal  Goal: *Developing strategies to promote health/change behavior  Description: Define the ABC's of diabetes; identify appropriate screenings, schedule and personal plan for screenings. Outcome: Progressing Towards Goal  Goal: *Using medications safely  Description: State effect of diabetes medications on diabetes; name diabetes medication taking, action and side effects. Outcome: Progressing Towards Goal  Goal: *Monitoring blood glucose, interpreting and using results  Description: Identify recommended blood glucose targets  and personal targets. Outcome: Progressing Towards Goal  Goal: *Prevention, detection, treatment of acute complications  Description: List symptoms of hyper- and hypoglycemia; describe how to treat low blood sugar and actions for lowering  high blood glucose level.   Outcome: Progressing Towards Goal  Goal: *Prevention, detection and treatment of chronic complications  Description: Define the natural course of diabetes and describe the relationship of blood glucose levels to long term complications of diabetes. Outcome: Progressing Towards Goal  Goal: *Developing strategies to address psychosocial issues  Description: Describe feelings about living with diabetes; identify support needed and support network  Outcome: Progressing Towards Goal  Goal: *Insulin pump training  Outcome: Progressing Towards Goal  Goal: *Sick day guidelines  Outcome: Progressing Towards Goal  Goal: *Patient Specific Goal (EDIT GOAL, INSERT TEXT)  Outcome: Progressing Towards Goal     Problem: Patient Education: Go to Patient Education Activity  Goal: Patient/Family Education  Outcome: Progressing Towards Goal     Problem: Falls - Risk of  Goal: *Absence of Falls  Description: Document Salvatore Fall Risk and appropriate interventions in the flowsheet.   Outcome: Progressing Towards Goal  Note: Fall Risk Interventions:            Medication Interventions: Evaluate medications/consider consulting pharmacy, Patient to call before getting OOB, Teach patient to arise slowly    Elimination Interventions: Call light in reach, Patient to call for help with toileting needs              Problem: Patient Education: Go to Patient Education Activity  Goal: Patient/Family Education  Outcome: Progressing Towards Goal

## 2020-04-20 LAB
ALBUMIN SERPL-MCNC: 2.8 G/DL (ref 3.4–5)
ALBUMIN/GLOB SERPL: 0.6 {RATIO} (ref 0.8–1.7)
ALP SERPL-CCNC: 36 U/L (ref 45–117)
ALT SERPL-CCNC: 13 U/L (ref 16–61)
AST SERPL-CCNC: 25 U/L (ref 10–38)
ATRIAL RATE: 69 BPM
BACTERIA SPEC CULT: ABNORMAL
BACTERIA SPEC CULT: ABNORMAL
BASOPHILS # BLD: 0 K/UL (ref 0–0.06)
BASOPHILS NFR BLD: 0 % (ref 0–3)
BILIRUB DIRECT SERPL-MCNC: <0.1 MG/DL (ref 0–0.2)
BILIRUB SERPL-MCNC: 0.4 MG/DL (ref 0.2–1)
CALCULATED P AXIS, ECG09: 49 DEGREES
CALCULATED R AXIS, ECG10: 1 DEGREES
CALCULATED T AXIS, ECG11: -3 DEGREES
CK SERPL-CCNC: 437 U/L (ref 39–308)
CRP SERPL-MCNC: 6.2 MG/DL (ref 0–0.3)
D DIMER PPP FEU-MCNC: 0.71 UG/ML(FEU)
DIAGNOSIS, 93000: NORMAL
DIFFERENTIAL METHOD BLD: ABNORMAL
EOSINOPHIL # BLD: 0 K/UL (ref 0–0.4)
EOSINOPHIL NFR BLD: 0 % (ref 0–5)
ERYTHROCYTE [DISTWIDTH] IN BLOOD BY AUTOMATED COUNT: 16 % (ref 11.6–14.5)
FERRITIN SERPL-MCNC: 628 NG/ML (ref 8–388)
GLOBULIN SER CALC-MCNC: 4.6 G/DL (ref 2–4)
GLUCOSE BLD STRIP.AUTO-MCNC: 135 MG/DL (ref 70–110)
GLUCOSE BLD STRIP.AUTO-MCNC: 177 MG/DL (ref 70–110)
GLUCOSE BLD STRIP.AUTO-MCNC: 208 MG/DL (ref 70–110)
GRAM STN SPEC: ABNORMAL
HCT VFR BLD AUTO: 34 % (ref 36–48)
HGB BLD-MCNC: 10.9 G/DL (ref 13–16)
LDH SERPL L TO P-CCNC: 351 U/L (ref 81–234)
LYMPHOCYTES # BLD: 9 K/UL (ref 0.8–3.5)
LYMPHOCYTES NFR BLD: 58 % (ref 20–51)
MCH RBC QN AUTO: 24.7 PG (ref 24–34)
MCHC RBC AUTO-ENTMCNC: 32.1 G/DL (ref 31–37)
MCV RBC AUTO: 77.1 FL (ref 74–97)
MONOCYTES # BLD: 0.9 K/UL (ref 0–1)
MONOCYTES NFR BLD: 6 % (ref 2–9)
NEUTS BAND NFR BLD MANUAL: 6 % (ref 0–5)
NEUTS SEG # BLD: 5.5 K/UL (ref 1.8–8)
NEUTS SEG NFR BLD: 30 % (ref 42–75)
P-R INTERVAL, ECG05: 182 MS
PHOSPHATE SERPL-MCNC: 3.4 MG/DL (ref 2.5–4.9)
PLATELET # BLD AUTO: 227 K/UL (ref 135–420)
PLATELET COMMENTS,PCOM: ABNORMAL
PMV BLD AUTO: 10.3 FL (ref 9.2–11.8)
PROCALCITONIN SERPL-MCNC: 0.38 NG/ML
PROT SERPL-MCNC: 7.4 G/DL (ref 6.4–8.2)
Q-T INTERVAL, ECG07: 426 MS
QRS DURATION, ECG06: 100 MS
QTC CALCULATION (BEZET), ECG08: 456 MS
RBC # BLD AUTO: 4.41 M/UL (ref 4.7–5.5)
RBC MORPH BLD: ABNORMAL
RBC MORPH BLD: ABNORMAL
SERVICE CMNT-IMP: ABNORMAL
VENTRICULAR RATE, ECG03: 69 BPM
WBC # BLD AUTO: 15.4 K/UL (ref 4.6–13.2)

## 2020-04-20 PROCEDURE — 94762 N-INVAS EAR/PLS OXIMTRY CONT: CPT

## 2020-04-20 PROCEDURE — 77010033678 HC OXYGEN DAILY

## 2020-04-20 PROCEDURE — 82962 GLUCOSE BLOOD TEST: CPT

## 2020-04-20 PROCEDURE — 80076 HEPATIC FUNCTION PANEL: CPT

## 2020-04-20 PROCEDURE — 36415 COLL VENOUS BLD VENIPUNCTURE: CPT

## 2020-04-20 PROCEDURE — 85379 FIBRIN DEGRADATION QUANT: CPT

## 2020-04-20 PROCEDURE — 74011636637 HC RX REV CODE- 636/637: Performed by: INTERNAL MEDICINE

## 2020-04-20 PROCEDURE — 74011250637 HC RX REV CODE- 250/637: Performed by: INTERNAL MEDICINE

## 2020-04-20 PROCEDURE — 74011000250 HC RX REV CODE- 250: Performed by: INTERNAL MEDICINE

## 2020-04-20 PROCEDURE — 74011250637 HC RX REV CODE- 250/637: Performed by: HOSPITALIST

## 2020-04-20 PROCEDURE — 84100 ASSAY OF PHOSPHORUS: CPT

## 2020-04-20 PROCEDURE — 93005 ELECTROCARDIOGRAM TRACING: CPT

## 2020-04-20 PROCEDURE — 84145 PROCALCITONIN (PCT): CPT

## 2020-04-20 PROCEDURE — 74011250636 HC RX REV CODE- 250/636: Performed by: INTERNAL MEDICINE

## 2020-04-20 PROCEDURE — 85025 COMPLETE CBC W/AUTO DIFF WBC: CPT

## 2020-04-20 PROCEDURE — 65660000000 HC RM CCU STEPDOWN

## 2020-04-20 PROCEDURE — 82728 ASSAY OF FERRITIN: CPT

## 2020-04-20 PROCEDURE — 74011250636 HC RX REV CODE- 250/636: Performed by: FAMILY MEDICINE

## 2020-04-20 PROCEDURE — 83615 LACTATE (LD) (LDH) ENZYME: CPT

## 2020-04-20 PROCEDURE — 74011000258 HC RX REV CODE- 258: Performed by: INTERNAL MEDICINE

## 2020-04-20 PROCEDURE — 86140 C-REACTIVE PROTEIN: CPT

## 2020-04-20 PROCEDURE — 82550 ASSAY OF CK (CPK): CPT

## 2020-04-20 RX ORDER — INSULIN LISPRO 100 [IU]/ML
INJECTION, SOLUTION INTRAVENOUS; SUBCUTANEOUS
Status: DISCONTINUED | OUTPATIENT
Start: 2020-04-20 | End: 2020-04-27

## 2020-04-20 RX ADMIN — ATORVASTATIN CALCIUM 40 MG: 40 TABLET, FILM COATED ORAL at 08:31

## 2020-04-20 RX ADMIN — ENOXAPARIN SODIUM 80 MG: 80 INJECTION SUBCUTANEOUS at 08:31

## 2020-04-20 RX ADMIN — GUAIFENESIN 600 MG: 600 TABLET, EXTENDED RELEASE ORAL at 21:34

## 2020-04-20 RX ADMIN — DICLOFENAC 4 G: 10 GEL TOPICAL at 17:00

## 2020-04-20 RX ADMIN — Medication 10 ML: at 05:37

## 2020-04-20 RX ADMIN — Medication 10 ML: at 21:35

## 2020-04-20 RX ADMIN — DOXYCYCLINE 100 MG: 100 INJECTION, POWDER, LYOPHILIZED, FOR SOLUTION INTRAVENOUS at 21:33

## 2020-04-20 RX ADMIN — CHOLECALCIFEROL (VITAMIN D3) 10 MCG (400 UNIT) TABLET 1 TABLET: at 08:31

## 2020-04-20 RX ADMIN — HYDROXYCHLOROQUINE SULFATE 200 MG: 200 TABLET, FILM COATED ORAL at 21:35

## 2020-04-20 RX ADMIN — ASPIRIN 81 MG: 81 TABLET, COATED ORAL at 08:31

## 2020-04-20 RX ADMIN — Medication 10 ML: at 12:08

## 2020-04-20 RX ADMIN — Medication 500 MG: at 08:31

## 2020-04-20 RX ADMIN — DICLOFENAC 4 G: 10 GEL TOPICAL at 13:00

## 2020-04-20 RX ADMIN — CARVEDILOL 12.5 MG: 12.5 TABLET, FILM COATED ORAL at 21:34

## 2020-04-20 RX ADMIN — FAMOTIDINE 20 MG: 20 TABLET ORAL at 08:31

## 2020-04-20 RX ADMIN — METHYLPREDNISOLONE SODIUM SUCCINATE 80 MG: 40 INJECTION, POWDER, FOR SOLUTION INTRAMUSCULAR; INTRAVENOUS at 08:31

## 2020-04-20 RX ADMIN — ENOXAPARIN SODIUM 80 MG: 80 INJECTION SUBCUTANEOUS at 21:34

## 2020-04-20 RX ADMIN — DICLOFENAC 4 G: 10 GEL TOPICAL at 21:00

## 2020-04-20 RX ADMIN — DICLOFENAC 4 G: 10 GEL TOPICAL at 09:00

## 2020-04-20 RX ADMIN — INSULIN LISPRO 6 UNITS: 100 INJECTION, SOLUTION INTRAVENOUS; SUBCUTANEOUS at 11:30

## 2020-04-20 RX ADMIN — WATER 2 G: 1 INJECTION INTRAMUSCULAR; INTRAVENOUS; SUBCUTANEOUS at 21:34

## 2020-04-20 RX ADMIN — ZINC SULFATE 220 MG (50 MG) CAPSULE 1 CAPSULE: CAPSULE at 08:31

## 2020-04-20 RX ADMIN — CARVEDILOL 12.5 MG: 12.5 TABLET, FILM COATED ORAL at 08:31

## 2020-04-20 RX ADMIN — INSULIN LISPRO 3 UNITS: 100 INJECTION, SOLUTION INTRAVENOUS; SUBCUTANEOUS at 16:30

## 2020-04-20 RX ADMIN — FAMOTIDINE 20 MG: 20 TABLET ORAL at 21:34

## 2020-04-20 RX ADMIN — WATER 2 G: 1 INJECTION INTRAMUSCULAR; INTRAVENOUS; SUBCUTANEOUS at 05:37

## 2020-04-20 RX ADMIN — WATER 2 G: 1 INJECTION INTRAMUSCULAR; INTRAVENOUS; SUBCUTANEOUS at 12:07

## 2020-04-20 RX ADMIN — DOXYCYCLINE 100 MG: 100 INJECTION, POWDER, LYOPHILIZED, FOR SOLUTION INTRAVENOUS at 08:27

## 2020-04-20 RX ADMIN — HYDROXYCHLOROQUINE SULFATE 200 MG: 200 TABLET, FILM COATED ORAL at 08:31

## 2020-04-20 RX ADMIN — Medication 500 MG: at 21:34

## 2020-04-20 RX ADMIN — MELATONIN TAB 3 MG 6 MG: 3 TAB at 21:34

## 2020-04-20 RX ADMIN — GUAIFENESIN 600 MG: 600 TABLET, EXTENDED RELEASE ORAL at 08:31

## 2020-04-20 NOTE — ROUTINE PROCESS
Verbal shift change report given to Jaqueline Rowe RN (oncoming nurse) by Jose Santos RN (offgoing nurse). Report included the following information SBAR, Kardex, Recent Results and Alarm Parameters .

## 2020-04-20 NOTE — ROUTINE PROCESS
Verbal shift change report given to Juan (oncoming nurse) by Jaqueline Rowe RN (offgoing nurse). Report included the following information SBAR, Kardex, Recent Results and Alarm Parameters .

## 2020-04-20 NOTE — DIABETES MGMT
NUTRITIONAL ASSESSMENT GLYCEMIC CONTROL/ PLAN OF CARE     Gagan Chinchilla           60 y.o.           4/16/2020                 1. COVID-19 virus infection    2. Pneumonia due to infectious organism, unspecified laterality, unspecified part of lung       INTERVENTIONS/PLAN:   Consider addition of prandial Lispro insulin coverage, 3 units TID with meals. ASSESSMENT:   Pt is a 60year old male with a past medical history significant for atrial fibrillation, hypertension, type 2 diabetes, and CLL who presented with shortness of breath. Pt with COVID-19 infections. Blood glucose fluctuating, likely related to steroids. Pt is receiving Solu-medrol 80 mg daily. Pt is tolerating diet, appetite okay.      Diabetes Management:   Recent blood glucose:     4/19/2020 17:22 4/19/2020 22:52 4/20/2020 08:44 4/20/2020 12:17   247 (H) 209 (H) 135 (H) 208 (H)   Within target range (non-ICU: <140; ICU<180): [] Yes   []  No    Current Insulin regimen:   Correctional Lispro insulin 4 times daily ACHS   Home medication/insulin regimen: Home medications per pt include janumet and bydureon  HbA1c: 8.2 % equivalent to an average blood glucose of 189 mg/dl over the past 2-3 months  Adequate glycemic control PTA:  [] Yes  [x] No     SUBJECTIVE/OBJECTIVE:   Diet: Diabetic consistent carbohydrate     Patient Vitals for the past 100 hrs:   % Diet Eaten   04/20/20 1000 75 %   04/19/20 1637 50 %   04/19/20 1100 50 %   04/17/20 1730 75 %   04/17/20 1200 75 %   04/17/20 0900 75 %     Medications: [x]  Reviewed     Most Recent POC Glucose:   Recent Labs     04/19/20  1908 04/19/20  0255 04/18/20  1732 04/18/20  0355   * 168* 168* 121*      Labs:   Lab Results   Component Value Date/Time    Hemoglobin A1c 8.2 (H) 04/17/2020 02:50 AM     Lab Results   Component Value Date/Time    Sodium 138 04/19/2020 07:08 PM    Potassium 4.8 04/19/2020 07:08 PM    Chloride 106 04/19/2020 07:08 PM    CO2 25 04/19/2020 07:08 PM    Anion gap 7 04/19/2020 07:08 PM Glucose 168 (H) 04/19/2020 07:08 PM    BUN 22 (H) 04/19/2020 07:08 PM    Creatinine 1.33 (H) 04/19/2020 07:08 PM    Calcium 8.2 (L) 04/19/2020 07:08 PM    Phosphorus 3.4 04/20/2020 01:25 AM    Albumin 2.8 (L) 04/20/2020 01:25 AM     Anthropometrics:  BMI (calculated): 27.6  Wt Readings from Last 1 Encounters:   04/16/20 84.8 kg (187 lb)      Ht Readings from Last 1 Encounters:   04/16/20 5' 9\" (1.753 m)     Estimated Nutrition Needs: 7324-9602 Kcal/day,  grams protein/day   Based on:   [x] Actual BW    [] IBW   [] Adjusted BW      Nutrition Diagnoses:    Altered nutrition related lab value related to diabetes as evidenced by Hemoglobin A1c of  8.2%  Nutrition Interventions: diabetic diet, coordination of care   Goal: Blood glucose will be within target range of  mg/dL by 4/23/20     Nutrition Monitoring and Evaluation    []     Monitor po intake on meal rounds  [x]     Continue inpatient monitoring and intervention  []     Other:    Denis Willis, [address], [address]

## 2020-04-20 NOTE — PROGRESS NOTES
Problem: Breathing Pattern - Ineffective  Goal: *Absence of hypoxia  4/20/2020 1159 by Irena Márquez  Outcome: Progressing Towards Goal  4/20/2020 1159 by Irena Márquez  Outcome: Progressing Towards Goal  Goal: *Use of effective breathing techniques  4/20/2020 1159 by Irena Márquez  Outcome: Progressing Towards Goal  4/20/2020 1159 by Irena Márquez  Outcome: Progressing Towards Goal  Goal: *PALLIATIVE CARE:  Alleviation of Dyspnea  4/20/2020 1159 by Irena Márquez  Outcome: Progressing Towards Goal  4/20/2020 1159 by Irena Márquez  Outcome: Progressing Towards Goal

## 2020-04-20 NOTE — DIABETES MGMT
Diabetes Patient/Family Education Record  Factors That  May Influence Patients Ability  to Learn or  Comply with Recommendations   []   Language barrier    []   Cultural needs   []   Motivation    []   Cognitive limitation    []   Physical   []   Education    []   Physiological factors   []   Hearing/vision/speaking impairment   []   Mandaen beliefs    []   Financial factors   []  Other:   [x]  No factors identified at this time. Person Instructed:   [x]   Patient   []   Family   []  Other     Preference for Learning:   [x]   Verbal   []   Written   []  Demonstration     Level of Comprehension & Competence:    [x]  Good                                      [] Fair                                     []  Poor                             []  Needs Reinforcement   [x]  Teachback completed    Education Component:  All information obtained by phone. Patient COVID 19 positive in isolation. [x]  Medication management, including how to administer insulin (if appropriate) and potential medication interactions   Home medications per pt include janumet and bydureon   []  Nutritional management -obtain usual meal pattern   []  Exercise   [x]  Signs, symptoms, and treatment of hyperglycemia and hypoglycemia   [x] Prevention, recognition and treatment of hyperglycemia and hypoglycemia   [x]  Importance of blood glucose monitoring and how to obtain a blood glucose meter   Has a glucometer and supplies at home. Checks his blood glucose daily. []  Instruction on use of the blood glucose meter   [x]  Discuss the importance of HbA1C monitoring   A1C of 8.2 % equivalent to an average blood glucose of 189 mg/dl over the past 2-3 months  Patient state that he usually runs around 7%.    []  Sick day guidelines   []  Proper use and disposal of lancets, needles, syringes or insulin pens (if appropriate)   []  Potential long-term complications (retinopathy, kidney disease, neuropathy, foot care)   [x] Information about whom to contact in case of emergency or for more information    [x]  Goal:  Patient/family will demonstrate understanding of Diabetes Self Management Skills by: (date) __4/25/2020_____  Plan for post-discharge education or self-management support:    [x] Outpatient class schedule provided            [] Patient Declined    [] Scheduled for outpatient classes (date) _______  Verify:  Does patient understand how diabetes medications work? Yes  Does patient know what their most recent A1c is? Yes  Does patient monitor glucose at home? Yes  Does patient have difficulty obtaining diabetes medications or testing supplies? No issues voiced.

## 2020-04-20 NOTE — PROGRESS NOTES
This writer spoke extensively to pt's Wife, Radha Anand @ [phone]. She will like attending physician to call her for an update regarding pt's care/plan of care. RN will f/u with incoming nurse. [id number]: RN in pt's room for an EKG, pt on prone position upon entry into the room, after changing position for text procedure, pt started coughing yellow with bloody stained sputum which later become clear, thin and frothy. Pt assisted with breathing techniques. O2 dropped to 77% and later 91. Pt O2 sat currently 88 to 91%, respiratory therapist contacted, RT to assess pt.

## 2020-04-20 NOTE — PROGRESS NOTES
Bedside shift change report given to Sona Martinez (oncoming nurse) by Diego Simmons RN (offgoing nurse).  Report included the following information SBAR, Procedure Summary, Intake/Output, MAR, Recent Results and Cardiac Rhythm SR.

## 2020-04-20 NOTE — PROGRESS NOTES
Infectious Disease progress Note        Reason: sepsis, COVID-19 infection    Current abx Prior abx     Hydroxychloroquine since 4/16/20  Cefepime, doxycycline since 4/18/2020 Ceftriaxone, azithromycin 4/16/20-4/18/2020     Lines:       Assessment :    60 y.o. male  with history of CLL, type 2 diabetes, prostate cancer, history of atrial fibrillation with ablation, hypertension, CKD stage 3 who presented to the emergency room on 4/16/2020 secondary to shortness of breath. CXR: mild opacities at lung bases. Now with increasing shortness of breath, hypoxia requiring oxygen, worsening renal function    Patient presents with a highly complex clinical picture. Clinical picture consistent with sepsis (present on admission ) due to bilateral  covid-19 infection. Labs 4/16 reviewed-ferritin 233, C-reactive protein 6.3, , , d-dimer 0.75  Labs 4/17 reviewed- ferritin 266, C-reactive protein 8.0, , LDH: 219, d-dimer 1.20   Labs 4/19 reviewed-ferritin 346, C-reactive protein 10.4, , , d-dimer 0.84  Labs on 4/20 reviewed-ferritin 628, C-reactive protein 6.2, , , d-dimer 0.7    Worsening respiratory status 4/18-4/19 with worsening inflammatory parameters suggestive of cytokine storm. Status post Solu-Medrol since 4/18. Status post tocilizumab on 4/19. Sputum culture 4/18 light normal respiratory giselle, yeast    Acute on chronic renal insufficiency-could be prerenal versus ATN secondary to sepsis. Nephrology consult appreciated. Creatinine stable    Subjective sense of improvement. Improved hypoxia. Still with episodes of desaturation in the sitting position. Improved oxygenation in the prone position. Patient is more compliant with prone position now. EKG 4/16-QTc 416, 456 on 4/20    Recommendations:    1. Continue cefepime, doxycycline, discontinue hydroxychloroquine after today p.m. dose  2. Monitor ferritin, CRP, CPK, LDH, d-dimer daily  3.   Discontinue daily EKG  4. Continue zinc, ascorbic acid. vitamin D3, melatonin  5. Recommend prone position ventilation. I discussed this with patient. 6.   Follow-up nephrology recommendations  7. Titrate oxygen as tolerated  8. Recommend therapeutic anticoagulation since severe COVID infection and CLL will predispose patient to thrombosis, PE.   9.  Will de-escalate antibiotics based on the clinical course    Risk and benefits of above treatment discussed with patient in details. He verbalized his understanding and agrees with the treatment plan  Above plan was discussed in details with patient, dr. Iwona Lock. Please call me if any further questions or concerns. Will continue to participate in the care of this patient. HPI:    Feels  better. Improved shortness of breath. Improved myalgias  Patient denies headaches, visual disturbances, sore throat, runny nose, earaches, abdominal pain, diarrhea, burning micturition, pain or weakness in extremities. He denies back pain/flank pain. home Medication List    Details   BYDUREON BCISE 2 mg/0.85 mL atIn INJECT 2MG EVERY WEEK UNDER THE SKIN  Refills: 0      ergocalciferol (ERGOCALCIFEROL) 50,000 unit capsule Take 1 Cap by mouth every seven (7) days. Qty: 24 Cap, Refills: 1    Associated Diagnoses: Vitamin D deficiency      sitagliptin phos/metformin HCl (JANUMET PO) Take  by mouth.      lisinopril (PRINIVIL, ZESTRIL) 5 mg tablet Take 1 Tab by mouth daily. Qty: 30 Tab, Refills: 6      carvedilol (COREG) 12.5 mg tablet Take 1 Tab by mouth two (2) times a day. Qty: 60 Tab, Refills: 6      simvastatin (ZOCOR) 10 mg tablet Take  by mouth daily. aspirin delayed-release 81 mg tablet Take 81 mg by mouth daily.              Current Facility-Administered Medications   Medication Dose Route Frequency    insulin lispro (HUMALOG) injection   SubCUTAneous AC&HS    guaiFENesin ER (MUCINEX) tablet 600 mg  600 mg Oral Q12H    diclofenac (VOLTAREN) 1 % topical gel 4 g  4 g Topical QID    melatonin tablet 6 mg  6 mg Oral QHS    famotidine (PEPCID) tablet 20 mg  20 mg Oral BID    LORazepam (ATIVAN) tablet 1 mg  1 mg Oral Q6H PRN    atorvastatin (LIPITOR) tablet 40 mg  40 mg Oral DAILY    cefepime (MAXIPIME) 2 g in sterile water (preservative free) 10 mL IV syringe  2 g IntraVENous Q8H    doxycycline (VIBRAMYCIN) 100 mg in 0.9% sodium chloride (MBP/ADV) 100 mL MBP  100 mg IntraVENous Q12H    methylPREDNISolone (PF) (SOLU-MEDROL) injection 80 mg  80 mg IntraVENous Q24H    enoxaparin (LOVENOX) injection 80 mg  80 mg SubCUTAneous Q12H    sodium chloride (NS) flush 5-40 mL  5-40 mL IntraVENous Q8H    aspirin delayed-release tablet 81 mg  81 mg Oral DAILY    carvediloL (COREG) tablet 12.5 mg  12.5 mg Oral BID    hydroxychloroquine (PLAQUENIL) tablet 200 mg  200 mg Oral BID WITH MEALS    zinc sulfate (ZINCATE) 220 mg capsule 1 Cap  1 Cap Oral DAILY    ascorbic acid (vitamin C) (VITAMIN C) tablet 500 mg  500 mg Oral BID    glucose chewable tablet 16 g  4 Tab Oral PRN    glucagon (GLUCAGEN) injection 1 mg  1 mg IntraMUSCular PRN    dextrose 10% infusion 125-250 mL  125-250 mL IntraVENous PRN    acetaminophen (TYLENOL) tablet 650 mg  650 mg Oral Q6H PRN    cholecalciferol (VITAMIN D3) (400 Units /10 mcg) tablet 1 Tab  400 Units Oral DAILY       Allergies: Patient has no known allergies. Temp (24hrs), Av.3 °F (36.8 °C), Min:98 °F (36.7 °C), Max:98.4 °F (36.9 °C)    Visit Vitals  /68 (BP 1 Location: Right arm, BP Patient Position: At rest)   Pulse 68   Temp 98.4 °F (36.9 °C)   Resp 22   Ht 5' 9\" (1.753 m)   Wt 84.8 kg (187 lb)   SpO2 94%   BMI 27.62 kg/m²       ROS: 12 point ROS obtained in details. Pertinent positives as mentioned in HPI,   otherwise negative    Physical Exam:    General: Well developed, well nourished male laying on the bed AAOx3.   Appears more comfortable    General:   awake alert and oriented   HEENT:  Normocephalic, atraumatic, PERRL, EOMI, no scleral icterus or pallor; no conjunctival hemmohage;  nasal and oral mucous are moist and without evidence of lesions. Neck supple, no bruits. Lymph Nodes:   no cervical, axillary or inguinal adenopathy   Lungs:   non-labored, bilaterally clear to auscultation- no crackles wheezes rales or rhonchi   Heart:  RRR, s1 and s2; no rubs or gallops, no edema, + pedal pulses   Abdomen:  soft, non-distended, active bowel sounds, no hepatomegaly, no splenomegaly. Non-tender   Genitourinary:  deferred   Extremities:   no clubbing, cyanosis; no joint effusions or swelling; Full ROM of all large joints to the upper and lower extremities; muscle mass appropriate for age   Neurologic:  No gross focal sensory abnormalities; 5/5 muscle strength to upper and lower extremities. Speech appropriate.  Cranial nerves intact                        Skin:  No rash or ulcers noted   Back:  no spinal or paraspinal muscle tenderness or rigidity, no CVA tenderness     Psychiatric:  No suicidal or homicidal ideations, appropriate mood and affect         Labs: Results:   Chemistry Recent Labs     04/20/20  0125 04/19/20  1908 04/19/20  0255 04/18/20  1732 04/18/20  0355   GLU  --  168* 168* 168* 121*   NA  --  138 136 138 136   K  --  4.8 5.0 4.5 4.3   CL  --  106 104 103 104   CO2  --  25 24 26 25   BUN  --  22* 17 15 16   CREA  --  1.33* 1.02 1.07 1.15   CA  --  8.2* 7.9* 7.9* 7.5*   AGAP  --  7 8 9 7   BUCR  --  17 17 14 14   AP 36*  --  37*  --  37*   TP 7.4  --  7.6  --  7.2   ALB 2.8* 2.7* 2.9* 3.2* 3.1*   GLOB 4.6*  --  4.7*  --  4.1*   AGRAT 0.6*  --  0.6*  --  0.8      CBC w/Diff Recent Labs     04/20/20  0125 04/19/20  0255 04/18/20  0355   WBC 15.4* 11.6 10.5   RBC 4.41* 4.72 4.45*   HGB 10.9* 11.4* 11.0*   HCT 34.0* 36.3 34.0*    191 154   GRANS 30* 22* 31*   LYMPH 58* 67* 57*   EOS 0 0 0      Microbiology Recent Labs     04/18/20  0125   CULT LIGHT NORMAL RESPIRATORY GURU  LIGHT YEAST, (APPARENT CANDIDA ALBICANS)* RADIOLOGY:    All available imaging studies/reports in [facility] for this admission were reviewed    Dr. Amrik Ngllor, Infectious Disease Specialist  [phone]  April 20, 2020  9:57 AM

## 2020-04-20 NOTE — PROGRESS NOTES
Problem: Breathing Pattern - Ineffective  Goal: *Absence of hypoxia  Outcome: Progressing Towards Goal  Goal: *Use of effective breathing techniques  Outcome: Progressing Towards Goal  Goal: *PALLIATIVE CARE:  Alleviation of Dyspnea  Outcome: Progressing Towards Goal     Problem: Patient Education: Go to Patient Education Activity  Goal: Patient/Family Education  Outcome: Progressing Towards Goal     Problem: Diabetes Self-Management  Goal: *Disease process and treatment process  Description: Define diabetes and identify own type of diabetes; list 3 options for treating diabetes. Outcome: Progressing Towards Goal  Goal: *Incorporating nutritional management into lifestyle  Description: Describe effect of type, amount and timing of food on blood glucose; list 3 methods for planning meals. Outcome: Progressing Towards Goal  Goal: *Incorporating physical activity into lifestyle  Description: State effect of exercise on blood glucose levels. Outcome: Progressing Towards Goal  Goal: *Developing strategies to promote health/change behavior  Description: Define the ABC's of diabetes; identify appropriate screenings, schedule and personal plan for screenings. Outcome: Progressing Towards Goal  Goal: *Using medications safely  Description: State effect of diabetes medications on diabetes; name diabetes medication taking, action and side effects. Outcome: Progressing Towards Goal  Goal: *Monitoring blood glucose, interpreting and using results  Description: Identify recommended blood glucose targets  and personal targets. Outcome: Progressing Towards Goal  Goal: *Prevention, detection, treatment of acute complications  Description: List symptoms of hyper- and hypoglycemia; describe how to treat low blood sugar and actions for lowering  high blood glucose level.   Outcome: Progressing Towards Goal  Goal: *Prevention, detection and treatment of chronic complications  Description: Define the natural course of diabetes and describe the relationship of blood glucose levels to long term complications of diabetes. Outcome: Progressing Towards Goal  Goal: *Developing strategies to address psychosocial issues  Description: Describe feelings about living with diabetes; identify support needed and support network  Outcome: Progressing Towards Goal  Goal: *Insulin pump training  Outcome: Progressing Towards Goal  Goal: *Sick day guidelines  Outcome: Progressing Towards Goal  Goal: *Patient Specific Goal (EDIT GOAL, INSERT TEXT)  Outcome: Progressing Towards Goal     Problem: Patient Education: Go to Patient Education Activity  Goal: Patient/Family Education  Outcome: Progressing Towards Goal     Problem: Falls - Risk of  Goal: *Absence of Falls  Description: Document Salvatore Fall Risk and appropriate interventions in the flowsheet.   Outcome: Progressing Towards Goal  Note: Fall Risk Interventions:     Medication Interventions: Evaluate medications/consider consulting pharmacy, Patient to call before getting OOB    Elimination Interventions: Call light in reach, Patient to call for help with toileting needs, Toilet paper/wipes in reach, Urinal in reach    Problem: Patient Education: Go to Patient Education Activity  Goal: Patient/Family Education  Outcome: Progressing Towards Goal

## 2020-04-20 NOTE — PROGRESS NOTES
Hospitalist Progress Note    Patient: Jer Bah Age: 60 y.o. : 1960 MR#: [id number] SSN: [id number]  Date/Time: 2020 12:42 PM    DOA: 2020  PCP: Satish Kelley MD    Subjective:   Still hypoxic when sitting up, still has cough with sputum. No blood   Tolerated proning with oxygen saturation at 92% on 5 liters. No fever  CK increase, Ferritin increased, CRP decrease, LDH decrease, D-dimer decreased  No chest pain. No diarrhea. Still ok with his appetite   He remains on Lovenox 80mg BID. Cefepime/Doxycycline/Hydroxychloroquine/VitC/Zinc  Tolerated Tocilizumab yesterday        ROS: No fever/chills, no headache, no dizziness, no facial pain, no sinus congestion, +cough  No swallowing pain, No chest pain, no palpitation, + shortness of breath, no abd pain,  No diarrhea, no urinary complaint, no leg pain or swelling    Assessment/Plan:     1. COVID-19 pneumonia with suspected superimposed bacterial pneumonia   2. Sepsis POA due to COVID-19 infection   3. JOSE on CKD3 associated with sepsis, hypovolemia, resolved   4. Acute hypoxic respiratory failure due to COVID-19 infection   5. Risk of pulmonary microthrombus with elevated D-dimer  6. Worsened inflammatory markers   7. H/O CLL, not treated previously   8. Hyperglycemia with DM2, HgbA1c 8.2%   9. Hyperlipidemia   10. Hypertension   11. Hypovolemic hyponatremia, resolved   12. Hypoalbuminemia   13. Leukocytosis in the setting of steroid    Continue proning per protocol. Oxygen can be increased if needed. Tolerating ICS  Spoke with ID to continue cefepime/doxycycline/hydroxychloroquine/VitC/Zinc, +solumedrol,  Educated on the need for prone and increase his oxygen needs. Continue Lovenox for now, if will need to monitor his inflammatory markers. Nutrition support.    Pepcid   Repeat chest xray tomorrow     Full code     Additional Notes:    Time spent >30 minutes    Case discussed with:  [x]Patient  []Family  [x]Nursing [x]Case Management  DVT Prophylaxis:  [x]Lovenox  []Hep SQ  []SCDs  []Coumadin   []On Heparin gtt    Signed By: Saulo Garcia MD     2020 12:42 PM              Objective:   VS:   Visit Vitals  /77 (BP 1 Location: Right arm, BP Patient Position: At rest)   Pulse 76   Temp 98.4 °F (36.9 °C)   Resp 20   Ht 5' 9\" (1.753 m)   Wt 84.8 kg (187 lb)   SpO2 94%   BMI 27.62 kg/m²      Tmax/24hrs: Temp (24hrs), Av.2 °F (36.8 °C), Min:98 °F (36.7 °C), Max:98.4 °F (36.9 °C)      Intake/Output Summary (Last 24 hours) at 2020 1242  Last data filed at 2020 1000  Gross per 24 hour   Intake 1240 ml   Output 1275 ml   Net -35 ml       Tele: sinus  General:  Cooperative, Not in acute distress, speaks in short sentence while in bed  HEENT: PERRL, EOMI, supple neck, no JVD, dry oral mucosa  Cardiovascular: S1S2 regular, no rub/gallop   Pulmonary: air entry bilaterally, no wheezing, + crackle  GI:  Soft, non tender, non distended, +bs, no guarding   Extremities:  No pedal edema, +distal pulses appreciated   Neuro: AOx3, moving all extremities, no gross deficit.      Additional:       Current Facility-Administered Medications   Medication Dose Route Frequency    insulin lispro (HUMALOG) injection   SubCUTAneous AC&HS    guaiFENesin ER (MUCINEX) tablet 600 mg  600 mg Oral Q12H    diclofenac (VOLTAREN) 1 % topical gel 4 g  4 g Topical QID    melatonin tablet 6 mg  6 mg Oral QHS    famotidine (PEPCID) tablet 20 mg  20 mg Oral BID    LORazepam (ATIVAN) tablet 1 mg  1 mg Oral Q6H PRN    atorvastatin (LIPITOR) tablet 40 mg  40 mg Oral DAILY    cefepime (MAXIPIME) 2 g in sterile water (preservative free) 10 mL IV syringe  2 g IntraVENous Q8H    doxycycline (VIBRAMYCIN) 100 mg in 0.9% sodium chloride (MBP/ADV) 100 mL MBP  100 mg IntraVENous Q12H    methylPREDNISolone (PF) (SOLU-MEDROL) injection 80 mg  80 mg IntraVENous Q24H    enoxaparin (LOVENOX) injection 80 mg  80 mg SubCUTAneous Q12H    sodium chloride (NS) flush 5-40 mL  5-40 mL IntraVENous Q8H    aspirin delayed-release tablet 81 mg  81 mg Oral DAILY    carvediloL (COREG) tablet 12.5 mg  12.5 mg Oral BID    hydroxychloroquine (PLAQUENIL) tablet 200 mg  200 mg Oral BID WITH MEALS    zinc sulfate (ZINCATE) 220 mg capsule 1 Cap  1 Cap Oral DAILY    ascorbic acid (vitamin C) (VITAMIN C) tablet 500 mg  500 mg Oral BID    glucose chewable tablet 16 g  4 Tab Oral PRN    glucagon (GLUCAGEN) injection 1 mg  1 mg IntraMUSCular PRN    dextrose 10% infusion 125-250 mL  125-250 mL IntraVENous PRN    acetaminophen (TYLENOL) tablet 650 mg  650 mg Oral Q6H PRN    cholecalciferol (VITAMIN D3) (400 Units /10 mcg) tablet 1 Tab  400 Units Oral DAILY            Lab/Data Review:  Labs: Results:       Chemistry Recent Labs     04/20/20 0125 04/19/20 1908 04/19/20 0255 04/18/20  1732   GLU  --  168* 168* 168*   NA  --  138 136 138   K  --  4.8 5.0 4.5   CL  --  106 104 103   CO2  --  25 24 26   BUN  --  22* 17 15   CREA  --  1.33* 1.02 1.07   BUCR  --  17 17 14   AGAP  --  7 8 9   CA  --  8.2* 7.9* 7.9*   PHOS 3.4 3.6  --  3.1     Recent Labs     04/20/20 0125 04/19/20 1908 04/19/20 0255 04/18/20  0355   ALT 13*  --  13*  --  13*   SGOT 25  --  29  --  23   TP 7.4  --  7.6  --  7.2   ALB 2.8* 2.7* 2.9*   < > 3.1*   GLOB 4.6*  --  4.7*  --  4.1*   AGRAT 0.6*  --  0.6*  --  0.8    < > = values in this interval not displayed. CBC w/Diff Recent Labs     04/20/20 0125 04/19/20 0255 04/18/20  0355   WBC 15.4* 11.6 10.5   RBC 4.41* 4.72 4.45*   HGB 10.9* 11.4* 11.0*   HCT 34.0* 36.3 34.0*   MCV 77.1 76.9 76.4   MCH 24.7 24.2 24.7   MCHC 32.1 31.4 32.4   RDW 16.0* 15.9* 15.7*    191 154   BANDS 6* 9* 9*   GRANS 30* 22* 31*   LYMPH 58* 67* 57*   EOS 0 0 0      Coagulation No results for input(s): PTP, INR, APTT, INREXT, INREXT in the last 72 hours.     Iron/Ferritin Lab Results   Component Value Date/Time    Ferritin 628 (H) 04/20/2020 01:25 AM       BNP    Cardiac Enzymes Lab Results   Component Value Date/Time     (H) 04/20/2020 01:25 AM        Lactic Acid    Thyroid Studies          All Micro Results     Procedure Component Value Units Date/Time    CULTURE, RESPIRATORY/SPUTUM/BRONCH Arch Pickles STAIN [[id number]]  (Abnormal) Collected:  04/18/20 0125    Order Status:  Completed Specimen:  Sputum Updated:  04/20/20 1102     Special Requests: NO SPECIAL REQUESTS        GRAM STAIN FEW WBCS SEEN               RARE EPITHELIAL CELLS SEEN                  1+ APPARENT GRAM NEGATIVE RODS            FEW GRAM POSITIVE RODS               OCCASIONAL GRAM POSITIVE COCCI IN PAIRS AND CHAINS           Culture result:       LIGHT NORMAL RESPIRATORY GURU                  LIGHT YEAST, (APPARENT CANDIDA ALBICANS)          CULTURE, BLOOD [[id number]] Collected:  04/16/20 0032    Order Status:  Completed Specimen:  Blood Updated:  04/20/20 0641     Special Requests: NO SPECIAL REQUESTS        Culture result: NO GROWTH 4 DAYS       CULTURE, BLOOD [[id number]] Collected:  04/16/20 0032    Order Status:  Completed Specimen:  Blood Updated:  04/20/20 0641     Special Requests: NO SPECIAL REQUESTS        Culture result: NO GROWTH 4 DAYS       EMERGENT DISEASE PANEL [[id number]]  (Abnormal) Collected:  04/16/20 0104    Order Status:  Completed Specimen:  Not Specified Updated:  04/18/20 0958     Emergent disease panel Detected        Comment: CALLED TO AND CORRECTLY REPEATED BY:  DR Rosa Maria Ellison, 0950, 4/18/20 BY 4744         RESPIRATORY PANEL,PCR,NASOPHARYNGEAL [[id number]] Collected:  04/16/20 0104    Order Status:  Completed Specimen:  Nasopharyngeal Updated:  04/16/20 1655     Adenovirus Not detected        Coronavirus 229E Not detected        Coronavirus HKU1 Not detected        Coronavirus CVNL63 Not detected        Coronavirus OC43 Not detected        Metapneumovirus Not detected        Rhinovirus and Enterovirus Not detected        Influenza A Not detected        Influenza A, subtype H1 Not detected Influenza A, subtype H3 Not detected        INFLUENZA A H1N1 PCR Not detected        Influenza B Not detected        Parainfluenza 1 Not detected        Parainfluenza 2 Not detected        Parainfluenza 3 Not detected        Parainfluenza virus 4 Not detected        RSV by PCR Not detected        B. parapertussis, PCR Not detected        Bordetella pertussis - PCR Not detected        Chlamydophila pneumoniae DNA, QL, PCR Not detected        Mycoplasma pneumoniae DNA, QL, PCR Not detected       INFLUENZA A & B AG (RAPID TEST) [[id number]] Collected:  04/16/20 0104    Order Status:  Completed Specimen:  Nasopharyngeal from Nasal washing Updated:  04/16/20 0130     Influenza A Antigen Negative        Comment: A negative result does not exclude influenza virus infection, seasonal or H1N1 due to suboptimal sensitivity. If influenza is circulating in your community, a diagnosis of influenza should be considered based on a patients clinical presentation and empiric antiviral treatment should be considered, if indicated. Influenza B Antigen Negative       CULTURE, BLOOD, PAIRED [[id number]] Collected:  04/16/20 0100    Order Status:  Canceled Specimen:  Blood             Images:    CT (Most Recent). CT Results (most recent):  No results found for this or any previous visit. XRAY (Most Recent) XR Results (most recent):  Results from Hospital Encounter encounter on 04/16/20   XR CHEST PORT    Narrative CHEST TWO VIEWS    CPT CODE: 32079    INDICATION: Pneumonia. .    COMPARISON: Plain film 4/16. FINDINGS:   Patient rotated significantly for this exam. Heart size does not appear  enlarged. Minimally improved lung volumes. Persistent patchy airspace infiltrate in the lateral right upper lobe and in the  periphery of the left lower lobe with some probable basilar atelectasis on the  left.  Subjectively this appears minimally improved on the right and slightly  increased on the left given difference in lung volumes. No new process. Impression IMPRESSION:  1. Persistent peripheral left lower lobe and lateral right midlung airspace  opacities given differential lung volumes as above. EKG Results for orders placed or performed in visit on 06/27/19   AMB POC EKG ROUTINE W/ 12 LEADS, INTER & REP     Status: None (Preliminary result)    Impression    Normal sinus, normal intervals, normal ST segment/T-wave.           2D ECHO

## 2020-04-20 NOTE — DISCHARGE INSTRUCTIONS
Your A1C  was   Lab Results   Component Value Date/Time    Hemoglobin A1c 8.2 (H) 04/17/2020 02:50 AM   .      An A1C of 8.2% is equivalent to an average blood glucose of 189 mg/dl over the past 2-3 months. This lab test reflects that your blood sugar averaged   over the past 3 months. It is important to follow up with your provider on a routine basis to continue to evaluate your blood sugar discuss any necessary changes in treatment.

## 2020-04-21 ENCOUNTER — APPOINTMENT (OUTPATIENT)
Dept: GENERAL RADIOLOGY | Age: 60
DRG: 871 | End: 2020-04-21
Attending: INTERNAL MEDICINE
Payer: COMMERCIAL

## 2020-04-21 LAB
ALBUMIN SERPL-MCNC: 2.8 G/DL (ref 3.4–5)
ANION GAP SERPL CALC-SCNC: 6 MMOL/L (ref 3–18)
BASOPHILS # BLD: 0 K/UL (ref 0–0.06)
BASOPHILS NFR BLD: 0 % (ref 0–3)
BUN SERPL-MCNC: 27 MG/DL (ref 7–18)
BUN/CREAT SERPL: 26 (ref 12–20)
CALCIUM SERPL-MCNC: 8.2 MG/DL (ref 8.5–10.1)
CHLORIDE SERPL-SCNC: 105 MMOL/L (ref 100–111)
CK SERPL-CCNC: 367 U/L (ref 39–308)
CO2 SERPL-SCNC: 26 MMOL/L (ref 21–32)
CREAT SERPL-MCNC: 1.05 MG/DL (ref 0.6–1.3)
CRP SERPL-MCNC: 3 MG/DL (ref 0–0.3)
D DIMER PPP FEU-MCNC: 0.59 UG/ML(FEU)
DIFFERENTIAL METHOD BLD: ABNORMAL
EOSINOPHIL # BLD: 0 K/UL (ref 0–0.4)
EOSINOPHIL NFR BLD: 0 % (ref 0–5)
ERYTHROCYTE [DISTWIDTH] IN BLOOD BY AUTOMATED COUNT: 16 % (ref 11.6–14.5)
FERRITIN SERPL-MCNC: 628 NG/ML (ref 8–388)
GLUCOSE BLD STRIP.AUTO-MCNC: 154 MG/DL (ref 70–110)
GLUCOSE BLD STRIP.AUTO-MCNC: 184 MG/DL (ref 70–110)
GLUCOSE BLD STRIP.AUTO-MCNC: 213 MG/DL (ref 70–110)
GLUCOSE BLD STRIP.AUTO-MCNC: 97 MG/DL (ref 70–110)
GLUCOSE SERPL-MCNC: 109 MG/DL (ref 74–99)
HCT VFR BLD AUTO: 36.7 % (ref 36–48)
HGB BLD-MCNC: 11.6 G/DL (ref 13–16)
LDH SERPL L TO P-CCNC: 375 U/L (ref 81–234)
LYMPHOCYTES # BLD: 14.9 K/UL (ref 0.8–3.5)
LYMPHOCYTES NFR BLD: 74 % (ref 20–51)
MCH RBC QN AUTO: 24.3 PG (ref 24–34)
MCHC RBC AUTO-ENTMCNC: 31.6 G/DL (ref 31–37)
MCV RBC AUTO: 76.8 FL (ref 74–97)
MONOCYTES # BLD: 0 K/UL (ref 0–1)
MONOCYTES NFR BLD: 0 % (ref 2–9)
NEUTS SEG # BLD: 5.2 K/UL (ref 1.8–8)
NEUTS SEG NFR BLD: 26 % (ref 42–75)
PHOSPHATE SERPL-MCNC: 4 MG/DL (ref 2.5–4.9)
PLATELET # BLD AUTO: 284 K/UL (ref 135–420)
PLATELET COMMENTS,PCOM: ABNORMAL
PMV BLD AUTO: 10.3 FL (ref 9.2–11.8)
POTASSIUM SERPL-SCNC: 4.5 MMOL/L (ref 3.5–5.5)
RBC # BLD AUTO: 4.78 M/UL (ref 4.7–5.5)
RBC MORPH BLD: ABNORMAL
RBC MORPH BLD: ABNORMAL
SARS-COV-2, COV2NT: DETECTED
SODIUM SERPL-SCNC: 137 MMOL/L (ref 136–145)
WBC # BLD AUTO: 20.1 K/UL (ref 4.6–13.2)

## 2020-04-21 PROCEDURE — 93005 ELECTROCARDIOGRAM TRACING: CPT

## 2020-04-21 PROCEDURE — 36415 COLL VENOUS BLD VENIPUNCTURE: CPT

## 2020-04-21 PROCEDURE — 85379 FIBRIN DEGRADATION QUANT: CPT

## 2020-04-21 PROCEDURE — 77010033711 HC HIGH FLOW OXYGEN

## 2020-04-21 PROCEDURE — 65660000000 HC RM CCU STEPDOWN

## 2020-04-21 PROCEDURE — 82728 ASSAY OF FERRITIN: CPT

## 2020-04-21 PROCEDURE — 74011636637 HC RX REV CODE- 636/637: Performed by: INTERNAL MEDICINE

## 2020-04-21 PROCEDURE — 74011000258 HC RX REV CODE- 258: Performed by: INTERNAL MEDICINE

## 2020-04-21 PROCEDURE — 86140 C-REACTIVE PROTEIN: CPT

## 2020-04-21 PROCEDURE — 85025 COMPLETE CBC W/AUTO DIFF WBC: CPT

## 2020-04-21 PROCEDURE — 74011250636 HC RX REV CODE- 250/636: Performed by: INTERNAL MEDICINE

## 2020-04-21 PROCEDURE — 74011250637 HC RX REV CODE- 250/637: Performed by: INTERNAL MEDICINE

## 2020-04-21 PROCEDURE — 74011250636 HC RX REV CODE- 250/636: Performed by: FAMILY MEDICINE

## 2020-04-21 PROCEDURE — 77030018846 HC SOL IRR STRL H20 ICUM -A

## 2020-04-21 PROCEDURE — 83615 LACTATE (LD) (LDH) ENZYME: CPT

## 2020-04-21 PROCEDURE — 74011250637 HC RX REV CODE- 250/637: Performed by: HOSPITALIST

## 2020-04-21 PROCEDURE — 74011000250 HC RX REV CODE- 250: Performed by: INTERNAL MEDICINE

## 2020-04-21 PROCEDURE — 71045 X-RAY EXAM CHEST 1 VIEW: CPT

## 2020-04-21 PROCEDURE — 80069 RENAL FUNCTION PANEL: CPT

## 2020-04-21 PROCEDURE — 82962 GLUCOSE BLOOD TEST: CPT

## 2020-04-21 PROCEDURE — 82550 ASSAY OF CK (CPK): CPT

## 2020-04-21 PROCEDURE — 74011250637 HC RX REV CODE- 250/637: Performed by: FAMILY MEDICINE

## 2020-04-21 PROCEDURE — 94762 N-INVAS EAR/PLS OXIMTRY CONT: CPT

## 2020-04-21 RX ORDER — ENOXAPARIN SODIUM 100 MG/ML
40 INJECTION SUBCUTANEOUS EVERY 12 HOURS
Status: DISCONTINUED | OUTPATIENT
Start: 2020-04-21 | End: 2020-04-25

## 2020-04-21 RX ORDER — RIBAVIRIN 200 MG/1
400 CAPSULE ORAL 2 TIMES DAILY
Status: DISCONTINUED | OUTPATIENT
Start: 2020-04-21 | End: 2020-04-25

## 2020-04-21 RX ADMIN — CARVEDILOL 12.5 MG: 12.5 TABLET, FILM COATED ORAL at 21:09

## 2020-04-21 RX ADMIN — WATER 2 G: 1 INJECTION INTRAMUSCULAR; INTRAVENOUS; SUBCUTANEOUS at 06:31

## 2020-04-21 RX ADMIN — ENOXAPARIN SODIUM 40 MG: 80 INJECTION SUBCUTANEOUS at 21:08

## 2020-04-21 RX ADMIN — INSULIN LISPRO 6 UNITS: 100 INJECTION, SOLUTION INTRAVENOUS; SUBCUTANEOUS at 16:53

## 2020-04-21 RX ADMIN — DOXYCYCLINE 100 MG: 100 INJECTION, POWDER, LYOPHILIZED, FOR SOLUTION INTRAVENOUS at 21:09

## 2020-04-21 RX ADMIN — ENOXAPARIN SODIUM 80 MG: 80 INJECTION SUBCUTANEOUS at 08:30

## 2020-04-21 RX ADMIN — Medication 10 ML: at 21:10

## 2020-04-21 RX ADMIN — Medication 500 MG: at 08:29

## 2020-04-21 RX ADMIN — ATORVASTATIN CALCIUM 40 MG: 40 TABLET, FILM COATED ORAL at 08:31

## 2020-04-21 RX ADMIN — DICLOFENAC 4 G: 10 GEL TOPICAL at 13:00

## 2020-04-21 RX ADMIN — Medication 500 MG: at 21:08

## 2020-04-21 RX ADMIN — DICLOFENAC 4 G: 10 GEL TOPICAL at 17:00

## 2020-04-21 RX ADMIN — CHOLECALCIFEROL (VITAMIN D3) 10 MCG (400 UNIT) TABLET 1 TABLET: at 08:31

## 2020-04-21 RX ADMIN — RIBAVIRIN 400 MG: 200 CAPSULE ORAL at 14:08

## 2020-04-21 RX ADMIN — FAMOTIDINE 20 MG: 20 TABLET ORAL at 21:09

## 2020-04-21 RX ADMIN — WATER 2 G: 1 INJECTION INTRAMUSCULAR; INTRAVENOUS; SUBCUTANEOUS at 21:08

## 2020-04-21 RX ADMIN — DICLOFENAC 4 G: 10 GEL TOPICAL at 09:00

## 2020-04-21 RX ADMIN — FAMOTIDINE 20 MG: 20 TABLET ORAL at 08:39

## 2020-04-21 RX ADMIN — LORAZEPAM 1 MG: 1 TABLET ORAL at 08:38

## 2020-04-21 RX ADMIN — INSULIN LISPRO 3 UNITS: 100 INJECTION, SOLUTION INTRAVENOUS; SUBCUTANEOUS at 21:09

## 2020-04-21 RX ADMIN — ZINC SULFATE 220 MG (50 MG) CAPSULE 1 CAPSULE: CAPSULE at 08:39

## 2020-04-21 RX ADMIN — METHYLPREDNISOLONE SODIUM SUCCINATE 80 MG: 40 INJECTION, POWDER, FOR SOLUTION INTRAMUSCULAR; INTRAVENOUS at 08:29

## 2020-04-21 RX ADMIN — Medication 10 ML: at 14:09

## 2020-04-21 RX ADMIN — WATER 2 G: 1 INJECTION INTRAMUSCULAR; INTRAVENOUS; SUBCUTANEOUS at 14:08

## 2020-04-21 RX ADMIN — MELATONIN TAB 3 MG 6 MG: 3 TAB at 21:09

## 2020-04-21 RX ADMIN — RIBAVIRIN 400 MG: 200 CAPSULE ORAL at 21:08

## 2020-04-21 RX ADMIN — ASPIRIN 81 MG: 81 TABLET, COATED ORAL at 08:39

## 2020-04-21 RX ADMIN — DOXYCYCLINE 100 MG: 100 INJECTION, POWDER, LYOPHILIZED, FOR SOLUTION INTRAVENOUS at 08:30

## 2020-04-21 RX ADMIN — Medication 10 ML: at 06:31

## 2020-04-21 RX ADMIN — CARVEDILOL 12.5 MG: 12.5 TABLET, FILM COATED ORAL at 08:30

## 2020-04-21 RX ADMIN — GUAIFENESIN 600 MG: 600 TABLET, EXTENDED RELEASE ORAL at 08:30

## 2020-04-21 RX ADMIN — GUAIFENESIN 600 MG: 600 TABLET, EXTENDED RELEASE ORAL at 21:09

## 2020-04-21 RX ADMIN — ACETAMINOPHEN 650 MG: 325 TABLET ORAL at 08:30

## 2020-04-21 NOTE — PROGRESS NOTES
Chart reviewed. No discharge needs at this time. Case Management will continue to follow.       MICHEAL JansenN RN  Care Management  Pager: [phone]

## 2020-04-21 NOTE — ROUTINE PROCESS
Patient's wife has called multiple times this morning requesting to speak with Dr. Ronald Hemphill. Dr. Ronald Hemphill notified by this nurse and notified of patient's family request.  [facility] phone numbers are [phone] or [phone].

## 2020-04-21 NOTE — DIABETES MGMT
GLYCEMIC CONTROL PLAN OF CARE     Assessment/Recommendations:  Fasting lab glucose this am 109 mg/dl   Noted pt receiving steroids. All postprandial blood glucose results elevated yesterday. May benefit from a low dose mealtime insulin. Lispro 2 units 3 times daily with meals. Continue corrective insulin coverage as needed  Will change frequency to AC&HS instead of tid. Will continue inpatient monitoring. Most recent blood glucose values:    Results for Miguel Gaxiola (MRN [id number]) as of 4/21/2020 09:17   Ref. Range 4/19/2020 22:52 4/20/2020 08:44 4/20/2020 12:17 4/20/2020 16:46 4/21/2020 08:00   GLUCOSE,FAST - POC Latest Ref Range: 70 - 110 mg/dL 209 (H) 135 (H) 208 (H) 177 (H) 97         Current A1C of 8.2 % is equivalent to average blood glucose of 189 mg/dl over the past 2-3 months.     Current hospital diabetes medications:   Lispro corrective insulin coverage AC&HS  Previous day's insulin requirements:   Lispro 9 units corrective insulin coverage  Home diabetes medications:  Janumet   Bydureon weekly  Diet:    Diabetic consistent carb with supplements  Education:  __x_Refer to Diabetes Education Record             ____Education not indicated at this time      [facility] CDE  Ext [phone]

## 2020-04-21 NOTE — ROUTINE PROCESS
Verbal shift change report given to Nicole Macias (oncoming nurse) by Viktoriya Finn RN (offgoing nurse). Report included the following information SBAR, Kardex, Recent Results and Alarm Parameters .

## 2020-04-21 NOTE — CONSULTS
[facility]. Pulmonary, Critical Care, and Sleep Medicine    Initial Patient Consult    Name: Char Love MRN: [id number]   : 1960 Hospital: [address] [place] [address] Dr   Date: 2020        This patient has been seen and evaluated at the request of Dr. Thelma Hernandes for resp failure/hypoxia. IMPRESSION:   · Acute hypoxic respiratory failure: Etiology secondary to COVID-19 pneumonia. Pt developing worsening hypoxia - SpO2 in upper 80s on 12LNC  · COVID-19 infection with multifocal pneumonia  · Moderate ARDS:  Secondary to above  · Severe sepsis:  Secondary to above  · JOSE on CKD  · Hx of HTN: pt on an ACEi prior to admission  · Hx of CLL  · Hx of PVC ablation in 2010  · Hx of dyslipidemia     Patient Active Problem List   Diagnosis Code    [facility] I42.8    PVC (premature ventricular contraction) I49.3    S/P ablation operation for arrhythmia Z98.890, Z86.79    Lymphocytosis D72.820    CLL (chronic lymphocytic leukemia) ([facility] C91.10    Skin rash R21    Vitamin D deficiency E55.9    Pneumonia J18.9    COVID-19 virus infection U07.1    Suspected Covid-19 Virus Infection R68.89      RECOMMENDATIONS:   Supplemental oxygen to maintain SpO2 >88% --- start pt on HFNC with Vapotherm (approved by institution for use with COVID-19 infection)  Consider convalescent serum transfusion given worsening infiltrates despite full supportive care  Will defer antiinflammatory medications (including antimalarials, steroids, and trace minerals) and ABx to ID service -- data for novel coronavirus is limited, please weigh risks and benefits.   Discussed with ID, would advise remdesivir administration if possible  Check QuIGs including IgG, IgM, IgA, as well as serial fibrinogen, LDH, triglyceride, CRP, ferritin  Aggressive pulmonary toileting/bronchial hygiene  Frequent incentive spirometry  Aspiration precautions including elevating HOB >30deg  PT/OT, OOB, ambulate with assistance as tolerated  DVT ppx per primary service -- recommend very judicious use of full dose anticoagulation without evidence of thrombitic disease -- pt has severe sespis from COVID-19 which is a hyperinflammatory state which may lead to more thrombus formation, but must weigh risks vs benefits. Please monitor for blood loss and DIC  Please assess for home oxygen need prior to discharge  Will follow    Prognosis guarded     Subjective:     Patient is a 60 y.o. male with a past medical history of CLL, PVC ablation, hypertension, presented to [facility] about 6 days ago with complaints of fever and cough for a few days prior to presentation. Patient reports that his wife and daughter were sick at home, but he does not know any other sick contacts. Patient then developed worsening shortness of breath over the last 5 days. Patient also reports that he lost his sense of taste during that time. While in the ER, patient was found to be hypoxic at 2 L by nasal cannula along with fever and had acute renal failure. During the course of hospitalization, patient has been having productive cough, which patient reports that resolved over the last day. Patient reports now that his cough is better and he reports that his dyspnea is somewhat improved however patient is becoming increasingly more hypoxic, patient has been going up from 2 L nasal cannula up to 8 L yesterday and 10 L today. Patient reports he has been having issues with insomnia, not being able to sleep. Patient also reports some malaise. Patient otherwise denies any chest pain, nausea, vomiting, diarrhea, hemoptysis. Patient reports smoking a pack a day until a number of years ago. Patient denies any occupational exposures to coal/silica/asbestos, patient reports he is a former . Patient reports he follows with Dr. Deloria Severance for CLL, has not been on any chemotherapy.       Past Medical History:   Diagnosis Date    Abnormal nuclear cardiac imaging test 11/30/2006    Mild anterior ischemia. Inferior scar w/border zone ischemia. LVE. EF 26%. (Gated imaging may be inaccurate.)  Global hypk. Neg EKG on max EST. Ex time 10:30.  Aorto-iliac duplex 09/10/2012    No AAA identified.  Carotid duplex 09/10/2012    No significant occlusive disease bilaterallly.  [facility]chronic [facility]ocytic [facility]) [facility]     Diabetes ([facility]     Echocardiogram 06/02/2015    EF 55%. No WMA. Mild LVH. Gr 1 DDfx. IMELDA. No significant chg from study of 5/17/10.  History of echocardiogram 06/20/2011    EF 50-55%. Gr 2 DDfx. Mild RVE. Mild LAE. Mild-mod IMELDA.  Hypercholesterolemia     Hypertension     Nonischemic cardiomyopathy ([facility]     s/p right coronary cusp PVC ablation  (1/23/36) without complication    [facility]     S/P cardiac cath 12/11/2006    Patent coronary arteries. LVEDP 12.  EF 25-30%. Mod-significant global hypk. Past Surgical History:   Procedure Laterality Date    HX HEART CATHETERIZATION  12/11/06    The right coronary artery is dominant. It is widely patent. The left main coronary artery is widely patent. The circumflex artery is widely patent. The left anterior descending coronary artery is widely patent. The LVEDP is 12 mmHg. The overall left ventricular systolic function is significantly diminished with an estimated ejection fraction of 25-30%. ** See report. Prior to Admission medications    Medication Sig Start Date End Date Taking? Authorizing Provider   SHELBY OBRIENSE 2 mg/0.85 mL atIn INJECT 2MG EVERY WEEK UNDER THE SKIN 6/7/19   Provider, Historical   ergocalciferol (ERGOCALCIFEROL) 50,000 unit capsule Take 1 Cap by mouth every seven (7) days. 1/7/19   Ludwin MCCORD NP   sitagliptin phos/metformin HCl (JANUMET PO) Take  by mouth. Provider, Historical   lisinopril (PRINIVIL, ZESTRIL) 5 mg tablet Take 1 Tab by mouth daily.  6/29/15   Denny Denise MD   carvedilol (COREG) 12.5 mg tablet Take 1 Tab by mouth two (2) times a day. 6/4/15   Diego Denise MD   simvastatin (ZOCOR) 10 mg tablet Take  by mouth daily. Provider, Historical   aspirin delayed-release 81 mg tablet Take 81 mg by mouth daily. 6/9/11   Provider, Historical     No Known Allergies   Social History     Tobacco Use    Smoking status: Former Smoker     Packs/day: 1.00    Smokeless tobacco: Never Used   Substance Use Topics    Alcohol use: Yes      History reviewed. No pertinent family history.      Current Facility-Administered Medications   Medication Dose Route Frequency    enoxaparin (LOVENOX) injection 40 mg  40 mg SubCUTAneous Q12H    ribavirin (REBETOL) capsule 400 mg  400 mg Oral BID    insulin lispro (HUMALOG) injection   SubCUTAneous AC&HS    guaiFENesin ER (MUCINEX) tablet 600 mg  600 mg Oral Q12H    diclofenac (VOLTAREN) 1 % topical gel 4 g  4 g Topical QID    melatonin tablet 6 mg  6 mg Oral QHS    famotidine (PEPCID) tablet 20 mg  20 mg Oral BID    atorvastatin (LIPITOR) tablet 40 mg  40 mg Oral DAILY    cefepime (MAXIPIME) 2 g in sterile water (preservative free) 10 mL IV syringe  2 g IntraVENous Q8H    doxycycline (VIBRAMYCIN) 100 mg in 0.9% sodium chloride (MBP/ADV) 100 mL MBP  100 mg IntraVENous Q12H    methylPREDNISolone (PF) (SOLU-MEDROL) injection 80 mg  80 mg IntraVENous Q24H    sodium chloride (NS) flush 5-40 mL  5-40 mL IntraVENous Q8H    aspirin delayed-release tablet 81 mg  81 mg Oral DAILY    carvediloL (COREG) tablet 12.5 mg  12.5 mg Oral BID    zinc sulfate (ZINCATE) 220 mg capsule 1 Cap  1 Cap Oral DAILY    ascorbic acid (vitamin C) (VITAMIN C) tablet 500 mg  500 mg Oral BID    cholecalciferol (VITAMIN D3) (400 Units /10 mcg) tablet 1 Tab  400 Units Oral DAILY       Review of Systems:  A comprehensive ROS was obtained as stated in HPI, all others are negative      Objective:   Vital Signs:    Visit Vitals  /69 (BP 1 Location: Right arm, BP Patient Position: Lying left side)   Pulse 63   Temp 97 °F (36.1 °C)   Resp 27   Ht 5' 9\" (1.753 m)   Wt 84.8 kg (187 lb)   SpO2 91%   BMI 27.62 kg/m²       O2 Device: Hi flow nasal cannula   O2 Flow Rate (L/min): 12 l/min   Temp (24hrs), Av.2 °F (36.8 °C), Min:97 °F (36.1 °C), Max:99.1 °F (37.3 °C)       Intake/Output:   Last shift:       07 - 1900  In: 230 [I.V.:230]  Out: -   Last 3 shifts: 1901 -  07  In: 720 [P.O.:480; I.V.:240]  Out: 1725 [Urine:1725]    Intake/Output Summary (Last 24 hours) at 2020 1602  Last data filed at 2020 1408  Gross per 24 hour   Intake 230 ml   Output 500 ml   Net -270 ml      Physical Exam:   General:  Alert, cooperative, no distress, appears stated age, laying in bed, wearing Salter cannula. Head:  Normocephalic, without obvious abnormality, atraumatic. Eyes:  Conjunctivae/corneas clear. ANicteric, PERRLA, EOMI   Nose: Nares normal. Mucosa normal. No drainage or sinus tenderness. Throat: Lips, mucosa dry. NO thrush; poor dentition, no oral lesions   Neck: Supple, symmetrical, trachea midline, no adenopathy, thyroid: no enlargment/tenderness/nodules, no carotid bruit and no JVD. No crepitus   Back:   Symmetric, no curvature, no spine tenderness or flank pain   Lungs:    Poor air entry bilaterally, CTA BL, no wheezes/rales/rhonchi throughout all lung fields   Chest wall:  No tenderness or deformity. NO CREPITUS   Heart:  Regular rate and rhythm, S1, S2 normal, no murmur, click, rub or gallop. Abdomen:   Soft, non-tender. Bowel sounds normal. No masses,  No organomegaly. No paradoxical motion   Extremities: normal, atraumatic, no cyanosis or edema. No clubbing   Pulses: 1-2+ and symmetric all extremities.    Skin: Skin color, texture, turgor normal. No rashes or lesions   Lymph nodes: Cervical, supraclavicular, and axillary nodes normal.   Neurologic: Grossly nonfocal, strength and coordination grossly intact throughout all extremities, sensation also grossly intact Data review:   Labs:  Recent Results (from the past 24 hour(s))   GLUCOSE, POC    Collection Time: 04/20/20  4:46 PM   Result Value Ref Range    Glucose (POC) 177 (H) 70 - 110 mg/dL   EKG, 12 LEAD, SUBSEQUENT    Collection Time: 04/21/20  4:25 AM   Result Value Ref Range    Ventricular Rate 67 BPM    Atrial Rate 67 BPM    P-R Interval 186 ms    QRS Duration 98 ms    Q-T Interval 408 ms    QTC Calculation (Bezet) 431 ms    Calculated P Axis 49 degrees    Calculated R Axis 23 degrees    Calculated T Axis 2 degrees    Diagnosis       Normal sinus rhythm  Normal ECG  When compared with ECG of 20-APR-2020 05:53,  No significant change was found     FERRITIN    Collection Time: 04/21/20  5:36 AM   Result Value Ref Range    Ferritin 628 (H) 8 - 388 NG/ML   CK    Collection Time: 04/21/20  5:36 AM   Result Value Ref Range     (H) 39 - 308 U/L   C REACTIVE PROTEIN, QT    Collection Time: 04/21/20  5:36 AM   Result Value Ref Range    C-Reactive protein 3.0 (H) 0 - 0.3 mg/dL   LD    Collection Time: 04/21/20  5:36 AM   Result Value Ref Range     (H) 81 - 234 U/L   D DIMER    Collection Time: 04/21/20  5:36 AM   Result Value Ref Range    D DIMER 0.59 (H) <0.46 ug/ml(FEU)   CBC WITH AUTOMATED DIFF    Collection Time: 04/21/20  5:36 AM   Result Value Ref Range    WBC 20.1 (H) 4.6 - 13.2 K/uL    RBC 4.78 4.70 - 5.50 M/uL    HGB 11.6 (L) 13.0 - 16.0 g/dL    HCT 36.7 36.0 - 48.0 %    MCV 76.8 74.0 - 97.0 FL    MCH 24.3 24.0 - 34.0 PG    MCHC 31.6 31.0 - 37.0 g/dL    RDW 16.0 (H) 11.6 - 14.5 %    PLATELET 103 454 - 229 K/uL    MPV 10.3 9.2 - 11.8 FL    NEUTROPHILS 26 (L) 42 - 75 %    LYMPHOCYTES 74 (H) 20 - 51 %    MONOCYTES 0 (L) 2 - 9 %    EOSINOPHILS 0 0 - 5 %    BASOPHILS 0 0 - 3 %    ABS. NEUTROPHILS 5.2 1.8 - 8.0 K/UL    ABS. LYMPHOCYTES 14.9 (H) 0.8 - 3.5 K/UL    ABS. MONOCYTES 0.0 0 - 1.0 K/UL    ABS. EOSINOPHILS 0.0 0.0 - 0.4 K/UL    ABS.  BASOPHILS 0.0 0.0 - 0.06 K/UL    DF MANUAL      PLATELET COMMENTS ADEQUATE PLATELETS      RBC COMMENTS ANISOCYTOSIS  1+        RBC COMMENTS MICROCYTOSIS  1+       RENAL FUNCTION PANEL    Collection Time: 04/21/20  5:36 AM   Result Value Ref Range    Sodium 137 136 - 145 mmol/L    Potassium 4.5 3.5 - 5.5 mmol/L    Chloride 105 100 - 111 mmol/L    CO2 26 21 - 32 mmol/L    Anion gap 6 3.0 - 18 mmol/L    Glucose 109 (H) 74 - 99 mg/dL    BUN 27 (H) 7.0 - 18 MG/DL    Creatinine 1.05 0.6 - 1.3 MG/DL    BUN/Creatinine ratio 26 (H) 12 - 20      GFR est AA >60 >60 ml/min/1.73m2    GFR est non-AA >60 >60 ml/min/1.73m2    Calcium 8.2 (L) 8.5 - 10.1 MG/DL    Phosphorus 4.0 2.5 - 4.9 MG/DL    Albumin 2.8 (L) 3.4 - 5.0 g/dL   GLUCOSE, POC    Collection Time: 04/21/20  8:00 AM   Result Value Ref Range    Glucose (POC) 97 70 - 110 mg/dL   GLUCOSE, POC    Collection Time: 04/21/20 12:01 PM   Result Value Ref Range    Glucose (POC) 154 (H) 70 - 110 mg/dL     ABG:  No results found for: PH, PHI, PCO2, PCO2I, PO2, PO2I, HCO3, HCO3I, FIO2, FIO2I    PFT Results  (Last 48 hours)    None        Echo Results  (Last 48 hours)    None        Imaging:  I have personally reviewed the patients radiographs and have reviewed the reports:  Chest x-ray from 4/21/2020 compared to chest x-ray from 4/17 and 4/16 shows worsening bilateral infiltrates consistent with multifocal pneumonia and ARDS. No masses, effusion seen, however due to dense nature of infiltrates, unable to assess further. CXR Results  (Last 48 hours)               04/21/20 0601  XR CHEST PORT Final result    Impression:  Impression:   1. Diffuse bilateral groundglass opacities which are progressed. Narrative:  EXAM: Chest Radiograph       INDICATION:  COVID-19 pneumonia       TECHNIQUE: AP view of the chest       COMPARISON: 4/17/2020, 4/16/2020, 1/22/2010        FINDINGS: No pneumothorax identified. The lungs are hypoinflated. Diffuse   groundglass opacities are noted which are significantly progressed since prior   imaging.  No effusions identified. The cardiomediastinal silhouette is   unremarkable. The pulmonary vasculature is unremarkable. Degenerative changes   of the thoracic spine. CT Results  (Last 48 hours)    None            High complexity decision making was performed during the evaluation of this patient at high risk for decompensation with multiple organ involvement    Total of 35 min critical care time spent at bedside during the course of care providing evaluation,management and care decisions and ordering appropriate treatment related to critical care problems exclusive of procedures. The reason for providing this level of medical care for this critically ill patient was due a critical illness that impaired one or more vital organ systems such that there was a high probability of imminent or life threatening deterioration in the patients condition. This care involved high complexity decision making to assess, manipulate, and support vital system functions, to treat this degree vital organ system failure and to prevent further life threatening deterioration of the patients condition. Above mentioned total time spent on reviewing the case/medical record/data/notes/EMR/patient examination/documentation/coordinating care with nurse/consultants, exclusive of procedures with complex decision making performed and > 50% time spent in face to face evaluation.            Pura Sands MD/MPH     Pulmonary, Critical Care Medicine  [address] [place] [address] [facility]

## 2020-04-21 NOTE — PROGRESS NOTES
Pt wake and alert   Pt placed on HF with prone position         04/21/20 1621   Oxygen Therapy   O2 Sat (%) 93 %   Pulse via Oximetry 68 beats per minute   O2 Device Hi flow nasal cannula; Heated   O2 Flow Rate (L/min) 35 l/min   O2 Temperature 91.4 °F (33 °C)   FIO2 (%) 65 %

## 2020-04-21 NOTE — PROGRESS NOTES
1925: Assumed patient care from [address]. Patient is alert and oriented to person, place, time and situation. Respiratory status Is stable on 6L via nasal cannula. Vital signs are stable. MEWS score is a one. Patient jovana any pain, discomfort, nausea vomiting dizziness or anxiety. White board and fall card is updated. Bed is locked and in lowest position. Call bell, water and personal belongings are within reach. Patient has no questions, comments or concerns after bedside shift report. 0700: Patient had an uneventful shift. Respiratory status, vital signs and MEWS score remained stable. Patient was resting quietly with no signs of distress noted. Bed locked and in lowest position. Call bell water and personal belongings were within reach. Patient had no questions, comments or concerns after bedside shift report.  Bedside report given to Dae Hilton R.N.

## 2020-04-21 NOTE — ROUTINE PROCESS
Verbal shift change report given to Luis Montana RN (oncoming nurse) by Rah Linares (offgoing nurse). Report included the following information SBAR, Kardex, Recent Results and Alarm Parameters .

## 2020-04-21 NOTE — PROGRESS NOTES
Infectious Disease progress Note        Reason: sepsis, COVID-19 infection    Current abx Prior abx   Cefepime, doxycycline since 4/18/2020 Ceftriaxone, azithromycin 4/16/20-4/18/2020  Hydroxychloroquine  4/16/20-4/20/20     Lines:       Assessment :    60 y.o. male  with history of CLL, type 2 diabetes, prostate cancer, history of atrial fibrillation with ablation, hypertension, CKD stage 3 who presented to the emergency room on 4/16/2020 secondary to shortness of breath. CXR: mild opacities at lung bases. Now with increasing shortness of breath, hypoxia requiring oxygen, worsening renal function    Patient presents with a highly complex clinical picture. Clinical picture consistent with sepsis (present on admission ) due to bilateral  covid-19 infection. Labs 4/16 reviewed-ferritin 233, C-reactive protein 6.3, , , d-dimer 0.75  Labs 4/17 reviewed- ferritin 266, C-reactive protein 8.0, , LDH: 219, d-dimer 1.20   Labs 4/19 reviewed-ferritin 346, C-reactive protein 10.4, , , d-dimer 0.84  Labs on 4/20 reviewed-ferritin 628, C-reactive protein 6.2, , , d-dimer 0.7  Labs on 4/21 reviewed ferritin 628, CRP 3, , , d-dimer 0.49    Worsening respiratory status 4/18-4/19 with worsening inflammatory parameters suggestive of cytokine storm. Status post Solu-Medrol since 4/18. Status post tocilizumab on 4/19. Sputum culture 4/18 light normal respiratory giselle, yeast    Acute on chronic renal insufficiency-could be prerenal versus ATN secondary to sepsis. Nephrology consult appreciated. Creatinine stable    Subjective sense of improvement. However labile oxygenation. episodes of desaturation with minimal movement. Improved oxygenation in the prone position. Patient is more compliant with prone position now.     EKG 4/16-QTc 416, 456 on 4/20, 431 on 4/21    Procalcitonin 0.38 on 4/20    Chest x-ray 4/21-worsening bilateral infiltrates suggestive of ARDS.     Recommendations:    1.  continue cefepime,  doxycycline  2. Monitor ferritin, CRP, CPK, LDH, d-dimer daily  3. Discontinue daily EKG  4. Continue zinc, ascorbic acid. vitamin D3, melatonin  5. Recommend prone position ventilation. I discussed this with patient. 6.   Follow-up nephrology/pulmonary recommendations  7. Titrate oxygen as tolerated  8. I have contacted pharmacist to attempt to arrange for remdesevir. I have requested dr. Irene Gonsalves to to see if we can arrange for convalescent plasma therapy. Risk and benefits of above treatment discussed with patient in details. He verbalized his understanding and agrees with the treatment plan  Above plan was discussed in details with patient, dr. Irene Gonsalves. Please call me if any further questions or concerns. Will continue to participate in the care of this patient. HPI:    Feels  better. Improved myalgias  Patient denies headaches, visual disturbances, sore throat, runny nose, earaches, abdominal pain, diarrhea, burning micturition, pain or weakness in extremities. He denies back pain/flank pain. home Medication List    Details   BYDUREON BCISE 2 mg/0.85 mL atIn INJECT 2MG EVERY WEEK UNDER THE SKIN  Refills: 0      ergocalciferol (ERGOCALCIFEROL) 50,000 unit capsule Take 1 Cap by mouth every seven (7) days. Qty: 24 Cap, Refills: 1    Associated Diagnoses: Vitamin D deficiency      sitagliptin phos/metformin HCl (JANUMET PO) Take  by mouth.      lisinopril (PRINIVIL, ZESTRIL) 5 mg tablet Take 1 Tab by mouth daily. Qty: 30 Tab, Refills: 6      carvedilol (COREG) 12.5 mg tablet Take 1 Tab by mouth two (2) times a day. Qty: 60 Tab, Refills: 6      simvastatin (ZOCOR) 10 mg tablet Take  by mouth daily. aspirin delayed-release 81 mg tablet Take 81 mg by mouth daily.              Current Facility-Administered Medications   Medication Dose Route Frequency    insulin lispro (HUMALOG) injection   SubCUTAneous AC&HS    guaiFENesin ER (MUCINEX) tablet 600 mg  600 mg Oral Q12H    diclofenac (VOLTAREN) 1 % topical gel 4 g  4 g Topical QID    melatonin tablet 6 mg  6 mg Oral QHS    famotidine (PEPCID) tablet 20 mg  20 mg Oral BID    LORazepam (ATIVAN) tablet 1 mg  1 mg Oral Q6H PRN    atorvastatin (LIPITOR) tablet 40 mg  40 mg Oral DAILY    cefepime (MAXIPIME) 2 g in sterile water (preservative free) 10 mL IV syringe  2 g IntraVENous Q8H    doxycycline (VIBRAMYCIN) 100 mg in 0.9% sodium chloride (MBP/ADV) 100 mL MBP  100 mg IntraVENous Q12H    methylPREDNISolone (PF) (SOLU-MEDROL) injection 80 mg  80 mg IntraVENous Q24H    enoxaparin (LOVENOX) injection 80 mg  80 mg SubCUTAneous Q12H    sodium chloride (NS) flush 5-40 mL  5-40 mL IntraVENous Q8H    aspirin delayed-release tablet 81 mg  81 mg Oral DAILY    carvediloL (COREG) tablet 12.5 mg  12.5 mg Oral BID    zinc sulfate (ZINCATE) 220 mg capsule 1 Cap  1 Cap Oral DAILY    ascorbic acid (vitamin C) (VITAMIN C) tablet 500 mg  500 mg Oral BID    glucose chewable tablet 16 g  4 Tab Oral PRN    glucagon (GLUCAGEN) injection 1 mg  1 mg IntraMUSCular PRN    dextrose 10% infusion 125-250 mL  125-250 mL IntraVENous PRN    acetaminophen (TYLENOL) tablet 650 mg  650 mg Oral Q6H PRN    cholecalciferol (VITAMIN D3) (400 Units /10 mcg) tablet 1 Tab  400 Units Oral DAILY       Allergies: Patient has no known allergies. Temp (24hrs), Av.5 °F (36.9 °C), Min:98.2 °F (36.8 °C), Max:99.1 °F (37.3 °C)    Visit Vitals  /53 (BP 1 Location: Right arm, BP Patient Position: Lying left side)   Pulse 69   Temp 99.1 °F (37.3 °C)   Resp 21   Ht 5' 9\" (1.753 m)   Wt 84.8 kg (187 lb)   SpO2 92%   BMI 27.62 kg/m²       ROS: 12 point ROS obtained in details. Pertinent positives as mentioned in HPI,   otherwise negative    Physical Exam:    General: Well developed, well nourished male laying on the bed AAOx3. Appears comfortable in the prone position.     General:   awake alert and oriented   HEENT: Normocephalic, atraumatic, PERRL, EOMI, no scleral icterus or pallor; no conjunctival hemmohage;  nasal and oral mucous are moist and without evidence of lesions. Neck supple, no bruits. Lymph Nodes:   no cervical, axillary or inguinal adenopathy   Lungs:   non-labored, bilaterally clear to auscultation- no crackles wheezes rales or rhonchi   Heart:  RRR, s1 and s2; no rubs or gallops, no edema, + pedal pulses   Abdomen:  soft, non-distended, active bowel sounds, no hepatomegaly, no splenomegaly. Non-tender   Genitourinary:  deferred   Extremities:   no clubbing, cyanosis; no joint effusions or swelling; Full ROM of all large joints to the upper and lower extremities; muscle mass appropriate for age   Neurologic:  No gross focal sensory abnormalities; 5/5 muscle strength to upper and lower extremities. Speech appropriate. Cranial nerves intact                        Skin:  No rash or ulcers noted   Back:  no spinal or paraspinal muscle tenderness or rigidity, no CVA tenderness     Psychiatric:  No suicidal or homicidal ideations, appropriate mood and affect         Labs: Results:   Chemistry Recent Labs     04/21/20  0536 04/20/20  0125 04/19/20  1908 04/19/20  0255   *  --  168* 168*     --  138 136   K 4.5  --  4.8 5.0     --  106 104   CO2 26  --  25 24   BUN 27*  --  22* 17   CREA 1.05  --  1.33* 1.02   CA 8.2*  --  8.2* 7.9*   AGAP 6  --  7 8   BUCR 26*  --  17 17   AP  --  36*  --  37*   TP  --  7.4  --  7.6   ALB 2.8* 2.8* 2.7* 2.9*   GLOB  --  4.6*  --  4.7*   AGRAT  --  0.6*  --  0.6*      CBC w/Diff Recent Labs     04/21/20 0536 04/20/20  0125 04/19/20  0255   WBC 20.1* 15.4* 11.6   RBC 4.78 4.41* 4.72   HGB 11.6* 10.9* 11.4*   HCT 36.7 34.0* 36.3    227 191   GRANS 26* 30* 22*   LYMPH 74* 58* 67*   EOS 0 0 0      Microbiology No results for input(s): CULT in the last 72 hours.        RADIOLOGY:    All available imaging studies/reports in [facility] for this admission were reviewed    Dr. Pauline Parker, Infectious Disease Specialist  [phone]  April 21, 2020  9:57 AM

## 2020-04-21 NOTE — PROGRESS NOTES
Infectious Disease progress Note        Reason: sepsis, COVID-19 infection    Current abx Prior abx   Cefepime, doxycycline since 4/18/2020 Ceftriaxone, azithromycin 4/16/20-4/18/2020  Hydroxychloroquine  4/16/20-4/20/20     Lines:       Assessment :    60 y.o. male  with history of CLL, type 2 diabetes, prostate cancer, history of atrial fibrillation with ablation, hypertension, CKD stage 3 who presented to the emergency room on 4/16/2020 secondary to shortness of breath. CXR: mild opacities at lung bases. Now with increasing shortness of breath, hypoxia requiring oxygen, worsening renal function    Patient presents with a highly complex clinical picture. Clinical picture consistent with sepsis (present on admission ) due to bilateral  covid-19 infection. Labs 4/16 reviewed-ferritin 233, C-reactive protein 6.3, , , d-dimer 0.75  Labs 4/17 reviewed- ferritin 266, C-reactive protein 8.0, , LDH: 219, d-dimer 1.20   Labs 4/19 reviewed-ferritin 346, C-reactive protein 10.4, , , d-dimer 0.84  Labs on 4/20 reviewed-ferritin 628, C-reactive protein 6.2, , , d-dimer 0.7  Labs on 4/21 reviewed ferritin 628, CRP 3, , , d-dimer 0.49    Worsening respiratory status 4/18-4/19 with worsening inflammatory parameters suggestive of cytokine storm. Status post Solu-Medrol since 4/18. Status post tocilizumab on 4/19. Sputum culture 4/18 light normal respiratory giselle, yeast    Acute on chronic renal insufficiency-could be prerenal versus ATN secondary to sepsis. Nephrology consult appreciated. Creatinine stable    Subjective sense of improvement. However labile oxygenation. episodes of desaturation with minimal movement. Improved oxygenation in the prone position. Patient is more compliant with prone position now.     EKG 4/16-QTc 416, 456 on 4/20, 431 on 4/21    Procalcitonin 0.38 on 4/20    Chest x-ray 4/21-worsening bilateral infiltrates suggestive of ARDS.     Recommendations:    1.  continue cefepime,  Doxycycline. Add ribavarin. 2.  Monitor ferritin, CRP, CPK, LDH, d-dimer daily  3. Discontinue daily EKG  4. Continue zinc, ascorbic acid. vitamin D3, melatonin  5. Recommend prone position ventilation. I discussed this with patient. 6.   Follow-up nephrology/pulmonary recommendations  7. Titrate oxygen as tolerated  8. I have contacted pharmacist to attempt to arrange for remdesevir. I have requested dr. Oz Durham to to see if we can arrange for convalescent plasma therapy. Risk and benefits of above treatment discussed with patient in details. He verbalized his understanding and agrees with the treatment plan  Above plan was discussed in details with patient, dr. Oz Durham. Please call me if any further questions or concerns. Will continue to participate in the care of this patient. HPI:    Feels  better. Improved myalgias  Patient denies headaches, visual disturbances, sore throat, runny nose, earaches, abdominal pain, diarrhea, burning micturition, pain or weakness in extremities. He denies back pain/flank pain. home Medication List    Details   BYDUREON BCISE 2 mg/0.85 mL atIn INJECT 2MG EVERY WEEK UNDER THE SKIN  Refills: 0      ergocalciferol (ERGOCALCIFEROL) 50,000 unit capsule Take 1 Cap by mouth every seven (7) days. Qty: 24 Cap, Refills: 1    Associated Diagnoses: Vitamin D deficiency      sitagliptin phos/metformin HCl (JANUMET PO) Take  by mouth.      lisinopril (PRINIVIL, ZESTRIL) 5 mg tablet Take 1 Tab by mouth daily. Qty: 30 Tab, Refills: 6      carvedilol (COREG) 12.5 mg tablet Take 1 Tab by mouth two (2) times a day. Qty: 60 Tab, Refills: 6      simvastatin (ZOCOR) 10 mg tablet Take  by mouth daily. aspirin delayed-release 81 mg tablet Take 81 mg by mouth daily.              Current Facility-Administered Medications   Medication Dose Route Frequency    enoxaparin (LOVENOX) injection 40 mg  40 mg SubCUTAneous Q12H    ribavirin (REBETOL) capsule 400 mg  400 mg Oral BID    insulin lispro (HUMALOG) injection   SubCUTAneous AC&HS    guaiFENesin ER (MUCINEX) tablet 600 mg  600 mg Oral Q12H    diclofenac (VOLTAREN) 1 % topical gel 4 g  4 g Topical QID    melatonin tablet 6 mg  6 mg Oral QHS    famotidine (PEPCID) tablet 20 mg  20 mg Oral BID    LORazepam (ATIVAN) tablet 1 mg  1 mg Oral Q6H PRN    atorvastatin (LIPITOR) tablet 40 mg  40 mg Oral DAILY    cefepime (MAXIPIME) 2 g in sterile water (preservative free) 10 mL IV syringe  2 g IntraVENous Q8H    doxycycline (VIBRAMYCIN) 100 mg in 0.9% sodium chloride (MBP/ADV) 100 mL MBP  100 mg IntraVENous Q12H    methylPREDNISolone (PF) (SOLU-MEDROL) injection 80 mg  80 mg IntraVENous Q24H    sodium chloride (NS) flush 5-40 mL  5-40 mL IntraVENous Q8H    aspirin delayed-release tablet 81 mg  81 mg Oral DAILY    carvediloL (COREG) tablet 12.5 mg  12.5 mg Oral BID    zinc sulfate (ZINCATE) 220 mg capsule 1 Cap  1 Cap Oral DAILY    ascorbic acid (vitamin C) (VITAMIN C) tablet 500 mg  500 mg Oral BID    glucose chewable tablet 16 g  4 Tab Oral PRN    glucagon (GLUCAGEN) injection 1 mg  1 mg IntraMUSCular PRN    dextrose 10% infusion 125-250 mL  125-250 mL IntraVENous PRN    acetaminophen (TYLENOL) tablet 650 mg  650 mg Oral Q6H PRN    cholecalciferol (VITAMIN D3) (400 Units /10 mcg) tablet 1 Tab  400 Units Oral DAILY       Allergies: Patient has no known allergies. Temp (24hrs), Av.3 °F (36.8 °C), Min:97 °F (36.1 °C), Max:99.1 °F (37.3 °C)    Visit Vitals  /82 Comment: cuff repositioned, BP recheck   Pulse 60   Temp 98.8 °F (37.1 °C)   Resp 22   Ht 5' 9\" (1.753 m)   Wt 84.8 kg (187 lb)   SpO2 94%   BMI 27.62 kg/m²       ROS: 12 point ROS obtained in details. Pertinent positives as mentioned in HPI,   otherwise negative    Physical Exam:    General: Well developed, well nourished male laying on the bed AAOx3.   Appears comfortable in the prone position. General:   awake alert and oriented   HEENT:  Normocephalic, atraumatic, PERRL, EOMI, no scleral icterus or pallor; no conjunctival hemmohage;  nasal and oral mucous are moist and without evidence of lesions. Neck supple, no bruits. Lymph Nodes:   no cervical, axillary or inguinal adenopathy   Lungs:   non-labored, bilaterally clear to auscultation- no crackles wheezes rales or rhonchi   Heart:  RRR, s1 and s2; no rubs or gallops, no edema, + pedal pulses   Abdomen:  soft, non-distended, active bowel sounds, no hepatomegaly, no splenomegaly. Non-tender   Genitourinary:  deferred   Extremities:   no clubbing, cyanosis; no joint effusions or swelling; Full ROM of all large joints to the upper and lower extremities; muscle mass appropriate for age   Neurologic:  No gross focal sensory abnormalities; 5/5 muscle strength to upper and lower extremities. Speech appropriate. Cranial nerves intact                        Skin:  No rash or ulcers noted   Back:  no spinal or paraspinal muscle tenderness or rigidity, no CVA tenderness     Psychiatric:  No suicidal or homicidal ideations, appropriate mood and affect         Labs: Results:   Chemistry Recent Labs     04/21/20  0536 04/20/20  0125 04/19/20  1908 04/19/20  0255   *  --  168* 168*     --  138 136   K 4.5  --  4.8 5.0     --  106 104   CO2 26  --  25 24   BUN 27*  --  22* 17   CREA 1.05  --  1.33* 1.02   CA 8.2*  --  8.2* 7.9*   AGAP 6  --  7 8   BUCR 26*  --  17 17   AP  --  36*  --  37*   TP  --  7.4  --  7.6   ALB 2.8* 2.8* 2.7* 2.9*   GLOB  --  4.6*  --  4.7*   AGRAT  --  0.6*  --  0.6*      CBC w/Diff Recent Labs     04/21/20  0536 04/20/20  0125 04/19/20  0255   WBC 20.1* 15.4* 11.6   RBC 4.78 4.41* 4.72   HGB 11.6* 10.9* 11.4*   HCT 36.7 34.0* 36.3    227 191   GRANS 26* 30* 22*   LYMPH 74* 58* 67*   EOS 0 0 0      Microbiology No results for input(s): CULT in the last 72 hours.        RADIOLOGY:    All available imaging studies/reports in [facility] for this admission were reviewed    Dr. Lisa Vasquez, Infectious Disease Specialist  [phone]  April 21, 2020  9:57 AM

## 2020-04-21 NOTE — PROGRESS NOTES
I spoke with patient over the phone after his wife and daughter called to request for transfer to [facility].   Per patient, he does not want to be transferred to [facility]. He will call his family to re-iterate his wishes.        Saulo Garcia MD

## 2020-04-21 NOTE — PROGRESS NOTES
Hospitalist Progress Note    Patient: Ramone Brown Age: 60 y.o. : 1960 MR#: [id number] SSN: [id number]  Date/Time: 2020 10:34 AM    DOA: 2020  PCP: Gwyn Ceja MD    Subjective:       Patient has no fever, has cough and able to bring up sputum. No blood   He continues to have hypoxia on sitting and standing, but able to improved 91% on prone position  This morning, his oxygen requirement increased to 10 liters on prone position. CXR with increased disease   CK decreased, Ferritin same as yesterday, CRP decreased, LD increased, d-dimer decreased. No chest pain. Respiratory culture with normal giselle   Has back pain which makes it hard for him to stay in prone position. He declined narcotic that offered. He remains Cefepime/Doxycycline/Hydroxychloroquine/VitC/Zinc.   He was given Tocilizumab two days ago. He give permission to update his family today:  I spoke with his wife and daughters and updated the clinical status and plan of care. They asked if he can be transferred to [facility] for further care. I asked them to discuss with him since he is off sound mind and able to make his decision         ROS: No fever/chills, no headache, no dizziness, no facial pain, no sinus congestion, +cough  No swallowing pain, No chest pain, no palpitation, + shortness of breath, no abd pain,  No diarrhea, no urinary complaint, no leg pain or swelling      Assessment/Plan:     1. COVID-19 pneumonia with suspected superimposed bacterial pneumonia   2. Sepsis POA due to COVID-19 infection   3. Acute hypoxic respiratory failure due to COVID-19 infection       -moderate worsening today with increase needs to 10 liters from 6 liters overnight   4. Risk of pulmonary microthrombus with elevated D-dimer, ?ARDS,   5. JOSE on CKD3 associated with sepsis, hypovolemia, resolved   6. Leukocytosis in the setting of steroid  7. H/O CLL, not treated previously   8.   Hyperglycemia with DM2, HgbA1c 8. 2%   9. Hyperlipidemia   10. Hypertension   11. Hypovolemic hyponatremia, resolved   12. Hypoalbuminemia   13. Chronic back pain     Will increase oxygen needs to maintain O2 saturation above 90%, Pulmonary consult for further support. Continue to assist him with pain control to help him maintain prone position. Chest xray repeat tomorrow   Offered narcotic prn   If his back pain persistent, will consider Xray of back   Spoke with ID for plan off care, continuecefepime/doxycycline/hydroxychloroquine/VitC/Zinc, +solumedrol,  Due to decrease D-dimer, no clear evidence of PE, his hypoxia is explained by COVID-19 pneumonia, to minimize risk to his kidney, hold off CTA chest. Decrease his lovenox to 40mg BID  Nutrition support. Pepcid   ICS    Full code   I spoke and update clinical status to his wife and daughters. I have asked them to call and talk to the patient before they request hospital transfer.  Since this patient is alert and making his decision, I cannot transfer this patient at family request.     Additional Notes:    Time spent >40 minutes    Case discussed with:  [x]Patient  [x]Family  [x]Nursing  [x]Case Management  DVT Prophylaxis:  [x]Lovenox  []Hep SQ  []SCDs  []Coumadin   []On Heparin gtt    Signed By: Hakan Miller MD     2020 10:34 AM              Objective:   VS:   Visit Vitals  /53 (BP 1 Location: Right arm, BP Patient Position: Lying left side)   Pulse 69   Temp 99.1 °F (37.3 °C)   Resp 21   Ht 5' 9\" (1.753 m)   Wt 84.8 kg (187 lb)   SpO2 90%   BMI 27.62 kg/m²      Tmax/24hrs: Temp (24hrs), Av.5 °F (36.9 °C), Min:98.2 °F (36.8 °C), Max:99.1 °F (37.3 °C)      Intake/Output Summary (Last 24 hours) at 2020 1034  Last data filed at 2020 0935  Gross per 24 hour   Intake 240 ml   Output 500 ml   Net -260 ml       Tele: sinus  General:  Cooperative, Not in acute distress, speaks in short sentence while in bed  HEENT: PERRL, EOMI, supple neck, no JVD, dry oral mucosa  Cardiovascular: S1S2 regular, no rub/gallop   Pulmonary: air entry bilaterally, no wheezing, ++ crackle  GI:  Soft, non tender, non distended, +bs, no guarding   Extremities:  No pedal edema, +distal pulses appreciated   Neuro: AOx3, moving all extremities, no gross deficit.        Additional:       Current Facility-Administered Medications   Medication Dose Route Frequency    insulin lispro (HUMALOG) injection   SubCUTAneous AC&HS    guaiFENesin ER (MUCINEX) tablet 600 mg  600 mg Oral Q12H    diclofenac (VOLTAREN) 1 % topical gel 4 g  4 g Topical QID    melatonin tablet 6 mg  6 mg Oral QHS    famotidine (PEPCID) tablet 20 mg  20 mg Oral BID    LORazepam (ATIVAN) tablet 1 mg  1 mg Oral Q6H PRN    atorvastatin (LIPITOR) tablet 40 mg  40 mg Oral DAILY    cefepime (MAXIPIME) 2 g in sterile water (preservative free) 10 mL IV syringe  2 g IntraVENous Q8H    doxycycline (VIBRAMYCIN) 100 mg in 0.9% sodium chloride (MBP/ADV) 100 mL MBP  100 mg IntraVENous Q12H    methylPREDNISolone (PF) (SOLU-MEDROL) injection 80 mg  80 mg IntraVENous Q24H    enoxaparin (LOVENOX) injection 80 mg  80 mg SubCUTAneous Q12H    sodium chloride (NS) flush 5-40 mL  5-40 mL IntraVENous Q8H    aspirin delayed-release tablet 81 mg  81 mg Oral DAILY    carvediloL (COREG) tablet 12.5 mg  12.5 mg Oral BID    zinc sulfate (ZINCATE) 220 mg capsule 1 Cap  1 Cap Oral DAILY    ascorbic acid (vitamin C) (VITAMIN C) tablet 500 mg  500 mg Oral BID    glucose chewable tablet 16 g  4 Tab Oral PRN    glucagon (GLUCAGEN) injection 1 mg  1 mg IntraMUSCular PRN    dextrose 10% infusion 125-250 mL  125-250 mL IntraVENous PRN    acetaminophen (TYLENOL) tablet 650 mg  650 mg Oral Q6H PRN    cholecalciferol (VITAMIN D3) (400 Units /10 mcg) tablet 1 Tab  400 Units Oral DAILY            Lab/Data Review:  Labs: Results:       Chemistry Recent Labs     04/21/20  0536 04/20/20  0125 04/19/20  1908 04/19/20  0255   *  --  168* 168*    --  138 136   K 4.5  --  4.8 5.0     --  106 104   CO2 26  --  25 24   BUN 27*  --  22* 17   CREA 1.05  --  1.33* 1.02   BUCR 26*  --  17 17   AGAP 6  --  7 8   CA 8.2*  --  8.2* 7.9*   PHOS 4.0 3.4 3.6  --      Recent Labs     04/21/20  0536 04/20/20  0125 04/19/20  1908 04/19/20  0255   ALT  --  13*  --  13*   SGOT  --  25  --  29   TP  --  7.4  --  7.6   ALB 2.8* 2.8* 2.7* 2.9*   GLOB  --  4.6*  --  4.7*   AGRAT  --  0.6*  --  0.6*      CBC w/Diff Recent Labs     04/21/20 0536 04/20/20 0125 04/19/20  0255   WBC 20.1* 15.4* 11.6   RBC 4.78 4.41* 4.72   HGB 11.6* 10.9* 11.4*   HCT 36.7 34.0* 36.3   MCV 76.8 77.1 76.9   MCH 24.3 24.7 24.2   MCHC 31.6 32.1 31.4   RDW 16.0* 16.0* 15.9*    227 191   BANDS  --  6* 9*   GRANS 26* 30* 22*   LYMPH 74* 58* 67*   EOS 0 0 0      Coagulation No results for input(s): PTP, INR, APTT, INREXT, INREXT in the last 72 hours.     Iron/Ferritin Lab Results   Component Value Date/Time    Ferritin 628 (H) 04/21/2020 05:36 AM       BNP    Cardiac Enzymes Lab Results   Component Value Date/Time     (H) 04/21/2020 05:36 AM        Lactic Acid    Thyroid Studies          All Micro Results     Procedure Component Value Units Date/Time    CULTURE, BLOOD [[id number]] Collected:  04/16/20 0032    Order Status:  Completed Specimen:  Blood Updated:  04/21/20 0648     Special Requests: NO SPECIAL REQUESTS        Culture result: NO GROWTH 5 DAYS       CULTURE, BLOOD [[id number]] Collected:  04/16/20 0032    Order Status:  Completed Specimen:  Blood Updated:  04/21/20 0648     Special Requests: NO SPECIAL REQUESTS        Culture result: NO GROWTH 5 DAYS       CULTURE, RESPIRATORY/SPUTUM/BRONCH Edwyna Imani STAIN [[id number]]  (Abnormal) Collected:  04/18/20 0125    Order Status:  Completed Specimen:  Sputum Updated:  04/20/20 1102     Special Requests: NO SPECIAL REQUESTS        GRAM STAIN FEW WBCS SEEN               RARE EPITHELIAL CELLS SEEN                  1+ APPARENT GRAM NEGATIVE RODS FEW GRAM POSITIVE RODS               OCCASIONAL GRAM POSITIVE COCCI IN PAIRS AND CHAINS           Culture result:       LIGHT NORMAL RESPIRATORY GURU                  LIGHT YEAST, (APPARENT CANDIDA ALBICANS)          EMERGENT DISEASE PANEL [[id number]]  (Abnormal) Collected:  04/16/20 0104    Order Status:  Completed Specimen:  Not Specified Updated:  04/18/20 0958     Emergent disease panel Detected        Comment: CALLED TO AND CORRECTLY REPEATED BY:  DR Garth Rivera, 0950, 4/18/20 BY [id number]         RESPIRATORY PANEL,PCR,NASOPHARYNGEAL [[id number]] Collected:  04/16/20 0104    Order Status:  Completed Specimen:  Nasopharyngeal Updated:  04/16/20 1655     Adenovirus Not detected        Coronavirus 229E Not detected        Coronavirus HKU1 Not detected        Coronavirus CVNL63 Not detected        Coronavirus OC43 Not detected        Metapneumovirus Not detected        Rhinovirus and Enterovirus Not detected        Influenza A Not detected        Influenza A, subtype H1 Not detected        Influenza A, subtype H3 Not detected        INFLUENZA A H1N1 PCR Not detected        Influenza B Not detected        Parainfluenza 1 Not detected        Parainfluenza 2 Not detected        Parainfluenza 3 Not detected        Parainfluenza virus 4 Not detected        RSV by PCR Not detected        B. parapertussis, PCR Not detected        Bordetella pertussis - PCR Not detected        Chlamydophila pneumoniae DNA, QL, PCR Not detected        Mycoplasma pneumoniae DNA, QL, PCR Not detected       INFLUENZA A & B AG (RAPID TEST) [[id number]] Collected:  04/16/20 0104    Order Status:  Completed Specimen:  Nasopharyngeal from Nasal washing Updated:  04/16/20 0130     Influenza A Antigen Negative        Comment: A negative result does not exclude influenza virus infection, seasonal or H1N1 due to suboptimal sensitivity.  If influenza is circulating in your community, a diagnosis of influenza should be considered based on a patients clinical presentation and empiric antiviral treatment should be considered, if indicated. Influenza B Antigen Negative       CULTURE, BLOOD, PAIRED [[id number]] Collected:  04/16/20 0100    Order Status:  Canceled Specimen:  Blood             Images:    CT (Most Recent). CT Results (most recent):  No results found for this or any previous visit. XRAY (Most Recent) XR Results (most recent):  Results from Hospital Encounter encounter on 04/16/20   XR CHEST PORT    Narrative EXAM: Chest Radiograph    INDICATION:  COVID-19 pneumonia    TECHNIQUE: AP view of the chest    COMPARISON: 4/17/2020, 4/16/2020, 1/22/2010     FINDINGS: No pneumothorax identified. The lungs are hypoinflated. Diffuse  groundglass opacities are noted which are significantly progressed since prior  imaging. No effusions identified. The cardiomediastinal silhouette is  unremarkable. The pulmonary vasculature is unremarkable. Degenerative changes  of the thoracic spine. Impression Impression:  1. Diffuse bilateral groundglass opacities which are progressed. EKG Results for orders placed or performed in visit on 06/27/19   AMB POC EKG ROUTINE W/ 12 LEADS, INTER & REP     Status: None (Preliminary result)    Impression    Normal sinus, normal intervals, normal ST segment/T-wave.           2D ECHO

## 2020-04-22 ENCOUNTER — APPOINTMENT (OUTPATIENT)
Dept: GENERAL RADIOLOGY | Age: 60
DRG: 871 | End: 2020-04-22
Attending: INTERNAL MEDICINE
Payer: COMMERCIAL

## 2020-04-22 LAB
ALBUMIN SERPL-MCNC: 2.9 G/DL (ref 3.4–5)
ALBUMIN/GLOB SERPL: 0.6 {RATIO} (ref 0.8–1.7)
ALP SERPL-CCNC: 35 U/L (ref 45–117)
ALT SERPL-CCNC: 18 U/L (ref 16–61)
ANION GAP SERPL CALC-SCNC: 6 MMOL/L (ref 3–18)
AST SERPL-CCNC: 36 U/L (ref 10–38)
ATRIAL RATE: 61 BPM
ATRIAL RATE: 67 BPM
BACTERIA SPEC CULT: NORMAL
BACTERIA SPEC CULT: NORMAL
BASOPHILS # BLD: 0 K/UL (ref 0–0.06)
BASOPHILS NFR BLD: 0 % (ref 0–3)
BILIRUB SERPL-MCNC: 0.4 MG/DL (ref 0.2–1)
BUN SERPL-MCNC: 28 MG/DL (ref 7–18)
BUN/CREAT SERPL: 25 (ref 12–20)
CALCIUM SERPL-MCNC: 7.9 MG/DL (ref 8.5–10.1)
CALCULATED P AXIS, ECG09: 49 DEGREES
CALCULATED P AXIS, ECG09: 55 DEGREES
CALCULATED R AXIS, ECG10: 23 DEGREES
CALCULATED R AXIS, ECG10: 8 DEGREES
CALCULATED T AXIS, ECG11: 14 DEGREES
CALCULATED T AXIS, ECG11: 2 DEGREES
CHLORIDE SERPL-SCNC: 107 MMOL/L (ref 100–111)
CK SERPL-CCNC: 266 U/L (ref 39–308)
CO2 SERPL-SCNC: 28 MMOL/L (ref 21–32)
CREAT SERPL-MCNC: 1.13 MG/DL (ref 0.6–1.3)
CRP SERPL-MCNC: 1.8 MG/DL (ref 0–0.3)
D DIMER PPP FEU-MCNC: 0.73 UG/ML(FEU)
DIAGNOSIS, 93000: NORMAL
DIAGNOSIS, 93000: NORMAL
DIFFERENTIAL METHOD BLD: ABNORMAL
EOSINOPHIL # BLD: 0.2 K/UL (ref 0–0.4)
EOSINOPHIL NFR BLD: 1 % (ref 0–5)
ERYTHROCYTE [DISTWIDTH] IN BLOOD BY AUTOMATED COUNT: 16.2 % (ref 11.6–14.5)
FERRITIN SERPL-MCNC: 653 NG/ML (ref 8–388)
FIBRINOGEN PPP-MCNC: 511 MG/DL (ref 210–451)
GLOBULIN SER CALC-MCNC: 4.5 G/DL (ref 2–4)
GLUCOSE BLD STRIP.AUTO-MCNC: 152 MG/DL (ref 70–110)
GLUCOSE BLD STRIP.AUTO-MCNC: 155 MG/DL (ref 70–110)
GLUCOSE BLD STRIP.AUTO-MCNC: 234 MG/DL (ref 70–110)
GLUCOSE BLD STRIP.AUTO-MCNC: 92 MG/DL (ref 70–110)
GLUCOSE SERPL-MCNC: 93 MG/DL (ref 74–99)
HCT VFR BLD AUTO: 36.5 % (ref 36–48)
HGB BLD-MCNC: 11.6 G/DL (ref 13–16)
IGA SERPL-MCNC: 133 MG/DL (ref 70–400)
IGG SERPL-MCNC: 883 MG/DL (ref 700–1600)
IGM SERPL-MCNC: 58 MG/DL (ref 40–230)
LDH SERPL L TO P-CCNC: 443 U/L (ref 81–234)
LYMPHOCYTES # BLD: 13.8 K/UL (ref 0.8–3.5)
LYMPHOCYTES NFR BLD: 67 % (ref 20–51)
MCH RBC QN AUTO: 24.5 PG (ref 24–34)
MCHC RBC AUTO-ENTMCNC: 31.8 G/DL (ref 31–37)
MCV RBC AUTO: 77.2 FL (ref 74–97)
MONOCYTES # BLD: 0.4 K/UL (ref 0–1)
MONOCYTES NFR BLD: 2 % (ref 2–9)
NEUTS SEG # BLD: 6.2 K/UL (ref 1.8–8)
NEUTS SEG NFR BLD: 30 % (ref 42–75)
P-R INTERVAL, ECG05: 186 MS
P-R INTERVAL, ECG05: 188 MS
PLATELET # BLD AUTO: 310 K/UL (ref 135–420)
PLATELET COMMENTS,PCOM: ABNORMAL
PMV BLD AUTO: 9.9 FL (ref 9.2–11.8)
POTASSIUM SERPL-SCNC: 4.7 MMOL/L (ref 3.5–5.5)
PROT SERPL-MCNC: 7.4 G/DL (ref 6.4–8.2)
Q-T INTERVAL, ECG07: 408 MS
Q-T INTERVAL, ECG07: 444 MS
QRS DURATION, ECG06: 104 MS
QRS DURATION, ECG06: 98 MS
QTC CALCULATION (BEZET), ECG08: 431 MS
QTC CALCULATION (BEZET), ECG08: 446 MS
RBC # BLD AUTO: 4.73 M/UL (ref 4.7–5.5)
RBC MORPH BLD: ABNORMAL
SERVICE CMNT-IMP: NORMAL
SERVICE CMNT-IMP: NORMAL
SODIUM SERPL-SCNC: 141 MMOL/L (ref 136–145)
VENTRICULAR RATE, ECG03: 61 BPM
VENTRICULAR RATE, ECG03: 67 BPM
WBC # BLD AUTO: 20.6 K/UL (ref 4.6–13.2)

## 2020-04-22 PROCEDURE — 82728 ASSAY OF FERRITIN: CPT

## 2020-04-22 PROCEDURE — 77010033711 HC HIGH FLOW OXYGEN

## 2020-04-22 PROCEDURE — 74011250636 HC RX REV CODE- 250/636: Performed by: INTERNAL MEDICINE

## 2020-04-22 PROCEDURE — 65660000000 HC RM CCU STEPDOWN

## 2020-04-22 PROCEDURE — 82962 GLUCOSE BLOOD TEST: CPT

## 2020-04-22 PROCEDURE — 74011000258 HC RX REV CODE- 258: Performed by: INTERNAL MEDICINE

## 2020-04-22 PROCEDURE — 93005 ELECTROCARDIOGRAM TRACING: CPT

## 2020-04-22 PROCEDURE — 71045 X-RAY EXAM CHEST 1 VIEW: CPT

## 2020-04-22 PROCEDURE — 82784 ASSAY IGA/IGD/IGG/IGM EACH: CPT

## 2020-04-22 PROCEDURE — 82550 ASSAY OF CK (CPK): CPT

## 2020-04-22 PROCEDURE — 74011636637 HC RX REV CODE- 636/637: Performed by: INTERNAL MEDICINE

## 2020-04-22 PROCEDURE — 86140 C-REACTIVE PROTEIN: CPT

## 2020-04-22 PROCEDURE — 85384 FIBRINOGEN ACTIVITY: CPT

## 2020-04-22 PROCEDURE — 74011250637 HC RX REV CODE- 250/637: Performed by: HOSPITALIST

## 2020-04-22 PROCEDURE — 80053 COMPREHEN METABOLIC PANEL: CPT

## 2020-04-22 PROCEDURE — 36415 COLL VENOUS BLD VENIPUNCTURE: CPT

## 2020-04-22 PROCEDURE — 83615 LACTATE (LD) (LDH) ENZYME: CPT

## 2020-04-22 PROCEDURE — 74011250637 HC RX REV CODE- 250/637: Performed by: INTERNAL MEDICINE

## 2020-04-22 PROCEDURE — 85025 COMPLETE CBC W/AUTO DIFF WBC: CPT

## 2020-04-22 PROCEDURE — 85379 FIBRIN DEGRADATION QUANT: CPT

## 2020-04-22 PROCEDURE — 74011000250 HC RX REV CODE- 250: Performed by: INTERNAL MEDICINE

## 2020-04-22 RX ADMIN — CARVEDILOL 12.5 MG: 12.5 TABLET, FILM COATED ORAL at 21:17

## 2020-04-22 RX ADMIN — ATORVASTATIN CALCIUM 40 MG: 40 TABLET, FILM COATED ORAL at 08:21

## 2020-04-22 RX ADMIN — WATER 2 G: 1 INJECTION INTRAMUSCULAR; INTRAVENOUS; SUBCUTANEOUS at 12:02

## 2020-04-22 RX ADMIN — ENOXAPARIN SODIUM 40 MG: 80 INJECTION SUBCUTANEOUS at 21:17

## 2020-04-22 RX ADMIN — DOXYCYCLINE 100 MG: 100 INJECTION, POWDER, LYOPHILIZED, FOR SOLUTION INTRAVENOUS at 08:24

## 2020-04-22 RX ADMIN — Medication 10 ML: at 12:03

## 2020-04-22 RX ADMIN — INSULIN LISPRO 3 UNITS: 100 INJECTION, SOLUTION INTRAVENOUS; SUBCUTANEOUS at 16:35

## 2020-04-22 RX ADMIN — Medication 10 ML: at 21:19

## 2020-04-22 RX ADMIN — RIBAVIRIN 400 MG: 200 CAPSULE ORAL at 08:21

## 2020-04-22 RX ADMIN — WATER 2 G: 1 INJECTION INTRAMUSCULAR; INTRAVENOUS; SUBCUTANEOUS at 05:00

## 2020-04-22 RX ADMIN — Medication 10 ML: at 05:00

## 2020-04-22 RX ADMIN — WATER 2 G: 1 INJECTION INTRAMUSCULAR; INTRAVENOUS; SUBCUTANEOUS at 21:17

## 2020-04-22 RX ADMIN — FAMOTIDINE 20 MG: 20 TABLET ORAL at 21:17

## 2020-04-22 RX ADMIN — GUAIFENESIN 600 MG: 600 TABLET, EXTENDED RELEASE ORAL at 08:22

## 2020-04-22 RX ADMIN — RIBAVIRIN 400 MG: 200 CAPSULE ORAL at 21:17

## 2020-04-22 RX ADMIN — CHOLECALCIFEROL (VITAMIN D3) 10 MCG (400 UNIT) TABLET 1 TABLET: at 08:21

## 2020-04-22 RX ADMIN — METHYLPREDNISOLONE SODIUM SUCCINATE 80 MG: 40 INJECTION, POWDER, FOR SOLUTION INTRAMUSCULAR; INTRAVENOUS at 08:22

## 2020-04-22 RX ADMIN — INSULIN LISPRO 3 UNITS: 100 INJECTION, SOLUTION INTRAVENOUS; SUBCUTANEOUS at 21:18

## 2020-04-22 RX ADMIN — MELATONIN TAB 3 MG 6 MG: 3 TAB at 21:17

## 2020-04-22 RX ADMIN — GUAIFENESIN 600 MG: 600 TABLET, EXTENDED RELEASE ORAL at 21:17

## 2020-04-22 RX ADMIN — INSULIN LISPRO 6 UNITS: 100 INJECTION, SOLUTION INTRAVENOUS; SUBCUTANEOUS at 11:42

## 2020-04-22 RX ADMIN — FAMOTIDINE 20 MG: 20 TABLET ORAL at 08:21

## 2020-04-22 RX ADMIN — DOXYCYCLINE 100 MG: 100 INJECTION, POWDER, LYOPHILIZED, FOR SOLUTION INTRAVENOUS at 21:18

## 2020-04-22 RX ADMIN — CARVEDILOL 12.5 MG: 12.5 TABLET, FILM COATED ORAL at 08:22

## 2020-04-22 RX ADMIN — ASPIRIN 81 MG: 81 TABLET, COATED ORAL at 08:23

## 2020-04-22 RX ADMIN — ZINC SULFATE 220 MG (50 MG) CAPSULE 1 CAPSULE: CAPSULE at 08:22

## 2020-04-22 RX ADMIN — ENOXAPARIN SODIUM 40 MG: 80 INJECTION SUBCUTANEOUS at 08:20

## 2020-04-22 RX ADMIN — Medication 500 MG: at 21:17

## 2020-04-22 RX ADMIN — Medication 500 MG: at 08:21

## 2020-04-22 NOTE — PROGRESS NOTES
Problem: Breathing Pattern - Ineffective  Goal: *Absence of hypoxia  Outcome: Progressing Towards Goal  Goal: *PALLIATIVE CARE:  Alleviation of Dyspnea  Outcome: Progressing Towards Goal     Problem: Patient Education: Go to Patient Education Activity  Goal: Patient/Family Education  Outcome: Progressing Towards Goal     Problem: Diabetes Self-Management  Goal: *Disease process and treatment process  Description: Define diabetes and identify own type of diabetes; list 3 options for treating diabetes. Outcome: Progressing Towards Goal  Goal: *Incorporating nutritional management into lifestyle  Description: Describe effect of type, amount and timing of food on blood glucose; list 3 methods for planning meals. Outcome: Progressing Towards Goal  Goal: *Incorporating physical activity into lifestyle  Description: State effect of exercise on blood glucose levels. Outcome: Progressing Towards Goal  Goal: *Developing strategies to promote health/change behavior  Description: Define the ABC's of diabetes; identify appropriate screenings, schedule and personal plan for screenings. Outcome: Progressing Towards Goal  Goal: *Using medications safely  Description: State effect of diabetes medications on diabetes; name diabetes medication taking, action and side effects. Outcome: Progressing Towards Goal  Goal: *Monitoring blood glucose, interpreting and using results  Description: Identify recommended blood glucose targets  and personal targets. Outcome: Progressing Towards Goal  Goal: *Prevention, detection, treatment of acute complications  Description: List symptoms of hyper- and hypoglycemia; describe how to treat low blood sugar and actions for lowering  high blood glucose level.   Outcome: Progressing Towards Goal  Goal: *Prevention, detection and treatment of chronic complications  Description: Define the natural course of diabetes and describe the relationship of blood glucose levels to long term complications of diabetes. Outcome: Progressing Towards Goal  Goal: *Developing strategies to address psychosocial issues  Description: Describe feelings about living with diabetes; identify support needed and support network  Outcome: Progressing Towards Goal     Problem: Falls - Risk of  Goal: *Absence of Falls  Description: Document Kate Mcdonald Fall Risk and appropriate interventions in the flowsheet.   Outcome: Progressing Towards Goal  Note: Fall Risk Interventions:            Medication Interventions: Evaluate medications/consider consulting pharmacy, Patient to call before getting OOB, Teach patient to arise slowly    Elimination Interventions: Call light in reach, Patient to call for help with toileting needs, Stay With Me (per policy), Toilet paper/wipes in reach, Toileting schedule/hourly rounds, Urinal in reach              Problem: Patient Education: Go to Patient Education Activity  Goal: Patient/Family Education  Outcome: Progressing Towards Goal     Problem: Diabetes Maintenance:Admission  Goal: *Blood glucose 80 to 180 mg/dl  Outcome: Progressing Towards Goal

## 2020-04-22 NOTE — PROGRESS NOTES
Infectious Disease progress Note        Reason: sepsis, COVID-19 infection    Current abx Prior abx   Cefepime, doxycycline since 4/18/2020 Ceftriaxone, azithromycin 4/16/20-4/18/2020  Hydroxychloroquine  4/16/20-4/20/20     Lines:       Assessment :    60 y.o. male  with history of CLL, type 2 diabetes, prostate cancer, history of atrial fibrillation with ablation, hypertension, CKD stage 3 who presented to the emergency room on 4/16/2020 secondary to shortness of breath. CXR: mild opacities at lung bases. Now with increasing shortness of breath, hypoxia requiring oxygen, worsening renal function    Patient presents with a highly complex clinical picture. Clinical picture consistent with sepsis (present on admission ) due to bilateral  covid-19 infection. Labs 4/16 reviewed-ferritin 233, C-reactive protein 6.3, , , d-dimer 0.75  Labs 4/17 reviewed- ferritin 266, C-reactive protein 8.0, , LDH: 219, d-dimer 1.20   Labs 4/19 reviewed-ferritin 346, C-reactive protein 10.4, , , d-dimer 0.84  Labs on 4/20 reviewed-ferritin 628, C-reactive protein 6.2, , , d-dimer 0.7  Labs on 4/21 reviewed ferritin 628, CRP 3, , , d-dimer 0.49  Labs on 4/22 reviewed ferritin 653, CRP 1.8, , , d-dimer 0.73      Worsening respiratory status 4/18-4/19 with worsening inflammatory parameters suggestive of cytokine storm. Status post Solu-Medrol since 4/18. Status post tocilizumab on 4/19. Sputum culture 4/18 light normal respiratory giselle, yeast    Acute on chronic renal insufficiency-could be prerenal versus ATN secondary to sepsis. Nephrology consult appreciated. Creatinine stable    Subjective sense of improvement. However labile oxygenation. episodes of desaturation with minimal movement. EKG 4/16-QTc 416, 456 on 4/20, 431 on 4/21    Procalcitonin 0.38 on 4/20    Chest x-ray 4/21-worsening bilateral infiltrates suggestive of ARDS.   Chest x-ray 4/22-some improvement in the infiltrates. Recommendations:    1. Discontinue cefepime,  Doxycycline after today's dose. Continue Ribavarin (d2/5)  2. Recommend convalescent plasma  3. Continue zinc, ascorbic acid. vitamin D3, melatonin  4. Recommend prone position ventilation. I discussed this with patient. 5.   Follow-up nephrology/pulmonary recommendations  6. Titrate oxygen as tolerated  7. Follow pulmonary recommendations    Risk and benefits of above treatment discussed with patient in details. He verbalized his understanding and agrees with the treatment plan  Above plan was discussed in details with patient, drMinal Adorno was Jose Manriquez. Please call me if any further questions or concerns. Will continue to participate in the care of this patient. HPI:    Feels  better. Patient denies headaches, visual disturbances, sore throat, runny nose, earaches, abdominal pain, diarrhea, burning micturition, pain or weakness in extremities. He denies back pain/flank pain. home Medication List    Details   BYDUREON BCISE 2 mg/0.85 mL atIn INJECT 2MG EVERY WEEK UNDER THE SKIN  Refills: 0      ergocalciferol (ERGOCALCIFEROL) 50,000 unit capsule Take 1 Cap by mouth every seven (7) days. Qty: 24 Cap, Refills: 1    Associated Diagnoses: Vitamin D deficiency      sitagliptin phos/metformin HCl (JANUMET PO) Take  by mouth.      lisinopril (PRINIVIL, ZESTRIL) 5 mg tablet Take 1 Tab by mouth daily. Qty: 30 Tab, Refills: 6      carvedilol (COREG) 12.5 mg tablet Take 1 Tab by mouth two (2) times a day. Qty: 60 Tab, Refills: 6      simvastatin (ZOCOR) 10 mg tablet Take  by mouth daily. aspirin delayed-release 81 mg tablet Take 81 mg by mouth daily.              Current Facility-Administered Medications   Medication Dose Route Frequency    enoxaparin (LOVENOX) injection 40 mg  40 mg SubCUTAneous Q12H    ribavirin (REBETOL) capsule 400 mg  400 mg Oral BID    insulin lispro (HUMALOG) injection SubCUTAneous AC&HS    guaiFENesin ER (MUCINEX) tablet 600 mg  600 mg Oral Q12H    diclofenac (VOLTAREN) 1 % topical gel 4 g  4 g Topical QID    melatonin tablet 6 mg  6 mg Oral QHS    famotidine (PEPCID) tablet 20 mg  20 mg Oral BID    LORazepam (ATIVAN) tablet 1 mg  1 mg Oral Q6H PRN    atorvastatin (LIPITOR) tablet 40 mg  40 mg Oral DAILY    cefepime (MAXIPIME) 2 g in sterile water (preservative free) 10 mL IV syringe  2 g IntraVENous Q8H    doxycycline (VIBRAMYCIN) 100 mg in 0.9% sodium chloride (MBP/ADV) 100 mL MBP  100 mg IntraVENous Q12H    methylPREDNISolone (PF) (SOLU-MEDROL) injection 80 mg  80 mg IntraVENous Q24H    sodium chloride (NS) flush 5-40 mL  5-40 mL IntraVENous Q8H    aspirin delayed-release tablet 81 mg  81 mg Oral DAILY    carvediloL (COREG) tablet 12.5 mg  12.5 mg Oral BID    zinc sulfate (ZINCATE) 220 mg capsule 1 Cap  1 Cap Oral DAILY    ascorbic acid (vitamin C) (VITAMIN C) tablet 500 mg  500 mg Oral BID    glucose chewable tablet 16 g  4 Tab Oral PRN    glucagon (GLUCAGEN) injection 1 mg  1 mg IntraMUSCular PRN    dextrose 10% infusion 125-250 mL  125-250 mL IntraVENous PRN    acetaminophen (TYLENOL) tablet 650 mg  650 mg Oral Q6H PRN    cholecalciferol (VITAMIN D3) (400 Units /10 mcg) tablet 1 Tab  400 Units Oral DAILY       Allergies: Patient has no known allergies. Temp (24hrs), Av.9 °F (36.6 °C), Min:97 °F (36.1 °C), Max:98.8 °F (37.1 °C)    Visit Vitals  /82   Pulse 69   Temp 97.9 °F (36.6 °C)   Resp 23   Ht 5' 9\" (1.753 m)   Wt 83.1 kg (183 lb 3.2 oz)   SpO2 95%   BMI 27.05 kg/m²       ROS: 12 point ROS obtained in details. Pertinent positives as mentioned in HPI,   otherwise negative    Physical Exam:    General: Well developed, well nourished male laying on the bed AAOx3.   In no apparent distress    General:   awake alert and oriented   HEENT:  Normocephalic, atraumatic, PERRL, EOMI, no scleral icterus or pallor; no conjunctival hemmohage   Lymph Nodes:   no cervical, axillary or inguinal adenopathy   Lungs:   non-labored, bilaterally clear to auscultation- no crackles wheezes rales or rhonchi   Heart:  RRR, s1 and s2; no rubs or gallops, no edema, + pedal pulses   Abdomen:  soft, non-distended, active bowel sounds, no hepatomegaly, no splenomegaly. Non-tender   Genitourinary:  deferred   Extremities:   no clubbing, cyanosis; no joint effusions or swelling; Full ROM of all large joints to the upper and lower extremities; muscle mass appropriate for age   Neurologic:  No gross focal motor or sensory abnormalities. Speech appropriate. Cranial nerves intact                        Skin:  No rash or ulcers noted   Back:   not examined     Psychiatric:  No suicidal or homicidal ideations, appropriate mood and affect         Labs: Results:   Chemistry Recent Labs     04/22/20  0555 04/21/20  0536 04/20/20  0125 04/19/20  1908   GLU 93 109*  --  168*    137  --  138   K 4.7 4.5  --  4.8    105  --  106   CO2 28 26  --  25   BUN 28* 27*  --  22*   CREA 1.13 1.05  --  1.33*   CA 7.9* 8.2*  --  8.2*   AGAP 6 6  --  7   BUCR 25* 26*  --  17   AP 35*  --  36*  --    TP 7.4  --  7.4  --    ALB 2.9* 2.8* 2.8* 2.7*   GLOB 4.5*  --  4.6*  --    AGRAT 0.6*  --  0.6*  --       CBC w/Diff Recent Labs     04/22/20  0555 04/21/20  0536 04/20/20  0125   WBC 20.6* 20.1* 15.4*   RBC 4.73 4.78 4.41*   HGB 11.6* 11.6* 10.9*   HCT 36.5 36.7 34.0*    284 227   GRANS 30* 26* 30*   LYMPH 67* 74* 58*   EOS 1 0 0      Microbiology No results for input(s): CULT in the last 72 hours.        RADIOLOGY:    All available imaging studies/reports in [facility] for this admission were reviewed    Dr. Harry Aparicio, Infectious Disease Specialist  [phone]  April 22, 2020  9:57 AM

## 2020-04-22 NOTE — PROGRESS NOTES
0725: Bedside and Verbal shift change report given to Vaalexis Duffy (oncoming nurse) by Ramses Sanchez RN (offgoing nurse). Report included the following information SBAR, Kardex, Intake/Output, MAR and Cardiac Rhythm NSR.     0820: Medications given. Pt resting in bed. No signs of distress on 35L hi-flow NC, 65% FiO2. Shift assessment performed. Pt set up to eat breakfast. All needs met. Call light within reach. Bed in lowest position. 1046: Pt sleeping in bed. VS stable. NAD.     1155: Pt sitting on side of bed, eating lunch. Medications given. No c/o pain or discomfort. No evidence of respiratory distress. 1415: Pt agrees to lie prone for a period of time during shift. 1517: Primary RN provides update of plan of care to pt's wife. 1520: Upon entering room, pt is in prone position. Pt reports that he has had a bowel movement and states that his wife would like him to transfer to a [facility] facility. Pt c/o feeling as if he is \"burning up\". Oral temperature read @ 97.4F Will notify provider of request.     1630: Pt lying in bed, in prone position. Medications given. 1800: Pt lying in prone position. NAD. 1910: Bedside and Verbal shift change report given to [facility] (oncoming nurse) by Malachi Kingsley RN (offgoing nurse). Report included the following information SBAR, Kardex, Intake/Output, MAR, Recent Results and Cardiac Rhythm NSR.

## 2020-04-22 NOTE — PROGRESS NOTES
Hospitalist Progress Note    Patient: Salima Yepez Age: 60 y.o. : 1960 MR#: [id number] SSN: [id number]  Date/Time: 2020       Subjective:     Patient is feeling much better. Cough is getting better. Shortness of breath is still present. No chest pain or abdominal pain. No headaches or dizziness. No nausea or vomiting. Patient is willing to participate in convalescent plasma therapy. He remains on high flow    Addendum: I got a call back from nurse stating that patient does not want plasma therapy and want to go to [facility]. Spoke to wife and daughter - they want me to initiate transfer to [facility]. I spoke to patient and he stated the same and also does not want to try plasma therapy. Spoke to oleg gomez and miki gomez to provide [facility] to initiate transfer process. Also informed pathologist, Dr. Thad Tony about it. Addendum: got a call from transfer center stating that [facility] stated that they are not accepting any patients at family/patient's preference [Dr. Puente Mate. Informed patient and family about it. They stated they do not want plasma treatment and wanted me to give me the number for transfer center. I advised that it is a physician line but they can call [facility] and ask  to talk to transfer center. They also want to try for [facility]I, I called [facility] transfer center about it [talked to kassi] and she will first page dr. Shaylee Lino and stated they also handle [facility]ICI transfer. I will call [facility] access [place] to initiate [facility] transfer.      Objective:     /71 (BP 1 Location: Right arm, BP Patient Position: At rest)   Pulse 71   Temp 98.1 °F (36.7 °C)   Resp 19   Ht 5' 9\" (1.753 m)   Wt 83.1 kg (183 lb 3.2 oz)   SpO2 91%   BMI 27.05 kg/m²     General:   Not in acute distress, follows commands appropriately  HEENT: Atraumatic, normocephalic, no obvious nasal discharge  Cardiovascular: S1S2 regular, no rub/gallop   Pulmonary: Diminished breath sound bibasilar with crackles present  GI:   Soft, nontender, nondistended, bowel sounds present  Extremities:   No pitting pedal edema  Neuro: AOx3, moving all extremities, no gross deficit. Assessment/Plan:     1. COVID-19 pneumonia with suspected superimposed bacterial pneumonia   2. Sepsis POA due to COVID-19 infection   3. Acute hypoxic respiratory failure due to COVID-19 infection  4. ARDS  5. JOSE on CKD3 associated with sepsis, hypovolemia, resolved   6. Leukocytosis in the setting of steroid  7. H/O CLL, not treated previously   8. Hyperglycemia with DM2, HgbA1c 8.2%   9. Hyperlipidemia   10. Hypertension   11. Hypovolemic hyponatremia, resolved   12. Hypoalbuminemia   13.   Chronic back pain     PLAN:    Continue current management  Discussed with ID and pulmonologist  We will start the process for nconvalescent plasma therapy      Case discussed with:  [x]Patient  [x]Family  [x]Nursing  [x]Case Management  DVT Prophylaxis:  [x]Lovenox  []Hep SQ  []SCDs  []Coumadin   []On Heparin gtt    Recent Results (from the past 24 hour(s))   GLUCOSE, POC    Collection Time: 04/21/20  4:12 PM   Result Value Ref Range    Glucose (POC) 213 (H) 70 - 110 mg/dL   GLUCOSE, POC    Collection Time: 04/21/20  8:22 PM   Result Value Ref Range    Glucose (POC) 184 (H) 70 - 110 mg/dL   EKG, 12 LEAD, SUBSEQUENT    Collection Time: 04/22/20  5:11 AM   Result Value Ref Range    Ventricular Rate 61 BPM    Atrial Rate 61 BPM    P-R Interval 188 ms    QRS Duration 104 ms    Q-T Interval 444 ms    QTC Calculation (Bezet) 446 ms    Calculated P Axis 55 degrees    Calculated R Axis 8 degrees    Calculated T Axis 14 degrees    Diagnosis       Normal sinus rhythm  Nonspecific T wave abnormality  Abnormal ECG  When compared with ECG of 21-APR-2020 04:25,  No significant change was found  Confirmed by Brittney Cannon MD, Amando Jane ([id number]) on 4/22/2020 12:12:37 PM     IMMUNOGLOBULIN G, QT Collection Time: 04/22/20  5:55 AM   Result Value Ref Range    Immunoglobulin G 883 700 - 1,600 mg/dL   IMMUNOGLOBULIN A    Collection Time: 04/22/20  5:55 AM   Result Value Ref Range    Immunoglobulin A 133 70 - 400 mg/dL   IMMUNOGLOBULIN, QT, IGM    Collection Time: 04/22/20  5:55 AM   Result Value Ref Range    Immunoglobulin M 58 40 - 230 mg/dL   FIBRINOGEN    Collection Time: 04/22/20  5:55 AM   Result Value Ref Range    Fibrinogen 511 (H) 210 - 451 mg/dL   FERRITIN    Collection Time: 04/22/20  5:55 AM   Result Value Ref Range    Ferritin 653 (H) 8 - 388 NG/ML   CK    Collection Time: 04/22/20  5:55 AM   Result Value Ref Range     39 - 308 U/L   C REACTIVE PROTEIN, QT    Collection Time: 04/22/20  5:55 AM   Result Value Ref Range    C-Reactive protein 1.8 (H) 0 - 0.3 mg/dL   LD    Collection Time: 04/22/20  5:55 AM   Result Value Ref Range     (H) 81 - 234 U/L   D DIMER    Collection Time: 04/22/20  5:55 AM   Result Value Ref Range    D DIMER 0.73 (H) <0.46 ug/ml(FEU)   CBC WITH AUTOMATED DIFF    Collection Time: 04/22/20  5:55 AM   Result Value Ref Range    WBC 20.6 (H) 4.6 - 13.2 K/uL    RBC 4.73 4.70 - 5.50 M/uL    HGB 11.6 (L) 13.0 - 16.0 g/dL    HCT 36.5 36.0 - 48.0 %    MCV 77.2 74.0 - 97.0 FL    MCH 24.5 24.0 - 34.0 PG    MCHC 31.8 31.0 - 37.0 g/dL    RDW 16.2 (H) 11.6 - 14.5 %    PLATELET 788 021 - 221 K/uL    MPV 9.9 9.2 - 11.8 FL    NEUTROPHILS 30 (L) 42 - 75 %    LYMPHOCYTES 67 (H) 20 - 51 %    MONOCYTES 2 2 - 9 %    EOSINOPHILS 1 0 - 5 %    BASOPHILS 0 0 - 3 %    ABS. NEUTROPHILS 6.2 1.8 - 8.0 K/UL    ABS. LYMPHOCYTES 13.8 (H) 0.8 - 3.5 K/UL    ABS. MONOCYTES 0.4 0 - 1.0 K/UL    ABS. EOSINOPHILS 0.2 0.0 - 0.4 K/UL    ABS.  BASOPHILS 0.0 0.0 - 0.06 K/UL    DF MANUAL      PLATELET COMMENTS ADEQUATE PLATELETS      RBC COMMENTS ANISOCYTOSIS  1+        RBC COMMENTS MICROCYTOSIS  1+        RBC COMMENTS OVALOCYTES  1+       METABOLIC PANEL, COMPREHENSIVE    Collection Time: 04/22/20  5:55 AM Result Value Ref Range    Sodium 141 136 - 145 mmol/L    Potassium 4.7 3.5 - 5.5 mmol/L    Chloride 107 100 - 111 mmol/L    CO2 28 21 - 32 mmol/L    Anion gap 6 3.0 - 18 mmol/L    Glucose 93 74 - 99 mg/dL    BUN 28 (H) 7.0 - 18 MG/DL    Creatinine 1.13 0.6 - 1.3 MG/DL    BUN/Creatinine ratio 25 (H) 12 - 20      GFR est AA >60 >60 ml/min/1.73m2    GFR est non-AA >60 >60 ml/min/1.73m2    Calcium 7.9 (L) 8.5 - 10.1 MG/DL    Bilirubin, total 0.4 0.2 - 1.0 MG/DL    ALT (SGPT) 18 16 - 61 U/L    AST (SGOT) 36 10 - 38 U/L    Alk.  phosphatase 35 (L) 45 - 117 U/L    Protein, total 7.4 6.4 - 8.2 g/dL    Albumin 2.9 (L) 3.4 - 5.0 g/dL    Globulin 4.5 (H) 2.0 - 4.0 g/dL    A-G Ratio 0.6 (L) 0.8 - 1.7     GLUCOSE, POC    Collection Time: 04/22/20  8:39 AM   Result Value Ref Range    Glucose (POC) 92 70 - 110 mg/dL   GLUCOSE, POC    Collection Time: 04/22/20 11:33 AM   Result Value Ref Range    Glucose (POC) 234 (H) 70 - 110 mg/dL         Signed By: Babar Crandall MD     April 22, 2020

## 2020-04-22 NOTE — ROUTINE PROCESS
1916 Assumed care of patient from off going nurse. Patient resting in bed. No distress noted. Patient currently on hi flow 35L FiO2 65% Sats 94%. Call bell within reach, siderails up x 3, bed in lowest position, and patient instructed to use call bell for assistance. Will continue to monitor. 0511 Uneventful night. EKG completed as ordered. 4055 Verbal shift change report given to Radha MURPHY (oncoming nurse) by Patel Velez RN(offgoing nurse).  Report included the following information SBAR, Intake/Output, MAR, Recent Results and Cardiac Rhythm SR.

## 2020-04-22 NOTE — PROGRESS NOTES
Problem: Breathing Pattern - Ineffective  Goal: *Absence of hypoxia  Outcome: Progressing Towards Goal  Goal: *Use of effective breathing techniques  Outcome: Progressing Towards Goal     Problem: Patient Education: Go to Patient Education Activity  Goal: Patient/Family Education  Outcome: Progressing Towards Goal     Problem: Diabetes Self-Management  Goal: *Disease process and treatment process  Description: Define diabetes and identify own type of diabetes; list 3 options for treating diabetes. Outcome: Progressing Towards Goal  Goal: *Incorporating nutritional management into lifestyle  Description: Describe effect of type, amount and timing of food on blood glucose; list 3 methods for planning meals. Outcome: Progressing Towards Goal  Goal: *Developing strategies to promote health/change behavior  Description: Define the ABC's of diabetes; identify appropriate screenings, schedule and personal plan for screenings. Outcome: Progressing Towards Goal  Goal: *Using medications safely  Description: State effect of diabetes medications on diabetes; name diabetes medication taking, action and side effects. Outcome: Progressing Towards Goal  Goal: *Prevention, detection and treatment of chronic complications  Description: Define the natural course of diabetes and describe the relationship of blood glucose levels to long term complications of diabetes. Outcome: Progressing Towards Goal     Problem: Patient Education: Go to Patient Education Activity  Goal: Patient/Family Education  Outcome: Progressing Towards Goal     Problem: Falls - Risk of  Goal: *Absence of Falls  Description: Document Bard  Fall Risk and appropriate interventions in the flowsheet.   Outcome: Progressing Towards Goal  Note: Fall Risk Interventions:            Medication Interventions: Evaluate medications/consider consulting pharmacy, Patient to call before getting OOB, Bed/chair exit alarm    Elimination Interventions: Bed/chair exit alarm, Call light in reach

## 2020-04-22 NOTE — PROGRESS NOTES
I got a call back from nurse stating that patient does not want plasma therapy and want to go to [facility] facility. Spoke to wife and daughter - they want me to initiate transfer to [facility]. I spoke to patient and he stated the same and also does not want to try plasma therapy. Spoke to oleg gomez and miki gomez to provide [facility] to initiate transfer process. Also informed pathologist, Dr. Александр Mason about it.      5.45 pm: I got a call from transfer center stating that [facility] stated that they are not accepting any patients at family/patient's preference [Dr. Che Lind. Informed patient and family about it. They stated they do not want plasma treatment and wanted me to give me the number for transfer center. I advised that it is a physician line but they can call [facility] and ask  to talk to transfer center. They also want to try for [facility], I called [facility] transfer center about it [talked to kassi] and she will first page dr. Kisha Hogue and stated they also handle OBICI transfer. I will call [facility] access center to initiate OBICI transfer. 6.13 PM: I got a call from [facility] and spoke to Dr. Carmela Tatum: no medical necessity so they will decline this transfer. Spoke to [facility] access center for [facility].       7.03 pm: I got a call from [facility] - Dr. Sasha Sequeira stated he can not accept the transfer as there is no medical necessity and can not place transportation personnel at risk for unnecessary exposure.      TOTAL TIME TO HANDLE TRANSFER REQUEST WAS GREATER THAN 65 MINUTES

## 2020-04-23 LAB
ANION GAP SERPL CALC-SCNC: 9 MMOL/L (ref 3–18)
BASOPHILS # BLD: 0 K/UL (ref 0–0.06)
BASOPHILS NFR BLD: 0 % (ref 0–3)
BUN SERPL-MCNC: 28 MG/DL (ref 7–18)
BUN/CREAT SERPL: 27 (ref 12–20)
CALCIUM SERPL-MCNC: 8.2 MG/DL (ref 8.5–10.1)
CHLORIDE SERPL-SCNC: 106 MMOL/L (ref 100–111)
CK SERPL-CCNC: 201 U/L (ref 39–308)
CO2 SERPL-SCNC: 25 MMOL/L (ref 21–32)
CREAT SERPL-MCNC: 1.04 MG/DL (ref 0.6–1.3)
CRP SERPL-MCNC: 1.2 MG/DL (ref 0–0.3)
D DIMER PPP FEU-MCNC: 0.64 UG/ML(FEU)
DIFFERENTIAL METHOD BLD: ABNORMAL
EOSINOPHIL # BLD: 0 K/UL (ref 0–0.4)
EOSINOPHIL NFR BLD: 0 % (ref 0–5)
ERYTHROCYTE [DISTWIDTH] IN BLOOD BY AUTOMATED COUNT: 16.2 % (ref 11.6–14.5)
FERRITIN SERPL-MCNC: 652 NG/ML (ref 8–388)
GLUCOSE BLD STRIP.AUTO-MCNC: 133 MG/DL (ref 70–110)
GLUCOSE BLD STRIP.AUTO-MCNC: 169 MG/DL (ref 70–110)
GLUCOSE BLD STRIP.AUTO-MCNC: 177 MG/DL (ref 70–110)
GLUCOSE SERPL-MCNC: 95 MG/DL (ref 74–99)
HCT VFR BLD AUTO: 39.2 % (ref 36–48)
HGB BLD-MCNC: 12.5 G/DL (ref 13–16)
LDH SERPL L TO P-CCNC: 462 U/L (ref 81–234)
LYMPHOCYTES # BLD: 15.9 K/UL (ref 0.8–3.5)
LYMPHOCYTES NFR BLD: 67 % (ref 20–51)
MCH RBC QN AUTO: 25.1 PG (ref 24–34)
MCHC RBC AUTO-ENTMCNC: 31.9 G/DL (ref 31–37)
MCV RBC AUTO: 78.6 FL (ref 74–97)
MONOCYTES # BLD: 0.5 K/UL (ref 0–1)
MONOCYTES NFR BLD: 2 % (ref 2–9)
NEUTS SEG # BLD: 7.3 K/UL (ref 1.8–8)
NEUTS SEG NFR BLD: 31 % (ref 42–75)
PLATELET # BLD AUTO: 336 K/UL (ref 135–420)
PLATELET COMMENTS,PCOM: ABNORMAL
PMV BLD AUTO: 10.1 FL (ref 9.2–11.8)
POTASSIUM SERPL-SCNC: 4.7 MMOL/L (ref 3.5–5.5)
RBC # BLD AUTO: 4.99 M/UL (ref 4.7–5.5)
RBC MORPH BLD: ABNORMAL
SODIUM SERPL-SCNC: 140 MMOL/L (ref 136–145)
WBC # BLD AUTO: 23.7 K/UL (ref 4.6–13.2)

## 2020-04-23 PROCEDURE — 74011250637 HC RX REV CODE- 250/637: Performed by: INTERNAL MEDICINE

## 2020-04-23 PROCEDURE — 74011250636 HC RX REV CODE- 250/636: Performed by: INTERNAL MEDICINE

## 2020-04-23 PROCEDURE — 82728 ASSAY OF FERRITIN: CPT

## 2020-04-23 PROCEDURE — 83615 LACTATE (LD) (LDH) ENZYME: CPT

## 2020-04-23 PROCEDURE — 65660000000 HC RM CCU STEPDOWN

## 2020-04-23 PROCEDURE — 74011000258 HC RX REV CODE- 258: Performed by: INTERNAL MEDICINE

## 2020-04-23 PROCEDURE — 86140 C-REACTIVE PROTEIN: CPT

## 2020-04-23 PROCEDURE — 36415 COLL VENOUS BLD VENIPUNCTURE: CPT

## 2020-04-23 PROCEDURE — 80048 BASIC METABOLIC PNL TOTAL CA: CPT

## 2020-04-23 PROCEDURE — 74011250637 HC RX REV CODE- 250/637: Performed by: HOSPITALIST

## 2020-04-23 PROCEDURE — 82962 GLUCOSE BLOOD TEST: CPT

## 2020-04-23 PROCEDURE — 74011000250 HC RX REV CODE- 250: Performed by: INTERNAL MEDICINE

## 2020-04-23 PROCEDURE — 85379 FIBRIN DEGRADATION QUANT: CPT

## 2020-04-23 PROCEDURE — 74011636637 HC RX REV CODE- 636/637: Performed by: INTERNAL MEDICINE

## 2020-04-23 PROCEDURE — 82550 ASSAY OF CK (CPK): CPT

## 2020-04-23 PROCEDURE — 85025 COMPLETE CBC W/AUTO DIFF WBC: CPT

## 2020-04-23 RX ADMIN — ENOXAPARIN SODIUM 40 MG: 80 INJECTION SUBCUTANEOUS at 08:09

## 2020-04-23 RX ADMIN — ZINC SULFATE 220 MG (50 MG) CAPSULE 1 CAPSULE: CAPSULE at 08:07

## 2020-04-23 RX ADMIN — GUAIFENESIN 600 MG: 600 TABLET, EXTENDED RELEASE ORAL at 20:46

## 2020-04-23 RX ADMIN — Medication 10 ML: at 14:32

## 2020-04-23 RX ADMIN — RIBAVIRIN 400 MG: 200 CAPSULE ORAL at 08:07

## 2020-04-23 RX ADMIN — DICLOFENAC 4 G: 10 GEL TOPICAL at 09:00

## 2020-04-23 RX ADMIN — WATER 2 G: 1 INJECTION INTRAMUSCULAR; INTRAVENOUS; SUBCUTANEOUS at 20:45

## 2020-04-23 RX ADMIN — WATER 2 G: 1 INJECTION INTRAMUSCULAR; INTRAVENOUS; SUBCUTANEOUS at 14:32

## 2020-04-23 RX ADMIN — DICLOFENAC 4 G: 10 GEL TOPICAL at 13:00

## 2020-04-23 RX ADMIN — ENOXAPARIN SODIUM 40 MG: 80 INJECTION SUBCUTANEOUS at 20:47

## 2020-04-23 RX ADMIN — INSULIN LISPRO 3 UNITS: 100 INJECTION, SOLUTION INTRAVENOUS; SUBCUTANEOUS at 18:00

## 2020-04-23 RX ADMIN — CARVEDILOL 12.5 MG: 12.5 TABLET, FILM COATED ORAL at 08:09

## 2020-04-23 RX ADMIN — Medication 500 MG: at 20:46

## 2020-04-23 RX ADMIN — RIBAVIRIN 400 MG: 200 CAPSULE ORAL at 20:46

## 2020-04-23 RX ADMIN — METHYLPREDNISOLONE SODIUM SUCCINATE 80 MG: 40 INJECTION, POWDER, FOR SOLUTION INTRAMUSCULAR; INTRAVENOUS at 08:08

## 2020-04-23 RX ADMIN — Medication 10 ML: at 06:16

## 2020-04-23 RX ADMIN — FAMOTIDINE 20 MG: 20 TABLET ORAL at 08:06

## 2020-04-23 RX ADMIN — WATER 2 G: 1 INJECTION INTRAMUSCULAR; INTRAVENOUS; SUBCUTANEOUS at 06:16

## 2020-04-23 RX ADMIN — DOXYCYCLINE 100 MG: 100 INJECTION, POWDER, LYOPHILIZED, FOR SOLUTION INTRAVENOUS at 08:07

## 2020-04-23 RX ADMIN — DOXYCYCLINE 100 MG: 100 INJECTION, POWDER, LYOPHILIZED, FOR SOLUTION INTRAVENOUS at 20:47

## 2020-04-23 RX ADMIN — Medication 500 MG: at 08:06

## 2020-04-23 RX ADMIN — Medication 10 ML: at 20:49

## 2020-04-23 RX ADMIN — INSULIN LISPRO 3 UNITS: 100 INJECTION, SOLUTION INTRAVENOUS; SUBCUTANEOUS at 12:00

## 2020-04-23 RX ADMIN — ASPIRIN 81 MG: 81 TABLET, COATED ORAL at 08:06

## 2020-04-23 RX ADMIN — FAMOTIDINE 20 MG: 20 TABLET ORAL at 20:46

## 2020-04-23 RX ADMIN — MELATONIN TAB 3 MG 6 MG: 3 TAB at 20:46

## 2020-04-23 RX ADMIN — CARVEDILOL 12.5 MG: 12.5 TABLET, FILM COATED ORAL at 20:46

## 2020-04-23 RX ADMIN — DICLOFENAC 4 G: 10 GEL TOPICAL at 21:00

## 2020-04-23 RX ADMIN — ATORVASTATIN CALCIUM 40 MG: 40 TABLET, FILM COATED ORAL at 08:07

## 2020-04-23 RX ADMIN — GUAIFENESIN 600 MG: 600 TABLET, EXTENDED RELEASE ORAL at 08:07

## 2020-04-23 RX ADMIN — CHOLECALCIFEROL (VITAMIN D3) 10 MCG (400 UNIT) TABLET 1 TABLET: at 08:07

## 2020-04-23 NOTE — PROGRESS NOTES
Infectious Disease progress Note        Reason: sepsis, COVID-19 infection    Current abx Prior abx   Cefepime, doxycycline since 4/18/2020  ribavarin since 4/21 Ceftriaxone, azithromycin 4/16/20-4/18/2020  Hydroxychloroquine  4/16/20-4/20/20     Lines:       Assessment :    60 y.o. male  with history of CLL, type 2 diabetes, prostate cancer, history of atrial fibrillation with ablation, hypertension, CKD stage 3 who presented to the emergency room on 4/16/2020 secondary to shortness of breath. CXR: mild opacities at lung bases. Now with increasing shortness of breath, hypoxia requiring oxygen, worsening renal function    Patient presents with a highly complex clinical picture. Clinical picture consistent with sepsis (present on admission ) due to bilateral  covid-19 infection. Labs 4/16 reviewed-ferritin 233, C-reactive protein 6.3, , , d-dimer 0.75  Labs 4/17 reviewed- ferritin 266, C-reactive protein 8.0, , LDH: 219, d-dimer 1.20   Labs 4/19 reviewed-ferritin 346, C-reactive protein 10.4, , , d-dimer 0.84  Labs on 4/20 reviewed-ferritin 628, C-reactive protein 6.2, , , d-dimer 0.7  Labs on 4/21 reviewed ferritin 628, CRP 3, , , d-dimer 0.49  Labs on 4/22 reviewed ferritin 653, CRP 1.8, , , d-dimer 0.73  Labs on 4/23 reviewed ferritin 652, CRP 1.2, , , d-dimer 0.64      Worsening respiratory status 4/18-4/19 with worsening inflammatory parameters suggestive of cytokine storm. Status post Solu-Medrol since 4/18. Status post tocilizumab on 4/19. Sputum culture 4/18 light normal respiratory giselle, yeast    Acute on chronic renal insufficiency-could be prerenal versus ATN secondary to sepsis. Nephrology consult appreciated. Creatinine stable    Subjective sense of improvement. However labile oxygenation. episodes of desaturation with minimal movement.      EKG 4/16-QTc 416, 456 on 4/20, 431 on 4/21    Procalcitonin 0.38 on 4/20    Chest x-ray 4/21-worsening bilateral infiltrates suggestive of ARDS. Chest x-ray 4/22-some improvement in the infiltrates. Worsening leukocytosis likely secondary to steroids    Clinically better. Still on high flow oxygen. However improved oxygen saturation in the sitting position. Was saturating around 96% in sitting position when I examined him today    Recommendations:    1.  continue cefepime,  Doxycycline (d6/7). Continue Ribavarin (d3/5)  2. Discontinue Solu-Medrol  3. Continue zinc, ascorbic acid. vitamin D3, melatonin  4. Recommend prone position ventilation. I discussed this with patient. 5.   Follow-up nephrology/pulmonary recommendations  6. Titrate oxygen as tolerated  7. Follow pulmonary recommendations  8. Recommend convalescent plasma if worsening hypoxia noted after discontinuation of steroids. I discussed with with the patient in details. He agrees to sign consent for convalescent plasma if deemed necessary on future exams      Above plan was discussed in details with patient, dr. Yamil Perera, Dr. Stevie Massey. please call me if any further questions or concerns. Will continue to participate in the care of this patient. HPI:    Feels  better. Patient denies headaches, visual disturbances, sore throat, runny nose, earaches, abdominal pain, diarrhea, burning micturition, pain or weakness in extremities. He denies back pain/flank pain. home Medication List    Details   BYDUREON BCISE 2 mg/0.85 mL atIn INJECT 2MG EVERY WEEK UNDER THE SKIN  Refills: 0      ergocalciferol (ERGOCALCIFEROL) 50,000 unit capsule Take 1 Cap by mouth every seven (7) days. Qty: 24 Cap, Refills: 1    Associated Diagnoses: Vitamin D deficiency      sitagliptin phos/metformin HCl (JANUMET PO) Take  by mouth.      lisinopril (PRINIVIL, ZESTRIL) 5 mg tablet Take 1 Tab by mouth daily.   Qty: 30 Tab, Refills: 6      carvedilol (COREG) 12.5 mg tablet Take 1 Tab by mouth two (2) times a day.  Qty: 60 Tab, Refills: 6      simvastatin (ZOCOR) 10 mg tablet Take  by mouth daily. aspirin delayed-release 81 mg tablet Take 81 mg by mouth daily. Current Facility-Administered Medications   Medication Dose Route Frequency    enoxaparin (LOVENOX) injection 40 mg  40 mg SubCUTAneous Q12H    ribavirin (REBETOL) capsule 400 mg  400 mg Oral BID    insulin lispro (HUMALOG) injection   SubCUTAneous AC&HS    guaiFENesin ER (MUCINEX) tablet 600 mg  600 mg Oral Q12H    diclofenac (VOLTAREN) 1 % topical gel 4 g  4 g Topical QID    melatonin tablet 6 mg  6 mg Oral QHS    famotidine (PEPCID) tablet 20 mg  20 mg Oral BID    LORazepam (ATIVAN) tablet 1 mg  1 mg Oral Q6H PRN    atorvastatin (LIPITOR) tablet 40 mg  40 mg Oral DAILY    cefepime (MAXIPIME) 2 g in sterile water (preservative free) 10 mL IV syringe  2 g IntraVENous Q8H    doxycycline (VIBRAMYCIN) 100 mg in 0.9% sodium chloride (MBP/ADV) 100 mL MBP  100 mg IntraVENous Q12H    methylPREDNISolone (PF) (SOLU-MEDROL) injection 80 mg  80 mg IntraVENous Q24H    sodium chloride (NS) flush 5-40 mL  5-40 mL IntraVENous Q8H    aspirin delayed-release tablet 81 mg  81 mg Oral DAILY    carvediloL (COREG) tablet 12.5 mg  12.5 mg Oral BID    zinc sulfate (ZINCATE) 220 mg capsule 1 Cap  1 Cap Oral DAILY    ascorbic acid (vitamin C) (VITAMIN C) tablet 500 mg  500 mg Oral BID    glucose chewable tablet 16 g  4 Tab Oral PRN    glucagon (GLUCAGEN) injection 1 mg  1 mg IntraMUSCular PRN    dextrose 10% infusion 125-250 mL  125-250 mL IntraVENous PRN    acetaminophen (TYLENOL) tablet 650 mg  650 mg Oral Q6H PRN    cholecalciferol (VITAMIN D3) (400 Units /10 mcg) tablet 1 Tab  400 Units Oral DAILY       Allergies: Patient has no known allergies.     Temp (24hrs), Av.4 °F (36.9 °C), Min:97.4 °F (36.3 °C), Max:99.1 °F (37.3 °C)    Visit Vitals  /72   Pulse 64   Temp 99.1 °F (37.3 °C)   Resp 22   Ht 5' 9\" (1.753 m)   Wt 83.5 kg (184 lb) SpO2 95%   BMI 27.17 kg/m²       ROS: 12 point ROS obtained in details. Pertinent positives as mentioned in HPI,   otherwise negative    Physical Exam:    General: Well developed, well nourished male laying on the bed AAOx3. In no apparent distress    General:   awake alert and oriented   HEENT:  Normocephalic, atraumatic, PERRL, EOMI, no scleral icterus or pallor; no conjunctival hemmohage   Lymph Nodes:   no cervical, axillary or inguinal adenopathy   Lungs:   non-labored, bilaterally clear to auscultation- no crackles wheezes rales or rhonchi   Heart:  RRR, s1 and s2; no rubs or gallops, no edema, + pedal pulses   Abdomen:  soft, non-distended, active bowel sounds, no hepatomegaly, no splenomegaly. Non-tender   Genitourinary:  deferred   Extremities:   no clubbing, cyanosis; no joint effusions or swelling; Full ROM of all large joints to the upper and lower extremities; muscle mass appropriate for age   Neurologic:  No gross focal motor or sensory abnormalities. Speech appropriate. Cranial nerves intact                        Skin:  No rash or ulcers noted   Back:   not examined     Psychiatric:  No suicidal or homicidal ideations, appropriate mood and affect         Labs: Results:   Chemistry Recent Labs     04/23/20 0137 04/22/20  0555 04/21/20  0536   GLU 95 93 109*    141 137   K 4.7 4.7 4.5    107 105   CO2 25 28 26   BUN 28* 28* 27*   CREA 1.04 1.13 1.05   CA 8.2* 7.9* 8.2*   AGAP 9 6 6   BUCR 27* 25* 26*   AP  --  35*  --    TP  --  7.4  --    ALB  --  2.9* 2.8*   GLOB  --  4.5*  --    AGRAT  --  0.6*  --       CBC w/Diff Recent Labs     04/23/20 0137 04/22/20  0555 04/21/20  0536   WBC 23.7* 20.6* 20.1*   RBC 4.99 4.73 4.78   HGB 12.5* 11.6* 11.6*   HCT 39.2 36.5 36.7    310 284   GRANS 31* 30* 26*   LYMPH 67* 67* 74*   EOS 0 1 0      Microbiology No results for input(s): CULT in the last 72 hours.        RADIOLOGY:    All available imaging studies/reports in [facility] for this admission were reviewed    Dr. Benton Dubon, Infectious Disease Specialist  [phone]  April 23, 2020  9:57 AM

## 2020-04-23 NOTE — ROUTINE PROCESS
Verbal shift change report given to Eleuterio Taylor RN (oncoming nurse) by Darin Jon RN (offgoing nurse). Report included the following information SBAR, Intake/Output, Recent Results and Med Rec Status.

## 2020-04-23 NOTE — PROGRESS NOTES
Problem: Breathing Pattern - Ineffective  Goal: *Use of effective breathing techniques  Outcome: Progressing Towards Goal     Problem: Falls - Risk of  Goal: *Absence of Falls  Description: Document Salvatore Fall Risk and appropriate interventions in the flowsheet.   Outcome: Progressing Towards Goal  Note: Fall Risk Interventions:  Mobility Interventions: Patient to call before getting OOB, Strengthening exercises (ROM-active/passive)         Medication Interventions: Patient to call before getting OOB, Teach patient to arise slowly    Elimination Interventions: Call light in reach, Patient to call for help with toileting needs, Toilet paper/wipes in reach, Urinal in reach

## 2020-04-23 NOTE — ROUTINE PROCESS
1916 Assumed care of patient from off going nurse. Patient resting in bed. No distress noted. Patient currently on hi flow 40L FiO2 65% Sats 92-94%. Call bell within reach, siderails up x 3, bed in lowest position, and patient instructed to use call bell for assistance. Will continue to monitor. 0511 Uneventful night. Patient tolerated sleeping in prone position for the most part of the night (at least 9 hours). . 
 
6850  Verbal shift change report given to Faby Maria (oncoming nurse) by Luis Eduardo Miranda RN(offgoing nurse).  Report included the following information SBAR, Intake/Output, MAR, Recent Results and Cardiac Rhythm SR.

## 2020-04-23 NOTE — PROGRESS NOTES
Hospitalist Progress Note    Patient: Maite Berg Age: 60 y.o. : 1960 MR#: [id number] SSN: [id number]  Date/Time: 2020       Subjective:     Patient is currently in prone position. Denies any chest pain. Shortness of breath and cough getting better. No headaches or dizziness. No nausea vomiting or abdominal pain. Patient states he has decided to stay here and does not want to go any different hospital.    Objective:     /77 (BP 1 Location: Right arm, BP Patient Position: Sitting)   Pulse 76   Temp 99.3 °F (37.4 °C)   Resp 15   Ht 5' 9\" (1.753 m)   Wt 83.5 kg (184 lb)   SpO2 93%   BMI 27.17 kg/m²     General:   Not in acute distress, follows commands appropriately  Cardiovascular: S1S2 regular. Pulmonary: Diminished breath sound bibasilar, no wheezes currently. GI:   Soft, nontender, nondistended, bowel sounds present  Extremities:   No pitting pedal edema  Neuro: AOx3, moving all extremities, no gross deficit. Assessment/Plan:     1. COVID-19 pneumonia with suspected superimposed bacterial pneumonia   2. Sepsis POA due to COVID-19 infection, stable  3. Acute hypoxic respiratory failure due to COVID-19 infection, stable  4. ARDS, stable  5. JOSE on CKD3 associated with sepsis, hypovolemia, resolved   6. Leukocytosis in the setting of steroid  7. H/O CLL, not treated previously   8. Hyperglycemia with DM2, HgbA1c 8.2%   9. Hyperlipidemia   10. Hypertension   11. Hypovolemic hyponatremia, resolved   12. Hypoalbuminemia   13. Chronic back pain     PLAN:    Continue current management  Discussed with ID: We will continue current management.   EKG can be done every 48 hours, discussed with nursing    Case discussed with:  [x]Patient  [x]Family  [x]Nursing  [x]Case Management  DVT Prophylaxis:  [x]Lovenox  []Hep SQ  []SCDs  []Coumadin   []On Heparin gtt    Recent Results (from the past 24 hour(s))   GLUCOSE, POC    Collection Time: 20  4:25 PM   Result Value Ref Range    Glucose (POC) 152 (H) 70 - 110 mg/dL   GLUCOSE, POC    Collection Time: 04/22/20  8:02 PM   Result Value Ref Range    Glucose (POC) 155 (H) 70 - 110 mg/dL   FERRITIN    Collection Time: 04/23/20  1:37 AM   Result Value Ref Range    Ferritin 652 (H) 8 - 388 NG/ML   CK    Collection Time: 04/23/20  1:37 AM   Result Value Ref Range     39 - 308 U/L   C REACTIVE PROTEIN, QT    Collection Time: 04/23/20  1:37 AM   Result Value Ref Range    C-Reactive protein 1.2 (H) 0 - 0.3 mg/dL   LD    Collection Time: 04/23/20  1:37 AM   Result Value Ref Range     (H) 81 - 234 U/L   D DIMER    Collection Time: 04/23/20  1:37 AM   Result Value Ref Range    D DIMER 0.64 (H) <0.46 ug/ml(FEU)   CBC WITH AUTOMATED DIFF    Collection Time: 04/23/20  1:37 AM   Result Value Ref Range    WBC 23.7 (H) 4.6 - 13.2 K/uL    RBC 4.99 4.70 - 5.50 M/uL    HGB 12.5 (L) 13.0 - 16.0 g/dL    HCT 39.2 36.0 - 48.0 %    MCV 78.6 74.0 - 97.0 FL    MCH 25.1 24.0 - 34.0 PG    MCHC 31.9 31.0 - 37.0 g/dL    RDW 16.2 (H) 11.6 - 14.5 %    PLATELET 226 332 - 958 K/uL    MPV 10.1 9.2 - 11.8 FL    NEUTROPHILS 31 (L) 42 - 75 %    LYMPHOCYTES 67 (H) 20 - 51 %    MONOCYTES 2 2 - 9 %    EOSINOPHILS 0 0 - 5 %    BASOPHILS 0 0 - 3 %    ABS. NEUTROPHILS 7.3 1.8 - 8.0 K/UL    ABS. LYMPHOCYTES 15.9 (H) 0.8 - 3.5 K/UL    ABS. MONOCYTES 0.5 0 - 1.0 K/UL    ABS. EOSINOPHILS 0.0 0.0 - 0.4 K/UL    ABS.  BASOPHILS 0.0 0.0 - 0.06 K/UL    DF AUTOMATED      PLATELET COMMENTS ADEQUATE PLATELETS      RBC COMMENTS ANISOCYTOSIS  1+       METABOLIC PANEL, BASIC    Collection Time: 04/23/20  1:37 AM   Result Value Ref Range    Sodium 140 136 - 145 mmol/L    Potassium 4.7 3.5 - 5.5 mmol/L    Chloride 106 100 - 111 mmol/L    CO2 25 21 - 32 mmol/L    Anion gap 9 3.0 - 18 mmol/L    Glucose 95 74 - 99 mg/dL    BUN 28 (H) 7.0 - 18 MG/DL    Creatinine 1.04 0.6 - 1.3 MG/DL    BUN/Creatinine ratio 27 (H) 12 - 20      GFR est AA >60 >60 ml/min/1.73m2    GFR est non-AA >60 >60 ml/min/1.73m2    Calcium 8.2 (L) 8.5 - 10.1 MG/DL   GLUCOSE, POC    Collection Time: 04/23/20 11:35 AM   Result Value Ref Range    Glucose (POC) 177 (H) 70 - 110 mg/dL         Signed By: Anuj Miller MD     April 23, 2020

## 2020-04-23 NOTE — PROGRESS NOTES
[facility]. Pulmonary, Critical Care, and Sleep Medicine    F/U Patient Consult    Name: Gagan Chinchilla MRN: [id number]   : 1960 Hospital: [address] [place] [address] Dr   Date: 2020        This patient has been seen and evaluated at the request of Dr. Graham Done for resp failure/hypoxia. IMPRESSION:   · Acute hypoxic respiratory failure: Etiology secondary to COVID-19 pneumonia. Pt developing worsening hypoxia requiring HFNC with FiO2 of 65%  · COVID-19 infection with multifocal pneumonia  · Moderate ARDS:  Secondary to above  · Severe sepsis:  Secondary to above  · JOSE on CKD  · Hx of HTN: pt on an ACEi prior to admission  · Hx of CLL  · Hx of PVC ablation in 2010  · Hx of dyslipidemia     Patient Active Problem List   Diagnosis Code    Nonischemic cardiomyopathy ([facility] [address] I42.8    PVC (premature ventricular contraction) I49.3    S/P ablation operation for arrhythmia Z98.890, Z86.79    Lymphocytosis D72.820    CLL (chronic lymphocytic leukemia) ([facility] [address] C91.10    Skin rash R21    Vitamin D deficiency E55.9    Pneumonia J18.9    COVID-19 virus infection U07.1    Suspected Covid-19 Virus Infection R68.89      RECOMMENDATIONS:   Supplemental oxygen to maintain SpO2 >88% --- continue pt on HFNC with Vapotherm (approved by institution for use with COVID-19 infection). Pt appears comfortable at this setting so would advise against mech ventilation at this point, only if pt decompensates  Discussed convalescent serum transfusion with pt, primary service, and ID, given worsening infiltrates despite full supportive care -- pt initially agreeable. Then pt discussed with primary service agreed and then he later declined asking for transfer.   Would still recommend that pt receive convalescent serum if he reconsiders and agrees  Defer antiinflammatory medications (including antimalarials, steroids, and trace minerals) and ABx to ID service -- data for novel coronavirus is limited, please weigh risks and benefits. Discussed with ID, would advise remdesivir however notified that [facility] is restricting use and pt does not qualify for compassionate use  Serial fibrinogen, LDH, triglyceride, CRP, ferritin  Aggressive pulmonary toileting/bronchial hygiene  Frequent incentive spirometry  Aspiration precautions including elevating HOB >30deg  PT/OT, OOB, ambulate with assistance as tolerated  DVT ppx per primary service -- recommend very judicious use of full dose anticoagulation without evidence of thrombitic disease -- pt has severe sespis from COVID-19 which is a hyperinflammatory state which may lead to more thrombus formation, but must weigh risks vs benefits. Please monitor for blood loss and DIC  Please assess for home oxygen need prior to discharge  Will follow    Prognosis guarded       Subjective:   04/22/20  Pt seen and examined at bedside. No acute events overnight. Pt reports still short of breath. Denies worsening SOB or cough/sputum/chestpain      HPI:  Patient is a 60 y.o. male with a past medical history of CLL, PVC ablation, hypertension, presented to [facility] about 6 days ago with complaints of fever and cough for a few days prior to presentation. Patient reports that his wife and daughter were sick at home, but he does not know any other sick contacts. Patient then developed worsening shortness of breath over the last 5 days. Patient also reports that he lost his sense of taste during that time. While in the ER, patient was found to be hypoxic at 2 L by nasal cannula along with fever and had acute renal failure. During the course of hospitalization, patient has been having productive cough, which patient reports that resolved over the last day.   Patient reports now that his cough is better and he reports that his dyspnea is somewhat improved however patient is becoming increasingly more hypoxic, patient has been going up from 2 L nasal cannula up to 8 L yesterday and 10 L today. Patient reports he has been having issues with insomnia, not being able to sleep. Patient also reports some malaise. Patient otherwise denies any chest pain, nausea, vomiting, diarrhea, hemoptysis. Patient reports smoking a pack a day until a number of years ago. Patient denies any occupational exposures to coal/silica/asbestos, patient reports he is a former . Patient reports he follows with Dr. Skye Ventura for CLL, has not been on any chemotherapy. Past Medical History:   Diagnosis Date    Abnormal nuclear cardiac imaging test 11/30/2006    Mild anterior ischemia. Inferior scar w/border zone ischemia. LVE. EF 26%. (Gated imaging may be inaccurate.)  Global hypk. Neg EKG on max EST. Ex time 10:30.  Aorto-iliac duplex 09/10/2012    No AAA identified.  Carotid duplex 09/10/2012    No significant occlusive disease bilaterallly.  CLL (chronic lymphocytic leukemia) ([facility] [address]     Diabetes ([facility] [address]     Echocardiogram 06/02/2015    EF 55%. No WMA. Mild LVH. Gr 1 DDfx. IMELDA. No significant chg from study of 5/17/10.  History of echocardiogram 06/20/2011    EF 50-55%. Gr 2 DDfx. Mild RVE. Mild LAE. Mild-mod IMELDA.  Hypercholesterolemia     Hypertension     Nonischemic cardiomyopathy ([address]     s/p right coronary cusp PVC ablation  (9/04/01) without complication    Prostate CA ([facility] [address]     S/P cardiac cath 12/11/2006    Patent coronary arteries. LVEDP 12.  EF 25-30%. Mod-significant global hypk. Past Surgical History:   Procedure Laterality Date    HX HEART CATHETERIZATION  12/11/06    The right coronary artery is dominant. It is widely patent. The left main coronary artery is widely patent. The circumflex artery is widely patent. The left anterior descending coronary artery is widely patent. The LVEDP is 12 mmHg. The overall left ventricular systolic function is significantly diminished with an estimated ejection fraction of 25-30%. ** See report. Prior to Admission medications    Medication Sig Start Date End Date Taking? Authorizing Provider   SHELBY BCISE 2 mg/0.85 mL atIn INJECT 2MG EVERY WEEK UNDER THE SKIN 6/7/19   Provider, Historical   ergocalciferol (ERGOCALCIFEROL) 50,000 unit capsule Take 1 Cap by mouth every seven (7) days. 1/7/19   Fidelia MCCORD NP   sitagliptin phos/metformin HCl (JANUMET PO) Take  by mouth. Provider, Historical   lisinopril (PRINIVIL, ZESTRIL) 5 mg tablet Take 1 Tab by mouth daily. 6/29/15   Gonzalo Denise MD   carvedilol (COREG) 12.5 mg tablet Take 1 Tab by mouth two (2) times a day. 6/4/15   Gonzalo Denise MD   simvastatin (ZOCOR) 10 mg tablet Take  by mouth daily. Provider, Historical   aspirin delayed-release 81 mg tablet Take 81 mg by mouth daily. 6/9/11   Provider, Historical     No Known Allergies   Social History     Tobacco Use    Smoking status: Former Smoker     Packs/day: 1.00    Smokeless tobacco: Never Used   Substance Use Topics    Alcohol use: Yes      History reviewed. No pertinent family history.      Current Facility-Administered Medications   Medication Dose Route Frequency    enoxaparin (LOVENOX) injection 40 mg  40 mg SubCUTAneous Q12H    ribavirin (REBETOL) capsule 400 mg  400 mg Oral BID    insulin lispro (HUMALOG) injection   SubCUTAneous AC&HS    guaiFENesin ER (MUCINEX) tablet 600 mg  600 mg Oral Q12H    diclofenac (VOLTAREN) 1 % topical gel 4 g  4 g Topical QID    melatonin tablet 6 mg  6 mg Oral QHS    famotidine (PEPCID) tablet 20 mg  20 mg Oral BID    atorvastatin (LIPITOR) tablet 40 mg  40 mg Oral DAILY    cefepime (MAXIPIME) 2 g in sterile water (preservative free) 10 mL IV syringe  2 g IntraVENous Q8H    doxycycline (VIBRAMYCIN) 100 mg in 0.9% sodium chloride (MBP/ADV) 100 mL MBP  100 mg IntraVENous Q12H    methylPREDNISolone (PF) (SOLU-MEDROL) injection 80 mg  80 mg IntraVENous Q24H    sodium chloride (NS) flush 5-40 mL  5-40 mL IntraVENous Q8H    aspirin delayed-release tablet 81 mg  81 mg Oral DAILY    carvediloL (COREG) tablet 12.5 mg  12.5 mg Oral BID    zinc sulfate (ZINCATE) 220 mg capsule 1 Cap  1 Cap Oral DAILY    ascorbic acid (vitamin C) (VITAMIN C) tablet 500 mg  500 mg Oral BID    cholecalciferol (VITAMIN D3) (400 Units /10 mcg) tablet 1 Tab  400 Units Oral DAILY       Review of Systems:  A comprehensive ROS was obtained as stated in HPI, all others are negative      Objective:   Vital Signs:    Visit Vitals  /61 (BP 1 Location: Right arm, BP Patient Position: At rest)   Pulse 62   Temp 98.7 °F (37.1 °C)   Resp 22   Ht 5' 9\" (1.753 m)   Wt 83.1 kg (183 lb 3.2 oz)   SpO2 95%   BMI 27.05 kg/m²       O2 Device: Hi flow nasal cannula   O2 Flow Rate (L/min): 35 l/min   Temp (24hrs), Av.3 °F (36.8 °C), Min:97.4 °F (36.3 °C), Max:98.8 °F (37.1 °C)       Intake/Output:   Last shift:      1901 -  0700  In: 240 [P.O.:240]  Out: 300 [Urine:300]  Last 3 shifts:  0701 -  1900  In: 1180 [P.O.:720; I.V.:460]  Out: 1925 [Urine:1925]    Intake/Output Summary (Last 24 hours) at 2020 9478  Last data filed at 2020 2018  Gross per 24 hour   Intake 700 ml   Output 1625 ml   Net -925 ml      Physical Exam:   General:  Alert, cooperative, no distress, appears stated age, laying in bed, wearing HFNC   Head:  Normocephalic, without obvious abnormality, atraumatic. Eyes:  Conjunctivae/corneas clear. ANicteric, PERRLA, EOMI   Nose: Nares normal. Mucosa normal. No drainage or sinus tenderness. Throat: Lips, mucosa dry. NO thrush; poor dentition, no oral lesions   Neck: Supple, symmetrical, trachea midline, no adenopathy,no carotid bruit and no JVD. No crepitus   Back:   Symmetric, no curvature, no spine tenderness or flank pain   Lungs:    Poor air entry bilaterally, CTA BL, no wheezes/rales/rhonchi throughout all lung fields   Chest wall:  No tenderness or deformity.  NO CREPITUS   Heart:  Regular rate and rhythm, S1, S2 normal, no murmur, click, rub or gallop. Abdomen:   Soft, non-tender. Bowel sounds normal. No masses,  No organomegaly. No paradoxical motion   Extremities: normal, atraumatic, no cyanosis or edema. No clubbing   Pulses: 1-2+ and symmetric all extremities. Skin: Skin color, texture, turgor normal. No rashes or lesions   Lymph nodes: Cervical, supraclavicular, and axillary nodes normal.   Neurologic: Grossly nonfocal, strength and coordination grossly intact throughout all extremities, sensation also grossly intact.   Pt having some difficulty mentating and remembering          Data review:   Labs:  Recent Results (from the past 24 hour(s))   EKG, 12 LEAD, SUBSEQUENT    Collection Time: 04/22/20  5:11 AM   Result Value Ref Range    Ventricular Rate 61 BPM    Atrial Rate 61 BPM    P-R Interval 188 ms    QRS Duration 104 ms    Q-T Interval 444 ms    QTC Calculation (Bezet) 446 ms    Calculated P Axis 55 degrees    Calculated R Axis 8 degrees    Calculated T Axis 14 degrees    Diagnosis       Normal sinus rhythm  Nonspecific T wave abnormality  Abnormal ECG  When compared with ECG of 21-APR-2020 04:25,  No significant change was found  Confirmed by Tammy Logan MD, Armand Barrow ([id number]) on 4/22/2020 12:12:37 PM     IMMUNOGLOBULIN G, QT    Collection Time: 04/22/20  5:55 AM   Result Value Ref Range    Immunoglobulin G 883 700 - 1,600 mg/dL   IMMUNOGLOBULIN A    Collection Time: 04/22/20  5:55 AM   Result Value Ref Range    Immunoglobulin A 133 70 - 400 mg/dL   IMMUNOGLOBULIN, QT, IGM    Collection Time: 04/22/20  5:55 AM   Result Value Ref Range    Immunoglobulin M 58 40 - 230 mg/dL   FIBRINOGEN    Collection Time: 04/22/20  5:55 AM   Result Value Ref Range    Fibrinogen 511 (H) 210 - 451 mg/dL   FERRITIN    Collection Time: 04/22/20  5:55 AM   Result Value Ref Range    Ferritin 653 (H) 8 - 388 NG/ML   CK    Collection Time: 04/22/20  5:55 AM   Result Value Ref Range     39 - 308 U/L   C REACTIVE PROTEIN, QT    Collection Time: 04/22/20  5:55 AM   Result Value Ref Range    C-Reactive protein 1.8 (H) 0 - 0.3 mg/dL   LD    Collection Time: 04/22/20  5:55 AM   Result Value Ref Range     (H) 81 - 234 U/L   D DIMER    Collection Time: 04/22/20  5:55 AM   Result Value Ref Range    D DIMER 0.73 (H) <0.46 ug/ml(FEU)   CBC WITH AUTOMATED DIFF    Collection Time: 04/22/20  5:55 AM   Result Value Ref Range    WBC 20.6 (H) 4.6 - 13.2 K/uL    RBC 4.73 4.70 - 5.50 M/uL    HGB 11.6 (L) 13.0 - 16.0 g/dL    HCT 36.5 36.0 - 48.0 %    MCV 77.2 74.0 - 97.0 FL    MCH 24.5 24.0 - 34.0 PG    MCHC 31.8 31.0 - 37.0 g/dL    RDW 16.2 (H) 11.6 - 14.5 %    PLATELET 892 251 - 940 K/uL    MPV 9.9 9.2 - 11.8 FL    NEUTROPHILS 30 (L) 42 - 75 %    LYMPHOCYTES 67 (H) 20 - 51 %    MONOCYTES 2 2 - 9 %    EOSINOPHILS 1 0 - 5 %    BASOPHILS 0 0 - 3 %    ABS. NEUTROPHILS 6.2 1.8 - 8.0 K/UL    ABS. LYMPHOCYTES 13.8 (H) 0.8 - 3.5 K/UL    ABS. MONOCYTES 0.4 0 - 1.0 K/UL    ABS. EOSINOPHILS 0.2 0.0 - 0.4 K/UL    ABS. BASOPHILS 0.0 0.0 - 0.06 K/UL    DF MANUAL      PLATELET COMMENTS ADEQUATE PLATELETS      RBC COMMENTS ANISOCYTOSIS  1+        RBC COMMENTS MICROCYTOSIS  1+        RBC COMMENTS OVALOCYTES  1+       METABOLIC PANEL, COMPREHENSIVE    Collection Time: 04/22/20  5:55 AM   Result Value Ref Range    Sodium 141 136 - 145 mmol/L    Potassium 4.7 3.5 - 5.5 mmol/L    Chloride 107 100 - 111 mmol/L    CO2 28 21 - 32 mmol/L    Anion gap 6 3.0 - 18 mmol/L    Glucose 93 74 - 99 mg/dL    BUN 28 (H) 7.0 - 18 MG/DL    Creatinine 1.13 0.6 - 1.3 MG/DL    BUN/Creatinine ratio 25 (H) 12 - 20      GFR est AA >60 >60 ml/min/1.73m2    GFR est non-AA >60 >60 ml/min/1.73m2    Calcium 7.9 (L) 8.5 - 10.1 MG/DL    Bilirubin, total 0.4 0.2 - 1.0 MG/DL    ALT (SGPT) 18 16 - 61 U/L    AST (SGOT) 36 10 - 38 U/L    Alk.  phosphatase 35 (L) 45 - 117 U/L    Protein, total 7.4 6.4 - 8.2 g/dL    Albumin 2.9 (L) 3.4 - 5.0 g/dL    Globulin 4.5 (H) 2.0 - 4.0 g/dL    A-G Ratio 0.6 (L) 0.8 - 1.7     GLUCOSE, POC    Collection Time: 04/22/20  8:39 AM   Result Value Ref Range    Glucose (POC) 92 70 - 110 mg/dL   GLUCOSE, POC    Collection Time: 04/22/20 11:33 AM   Result Value Ref Range    Glucose (POC) 234 (H) 70 - 110 mg/dL   GLUCOSE, POC    Collection Time: 04/22/20  4:25 PM   Result Value Ref Range    Glucose (POC) 152 (H) 70 - 110 mg/dL   GLUCOSE, POC    Collection Time: 04/22/20  8:02 PM   Result Value Ref Range    Glucose (POC) 155 (H) 70 - 110 mg/dL     ABG:  No results found for: PH, PHI, PCO2, PCO2I, PO2, PO2I, HCO3, HCO3I, FIO2, FIO2I    PFT Results  (Last 48 hours)    None        Echo Results  (Last 48 hours)    None        Imaging:  I have personally reviewed the patients radiographs and have reviewed the reports:  Chest x-ray from 4/21/2020 compared to chest x-ray from 4/17 and 4/16 shows worsening bilateral infiltrates consistent with multifocal pneumonia and ARDS. No masses, effusion seen, however due to dense nature of infiltrates, unable to assess further. CXR Results  (Last 48 hours)               04/22/20 0614  XR CHEST PORT Final result    Impression:  Impression:   1. Bilateral airspace opacities mildly improved on the right. These findings   are consistent with COVID pneumonia. Narrative:  EXAM: Chest Radiograph       INDICATION:  covid pneumonia       TECHNIQUE: AP view of the chest       COMPARISON: 4/21/2020, 4/17/2020, 4/16/2020       FINDINGS: No pneumothorax identified. Diffuse bilateral airspace opacities left   slightly greater than right. There is interval improvement in the aeration of   the right lung. No effusions identified. The cardiomediastinal silhouette is   unremarkable. The pulmonary vasculature is unremarkable. The osseous   structures are unremarkable. 04/21/20 0601  XR CHEST PORT Final result    Impression:  Impression:   1. Diffuse bilateral groundglass opacities which are progressed.         Narrative:  EXAM: Chest Radiograph       INDICATION: COVID-19 pneumonia       TECHNIQUE: AP view of the chest       COMPARISON: 4/17/2020, 4/16/2020, 1/22/2010        FINDINGS: No pneumothorax identified. The lungs are hypoinflated. Diffuse   groundglass opacities are noted which are significantly progressed since prior   imaging. No effusions identified. The cardiomediastinal silhouette is   unremarkable. The pulmonary vasculature is unremarkable. Degenerative changes   of the thoracic spine. CT Results  (Last 48 hours)    None            High complexity decision making was performed during the evaluation of this patient at high risk for decompensation with multiple organ involvement    Total of 38 min critical care time spent at bedside during the course of care providing evaluation,management and care decisions and ordering appropriate treatment related to critical care problems exclusive of procedures. The reason for providing this level of medical care for this critically ill patient was due a critical illness that impaired one or more vital organ systems such that there was a high probability of imminent or life threatening deterioration in the patients condition. This care involved high complexity decision making to assess, manipulate, and support vital system functions, to treat this degree vital organ system failure and to prevent further life threatening deterioration of the patients condition. Above mentioned total time spent on reviewing the case/medical record/data/notes/EMR/patient examination/documentation/coordinating care with nurse/consultants, exclusive of procedures with complex decision making performed and > 50% time spent in face to face evaluation.            Amberly Perkins MD/MPH     Pulmonary, Critical Care Medicine  [address] [place] [address] [facility]

## 2020-04-23 NOTE — DIABETES MGMT
GLYCEMIC CONTROL PLAN OF CARE     Assessment/Recommendations:  Fasting lab glucose this am 95 mg/dl   Noted pt receiving steroids. All postprandial blood glucose results elevated yesterday. May benefit from a low dose mealtime insulin. Lispro 2 units 3 times daily with meals. Continue corrective insulin coverage as needed  Will change frequency to AC&HS instead of tid. Will continue inpatient monitoring. Most recent blood glucose values:      Results for Mayra Roche (MRN [id number]) as of 4/23/2020 12:56   Ref. Range 4/22/2020 08:39 4/22/2020 11:33 4/22/2020 16:25 4/22/2020 20:02 4/23/2020 11:35   GLUCOSE,FAST - POC Latest Ref Range: 70 - 110 mg/dL 92 234 (H) 152 (H) 155 (H) 177 (H)         Current A1C of 8.2 % is equivalent to average blood glucose of 189 mg/dl over the past 2-3 months.     Current hospital diabetes medications:   Lispro corrective insulin coverage AC&HS  Previous day's insulin requirements:   Lispro 12 units corrective insulin coverage  Home diabetes medications:  Janumet   Bydureon weekly  Diet:    Diabetic consistent carb with supplements  Education:  __x_Refer to Diabetes Education Record             ____Education not indicated at this time      [place]hy[place]iss [place], [place] CDE  Ext [phone]

## 2020-04-23 NOTE — PROGRESS NOTES
[facility]. Pulmonary, Critical Care, and Sleep Medicine    F/U Patient Consult    Name: Farzad Allen MRN: [id number]   : 1960 Hospital: [facility]   Date: 2020        This patient has been seen and evaluated at the request of Dr. Ludivina Fierro for resp failure/hypoxia. IMPRESSION:   · Acute hypoxic respiratory failure: Etiology secondary to COVID-19 pneumonia. Pt developed worsening hypoxia requiring HFNC with FiO2 of 65% - still requiring HFNC at 65%  · COVID-19 infection with multifocal pneumonia  · Moderate ARDS:  Secondary to above  · Severe sepsis:  Secondary to above  · JOSE on CKD  · Hx of HTN: pt on an ACEi prior to admission  · Hx of CLL  · Hx of PVC ablation in 2010  · Hx of dyslipidemia     Patient Active Problem List   Diagnosis Code    Nonischemic cardiomyopathy ([facility] [facility] I42.8    PVC (premature ventricular contraction) I49.3    S/P ablation operation for arrhythmia Z98.890, Z86.79    Lymphocytosis D72.820    [facility]ch[facility] C91.10    Skin rash R21    Vitamin D deficiency E55.9    Pneumonia J18.9    COVID-19 virus infection U07.1    Suspected Covid-19 Virus Infection R68.89      RECOMMENDATIONS:   Supplemental oxygen to maintain SpO2 >88% --- continue pt on HFNC with Vapotherm. Pt appears comfortable at this setting so would continue to advise against mech ventilation at this point, only if pt decompensates  AGAIN, discussed convalescent serum transfusion with pt, primary service, and ID, given worsening infiltrates and requiring HFNC (consistent with moderate ARDS).   Pt continues to decline after discussing risks and benefits  Defer antiinflammatory medications (including antimalarials, steroids, and trace minerals) and ABx to ID service -- data for novel coronavirus is limited, please weigh risks and benefits  Serial fibrinogen, LDH, triglyceride, CRP, ferritin  Aggressive pulmonary toileting/bronchial hygiene  Frequent incentive spirometry - counseled to perform 4-5x every hour  Aspiration precautions including elevating HOB >30deg  PT/OT, OOB, ambulate with assistance as tolerated  DVT ppx per primary service -- recommend very judicious use of full dose anticoagulation without evidence of thrombitic disease -- pt has severe sespis from COVID-19 which is a hyperinflammatory state which may lead to more thrombus formation, but must weigh risks vs benefits. Please monitor for blood loss and DIC  Please assess for home oxygen need prior to discharge  Per discussion with primary service, [facility] and [facility] declined transfer. No medical indication for transfer noted by Koki  Continue supportive care  Will follow    Prognosis guarded       Subjective:   04/23/20  Pt seen and examined at bedside. No acute events overnight. Pt reports having more energy and feeling better today. Pt remains on HFNC at 65% FiO2, desats with minimal exertion. Pt continues to decline convalescent plasma -- notes that his wife and kids don't want him to try it or he would be agreeable. Denies N/V/D, chest pain, sputum      HPI:  Patient is a 60 y.o. male with a past medical history of CLL, PVC ablation, hypertension, presented to [facility] about 6 days ago with complaints of fever and cough for a few days prior to presentation. Patient reports that his wife and daughter were sick at home, but he does not know any other sick contacts. Patient then developed worsening shortness of breath over the last 5 days. Patient also reports that he lost his sense of taste during that time. While in the ER, patient was found to be hypoxic at 2 L by nasal cannula along with fever and had acute renal failure. During the course of hospitalization, patient has been having productive cough, which patient reports that resolved over the last day.   Patient reports now that his cough is better and he reports that his dyspnea is somewhat improved however patient is becoming increasingly more hypoxic, patient has been going up from 2 L nasal cannula up to 8 L yesterday and 10 L today. Patient reports he has been having issues with insomnia, not being able to sleep. Patient also reports some malaise. Patient otherwise denies any chest pain, nausea, vomiting, diarrhea, hemoptysis. Patient reports smoking a pack a day until a number of years ago. Patient denies any occupational exposures to coal/silica/asbestos, patient reports he is a former . Patient reports he follows with Dr. Shila Muir for CLL, has not been on any chemotherapy. Past Medical History:   Diagnosis Date    Abnormal nuclear cardiac imaging test 11/30/2006    Mild anterior ischemia. Inferior scar w/border zone ischemia. LVE. EF 26%. (Gated imaging may be inaccurate.)  Global hypk. Neg EKG on max EST. Ex time 10:30.  Aorto-iliac duplex 09/10/2012    No AAA identified.  Carotid duplex 09/10/2012    No significant occlusive disease bilaterallly.  CLL (chronic lymphocytic leukemia) ([facility]     Diabetes ([facility]     Echocardiogram 06/02/2015    EF 55%. No WMA. Mild LVH. Gr 1 DDfx. IMELDA. No significant chg from study of 5/17/10.  History of echocardiogram 06/20/2011    EF 50-55%. Gr 2 DDfx. Mild RVE. Mild LAE. Mild-mod IMELDA.  Hypercholesterolemia     Hypertension     Nonischemic cardiomyopathy ([address]     s/p right coronary cusp PVC ablation  (9/40/60) without complication    Prostate CA ([facility] [address]     S/P cardiac cath 12/11/2006    Patent coronary arteries. LVEDP 12.  EF 25-30%. Mod-significant global hypk. Past Surgical History:   Procedure Laterality Date    HX HEART CATHETERIZATION  12/11/06    The right coronary artery is dominant. It is widely patent. The left main coronary artery is widely patent. The circumflex artery is widely patent. The left anterior descending coronary artery is widely patent. The LVEDP is 12 mmHg.  The overall left ventricular systolic function is significantly diminished with an estimated ejection fraction of 25-30%. ** See report. Prior to Admission medications    Medication Sig Start Date End Date Taking? Authorizing Provider   SHELBY OBRIENSE 2 mg/0.85 mL atIn INJECT 2MG EVERY WEEK UNDER THE SKIN 6/7/19   Provider, Historical   ergocalciferol (ERGOCALCIFEROL) 50,000 unit capsule Take 1 Cap by mouth every seven (7) days. 1/7/19   Lesly MCCORD NP   sitagliptin phos/metformin HCl (JANUMET PO) Take  by mouth. Provider, Historical   lisinopril (PRINIVIL, ZESTRIL) 5 mg tablet Take 1 Tab by mouth daily. 6/29/15   Magno Denise MD   carvedilol (COREG) 12.5 mg tablet Take 1 Tab by mouth two (2) times a day. 6/4/15   Magno Denise MD   simvastatin (ZOCOR) 10 mg tablet Take  by mouth daily. Provider, Historical   aspirin delayed-release 81 mg tablet Take 81 mg by mouth daily. 6/9/11   Provider, Historical     No Known Allergies   Social History     Tobacco Use    Smoking status: Former Smoker     Packs/day: 1.00    Smokeless tobacco: Never Used   Substance Use Topics    Alcohol use: Yes      History reviewed. No pertinent family history.      Current Facility-Administered Medications   Medication Dose Route Frequency    enoxaparin (LOVENOX) injection 40 mg  40 mg SubCUTAneous Q12H    ribavirin (REBETOL) capsule 400 mg  400 mg Oral BID    insulin lispro (HUMALOG) injection   SubCUTAneous AC&HS    guaiFENesin ER (MUCINEX) tablet 600 mg  600 mg Oral Q12H    diclofenac (VOLTAREN) 1 % topical gel 4 g  4 g Topical QID    melatonin tablet 6 mg  6 mg Oral QHS    famotidine (PEPCID) tablet 20 mg  20 mg Oral BID    atorvastatin (LIPITOR) tablet 40 mg  40 mg Oral DAILY    cefepime (MAXIPIME) 2 g in sterile water (preservative free) 10 mL IV syringe  2 g IntraVENous Q8H    doxycycline (VIBRAMYCIN) 100 mg in 0.9% sodium chloride (MBP/ADV) 100 mL MBP  100 mg IntraVENous Q12H    sodium chloride (NS) flush 5-40 mL  5-40 mL IntraVENous Q8H    aspirin delayed-release tablet 81 mg  81 mg Oral DAILY    carvediloL (COREG) tablet 12.5 mg  12.5 mg Oral BID    zinc sulfate (ZINCATE) 220 mg capsule 1 Cap  1 Cap Oral DAILY    ascorbic acid (vitamin C) (VITAMIN C) tablet 500 mg  500 mg Oral BID    cholecalciferol (VITAMIN D3) (400 Units /10 mcg) tablet 1 Tab  400 Units Oral DAILY       Review of Systems:  A comprehensive ROS was obtained as stated in HPI, all others are negative      Objective:   Vital Signs:    Visit Vitals  /60 (BP 1 Location: Right arm, BP Patient Position: Prone)   Pulse 71   Temp 99.3 °F (37.4 °C)   Resp 18   Ht 5' 9\" (1.753 m)   Wt 83.5 kg (184 lb)   SpO2 90%   BMI 27.17 kg/m²       O2 Device: Hi flow nasal cannula   O2 Flow Rate (L/min): 40 l/min   Temp (24hrs), Av.7 °F (37.1 °C), Min:98 °F (36.7 °C), Max:99.3 °F (37.4 °C)       Intake/Output:   Last shift:       07 -  190  In: 460 [P.O.:360; I.V.:100]  Out: 300 [Urine:300]  Last 3 shifts:  190 -  0700  In: 7085 [P.O.:1200; I.V.:350]  Out: 2725 [Urine:2725]    Intake/Output Summary (Last 24 hours) at 2020 1726  Last data filed at 2020 1200  Gross per 24 hour   Intake 1060 ml   Output 1400 ml   Net -340 ml      Physical Exam:   General:  Alert, cooperative, no distress, appears stated age, sitting up on side of bed, looks more comfortable wearing HFNC   Head:  Normocephalic, without obvious abnormality, atraumatic. Eyes:  Conjunctivae/corneas clear. ANicteric, PERRLA, EOMI   Nose: Nares normal. Mucosa normal. No drainage or sinus tenderness. Throat: Lips, mucosa dry. NO thrush; poor dentition, no oral lesions   Neck: Supple, symmetrical, trachea midline, no adenopathy,no carotid bruit and no JVD.  No crepitus   Back:   Symmetric, no curvature, no spine tenderness or flank pain   Lungs:    Unchanged, poor air entry bilaterally, CTA BL, no wheezes/rales/rhonchi throughout all lung fields   Chest wall:  No tenderness or deformity. NO CREPITUS   Heart:  Regular rate and rhythm, S1, S2 normal, no murmur, click, rub or gallop. Abdomen:   Soft, non-tender. Bowel sounds normal. No masses,  No organomegaly. No paradoxical motion   Extremities: normal, atraumatic, no cyanosis or edema. No clubbing   Pulses: 1-2+ and symmetric all extremities. Skin: Skin color, texture, turgor normal. No rashes or lesions   Lymph nodes: Cervical, supraclavicular, and axillary nodes normal.   Neurologic: Grossly nonfocal, strength and coordination grossly intact throughout all extremities, sensation also grossly intact. Pt having some difficulty mentating and remembering          Data review:   Labs:  Recent Results (from the past 24 hour(s))   GLUCOSE, POC    Collection Time: 04/22/20  8:02 PM   Result Value Ref Range    Glucose (POC) 155 (H) 70 - 110 mg/dL   FERRITIN    Collection Time: 04/23/20  1:37 AM   Result Value Ref Range    Ferritin 652 (H) 8 - 388 NG/ML   CK    Collection Time: 04/23/20  1:37 AM   Result Value Ref Range     39 - 308 U/L   C REACTIVE PROTEIN, QT    Collection Time: 04/23/20  1:37 AM   Result Value Ref Range    C-Reactive protein 1.2 (H) 0 - 0.3 mg/dL   LD    Collection Time: 04/23/20  1:37 AM   Result Value Ref Range     (H) 81 - 234 U/L   D DIMER    Collection Time: 04/23/20  1:37 AM   Result Value Ref Range    D DIMER 0.64 (H) <0.46 ug/ml(FEU)   CBC WITH AUTOMATED DIFF    Collection Time: 04/23/20  1:37 AM   Result Value Ref Range    WBC 23.7 (H) 4.6 - 13.2 K/uL    RBC 4.99 4.70 - 5.50 M/uL    HGB 12.5 (L) 13.0 - 16.0 g/dL    HCT 39.2 36.0 - 48.0 %    MCV 78.6 74.0 - 97.0 FL    MCH 25.1 24.0 - 34.0 PG    MCHC 31.9 31.0 - 37.0 g/dL    RDW 16.2 (H) 11.6 - 14.5 %    PLATELET 931 812 - 283 K/uL    MPV 10.1 9.2 - 11.8 FL    NEUTROPHILS 31 (L) 42 - 75 %    LYMPHOCYTES 67 (H) 20 - 51 %    MONOCYTES 2 2 - 9 %    EOSINOPHILS 0 0 - 5 %    BASOPHILS 0 0 - 3 %    ABS. NEUTROPHILS 7.3 1.8 - 8.0 K/UL    ABS. LYMPHOCYTES 15.9 (H) 0.8 - 3.5 K/UL    ABS. MONOCYTES 0.5 0 - 1.0 K/UL    ABS. EOSINOPHILS 0.0 0.0 - 0.4 K/UL    ABS. BASOPHILS 0.0 0.0 - 0.06 K/UL    DF AUTOMATED      PLATELET COMMENTS ADEQUATE PLATELETS      RBC COMMENTS ANISOCYTOSIS  1+       METABOLIC PANEL, BASIC    Collection Time: 04/23/20  1:37 AM   Result Value Ref Range    Sodium 140 136 - 145 mmol/L    Potassium 4.7 3.5 - 5.5 mmol/L    Chloride 106 100 - 111 mmol/L    CO2 25 21 - 32 mmol/L    Anion gap 9 3.0 - 18 mmol/L    Glucose 95 74 - 99 mg/dL    BUN 28 (H) 7.0 - 18 MG/DL    Creatinine 1.04 0.6 - 1.3 MG/DL    BUN/Creatinine ratio 27 (H) 12 - 20      GFR est AA >60 >60 ml/min/1.73m2    GFR est non-AA >60 >60 ml/min/1.73m2    Calcium 8.2 (L) 8.5 - 10.1 MG/DL   GLUCOSE, POC    Collection Time: 04/23/20 11:35 AM   Result Value Ref Range    Glucose (POC) 177 (H) 70 - 110 mg/dL   GLUCOSE, POC    Collection Time: 04/23/20  4:56 PM   Result Value Ref Range    Glucose (POC) 169 (H) 70 - 110 mg/dL     ABG:  No results found for: PH, PHI, PCO2, PCO2I, PO2, PO2I, HCO3, HCO3I, FIO2, FIO2I    PFT Results  (Last 48 hours)    None        Echo Results  (Last 48 hours)    None        Imaging:  I have personally reviewed the patients radiographs and have reviewed the reports:  No new studies in the interval  Chest x-ray from 4/21/2020 compared to chest x-ray from 4/17 and 4/16 shows worsening bilateral infiltrates consistent with multifocal pneumonia and ARDS. No masses, effusion seen, however due to dense nature of infiltrates, unable to assess further. CXR Results  (Last 48 hours)               04/22/20 0614  XR CHEST PORT Final result    Impression:  Impression:   1. Bilateral airspace opacities mildly improved on the right. These findings   are consistent with COVID pneumonia.         Narrative:  EXAM: Chest Radiograph       INDICATION:  covid pneumonia       TECHNIQUE: AP view of the chest       COMPARISON: 4/21/2020, 4/17/2020, 4/16/2020       FINDINGS: No pneumothorax identified. Diffuse bilateral airspace opacities left   slightly greater than right. There is interval improvement in the aeration of   the right lung. No effusions identified. The cardiomediastinal silhouette is   unremarkable. The pulmonary vasculature is unremarkable. The osseous   structures are unremarkable. CT Results  (Last 48 hours)    None            High complexity decision making was performed during the evaluation of this patient at high risk for decompensation with multiple organ involvement    Total of 30 min critical care time spent at bedside during the course of care providing evaluation,management and care decisions and ordering appropriate treatment related to critical care problems exclusive of procedures. The reason for providing this level of medical care for this critically ill patient was due a critical illness that impaired one or more vital organ systems such that there was a high probability of imminent or life threatening deterioration in the patients condition. This care involved high complexity decision making to assess, manipulate, and support vital system functions, to treat this degree vital organ system failure and to prevent further life threatening deterioration of the patients condition. Above mentioned total time spent on reviewing the case/medical record/data/notes/EMR/patient examination/documentation/coordinating care with nurse/consultants, exclusive of procedures with complex decision making performed and > 50% time spent in face to face evaluation.            Stephane Smith MD/MPH     Pulmonary, Critical Care Medicine  [facility]

## 2020-04-23 NOTE — ROUTINE PROCESS
Verbal shift change report given to Natasha Pimentel RN (oncoming nurse) by China Parker RN (offgoing nurse). Report included the following information SBAR, ED Summary, Intake/Output, Recent Results and Med Rec Status.

## 2020-04-24 ENCOUNTER — APPOINTMENT (OUTPATIENT)
Dept: GENERAL RADIOLOGY | Age: 60
DRG: 871 | End: 2020-04-24
Attending: INTERNAL MEDICINE
Payer: COMMERCIAL

## 2020-04-24 LAB
ALBUMIN SERPL-MCNC: 3.2 G/DL (ref 3.4–5)
ALBUMIN/GLOB SERPL: 0.7 {RATIO} (ref 0.8–1.7)
ALP SERPL-CCNC: 44 U/L (ref 45–117)
ALT SERPL-CCNC: 40 U/L (ref 16–61)
ANION GAP SERPL CALC-SCNC: 9 MMOL/L (ref 3–18)
AST SERPL-CCNC: 46 U/L (ref 10–38)
ATRIAL RATE: 57 BPM
BASOPHILS # BLD: 0 K/UL (ref 0–0.06)
BASOPHILS NFR BLD: 0 % (ref 0–3)
BILIRUB SERPL-MCNC: 0.5 MG/DL (ref 0.2–1)
BUN SERPL-MCNC: 31 MG/DL (ref 7–18)
BUN/CREAT SERPL: 30 (ref 12–20)
CALCIUM SERPL-MCNC: 8.5 MG/DL (ref 8.5–10.1)
CALCULATED P AXIS, ECG09: 48 DEGREES
CALCULATED R AXIS, ECG10: 8 DEGREES
CALCULATED T AXIS, ECG11: 34 DEGREES
CHLORIDE SERPL-SCNC: 104 MMOL/L (ref 100–111)
CK SERPL-CCNC: 138 U/L (ref 39–308)
CO2 SERPL-SCNC: 24 MMOL/L (ref 21–32)
CREAT SERPL-MCNC: 1.05 MG/DL (ref 0.6–1.3)
CRP SERPL-MCNC: 0.9 MG/DL (ref 0–0.3)
D DIMER PPP FEU-MCNC: 0.65 UG/ML(FEU)
DIAGNOSIS, 93000: NORMAL
DIFFERENTIAL METHOD BLD: ABNORMAL
EOSINOPHIL # BLD: 0 K/UL (ref 0–0.4)
EOSINOPHIL NFR BLD: 0 % (ref 0–5)
ERYTHROCYTE [DISTWIDTH] IN BLOOD BY AUTOMATED COUNT: 16.6 % (ref 11.6–14.5)
FERRITIN SERPL-MCNC: 644 NG/ML (ref 8–388)
GLOBULIN SER CALC-MCNC: 4.6 G/DL (ref 2–4)
GLUCOSE BLD STRIP.AUTO-MCNC: 109 MG/DL (ref 70–110)
GLUCOSE BLD STRIP.AUTO-MCNC: 126 MG/DL (ref 70–110)
GLUCOSE BLD STRIP.AUTO-MCNC: 133 MG/DL (ref 70–110)
GLUCOSE BLD STRIP.AUTO-MCNC: 150 MG/DL (ref 70–110)
GLUCOSE SERPL-MCNC: 91 MG/DL (ref 74–99)
HCT VFR BLD AUTO: 39.6 % (ref 36–48)
HGB BLD-MCNC: 12.6 G/DL (ref 13–16)
LDH SERPL L TO P-CCNC: 734 U/L (ref 81–234)
LYMPHOCYTES # BLD: 15.4 K/UL (ref 0.8–3.5)
LYMPHOCYTES NFR BLD: 63 % (ref 20–51)
MCH RBC QN AUTO: 25 PG (ref 24–34)
MCHC RBC AUTO-ENTMCNC: 31.8 G/DL (ref 31–37)
MCV RBC AUTO: 78.7 FL (ref 74–97)
MONOCYTES # BLD: 0.5 K/UL (ref 0–1)
MONOCYTES NFR BLD: 2 % (ref 2–9)
NEUTS BAND NFR BLD MANUAL: 1 % (ref 0–5)
NEUTS SEG # BLD: 8.5 K/UL (ref 1.8–8)
NEUTS SEG NFR BLD: 34 % (ref 42–75)
P-R INTERVAL, ECG05: 176 MS
PLATELET # BLD AUTO: 374 K/UL (ref 135–420)
PLATELET COMMENTS,PCOM: ABNORMAL
PMV BLD AUTO: 10.1 FL (ref 9.2–11.8)
POTASSIUM SERPL-SCNC: 4.6 MMOL/L (ref 3.5–5.5)
PROT SERPL-MCNC: 7.8 G/DL (ref 6.4–8.2)
Q-T INTERVAL, ECG07: 450 MS
QRS DURATION, ECG06: 106 MS
QTC CALCULATION (BEZET), ECG08: 438 MS
RBC # BLD AUTO: 5.03 M/UL (ref 4.7–5.5)
RBC MORPH BLD: ABNORMAL
RBC MORPH BLD: ABNORMAL
SODIUM SERPL-SCNC: 137 MMOL/L (ref 136–145)
VENTRICULAR RATE, ECG03: 57 BPM
WBC # BLD AUTO: 24.4 K/UL (ref 4.6–13.2)

## 2020-04-24 PROCEDURE — 80053 COMPREHEN METABOLIC PANEL: CPT

## 2020-04-24 PROCEDURE — 71045 X-RAY EXAM CHEST 1 VIEW: CPT

## 2020-04-24 PROCEDURE — 82550 ASSAY OF CK (CPK): CPT

## 2020-04-24 PROCEDURE — 82728 ASSAY OF FERRITIN: CPT

## 2020-04-24 PROCEDURE — 36415 COLL VENOUS BLD VENIPUNCTURE: CPT

## 2020-04-24 PROCEDURE — 74011250637 HC RX REV CODE- 250/637: Performed by: INTERNAL MEDICINE

## 2020-04-24 PROCEDURE — 74011000258 HC RX REV CODE- 258: Performed by: INTERNAL MEDICINE

## 2020-04-24 PROCEDURE — 86140 C-REACTIVE PROTEIN: CPT

## 2020-04-24 PROCEDURE — 74011250637 HC RX REV CODE- 250/637: Performed by: HOSPITALIST

## 2020-04-24 PROCEDURE — 74011636637 HC RX REV CODE- 636/637: Performed by: INTERNAL MEDICINE

## 2020-04-24 PROCEDURE — 65660000000 HC RM CCU STEPDOWN

## 2020-04-24 PROCEDURE — 85025 COMPLETE CBC W/AUTO DIFF WBC: CPT

## 2020-04-24 PROCEDURE — 74011000250 HC RX REV CODE- 250: Performed by: INTERNAL MEDICINE

## 2020-04-24 PROCEDURE — 83615 LACTATE (LD) (LDH) ENZYME: CPT

## 2020-04-24 PROCEDURE — 85379 FIBRIN DEGRADATION QUANT: CPT

## 2020-04-24 PROCEDURE — 74011250636 HC RX REV CODE- 250/636: Performed by: INTERNAL MEDICINE

## 2020-04-24 PROCEDURE — 93005 ELECTROCARDIOGRAM TRACING: CPT

## 2020-04-24 PROCEDURE — 82962 GLUCOSE BLOOD TEST: CPT

## 2020-04-24 RX ADMIN — WATER 2 G: 1 INJECTION INTRAMUSCULAR; INTRAVENOUS; SUBCUTANEOUS at 12:50

## 2020-04-24 RX ADMIN — ZINC SULFATE 220 MG (50 MG) CAPSULE 1 CAPSULE: CAPSULE at 09:00

## 2020-04-24 RX ADMIN — Medication 500 MG: at 08:49

## 2020-04-24 RX ADMIN — INSULIN LISPRO 3 UNITS: 100 INJECTION, SOLUTION INTRAVENOUS; SUBCUTANEOUS at 22:07

## 2020-04-24 RX ADMIN — FAMOTIDINE 20 MG: 20 TABLET ORAL at 21:16

## 2020-04-24 RX ADMIN — CHOLECALCIFEROL (VITAMIN D3) 10 MCG (400 UNIT) TABLET 1 TABLET: at 08:46

## 2020-04-24 RX ADMIN — DOXYCYCLINE 100 MG: 100 INJECTION, POWDER, LYOPHILIZED, FOR SOLUTION INTRAVENOUS at 08:46

## 2020-04-24 RX ADMIN — RIBAVIRIN 400 MG: 200 CAPSULE ORAL at 08:46

## 2020-04-24 RX ADMIN — GUAIFENESIN 600 MG: 600 TABLET, EXTENDED RELEASE ORAL at 08:46

## 2020-04-24 RX ADMIN — Medication 10 ML: at 21:19

## 2020-04-24 RX ADMIN — DOXYCYCLINE 100 MG: 100 INJECTION, POWDER, LYOPHILIZED, FOR SOLUTION INTRAVENOUS at 21:15

## 2020-04-24 RX ADMIN — Medication 10 ML: at 13:00

## 2020-04-24 RX ADMIN — FAMOTIDINE 20 MG: 20 TABLET ORAL at 08:47

## 2020-04-24 RX ADMIN — DICLOFENAC 4 G: 10 GEL TOPICAL at 21:00

## 2020-04-24 RX ADMIN — Medication 10 ML: at 05:00

## 2020-04-24 RX ADMIN — RIBAVIRIN 400 MG: 200 CAPSULE ORAL at 21:17

## 2020-04-24 RX ADMIN — ENOXAPARIN SODIUM 40 MG: 80 INJECTION SUBCUTANEOUS at 21:17

## 2020-04-24 RX ADMIN — Medication 500 MG: at 21:17

## 2020-04-24 RX ADMIN — CARVEDILOL 12.5 MG: 12.5 TABLET, FILM COATED ORAL at 21:16

## 2020-04-24 RX ADMIN — WATER 2 G: 1 INJECTION INTRAMUSCULAR; INTRAVENOUS; SUBCUTANEOUS at 04:58

## 2020-04-24 RX ADMIN — GUAIFENESIN 600 MG: 600 TABLET, EXTENDED RELEASE ORAL at 21:17

## 2020-04-24 RX ADMIN — MELATONIN TAB 3 MG 6 MG: 3 TAB at 21:16

## 2020-04-24 RX ADMIN — CARVEDILOL 12.5 MG: 12.5 TABLET, FILM COATED ORAL at 08:49

## 2020-04-24 RX ADMIN — ATORVASTATIN CALCIUM 40 MG: 40 TABLET, FILM COATED ORAL at 08:49

## 2020-04-24 RX ADMIN — WATER 2 G: 1 INJECTION INTRAMUSCULAR; INTRAVENOUS; SUBCUTANEOUS at 21:17

## 2020-04-24 RX ADMIN — ENOXAPARIN SODIUM 40 MG: 80 INJECTION SUBCUTANEOUS at 08:46

## 2020-04-24 RX ADMIN — ASPIRIN 81 MG: 81 TABLET, COATED ORAL at 08:46

## 2020-04-24 NOTE — DIABETES MGMT
GLYCEMIC CONTROL PLAN OF CARE     Assessment/Recommendations:  Fasting lab glucose this am 91 mg/dl   Noted steroids discontinued. Blood glucose improving. Continue corrective insulin coverage as needed. Will continue inpatient monitoring. Most recent blood glucose values:        Results for Richardson Casey (MRN [id number]) as of 4/24/2020 14:24   Ref. Range 4/23/2020 11:35 4/23/2020 16:56 4/23/2020 20:54 4/24/2020 08:59 4/24/2020 12:58   GLUCOSE,FAST - POC Latest Ref Range: 70 - 110 mg/dL 177 (H) 169 (H) 133 (H) 109 133 (H)       Current A1C of 8.2 % is equivalent to average blood glucose of 189 mg/dl over the past 2-3 months.     Current hospital diabetes medications:   Lispro corrective insulin coverage AC&HS  Previous day's insulin requirements:   Lispro 6 units corrective insulin coverage  Home diabetes medications:  Janumet   Bydureon weekly  Diet:    Diabetic consistent carb with supplements  Education:  __x_Refer to Diabetes Education Record             ____Education not indicated at this time      Teresa Miller [facility] CDE  Ext [phone]

## 2020-04-24 NOTE — PROGRESS NOTES
Chart Reviewed:  Discharge disposition at this time is Home with family assist, family will transport home. Monitor need for home Oxygen.       Sylvia Hitchcock RN  Case Management [phone]

## 2020-04-24 NOTE — PROGRESS NOTES
Infectious Disease progress Note        Reason: sepsis, COVID-19 infection    Current abx Prior abx   Cefepime, doxycycline since 4/18/2020  ribavarin since 4/21 Ceftriaxone, azithromycin 4/16/20-4/18/2020  Hydroxychloroquine  4/16/20-4/20/20     Lines:       Assessment :    60 y.o. male  with history of CLL, type 2 diabetes, prostate cancer, history of atrial fibrillation with ablation, hypertension, CKD stage 3 who presented to the emergency room on 4/16/2020 secondary to shortness of breath. CXR: mild opacities at lung bases. Now with increasing shortness of breath, hypoxia requiring oxygen, worsening renal function    Patient presents with a highly complex clinical picture. Clinical picture consistent with sepsis (present on admission ) due to bilateral  covid-19 infection. Labs 4/16 reviewed-ferritin 233, C-reactive protein 6.3, , , d-dimer 0.75  Labs 4/17 reviewed- ferritin 266, C-reactive protein 8.0, , LDH: 219, d-dimer 1.20   Labs 4/19 reviewed-ferritin 346, C-reactive protein 10.4, , , d-dimer 0.84  Labs on 4/20 reviewed-ferritin 628, C-reactive protein 6.2, , , d-dimer 0.7  Labs on 4/21 reviewed ferritin 628, CRP 3, , , d-dimer 0.49  Labs on 4/22 reviewed ferritin 653, CRP 1.8, , , d-dimer 0.73  Labs on 4/23 reviewed ferritin 652, CRP 1.2, , , d-dimer 0.64  Labs on 4/24 reviewed ferritin 644, ,D-dimer 0.65      Worsening respiratory status 4/18-4/19 with worsening inflammatory parameters suggestive of cytokine storm. Status post Solu-Medrol since 4/18. Status post tocilizumab on 4/19. Sputum culture 4/18 light normal respiratory giselle, yeast    Acute on chronic renal insufficiency-could be prerenal versus ATN secondary to sepsis. Nephrology consult appreciated.   Creatinine stable    EKG 4/16-QTc 416, 456 on 4/20, 431 on 4/21    Procalcitonin 0.38 on 4/20    Chest x-ray 4/21-worsening bilateral infiltrates suggestive of ARDS. Chest x-ray 4/22-some improvement in the infiltrates. Worsening leukocytosis likely secondary to steroids    Clinically better. Still on high flow oxygen. However subjective sense of improvement. Able to perform more activities. Improved aeration noted on chest x-ray today    Recommendations:    1. Discontinue cefepime. doxycycline after today's dose. Continue Ribavarin (d4/5)  2. Continue zinc, ascorbic acid. vitamin D3, melatonin  3. Recommend prone position ventilation. I discussed this with patient. 4.   Follow-up nephrology/pulmonary recommendations  5. Titrate oxygen as tolerated  6. Follow pulmonary recommendations      Above plan was discussed in details with patient, dr. Otis Randolph, Dr. Radha Paredes. please call me if any further questions or concerns. Will continue to participate in the care of this patient. HPI:    Feels  better. Patient denies headaches, visual disturbances, sore throat, runny nose, earaches, abdominal pain, diarrhea, burning micturition, pain or weakness in extremities. He denies back pain/flank pain. home Medication List    Details   BYDUREON BCISE 2 mg/0.85 mL atIn INJECT 2MG EVERY WEEK UNDER THE SKIN  Refills: 0      ergocalciferol (ERGOCALCIFEROL) 50,000 unit capsule Take 1 Cap by mouth every seven (7) days. Qty: 24 Cap, Refills: 1    Associated Diagnoses: Vitamin D deficiency      sitagliptin phos/metformin HCl (JANUMET PO) Take  by mouth.      lisinopril (PRINIVIL, ZESTRIL) 5 mg tablet Take 1 Tab by mouth daily. Qty: 30 Tab, Refills: 6      carvedilol (COREG) 12.5 mg tablet Take 1 Tab by mouth two (2) times a day. Qty: 60 Tab, Refills: 6      simvastatin (ZOCOR) 10 mg tablet Take  by mouth daily. aspirin delayed-release 81 mg tablet Take 81 mg by mouth daily.              Current Facility-Administered Medications   Medication Dose Route Frequency    enoxaparin (LOVENOX) injection 40 mg  40 mg SubCUTAneous Q12H    ribavirin (REBETOL) capsule 400 mg  400 mg Oral BID    insulin lispro (HUMALOG) injection   SubCUTAneous AC&HS    guaiFENesin ER (MUCINEX) tablet 600 mg  600 mg Oral Q12H    diclofenac (VOLTAREN) 1 % topical gel 4 g  4 g Topical QID    melatonin tablet 6 mg  6 mg Oral QHS    famotidine (PEPCID) tablet 20 mg  20 mg Oral BID    LORazepam (ATIVAN) tablet 1 mg  1 mg Oral Q6H PRN    atorvastatin (LIPITOR) tablet 40 mg  40 mg Oral DAILY    cefepime (MAXIPIME) 2 g in sterile water (preservative free) 10 mL IV syringe  2 g IntraVENous Q8H    doxycycline (VIBRAMYCIN) 100 mg in 0.9% sodium chloride (MBP/ADV) 100 mL MBP  100 mg IntraVENous Q12H    sodium chloride (NS) flush 5-40 mL  5-40 mL IntraVENous Q8H    aspirin delayed-release tablet 81 mg  81 mg Oral DAILY    carvediloL (COREG) tablet 12.5 mg  12.5 mg Oral BID    zinc sulfate (ZINCATE) 220 mg capsule 1 Cap  1 Cap Oral DAILY    ascorbic acid (vitamin C) (VITAMIN C) tablet 500 mg  500 mg Oral BID    glucose chewable tablet 16 g  4 Tab Oral PRN    glucagon (GLUCAGEN) injection 1 mg  1 mg IntraMUSCular PRN    dextrose 10% infusion 125-250 mL  125-250 mL IntraVENous PRN    acetaminophen (TYLENOL) tablet 650 mg  650 mg Oral Q6H PRN    cholecalciferol (VITAMIN D3) (400 Units /10 mcg) tablet 1 Tab  400 Units Oral DAILY       Allergies: Patient has no known allergies. Temp (24hrs), Av °F (36.7 °C), Min:95.8 °F (35.4 °C), Max:99.3 °F (37.4 °C)    Visit Vitals  /81 (BP 1 Location: Right arm, BP Patient Position: Sitting)   Pulse 71   Temp 95.8 °F (35.4 °C)   Resp 26   Ht 5' 9\" (1.753 m)   Wt 83.5 kg (184 lb)   SpO2 90%   BMI 27.17 kg/m²       ROS: 12 point ROS obtained in details. Pertinent positives as mentioned in HPI,   otherwise negative    Physical Exam:    General: Well developed, well nourished male sitting on the bed AAOx3.   In no apparent distress    General:   awake alert and oriented   HEENT:  Normocephalic, atraumatic, PERRL, EOMI, no scleral icterus or pallor; no conjunctival hemmohage   Lymph Nodes:   no cervical, axillary or inguinal adenopathy   Lungs:   non-labored, bilaterally clear to auscultation- no crackles wheezes rales or rhonchi   Heart:  RRR, s1 and s2; no rubs or gallops, no edema, + pedal pulses   Abdomen:  soft, non-distended, active bowel sounds, no hepatomegaly, no splenomegaly. Non-tender   Genitourinary:  deferred   Extremities:   no clubbing, cyanosis; no joint effusions or swelling; Full ROM of all large joints to the upper and lower extremities; muscle mass appropriate for age   Neurologic:  No gross focal motor or sensory abnormalities. Speech appropriate. Cranial nerves intact                        Skin:  No rash or ulcers noted   Back:   not examined     Psychiatric:  No suicidal or homicidal ideations, appropriate mood and affect         Labs: Results:   Chemistry Recent Labs     04/24/20 0307 04/23/20 0137 04/22/20  0555   GLU 91 95 93    140 141   K 4.6 4.7 4.7    106 107   CO2 24 25 28   BUN 31* 28* 28*   CREA 1.05 1.04 1.13   CA 8.5 8.2* 7.9*   AGAP 9 9 6   BUCR 30* 27* 25*   AP 44*  --  35*   TP 7.8  --  7.4   ALB 3.2*  --  2.9*   GLOB 4.6*  --  4.5*   AGRAT 0.7*  --  0.6*      CBC w/Diff Recent Labs     04/24/20 0307 04/23/20 0137 04/22/20  0555   WBC 24.4* 23.7* 20.6*   RBC 5.03 4.99 4.73   HGB 12.6* 12.5* 11.6*   HCT 39.6 39.2 36.5    336 310   GRANS 34* 31* 30*   LYMPH 63* 67* 67*   EOS 0 0 1      Microbiology No results for input(s): CULT in the last 72 hours.        RADIOLOGY:    All available imaging studies/reports in [facility] for this admission were reviewed    Dr. Lex Uribe, Infectious Disease Specialist  [phone]  April 24, 2020  9:57 AM

## 2020-04-24 NOTE — PROGRESS NOTES
Verbal shift change report given to Shannan Quiroz RN (oncoming nurse) by Lemond Cranker, RN (offgoing nurse). Report included the following information SBAR and Kardex.

## 2020-04-24 NOTE — PROGRESS NOTES
Hospitalist Progress Note    Patient: Tawana Cullen Age: 60 y.o. : 1960 MR#: [id number] SSN: [id number]  Date/Time: 2020       Subjective:     No new complaint. Says cough getting better. He says he is feeling definitely better but still having some weakness when he tries to walk around in the room. No nausea vomiting. No new shortness of breath. Denies for chest pain or abdominal pain. No cough. Patient is open to have plasma therapy if needed. Objective:     /81 (BP 1 Location: Right arm, BP Patient Position: Sitting)   Pulse 71   Temp 95.8 °F (35.4 °C)   Resp 26   Ht 5' 9\" (1.753 m)   Wt 83.5 kg (184 lb)   SpO2 90%   BMI 27.17 kg/m²     General:   Not in acute distress, follows commands appropriately  Cardiovascular: S1S2 regular. Pulmonary: Diminished breath sound bibasilar, no wheezes currently. GI:   Soft, nontender, nondistended, bowel sounds present  Extremities:   No pitting pedal edema  Neuro: AOx3, moving all extremities, no gross deficit. Assessment/Plan:     1. COVID-19 pneumonia with suspected superimposed bacterial pneumonia   2. Sepsis POA due to COVID-19 infection, stable  3. Acute hypoxic respiratory failure due to COVID-19 infection, stable  4. ARDS, stable  5. JOSE on CKD3 associated with sepsis, hypovolemia, resolved   6. Leukocytosis in the setting of steroid, stable  7. H/O CLL, not treated previously   8. Hyperglycemia with DM2, HgbA1c 8.2%   9. Hyperlipidemia   10. Hypertension   11. Hypovolemic hyponatremia, resolved   12. Hypoalbuminemia   13. Chronic back pain     PLAN:    Continue current management  Discussed with ID: We will continue current management. We will monitor this patient off steroid.     Case discussed with:  [x]Patient  [x]Family  [x]Nursing  [x]Case Management  DVT Prophylaxis:  [x]Lovenox  []Hep SQ  []SCDs  []Coumadin   []On Heparin gtt    Recent Results (from the past 24 hour(s))   GLUCOSE, POC    Collection Time: 04/23/20  4:56 PM   Result Value Ref Range    Glucose (POC) 169 (H) 70 - 110 mg/dL   GLUCOSE, POC    Collection Time: 04/23/20  8:54 PM   Result Value Ref Range    Glucose (POC) 133 (H) 70 - 110 mg/dL   FERRITIN    Collection Time: 04/24/20  3:07 AM   Result Value Ref Range    Ferritin 644 (H) 8 - 388 NG/ML   CK    Collection Time: 04/24/20  3:07 AM   Result Value Ref Range     39 - 308 U/L   D DIMER    Collection Time: 04/24/20  3:07 AM   Result Value Ref Range    D DIMER 0.65 (H) <0.46 ug/ml(FEU)   CBC WITH AUTOMATED DIFF    Collection Time: 04/24/20  3:07 AM   Result Value Ref Range    WBC 24.4 (H) 4.6 - 13.2 K/uL    RBC 5.03 4.70 - 5.50 M/uL    HGB 12.6 (L) 13.0 - 16.0 g/dL    HCT 39.6 36.0 - 48.0 %    MCV 78.7 74.0 - 97.0 FL    MCH 25.0 24.0 - 34.0 PG    MCHC 31.8 31.0 - 37.0 g/dL    RDW 16.6 (H) 11.6 - 14.5 %    PLATELET 072 377 - 264 K/uL    MPV 10.1 9.2 - 11.8 FL    NEUTROPHILS 34 (L) 42 - 75 %    BAND NEUTROPHILS 1 0 - 5 %    LYMPHOCYTES 63 (H) 20 - 51 %    MONOCYTES 2 2 - 9 %    EOSINOPHILS 0 0 - 5 %    BASOPHILS 0 0 - 3 %    ABS. NEUTROPHILS 8.5 (H) 1.8 - 8.0 K/UL    ABS. LYMPHOCYTES 15.4 (H) 0.8 - 3.5 K/UL    ABS. MONOCYTES 0.5 0 - 1.0 K/UL    ABS. EOSINOPHILS 0.0 0.0 - 0.4 K/UL    ABS.  BASOPHILS 0.0 0.0 - 0.06 K/UL    DF MANUAL      PLATELET COMMENTS ADEQUATE PLATELETS      RBC COMMENTS ANISOCYTOSIS  1+        RBC COMMENTS POLYCHROMASIA  1+       METABOLIC PANEL, COMPREHENSIVE    Collection Time: 04/24/20  3:07 AM   Result Value Ref Range    Sodium 137 136 - 145 mmol/L    Potassium 4.6 3.5 - 5.5 mmol/L    Chloride 104 100 - 111 mmol/L    CO2 24 21 - 32 mmol/L    Anion gap 9 3.0 - 18 mmol/L    Glucose 91 74 - 99 mg/dL    BUN 31 (H) 7.0 - 18 MG/DL    Creatinine 1.05 0.6 - 1.3 MG/DL    BUN/Creatinine ratio 30 (H) 12 - 20      GFR est AA >60 >60 ml/min/1.73m2    GFR est non-AA >60 >60 ml/min/1.73m2    Calcium 8.5 8.5 - 10.1 MG/DL    Bilirubin, total 0.5 0.2 - 1.0 MG/DL    ALT (SGPT) 40 16 - 61 U/L AST (SGOT) 46 (H) 10 - 38 U/L    Alk.  phosphatase 44 (L) 45 - 117 U/L    Protein, total 7.8 6.4 - 8.2 g/dL    Albumin 3.2 (L) 3.4 - 5.0 g/dL    Globulin 4.6 (H) 2.0 - 4.0 g/dL    A-G Ratio 0.7 (L) 0.8 - 1.7     EKG, 12 LEAD, SUBSEQUENT    Collection Time: 04/24/20  4:39 AM   Result Value Ref Range    Ventricular Rate 57 BPM    Atrial Rate 57 BPM    P-R Interval 176 ms    QRS Duration 106 ms    Q-T Interval 450 ms    QTC Calculation (Bezet) 438 ms    Calculated P Axis 48 degrees    Calculated R Axis 8 degrees    Calculated T Axis 34 degrees    Diagnosis       Sinus bradycardia  Nonspecific ST and T wave abnormality  Abnormal ECG  When compared with ECG of 22-APR-2020 05:11,  No significant change was found     GLUCOSE, POC    Collection Time: 04/24/20  8:59 AM   Result Value Ref Range    Glucose (POC) 109 70 - 110 mg/dL         Signed By: Yan Mckeon MD     April 24, 2020

## 2020-04-24 NOTE — PROGRESS NOTES
MEWS 3 /54 HR 60 R 22- Pt's baseline. Pt denies SOB or discomfort. Respiration regular and non labored. Will continue to monitor pt. Call bell in reach.

## 2020-04-24 NOTE — PROGRESS NOTES
Problem: Breathing Pattern - Ineffective  Goal: *Absence of hypoxia  Outcome: Progressing Towards Goal  Goal: *Use of effective breathing techniques  Outcome: Progressing Towards Goal  Goal: *PALLIATIVE CARE:  Alleviation of Dyspnea  Outcome: Progressing Towards Goal     Problem: Patient Education: Go to Patient Education Activity  Goal: Patient/Family Education  Outcome: Progressing Towards Goal     Problem: Diabetes Self-Management  Goal: *Disease process and treatment process  Description: Define diabetes and identify own type of diabetes; list 3 options for treating diabetes. Outcome: Progressing Towards Goal  Goal: *Incorporating nutritional management into lifestyle  Description: Describe effect of type, amount and timing of food on blood glucose; list 3 methods for planning meals. Outcome: Progressing Towards Goal  Goal: *Incorporating physical activity into lifestyle  Description: State effect of exercise on blood glucose levels. Outcome: Progressing Towards Goal  Goal: *Developing strategies to promote health/change behavior  Description: Define the ABC's of diabetes; identify appropriate screenings, schedule and personal plan for screenings. Outcome: Progressing Towards Goal  Goal: *Using medications safely  Description: State effect of diabetes medications on diabetes; name diabetes medication taking, action and side effects. Outcome: Progressing Towards Goal  Goal: *Monitoring blood glucose, interpreting and using results  Description: Identify recommended blood glucose targets  and personal targets. Outcome: Progressing Towards Goal  Goal: *Prevention, detection, treatment of acute complications  Description: List symptoms of hyper- and hypoglycemia; describe how to treat low blood sugar and actions for lowering  high blood glucose level.   Outcome: Progressing Towards Goal  Goal: *Prevention, detection and treatment of chronic complications  Description: Define the natural course of diabetes and describe the relationship of blood glucose levels to long term complications of diabetes. Outcome: Progressing Towards Goal  Goal: *Developing strategies to address psychosocial issues  Description: Describe feelings about living with diabetes; identify support needed and support network  Outcome: Progressing Towards Goal  Goal: *Insulin pump training  Outcome: Progressing Towards Goal  Goal: *Sick day guidelines  Outcome: Progressing Towards Goal  Goal: *Patient Specific Goal (EDIT GOAL, INSERT TEXT)  Outcome: Progressing Towards Goal     Problem: Patient Education: Go to Patient Education Activity  Goal: Patient/Family Education  Outcome: Progressing Towards Goal     Problem: Falls - Risk of  Goal: *Absence of Falls  Description: Document Salvatore Fall Risk and appropriate interventions in the flowsheet. Outcome: Progressing Towards Goal  Note: Fall Risk Interventions:  Mobility Interventions: Communicate number of staff needed for ambulation/transfer, Patient to call before getting OOB         Medication Interventions: Patient to call before getting OOB, Teach patient to arise slowly    Elimination Interventions: Call light in reach, Patient to call for help with toileting needs, Urinal in reach              Problem: Patient Education: Go to Patient Education Activity  Goal: Patient/Family Education  Outcome: Progressing Towards Goal     Problem: Pressure Injury - Risk of  Goal: *Prevention of pressure injury  Description: Document Saroj Scale and appropriate interventions in the flowsheet. Outcome: Progressing Towards Goal  Note: Pressure Injury Interventions:             Activity Interventions: Pressure redistribution bed/mattress(bed type)    Mobility Interventions: HOB 30 degrees or less    Nutrition Interventions: Document food/fluid/supplement intake                     Problem: Patient Education: Go to Patient Education Activity  Goal: Patient/Family Education  Outcome: Progressing Towards Goal Problem: Diabetes Maintenance:Admission  Goal: *Blood glucose 80 to 180 mg/dl  Outcome: Progressing Towards Goal

## 2020-04-24 NOTE — PROGRESS NOTES
0715: Verbal shift change report given to Delmi Pérez (oncoming nurse) by Ana Maria Candelaria RN (offgoing nurse). Report included the following information SBAR, Kardex and Cardiac Rhythm Sinus brian. Patient is on hi flow at 40 L FiO2 65%. 8425: Assessment completed, patient's piv infiltrated. Patient stated the the IV site burns and swelling noted. Will remove piv and attempt to reinsert a new piv.     1000: Received phone call from patient's wife concerning patient's disposition. Patient's wife wants to speak with physician about patient's care of plan. Will notify physician. 1015: Received phone call from patient's wife and daughter concerning hi flow device. This nurse explained that currently the patient's SpO2 is 95% and that he is on 40 l hi flow with FiO2 of 65% inhaled oxygen from the device. Patient's daughter and wife stated that they now understand how the device works, but had questions on if the patient would need the device at discharge. This nurse explained to the patient's daughter and wife that the patient's disposition will be better discussed with the physician. 1300: Assisted patient in standing with walker. Patient's SpO2 remained above 88%. Encouraged patient to do standing exersies using the walker. The patient tolerated well. SpO2 remained above 88%. Patient denied any dyspnea. Stayed with patient as he did exercises with the walker for 10 minutes. Educated patient on importance of safety and alerting nursing staff if he notices any change in his respiratory effort. 1530: Received phone call from Dr. Davida Bailey. Updated on patient status and bedside exercises. Received orders to reinforce to the patient that it is important for him to lay in the prone position for at least 8 hours a day when resting. 1704: Vitals taken, patient laying in prone position. NAD.

## 2020-04-25 LAB
CK SERPL-CCNC: 102 U/L (ref 39–308)
CRP SERPL-MCNC: 0.6 MG/DL (ref 0–0.3)
D DIMER PPP FEU-MCNC: 0.75 UG/ML(FEU)
FERRITIN SERPL-MCNC: 595 NG/ML (ref 8–388)
GLUCOSE BLD STRIP.AUTO-MCNC: 101 MG/DL (ref 70–110)
GLUCOSE BLD STRIP.AUTO-MCNC: 163 MG/DL (ref 70–110)
GLUCOSE BLD STRIP.AUTO-MCNC: 207 MG/DL (ref 70–110)
GLUCOSE BLD STRIP.AUTO-MCNC: 90 MG/DL (ref 70–110)
LDH SERPL L TO P-CCNC: 430 U/L (ref 81–234)

## 2020-04-25 PROCEDURE — 65660000000 HC RM CCU STEPDOWN

## 2020-04-25 PROCEDURE — 77010033711 HC HIGH FLOW OXYGEN

## 2020-04-25 PROCEDURE — 82550 ASSAY OF CK (CPK): CPT

## 2020-04-25 PROCEDURE — 74011250637 HC RX REV CODE- 250/637: Performed by: INTERNAL MEDICINE

## 2020-04-25 PROCEDURE — 86140 C-REACTIVE PROTEIN: CPT

## 2020-04-25 PROCEDURE — 82962 GLUCOSE BLOOD TEST: CPT

## 2020-04-25 PROCEDURE — 83615 LACTATE (LD) (LDH) ENZYME: CPT

## 2020-04-25 PROCEDURE — 77030038269 HC DRN EXT URIN PURWCK BARD -A

## 2020-04-25 PROCEDURE — 74011636637 HC RX REV CODE- 636/637: Performed by: INTERNAL MEDICINE

## 2020-04-25 PROCEDURE — 94762 N-INVAS EAR/PLS OXIMTRY CONT: CPT

## 2020-04-25 PROCEDURE — 74011250636 HC RX REV CODE- 250/636: Performed by: INTERNAL MEDICINE

## 2020-04-25 PROCEDURE — 82728 ASSAY OF FERRITIN: CPT

## 2020-04-25 PROCEDURE — 36415 COLL VENOUS BLD VENIPUNCTURE: CPT

## 2020-04-25 PROCEDURE — 85379 FIBRIN DEGRADATION QUANT: CPT

## 2020-04-25 PROCEDURE — 74011250637 HC RX REV CODE- 250/637: Performed by: HOSPITALIST

## 2020-04-25 RX ORDER — ENOXAPARIN SODIUM 100 MG/ML
40 INJECTION SUBCUTANEOUS EVERY 24 HOURS
Status: DISCONTINUED | OUTPATIENT
Start: 2020-04-26 | End: 2020-05-07 | Stop reason: HOSPADM

## 2020-04-25 RX ORDER — EMTRICITABINE 200 MG/1
200 CAPSULE ORAL DAILY
Status: DISCONTINUED | OUTPATIENT
Start: 2020-04-25 | End: 2020-05-07 | Stop reason: HOSPADM

## 2020-04-25 RX ORDER — TENOFOVIR DISOPROXIL FUMARATE 300 MG/1
300 TABLET, FILM COATED ORAL DAILY
Status: DISCONTINUED | OUTPATIENT
Start: 2020-04-25 | End: 2020-05-07 | Stop reason: HOSPADM

## 2020-04-25 RX ADMIN — DICLOFENAC 4 G: 10 GEL TOPICAL at 17:00

## 2020-04-25 RX ADMIN — Medication 10 ML: at 21:43

## 2020-04-25 RX ADMIN — ZINC SULFATE 220 MG (50 MG) CAPSULE 1 CAPSULE: CAPSULE at 08:09

## 2020-04-25 RX ADMIN — CARVEDILOL 12.5 MG: 12.5 TABLET, FILM COATED ORAL at 21:42

## 2020-04-25 RX ADMIN — Medication 500 MG: at 21:42

## 2020-04-25 RX ADMIN — CHOLECALCIFEROL (VITAMIN D3) 10 MCG (400 UNIT) TABLET 1 TABLET: at 08:09

## 2020-04-25 RX ADMIN — TENOFOVIR DISPROXIL FUMARATE 300 MG: 300 TABLET ORAL at 16:00

## 2020-04-25 RX ADMIN — Medication 10 ML: at 13:13

## 2020-04-25 RX ADMIN — DICLOFENAC 4 G: 10 GEL TOPICAL at 09:00

## 2020-04-25 RX ADMIN — Medication 10 ML: at 04:53

## 2020-04-25 RX ADMIN — EMTRICITABINE 200 MG: 200 CAPSULE ORAL at 16:34

## 2020-04-25 RX ADMIN — INSULIN LISPRO 3 UNITS: 100 INJECTION, SOLUTION INTRAVENOUS; SUBCUTANEOUS at 21:42

## 2020-04-25 RX ADMIN — GUAIFENESIN 600 MG: 600 TABLET, EXTENDED RELEASE ORAL at 08:09

## 2020-04-25 RX ADMIN — DICLOFENAC 4 G: 10 GEL TOPICAL at 13:00

## 2020-04-25 RX ADMIN — INSULIN LISPRO 6 UNITS: 100 INJECTION, SOLUTION INTRAVENOUS; SUBCUTANEOUS at 11:30

## 2020-04-25 RX ADMIN — ATORVASTATIN CALCIUM 40 MG: 40 TABLET, FILM COATED ORAL at 08:09

## 2020-04-25 RX ADMIN — Medication 500 MG: at 08:08

## 2020-04-25 RX ADMIN — MELATONIN TAB 3 MG 6 MG: 3 TAB at 21:42

## 2020-04-25 RX ADMIN — RIBAVIRIN 400 MG: 200 CAPSULE ORAL at 08:09

## 2020-04-25 RX ADMIN — ASPIRIN 81 MG: 81 TABLET, COATED ORAL at 08:09

## 2020-04-25 RX ADMIN — FAMOTIDINE 20 MG: 20 TABLET ORAL at 08:09

## 2020-04-25 RX ADMIN — FAMOTIDINE 20 MG: 20 TABLET ORAL at 21:42

## 2020-04-25 RX ADMIN — ENOXAPARIN SODIUM 40 MG: 80 INJECTION SUBCUTANEOUS at 08:08

## 2020-04-25 RX ADMIN — GUAIFENESIN 600 MG: 600 TABLET, EXTENDED RELEASE ORAL at 21:42

## 2020-04-25 NOTE — ROUTINE PROCESS
Verbal shift change report given to Kwadwo Brasher RN, and  Momo RN (oncoming nurses) by Addison Bright RN (offgoing nurse). Report included the following information SBAR, Intake/Output, Recent Results and Med Rec Status.

## 2020-04-25 NOTE — ROUTINE PROCESS
Verbal shift change report given to Fernando Denton RN (oncoming nurse) by Rani Martinez RN (offgoing nurse). Report included the following information SBAR, Intake/Output, MAR, Recent Results and Med Rec Status.

## 2020-04-25 NOTE — PROGRESS NOTES
Problem: Breathing Pattern - Ineffective  Goal: *Use of effective breathing techniques  Outcome: Progressing Towards Goal     Problem: Falls - Risk of  Goal: *Absence of Falls  Description: Document Salvatore Fall Risk and appropriate interventions in the flowsheet. Outcome: Progressing Towards Goal  Note: Fall Risk Interventions:  Mobility Interventions: Patient to call before getting OOB         Medication Interventions: Evaluate medications/consider consulting pharmacy, Patient to call before getting OOB, Teach patient to arise slowly    Elimination Interventions: Call light in reach, Patient to call for help with toileting needs, Urinal in reach              Problem: Pressure Injury - Risk of  Goal: *Prevention of pressure injury  Description: Document Saorj Scale and appropriate interventions in the flowsheet. Outcome: Progressing Towards Goal  Note: Pressure Injury Interventions:             Activity Interventions: Pressure redistribution bed/mattress(bed type), Increase time out of bed, PT/OT evaluation    Mobility Interventions: HOB 30 degrees or less, Pressure redistribution bed/mattress (bed type), PT/OT evaluation    Nutrition Interventions: Document food/fluid/supplement intake

## 2020-04-25 NOTE — PROGRESS NOTES
[facility]. Pulmonary, Critical Care, and Sleep Medicine    F/U Patient Consult    Name: Char Love MRN: [id number]   : 1960 Hospital: [address] [place] [address] Dr   Date: 2020        This patient has been seen and evaluated at the request of Dr. Thelma Hernandes for resp failure/hypoxia. IMPRESSION:   · Acute hypoxic respiratory failure: Etiology secondary to COVID-19 pneumonia. Pt still requiring HFNC at 65%  · COVID-19 infection with multifocal pneumonia  · Moderate ARDS:  Secondary to above  · Severe sepsis:  Secondary to above  · JOSE on CKD  · Hx of HTN: pt on an ACEi prior to admission  · Hx of CLL  · Hx of PVC ablation in 2010  · Hx of dyslipidemia     Patient Active Problem List   Diagnosis Code    Nonischemic cardiomyopathy ([facility] I42.8    PVC (premature ventricular contraction) I49.3    S/P ablation operation for arrhythmia Z98.890, Z86.79    Lymphocytosis D72.820    CLL (chronic lymphocytic leukemia) ([facility] C91.10    Skin rash R21    Vitamin D deficiency E55.9    Pneumonia J18.9    COVID-19 virus infection U07.1    Suspected Covid-19 Virus Infection R68.89      RECOMMENDATIONS:   Supplemental oxygen to maintain SpO2 >88% --- continue pt on HFNC with Vapotherm.   Pt appears comfortable at this setting so would continue to advise against mech ventilation at this point, only if pt decompensates  No indication for repeat CXR unless pt worsens, please avoid daily CXR in the setting of ARDS not on mech vent  Pt declines convalescent serum after discussing risks and benefits  Defer antiinflammatory medications (including antimalarials, steroids, and trace minerals) and ABx to ID service -- data for novel coronavirus is limited, please weigh risks and benefits  Serial fibrinogen, LDH, triglyceride, CRP, ferritin  Aggressive pulmonary toileting/bronchial hygiene  Frequent incentive spirometry - counseled to perform 4-5x every hour  Aspiration precautions including elevating HOB >30deg  PT/OT, OOB, ambulate with assistance as tolerated  DVT ppx per primary service -- recommend very judicious use of full dose anticoagulation without evidence of thrombitic disease -- pt has severe sespis from COVID-19 which is a hyperinflammatory state which may lead to more thrombus formation, but must weigh risks vs benefits. Please monitor for blood loss and DIC  Please assess for home oxygen need prior to discharge  Continue supportive care  Will follow    Prognosis guarded       Subjective:   04/24/20  Pt seen and examined at bedside. No acute events overnight. Patient reports again having more energy today, reports dyspnea is improving. Denies N/V/D, chest pain, sputum. Nursing reports that she work with the patient to help lift a walker a few times for physical therapy. Patient also reports using incentive spirometer      HPI:  Patient is a 60 y.o. male with a past medical history of CLL, PVC ablation, hypertension, presented to [facility] about 6 days ago with complaints of fever and cough for a few days prior to presentation. Patient reports that his wife and daughter were sick at home, but he does not know any other sick contacts. Patient then developed worsening shortness of breath over the last 5 days. Patient also reports that he lost his sense of taste during that time. While in the ER, patient was found to be hypoxic at 2 L by nasal cannula along with fever and had acute renal failure. During the course of hospitalization, patient has been having productive cough, which patient reports that resolved over the last day. Patient reports now that his cough is better and he reports that his dyspnea is somewhat improved however patient is becoming increasingly more hypoxic, patient has been going up from 2 L nasal cannula up to 8 L yesterday and 10 L today. Patient reports he has been having issues with insomnia, not being able to sleep.   Patient also reports some malaise. Patient otherwise denies any chest pain, nausea, vomiting, diarrhea, hemoptysis. Patient reports smoking a pack a day until a number of years ago. Patient denies any occupational exposures to coal/silica/asbestos, patient reports he is a former . Patient reports he follows with Dr. Silver Gurrola for CLL, has not been on any chemotherapy. Past Medical History:   Diagnosis Date    Abnormal nuclear cardiac imaging test 11/30/2006    Mild anterior ischemia. Inferior scar w/border zone ischemia. LVE. EF 26%. (Gated imaging may be inaccurate.)  Global hypk. Neg EKG on max EST. Ex time 10:30.  Aorto-iliac duplex 09/10/2012    No AAA identified.  Carotid duplex 09/10/2012    No significant occlusive disease bilaterallly.  CLL (chronic lymphocytic leukemia) ([facility]     Diabetes ([facility]     Echocardiogram 06/02/2015    EF 55%. No WMA. Mild LVH. Gr 1 DDfx. IMELDA. No significant chg from study of 5/17/10.  History of echocardiogram 06/20/2011    EF 50-55%. Gr 2 DDfx. Mild RVE. Mild LAE. Mild-mod IMELDA.  Hypercholesterolemia     Hypertension     Nonischemic cardiomyopathy ([facility]     s/p right coronary cusp PVC ablation  (2/27/42) without complication    Prostate CA ([facility]     S/P cardiac cath 12/11/2006    Patent coronary arteries. LVEDP 12.  EF 25-30%. Mod-significant global hypk. Past Surgical History:   Procedure Laterality Date    HX HEART CATHETERIZATION  12/11/06    The right coronary artery is dominant. It is widely patent. The left main coronary artery is widely patent. The circumflex artery is widely patent. The left anterior descending coronary artery is widely patent. The LVEDP is 12 mmHg. The overall left ventricular systolic function is significantly diminished with an estimated ejection fraction of 25-30%. ** See report. Prior to Admission medications    Medication Sig Start Date End Date Taking?  Authorizing Provider   SHELBY BCISE 2 mg/0.85 mL atIn INJECT 2MG EVERY WEEK UNDER THE SKIN 6/7/19   Provider, Historical   ergocalciferol (ERGOCALCIFEROL) 50,000 unit capsule Take 1 Cap by mouth every seven (7) days. 1/7/19   Kaylan MCCORD NP   sitagliptin phos/metformin HCl (JANUMET PO) Take  by mouth. Provider, Historical   lisinopril (PRINIVIL, ZESTRIL) 5 mg tablet Take 1 Tab by mouth daily. 6/29/15   Diego Denise MD   carvedilol (COREG) 12.5 mg tablet Take 1 Tab by mouth two (2) times a day. 6/4/15   Diego Denise MD   simvastatin (ZOCOR) 10 mg tablet Take  by mouth daily. Provider, Historical   aspirin delayed-release 81 mg tablet Take 81 mg by mouth daily. 6/9/11   Provider, Historical     No Known Allergies   Social History     Tobacco Use    Smoking status: Former Smoker     Packs/day: 1.00    Smokeless tobacco: Never Used   Substance Use Topics    Alcohol use: Yes      History reviewed. No pertinent family history.      Current Facility-Administered Medications   Medication Dose Route Frequency    enoxaparin (LOVENOX) injection 40 mg  40 mg SubCUTAneous Q12H    ribavirin (REBETOL) capsule 400 mg  400 mg Oral BID    insulin lispro (HUMALOG) injection   SubCUTAneous AC&HS    guaiFENesin ER (MUCINEX) tablet 600 mg  600 mg Oral Q12H    diclofenac (VOLTAREN) 1 % topical gel 4 g  4 g Topical QID    melatonin tablet 6 mg  6 mg Oral QHS    famotidine (PEPCID) tablet 20 mg  20 mg Oral BID    atorvastatin (LIPITOR) tablet 40 mg  40 mg Oral DAILY    sodium chloride (NS) flush 5-40 mL  5-40 mL IntraVENous Q8H    aspirin delayed-release tablet 81 mg  81 mg Oral DAILY    carvediloL (COREG) tablet 12.5 mg  12.5 mg Oral BID    zinc sulfate (ZINCATE) 220 mg capsule 1 Cap  1 Cap Oral DAILY    ascorbic acid (vitamin C) (VITAMIN C) tablet 500 mg  500 mg Oral BID    cholecalciferol (VITAMIN D3) (400 Units /10 mcg) tablet 1 Tab  400 Units Oral DAILY       Review of Systems:  A comprehensive ROS was obtained as stated in HPI, all others are negative      Objective:   Vital Signs:    Visit Vitals  /57 (BP 1 Location: Right arm, BP Patient Position: Prone; At rest)   Pulse 66   Temp 97.7 °F (36.5 °C)   Resp 16   Ht 5' 9\" (1.753 m)   Wt 83.5 kg (184 lb)   SpO2 93%   BMI 27.17 kg/m²       O2 Device: Hi flow nasal cannula   O2 Flow Rate (L/min): 40 l/min   Temp (24hrs), Av.2 °F (36.2 °C), Min:95.5 °F (35.3 °C), Max:98.2 °F (36.8 °C)       Intake/Output:   Last shift:      1901 -  0700  In: 230 [P.O.:120; I.V.:110]  Out: 300 [Urine:300]  Last 3 shifts:  07 -  1900  In: 4528 [P.O.:720; I.V.:320]  Out: 1855 [Urine:1855]    Intake/Output Summary (Last 24 hours) at 2020 2351  Last data filed at 2020 2207  Gross per 24 hour   Intake 340 ml   Output 1180 ml   Net -840 ml      Physical Exam:   General:  Alert, cooperative, no distress, appears stated age, sitting up on side of bed, looks more comfortable wearing HFNC, patient able to converse without any difficulty breathing   Head:  Normocephalic, without obvious abnormality, atraumatic. Eyes:  Conjunctivae/corneas clear. ANicteric, PERRLA, EOMI   Nose: Nares normal. Mucosa normal. No drainage or sinus tenderness. Throat: Lips, mucosa dry. NO thrush; poor dentition, no oral lesions   Neck: Supple, symmetrical, trachea midline, no adenopathy,no carotid bruit and no JVD. No crepitus   Back:   Symmetric, no curvature, no spine tenderness or flank pain   Lungs:    Unchanged, poor air entry bilaterally, CTA BL, no wheezes/rales/rhonchi throughout all lung fields   Chest wall:  No tenderness or deformity. NO CREPITUS   Heart:  Regular rate and rhythm, S1, S2 normal, no murmur, click, rub or gallop. Abdomen:   Soft, non-tender. Bowel sounds normal. No masses,  No organomegaly. No paradoxical motion   Extremities: normal, atraumatic, no cyanosis or edema. No clubbing   Pulses: 1-2+ and symmetric all extremities.    Skin: Skin color, texture, turgor normal. No rashes or lesions Lymph nodes: Cervical, supraclavicular, and axillary nodes normal.   Neurologic: Grossly nonfocal, strength and coordination grossly intact throughout all extremities, sensation also grossly intact. Alert and oriented x3, good memory now          Data review:   Labs:  Recent Results (from the past 24 hour(s))   FERRITIN    Collection Time: 04/24/20  3:07 AM   Result Value Ref Range    Ferritin 644 (H) 8 - 388 NG/ML   CK    Collection Time: 04/24/20  3:07 AM   Result Value Ref Range     39 - 308 U/L   C REACTIVE PROTEIN, QT    Collection Time: 04/24/20  3:07 AM   Result Value Ref Range    C-Reactive protein 0.9 (H) 0 - 0.3 mg/dL   LD    Collection Time: 04/24/20  3:07 AM   Result Value Ref Range     (H) 81 - 234 U/L   D DIMER    Collection Time: 04/24/20  3:07 AM   Result Value Ref Range    D DIMER 0.65 (H) <0.46 ug/ml(FEU)   CBC WITH AUTOMATED DIFF    Collection Time: 04/24/20  3:07 AM   Result Value Ref Range    WBC 24.4 (H) 4.6 - 13.2 K/uL    RBC 5.03 4.70 - 5.50 M/uL    HGB 12.6 (L) 13.0 - 16.0 g/dL    HCT 39.6 36.0 - 48.0 %    MCV 78.7 74.0 - 97.0 FL    MCH 25.0 24.0 - 34.0 PG    MCHC 31.8 31.0 - 37.0 g/dL    RDW 16.6 (H) 11.6 - 14.5 %    PLATELET 361 615 - 176 K/uL    MPV 10.1 9.2 - 11.8 FL    NEUTROPHILS 34 (L) 42 - 75 %    BAND NEUTROPHILS 1 0 - 5 %    LYMPHOCYTES 63 (H) 20 - 51 %    MONOCYTES 2 2 - 9 %    EOSINOPHILS 0 0 - 5 %    BASOPHILS 0 0 - 3 %    ABS. NEUTROPHILS 8.5 (H) 1.8 - 8.0 K/UL    ABS. LYMPHOCYTES 15.4 (H) 0.8 - 3.5 K/UL    ABS. MONOCYTES 0.5 0 - 1.0 K/UL    ABS. EOSINOPHILS 0.0 0.0 - 0.4 K/UL    ABS.  BASOPHILS 0.0 0.0 - 0.06 K/UL    DF MANUAL      PLATELET COMMENTS ADEQUATE PLATELETS      RBC COMMENTS ANISOCYTOSIS  1+        RBC COMMENTS POLYCHROMASIA  1+       METABOLIC PANEL, COMPREHENSIVE    Collection Time: 04/24/20  3:07 AM   Result Value Ref Range    Sodium 137 136 - 145 mmol/L    Potassium 4.6 3.5 - 5.5 mmol/L    Chloride 104 100 - 111 mmol/L    CO2 24 21 - 32 mmol/L Anion gap 9 3.0 - 18 mmol/L    Glucose 91 74 - 99 mg/dL    BUN 31 (H) 7.0 - 18 MG/DL    Creatinine 1.05 0.6 - 1.3 MG/DL    BUN/Creatinine ratio 30 (H) 12 - 20      GFR est AA >60 >60 ml/min/1.73m2    GFR est non-AA >60 >60 ml/min/1.73m2    Calcium 8.5 8.5 - 10.1 MG/DL    Bilirubin, total 0.5 0.2 - 1.0 MG/DL    ALT (SGPT) 40 16 - 61 U/L    AST (SGOT) 46 (H) 10 - 38 U/L    Alk.  phosphatase 44 (L) 45 - 117 U/L    Protein, total 7.8 6.4 - 8.2 g/dL    Albumin 3.2 (L) 3.4 - 5.0 g/dL    Globulin 4.6 (H) 2.0 - 4.0 g/dL    A-G Ratio 0.7 (L) 0.8 - 1.7     EKG, 12 LEAD, SUBSEQUENT    Collection Time: 04/24/20  4:39 AM   Result Value Ref Range    Ventricular Rate 57 BPM    Atrial Rate 57 BPM    P-R Interval 176 ms    QRS Duration 106 ms    Q-T Interval 450 ms    QTC Calculation (Bezet) 438 ms    Calculated P Axis 48 degrees    Calculated R Axis 8 degrees    Calculated T Axis 34 degrees    Diagnosis       Sinus bradycardia  Nonspecific ST and T wave abnormality  Abnormal ECG  When compared with ECG of 22-APR-2020 05:11,  No significant change was found  Confirmed by Zeynep Madden MD, Halina Shallow ([id number]) on 4/24/2020 3:09:22 PM     GLUCOSE, POC    Collection Time: 04/24/20  8:59 AM   Result Value Ref Range    Glucose (POC) 109 70 - 110 mg/dL   GLUCOSE, POC    Collection Time: 04/24/20 12:58 PM   Result Value Ref Range    Glucose (POC) 133 (H) 70 - 110 mg/dL   GLUCOSE, POC    Collection Time: 04/24/20  5:04 PM   Result Value Ref Range    Glucose (POC) 126 (H) 70 - 110 mg/dL   GLUCOSE, POC    Collection Time: 04/24/20  9:25 PM   Result Value Ref Range    Glucose (POC) 150 (H) 70 - 110 mg/dL     ABG:  No results found for: PH, PHI, PCO2, PCO2I, PO2, PO2I, HCO3, HCO3I, FIO2, FIO2I    PFT Results  (Last 48 hours)    None        Echo Results  (Last 48 hours)    None        Imaging:  I have personally reviewed the patients radiographs and have reviewed the reports:  No new studies in the interval  Chest x-ray from 4/21/2020 compared to chest x-ray from 4/17 and 4/16 shows worsening bilateral infiltrates consistent with multifocal pneumonia and ARDS. No masses, effusion seen, however due to dense nature of infiltrates, unable to assess further. CXR Results  (Last 48 hours)               04/24/20 1051  XR CHEST PORT Final result    Impression:  IMPRESSION:   Bilateral reticular and confluent lung opacities with slight interval   improvement at the lung bases. Narrative:  EXAM: XR CHEST PORT       INDICATION: 60 years Male. compare with prior x ray. ADDITIONAL HISTORY: COVID positive       TECHNIQUE: Frontal view of the chest.       COMPARISON: 4/22/2020 at 0525 hours       FINDINGS:       The lungs are hypoinflated. The cardiac silhouette is within normal limits in size. Bilateral reticular and confluent opacities are noted within the lungs,  most   pronounced in the left mid and bilateral lower lung zones. These opacities are   slightly improved at the lung bases as compared to prior. No definite pleural effusion. No appreciable pneumothorax. Osseous structures are unchanged in appearance. CT Results  (Last 48 hours)    None            High complexity decision making was performed during the evaluation of this patient at high risk for decompensation with multiple organ involvement    Total of 33 min critical care time spent at bedside during the course of care providing evaluation,management and care decisions and ordering appropriate treatment related to critical care problems exclusive of procedures. The reason for providing this level of medical care for this critically ill patient was due a critical illness that impaired one or more vital organ systems such that there was a high probability of imminent or life threatening deterioration in the patients condition.  This care involved high complexity decision making to assess, manipulate, and support vital system functions, to treat this degree vital organ system failure and to prevent further life threatening deterioration of the patients condition. Above mentioned total time spent on reviewing the case/medical record/data/notes/EMR/patient examination/documentation/coordinating care with nurse/consultants, exclusive of procedures with complex decision making performed and > 50% time spent in face to face evaluation.            Bunny Jacob MD/MPH     Pulmonary, Critical Care Medicine  [facility]

## 2020-04-25 NOTE — PROGRESS NOTES
[facility] Hospitalist Group  Progress Note    Patient: Tammy Mantilla Age: 60 y.o. : 1960 MR#: [id number] SSN: [id number]  Date: 2020     Subjective/24-hour events:     Chart reviewed. Patient seen formally by Dr. Noni Soulier yesterday, case discussed with him. Overall clinical status essentially unchanged. Continues to require supplemental oxygen via HFNC and has had some mild hypoxia with exertion. Assessment:   Acute hypoxic respiratory failure  COVID-19 pneumonia with suspected underlying bacterial pneumonia  ARDS secondary to above  JOSE on CKD 3  DM2 with hyperglycemia, hemoglobin A1c 8.2%  HTN  Hyperlipidemia  Hyponatremia resolved  CLL history    Plan:  Case discussed with ID today. Patient still not amenable to considering plasmapheresis at this point. Continue supplemental oxygen as necessary and continue off of steroid therapy for now. Care primarily supportive with hope to avoid any further respiratory decompensation. Will follow-up formally in a. m. AM.      Objective:   VS:   Visit Vitals  /78 (BP 1 Location: Right arm, BP Patient Position: Sitting)   Pulse 85   Temp 98.5 °F (36.9 °C)   Resp 16   Ht 5' 9\" (1.753 m)   Wt 85.7 kg (189 lb)   SpO2 (!) 88%   BMI 27.91 kg/m²      Tmax/24hrs: Temp (24hrs), Av.1 °F (36.7 °C), Min:97 °F (36.1 °C), Max:99.1 °F (37.3 °C)      Intake/Output Summary (Last 24 hours) at 2020 1322  Last data filed at 2020 1200  Gross per 24 hour   Intake 590 ml   Output 825 ml   Net -235 ml       Labs:    Recent Results (from the past 24 hour(s))   GLUCOSE, POC    Collection Time: 20  5:04 PM   Result Value Ref Range    Glucose (POC) 126 (H) 70 - 110 mg/dL   GLUCOSE, POC    Collection Time: 20  9:25 PM   Result Value Ref Range    Glucose (POC) 150 (H) 70 - 110 mg/dL   FERRITIN    Collection Time: 20  1:53 AM   Result Value Ref Range    Ferritin 595 (H) 8 - 388 NG/ML   CK    Collection Time: 20 1:53 AM   Result Value Ref Range     39 - 308 U/L   C REACTIVE PROTEIN, QT    Collection Time: 04/25/20  1:53 AM   Result Value Ref Range    C-Reactive protein 0.6 (H) 0 - 0.3 mg/dL   LD    Collection Time: 04/25/20  1:53 AM   Result Value Ref Range     (H) 81 - 234 U/L   GLUCOSE, POC    Collection Time: 04/25/20  8:14 AM   Result Value Ref Range    Glucose (POC) 90 70 - 110 mg/dL   D DIMER    Collection Time: 04/25/20 10:15 AM   Result Value Ref Range    D DIMER 0.75 (H) <0.46 ug/ml(FEU)   GLUCOSE, POC    Collection Time: 04/25/20 11:31 AM   Result Value Ref Range    Glucose (POC) 207 (H) 70 - 110 mg/dL       Signed By: Rg Lord MD     April 25, 2020

## 2020-04-26 LAB
CK SERPL-CCNC: 90 U/L (ref 39–308)
CRP SERPL-MCNC: 0.4 MG/DL (ref 0–0.3)
D DIMER PPP FEU-MCNC: 0.68 UG/ML(FEU)
FERRITIN SERPL-MCNC: 537 NG/ML (ref 8–388)
GLUCOSE BLD STRIP.AUTO-MCNC: 118 MG/DL (ref 70–110)
GLUCOSE BLD STRIP.AUTO-MCNC: 125 MG/DL (ref 70–110)
GLUCOSE BLD STRIP.AUTO-MCNC: 135 MG/DL (ref 70–110)
GLUCOSE BLD STRIP.AUTO-MCNC: 143 MG/DL (ref 70–110)
LDH SERPL L TO P-CCNC: 412 U/L (ref 81–234)

## 2020-04-26 PROCEDURE — 77010033711 HC HIGH FLOW OXYGEN

## 2020-04-26 PROCEDURE — 87635 SARS-COV-2 COVID-19 AMP PRB: CPT

## 2020-04-26 PROCEDURE — 74011250637 HC RX REV CODE- 250/637: Performed by: INTERNAL MEDICINE

## 2020-04-26 PROCEDURE — 82728 ASSAY OF FERRITIN: CPT

## 2020-04-26 PROCEDURE — 74011250636 HC RX REV CODE- 250/636: Performed by: INTERNAL MEDICINE

## 2020-04-26 PROCEDURE — 86140 C-REACTIVE PROTEIN: CPT

## 2020-04-26 PROCEDURE — 74011250637 HC RX REV CODE- 250/637: Performed by: HOSPITALIST

## 2020-04-26 PROCEDURE — 82962 GLUCOSE BLOOD TEST: CPT

## 2020-04-26 PROCEDURE — 85379 FIBRIN DEGRADATION QUANT: CPT

## 2020-04-26 PROCEDURE — 82550 ASSAY OF CK (CPK): CPT

## 2020-04-26 PROCEDURE — 65660000000 HC RM CCU STEPDOWN

## 2020-04-26 PROCEDURE — 94762 N-INVAS EAR/PLS OXIMTRY CONT: CPT

## 2020-04-26 PROCEDURE — 36415 COLL VENOUS BLD VENIPUNCTURE: CPT

## 2020-04-26 PROCEDURE — 83615 LACTATE (LD) (LDH) ENZYME: CPT

## 2020-04-26 RX ADMIN — Medication 500 MG: at 21:01

## 2020-04-26 RX ADMIN — TENOFOVIR DISPROXIL FUMARATE 300 MG: 300 TABLET ORAL at 08:41

## 2020-04-26 RX ADMIN — DICLOFENAC 4 G: 10 GEL TOPICAL at 17:00

## 2020-04-26 RX ADMIN — ATORVASTATIN CALCIUM 40 MG: 40 TABLET, FILM COATED ORAL at 08:39

## 2020-04-26 RX ADMIN — FAMOTIDINE 20 MG: 20 TABLET ORAL at 08:40

## 2020-04-26 RX ADMIN — EMTRICITABINE 200 MG: 200 CAPSULE ORAL at 08:41

## 2020-04-26 RX ADMIN — FAMOTIDINE 20 MG: 20 TABLET ORAL at 21:01

## 2020-04-26 RX ADMIN — ASPIRIN 81 MG: 81 TABLET, COATED ORAL at 08:40

## 2020-04-26 RX ADMIN — DICLOFENAC 4 G: 10 GEL TOPICAL at 13:00

## 2020-04-26 RX ADMIN — GUAIFENESIN 600 MG: 600 TABLET, EXTENDED RELEASE ORAL at 21:01

## 2020-04-26 RX ADMIN — Medication 10 ML: at 13:57

## 2020-04-26 RX ADMIN — Medication 500 MG: at 08:40

## 2020-04-26 RX ADMIN — CHOLECALCIFEROL (VITAMIN D3) 10 MCG (400 UNIT) TABLET 1 TABLET: at 08:40

## 2020-04-26 RX ADMIN — Medication 10 ML: at 21:01

## 2020-04-26 RX ADMIN — MELATONIN TAB 3 MG 6 MG: 3 TAB at 21:01

## 2020-04-26 RX ADMIN — ENOXAPARIN SODIUM 40 MG: 40 INJECTION SUBCUTANEOUS at 08:41

## 2020-04-26 RX ADMIN — CARVEDILOL 12.5 MG: 12.5 TABLET, FILM COATED ORAL at 21:01

## 2020-04-26 RX ADMIN — CARVEDILOL 12.5 MG: 12.5 TABLET, FILM COATED ORAL at 08:40

## 2020-04-26 RX ADMIN — GUAIFENESIN 600 MG: 600 TABLET, EXTENDED RELEASE ORAL at 08:40

## 2020-04-26 RX ADMIN — ZINC SULFATE 220 MG (50 MG) CAPSULE 1 CAPSULE: CAPSULE at 08:41

## 2020-04-26 RX ADMIN — Medication 10 ML: at 05:04

## 2020-04-26 RX ADMIN — DICLOFENAC 4 G: 10 GEL TOPICAL at 09:00

## 2020-04-26 NOTE — ROUTINE PROCESS
Verbal shift change report given to Sonia Cruz RN (oncoming nurse) by Arabella Corral RN (offgoing nurse). Report included the following information SBAR, Intake/Output, Recent Results and Med Rec Status.

## 2020-04-26 NOTE — ROUTINE PROCESS
Verbal shift change report given to Rani Martinez RN (oncoming nurse) by Fernando Denton RN (offgoing nurse). Report included the following information SBAR, Intake/Output, Recent Results and Med Rec Status.

## 2020-04-26 NOTE — PROGRESS NOTES
[facility]. Pulmonary, Critical Care, and Sleep Medicine    F/U Patient Consult    Name: Jose Petty MRN: [id number]   : 1960 Hospital: [facility]   Date: 2020        This patient has been seen and evaluated at the request of Dr. Saturnino Barnes for resp failure/hypoxia. IMPRESSION:   · Acute hypoxic respiratory failure: Etiology secondary to COVID-19 pneumonia. Pt still requiring HFNC at 65%  · COVID-19 infection with multifocal pneumonia  · Moderate ARDS:  Secondary to above  · Severe sepsis:  Secondary to above  · JOSE on CKD  · Hx of HTN: pt on an ACEi prior to admission  · Hx of CLL  · Hx of PVC ablation in 2010  · Hx of dyslipidemia     Patient Active Problem List   Diagnosis Code    Nonischemic cardiomyopathy ([facility] I42.8    PVC (premature ventricular contraction) I49.3    S/P ablation operation for arrhythmia Z98.890, Z86.79    Lymphocytosis D72.820    CLL (chronic lymphocytic leukemia) ([facility] C91.10    Skin rash R21    Vitamin D deficiency E55.9    Pneumonia J18.9    COVID-19 virus infection U07.1    Suspected Covid-19 Virus Infection R68.89      RECOMMENDATIONS:   Supplemental oxygen to maintain SpO2 >88% --- continue pt on HFNC with Vapotherm. Pt appears comfortable at this setting so would continue to advise against mech ventilation at this point, only if pt decompensates.   Pt also awake proning today, advised to continue, nightly along with 2-4h during day  No indication for repeat CXR unless pt worsens, please avoid daily CXR in the setting of ARDS not on mech vent  Pt adamantly declines convalescent serum after discussing risks and benefits  Defer antiinflammatory medications (including antimalarials, steroids, and trace minerals) and ABx to ID service -- data for novel coronavirus is limited, please weigh risks and benefits  Serial fibrinogen, LDH, triglyceride, CRP, ferritin  Aggressive pulmonary toileting/bronchial hygiene  Frequent incentive spirometry - counseled to perform 4-5x every hour  Aspiration precautions including elevating HOB >30deg  PT/OT, OOB, ambulate with assistance as tolerated  DVT ppx per primary service -- Please monitor for blood loss and DIC  Please assess for home oxygen need prior to discharge  Continue supportive care  Will follow    Prognosis guarded       Subjective:   04/25/20  Pt seen and examined at bedside. No acute events overnight. Patient reports he had difficulty sleeping last night, with dryness from the high flow nasal cannula. Patient currently doing awake proning, able to tolerate. Patient reports he does not want convalescent serum. Patient denies nausea, vomiting, diarrhea, chest pain, worsening cough      HPI:  Patient is a 60 y.o. male with a past medical history of CLL, PVC ablation, hypertension, presented to [facility] about 6 days ago with complaints of fever and cough for a few days prior to presentation. Patient reports that his wife and daughter were sick at home, but he does not know any other sick contacts. Patient then developed worsening shortness of breath over the last 5 days. Patient also reports that he lost his sense of taste during that time. While in the ER, patient was found to be hypoxic at 2 L by nasal cannula along with fever and had acute renal failure. During the course of hospitalization, patient has been having productive cough, which patient reports that resolved over the last day. Patient reports now that his cough is better and he reports that his dyspnea is somewhat improved however patient is becoming increasingly more hypoxic, patient has been going up from 2 L nasal cannula up to 8 L yesterday and 10 L today. Patient reports he has been having issues with insomnia, not being able to sleep. Patient also reports some malaise. Patient otherwise denies any chest pain, nausea, vomiting, diarrhea, hemoptysis.   Patient reports smoking a pack a day until a number of years ago. Patient denies any occupational exposures to coal/silica/asbestos, patient reports he is a former . Patient reports he follows with Dr. Fidel Mata for CLL, has not been on any chemotherapy. Past Medical History:   Diagnosis Date    Abnormal nuclear cardiac imaging test 11/30/2006    Mild anterior ischemia. Inferior scar w/border zone ischemia. LVE. EF 26%. (Gated imaging may be inaccurate.)  Global hypk. Neg EKG on max EST. Ex time 10:30.  Aorto-iliac duplex 09/10/2012    No AAA identified.  Carotid duplex 09/10/2012    No significant occlusive disease bilaterallly.  CLL (chronic lymphocytic [facility]) ([facility]     Diabetes ([facility]     Echocardiogram 06/02/2015    EF 55%. No WMA. Mild LVH. Gr 1 DDfx. IMELDA. No significant chg from study of 5/17/10.  History of echocardiogram 06/20/2011    EF 50-55%. Gr 2 DDfx. Mild RVE. Mild LAE. Mild-mod IMELDA.  Hypercholesterolemia     Hypertension     Nonischemic cardiomyopathy ([address]     s/p right coronary cusp PVC ablation  (2/43/59) without complication    Prostate CA ([facility]     S/P cardiac cath 12/11/2006    Patent coronary arteries. LVEDP 12.  EF 25-30%. Mod-significant global hypk. Past Surgical History:   Procedure Laterality Date    HX HEART CATHETERIZATION  12/11/06    The right coronary artery is dominant. It is widely patent. The left main coronary artery is widely patent. The circumflex artery is widely patent. The left anterior descending coronary artery is widely patent. The LVEDP is 12 mmHg. The overall left ventricular systolic function is significantly diminished with an estimated ejection fraction of 25-30%. ** See report. Prior to Admission medications    Medication Sig Start Date End Date Taking?  Authorizing Provider   SHELBY OBRIENSE 2 mg/0.85 mL atIn INJECT 2MG EVERY WEEK UNDER THE SKIN 6/7/19   Provider, Historical   ergocalciferol (ERGOCALCIFEROL) 50,000 unit capsule Take 1 Cap by mouth every seven (7) days. 1/7/19   Billie MCCORD NP   sitagliptin phos/metformin HCl (JANUMET PO) Take  by mouth. Provider, Historical   lisinopril (PRINIVIL, ZESTRIL) 5 mg tablet Take 1 Tab by mouth daily. 6/29/15   Jm Denise MD   carvedilol (COREG) 12.5 mg tablet Take 1 Tab by mouth two (2) times a day. 6/4/15   Jm Denise MD   simvastatin (ZOCOR) 10 mg tablet Take  by mouth daily. Provider, Historical   aspirin delayed-release 81 mg tablet Take 81 mg by mouth daily. 6/9/11   Provider, Historical     No Known Allergies   Social History     Tobacco Use    Smoking status: Former Smoker     Packs/day: 1.00    Smokeless tobacco: Never Used   Substance Use Topics    Alcohol use: Yes      History reviewed. No pertinent family history.      Current Facility-Administered Medications   Medication Dose Route Frequency    [START ON 4/26/2020] enoxaparin (LOVENOX) injection 40 mg  40 mg SubCUTAneous Q24H    emtricitabine (EMTRIVA) capsule 200 mg  200 mg Oral DAILY    tenofovir DISOPROXIL FUMARATE (VIREAD) tablet 300 mg  300 mg Oral DAILY    insulin lispro (HUMALOG) injection   SubCUTAneous AC&HS    guaiFENesin ER (MUCINEX) tablet 600 mg  600 mg Oral Q12H    diclofenac (VOLTAREN) 1 % topical gel 4 g  4 g Topical QID    melatonin tablet 6 mg  6 mg Oral QHS    famotidine (PEPCID) tablet 20 mg  20 mg Oral BID    atorvastatin (LIPITOR) tablet 40 mg  40 mg Oral DAILY    sodium chloride (NS) flush 5-40 mL  5-40 mL IntraVENous Q8H    aspirin delayed-release tablet 81 mg  81 mg Oral DAILY    carvediloL (COREG) tablet 12.5 mg  12.5 mg Oral BID    zinc sulfate (ZINCATE) 220 mg capsule 1 Cap  1 Cap Oral DAILY    ascorbic acid (vitamin C) (VITAMIN C) tablet 500 mg  500 mg Oral BID    cholecalciferol (VITAMIN D3) (400 Units /10 mcg) tablet 1 Tab  400 Units Oral DAILY       Review of Systems:  A comprehensive ROS was obtained as stated in HPI, all others are negative      Objective: Vital Signs:    Visit Vitals  BP (!) 115/95 (BP 1 Location: Right arm, BP Patient Position: At rest;Sitting)   Pulse 69   Temp 98.6 °F (37 °C)   Resp 23   Ht 5' 9\" (1.753 m)   Wt 85.7 kg (189 lb)   SpO2 95%   BMI 27.91 kg/m²       O2 Device: Hi flow nasal cannula   O2 Flow Rate (L/min): 40 l/min   Temp (24hrs), Av.3 °F (36.8 °C), Min:97.7 °F (36.5 °C), Max:99.1 °F (37.3 °C)       Intake/Output:   Last shift:      1901 -  0700  In: 240 [P.O.:240]  Out: 200 [Urine:200]  Last 3 shifts:  07 -  1900  In: 940 [P.O.:720; I.V.:220]  Out: 6362 [Urine:1830]    Intake/Output Summary (Last 24 hours) at 2020 2243  Last data filed at 2020 2142  Gross per 24 hour   Intake 840 ml   Output 1150 ml   Net -310 ml      Physical Exam:   General:  Alert, cooperative, no distress, appears stated age, laying in bed in prone position, wearing high flow nasal cannula, appears comfortable   Head:  Normocephalic, without obvious abnormality, atraumatic. Eyes:  Conjunctivae/corneas clear. ANicteric, PERRLA, EOMI   Nose: Nares normal. Mucosa normal. No drainage or sinus tenderness. Throat: Lips, mucosa dry. NO thrush; poor dentition, no oral lesions   Neck: Supple, symmetrical, trachea midline, no adenopathy,no carotid bruit and no JVD. No crepitus   Back:   Symmetric, no curvature, no spine tenderness or flank pain   Lungs:    Unchanged, poor air entry bilaterally, CTA BL, no wheezes/rales/rhonchi throughout all lung fields   Chest wall:  No tenderness or deformity. NO CREPITUS   Heart:  Regular rate and rhythm, S1, S2 normal, no murmur, click, rub or gallop. Abdomen:   Soft, non-tender. Bowel sounds normal. No masses,  No organomegaly. No paradoxical motion   Extremities: normal, atraumatic, no cyanosis or edema. No clubbing   Pulses: 1-2+ and symmetric all extremities.    Skin: Skin color, texture, turgor normal. No rashes or lesions   Lymph nodes: Cervical, supraclavicular, and axillary nodes normal.   Neurologic: Grossly nonfocal, strength and coordination grossly intact throughout all extremities, sensation also grossly intact. Alert and oriented x3, good memory now          Data review:   Labs:  Recent Results (from the past 24 hour(s))   FERRITIN    Collection Time: 04/25/20  1:53 AM   Result Value Ref Range    Ferritin 595 (H) 8 - 388 NG/ML   CK    Collection Time: 04/25/20  1:53 AM   Result Value Ref Range     39 - 308 U/L   C REACTIVE PROTEIN, QT    Collection Time: 04/25/20  1:53 AM   Result Value Ref Range    C-Reactive protein 0.6 (H) 0 - 0.3 mg/dL   LD    Collection Time: 04/25/20  1:53 AM   Result Value Ref Range     (H) 81 - 234 U/L   GLUCOSE, POC    Collection Time: 04/25/20  8:14 AM   Result Value Ref Range    Glucose (POC) 90 70 - 110 mg/dL   D DIMER    Collection Time: 04/25/20 10:15 AM   Result Value Ref Range    D DIMER 0.75 (H) <0.46 ug/ml(FEU)   GLUCOSE, POC    Collection Time: 04/25/20 11:31 AM   Result Value Ref Range    Glucose (POC) 207 (H) 70 - 110 mg/dL   GLUCOSE, POC    Collection Time: 04/25/20  4:46 PM   Result Value Ref Range    Glucose (POC) 101 70 - 110 mg/dL   GLUCOSE, POC    Collection Time: 04/25/20  7:48 PM   Result Value Ref Range    Glucose (POC) 163 (H) 70 - 110 mg/dL     ABG:  No results found for: PH, PHI, PCO2, PCO2I, PO2, PO2I, HCO3, HCO3I, FIO2, FIO2I    PFT Results  (Last 48 hours)    None        Echo Results  (Last 48 hours)    None        Imaging:  I have personally reviewed the patients radiographs and have reviewed the reports:  No new studies in the interval  Chest x-ray from 4/21/2020 compared to chest x-ray from 4/17 and 4/16 shows worsening bilateral infiltrates consistent with multifocal pneumonia and ARDS. No masses, effusion seen, however due to dense nature of infiltrates, unable to assess further.   CXR Results  (Last 48 hours)               04/24/20 1051  XR CHEST PORT Final result    Impression:  IMPRESSION:   Bilateral reticular and confluent lung opacities with slight interval   improvement at the lung bases. Narrative:  EXAM: XR CHEST PORT       INDICATION: 60 years Male. compare with prior x ray. ADDITIONAL HISTORY: COVID positive       TECHNIQUE: Frontal view of the chest.       COMPARISON: 4/22/2020 at 0525 hours       FINDINGS:       The lungs are hypoinflated. The cardiac silhouette is within normal limits in size. Bilateral reticular and confluent opacities are noted within the lungs,  most   pronounced in the left mid and bilateral lower lung zones. These opacities are   slightly improved at the lung bases as compared to prior. No definite pleural effusion. No appreciable pneumothorax. Osseous structures are unchanged in appearance. CT Results  (Last 48 hours)    None            High complexity decision making was performed during the evaluation of this patient at high risk for decompensation with multiple organ involvement    Total of 36 min critical care time spent at bedside during the course of care providing evaluation,management and care decisions and ordering appropriate treatment related to critical care problems exclusive of procedures. The reason for providing this level of medical care for this critically ill patient was due a critical illness that impaired one or more vital organ systems such that there was a high probability of imminent or life threatening deterioration in the patients condition. This care involved high complexity decision making to assess, manipulate, and support vital system functions, to treat this degree vital organ system failure and to prevent further life threatening deterioration of the patients condition.     Above mentioned total time spent on reviewing the case/medical record/data/notes/EMR/patient examination/documentation/coordinating care with nurse/consultants, exclusive of procedures with complex decision making performed and > 50% time spent in face to face evaluation.            Vivienne Alves MD/MPH     Pulmonary, Critical Care Medicine  [facility]

## 2020-04-26 NOTE — PROGRESS NOTES
[facility]  Progress Note    Patient: Farzad Allen Age: 60 y.o. : 1960 MR#: [id number] SSN: [id number]  Date: 2020     Subjective/24-hour events:     Breathing reported to be about the same. Continues to feel more SOB with exertion but improves with rest.  Oxygen has remained adequate on HFNC. Assessment:   Acute hypoxic respiratory failure  COVID-19 pneumonia with suspected underlying bacterial pneumonia  ARDS secondary to above  JOSE on CKD 3  DM2 with hyperglycemia, hemoglobin A1c 8.2%  HTN  Hyperlipidemia  Hyponatremia resolved  CLL history    Plan:  Followed up with patient re: consideration for plasma therapy today - he still states that he would like to avoid this. Continuing emtriva and tenofovir as ordered - ID follow up in AM.  Trend labs. Mobilize as much as able/tolerated, encourage ICS use. Continue current management/supportive care as ordered otherwise. Follow. Objective:   VS:   Visit Vitals  /67 (BP 1 Location: Right arm, BP Patient Position: At rest;Lying left side)   Pulse 70   Temp 98.8 °F (37.1 °C)   Resp 16   Ht 5' 9\" (1.753 m)   Wt 84.6 kg (186 lb 8.2 oz)   SpO2 95%   BMI 27.54 kg/m²      Tmax/24hrs: Temp (24hrs), Av.7 °F (37.1 °C), Min:98.3 °F (36.8 °C), Max:99 °F (37.2 °C)      Intake/Output Summary (Last 24 hours) at 2020 1101  Last data filed at 2020 0800  Gross per 24 hour   Intake 1080 ml   Output 1775 ml   Net -695 ml       Gen: In NAD. Nontoxic-appearing. Cardiovascular:  RRR  Pulmonary:  Breath sounds decreased bilaterally but no active wheezing. No accesory muscle use. GI:  Soft, NTTP. Extremities:  Warm, no edema or ischemia.   Neuro:  Awake and alert, motor grossly nonfocal.    Labs:    Recent Results (from the past 24 hour(s))   GLUCOSE, POC    Collection Time: 20 11:31 AM   Result Value Ref Range    Glucose (POC) 207 (H) 70 - 110 mg/dL   GLUCOSE, POC    Collection Time: 20 4:46 PM   Result Value Ref Range    Glucose (POC) 101 70 - 110 mg/dL   GLUCOSE, POC    Collection Time: 04/25/20  7:48 PM   Result Value Ref Range    Glucose (POC) 163 (H) 70 - 110 mg/dL   CK    Collection Time: 04/26/20  1:35 AM   Result Value Ref Range    CK 90 39 - 308 U/L   C REACTIVE PROTEIN, QT    Collection Time: 04/26/20  1:35 AM   Result Value Ref Range    C-Reactive protein 0.4 (H) 0 - 0.3 mg/dL   LD    Collection Time: 04/26/20  1:35 AM   Result Value Ref Range     (H) 81 - 234 U/L   D DIMER    Collection Time: 04/26/20  1:35 AM   Result Value Ref Range    D DIMER 0.68 (H) <0.46 ug/ml(FEU)   FERRITIN    Collection Time: 04/26/20  1:35 AM   Result Value Ref Range    Ferritin 537 (H) 8 - 388 NG/ML   GLUCOSE, POC    Collection Time: 04/26/20  8:52 AM   Result Value Ref Range    Glucose (POC) 118 (H) 70 - 110 mg/dL       Signed By: Tyrel Sanchez MD     April 26, 2020

## 2020-04-26 NOTE — PROGRESS NOTES
Problem: Breathing Pattern - Ineffective  Goal: *Absence of hypoxia  Outcome: Progressing Towards Goal  Goal: *Use of effective breathing techniques  Outcome: Progressing Towards Goal  Goal: *PALLIATIVE CARE:  Alleviation of Dyspnea  Outcome: Progressing Towards Goal     Problem: Diabetes Self-Management  Goal: *Disease process and treatment process  Description: Define diabetes and identify own type of diabetes; list 3 options for treating diabetes. Outcome: Progressing Towards Goal  Goal: *Incorporating nutritional management into lifestyle  Description: Describe effect of type, amount and timing of food on blood glucose; list 3 methods for planning meals. Outcome: Progressing Towards Goal  Goal: *Incorporating physical activity into lifestyle  Description: State effect of exercise on blood glucose levels. Outcome: Progressing Towards Goal  Goal: *Developing strategies to promote health/change behavior  Description: Define the ABC's of diabetes; identify appropriate screenings, schedule and personal plan for screenings. Outcome: Progressing Towards Goal  Goal: *Using medications safely  Description: State effect of diabetes medications on diabetes; name diabetes medication taking, action and side effects. Outcome: Progressing Towards Goal  Goal: *Monitoring blood glucose, interpreting and using results  Description: Identify recommended blood glucose targets  and personal targets. Outcome: Progressing Towards Goal  Goal: *Prevention, detection, treatment of acute complications  Description: List symptoms of hyper- and hypoglycemia; describe how to treat low blood sugar and actions for lowering  high blood glucose level. Outcome: Progressing Towards Goal  Goal: *Prevention, detection and treatment of chronic complications  Description: Define the natural course of diabetes and describe the relationship of blood glucose levels to long term complications of diabetes.   Outcome: Progressing Towards Goal  Goal: *Developing strategies to address psychosocial issues  Description: Describe feelings about living with diabetes; identify support needed and support network  Outcome: Progressing Towards Goal     Problem: Patient Education: Go to Patient Education Activity  Goal: Patient/Family Education  Outcome: Progressing Towards Goal     Problem: Falls - Risk of  Goal: *Absence of Falls  Description: Document [place] Flow Fall Risk and appropriate interventions in the flowsheet. Outcome: Progressing Towards Goal  Note: Fall Risk Interventions:  Mobility Interventions: Assess mobility with egress test, Bed/chair exit alarm, OT consult for ADLs, Communicate number of staff needed for ambulation/transfer, Patient to call before getting OOB         Medication Interventions: Evaluate medications/consider consulting pharmacy, Patient to call before getting OOB, Teach patient to arise slowly    Elimination Interventions: Bed/chair exit alarm, Call light in reach, Patient to call for help with toileting needs, Stay With Me (per policy), Toilet paper/wipes in reach, Toileting schedule/hourly rounds, Urinal in reach              Problem: Patient Education: Go to Patient Education Activity  Goal: Patient/Family Education  Outcome: Progressing Towards Goal     Problem: Pressure Injury - Risk of  Goal: *Prevention of pressure injury  Description: Document Saroj Scale and appropriate interventions in the flowsheet. Outcome: Progressing Towards Goal  Note: Pressure Injury Interventions: Activity Interventions: Pressure redistribution bed/mattress(bed type), Assess need for specialty bed    Mobility Interventions: Assess need for specialty bed, Float heels, HOB 30 degrees or less, Pressure redistribution bed/mattress (bed type), Turn and reposition approx.  every two hours(pillow and wedges)    Nutrition Interventions: Document food/fluid/supplement intake, Discuss nutritional consult with provider, Offer support with meals,snacks and hydration                     Problem: Patient Education: Go to Patient Education Activity  Goal: Patient/Family Education  Outcome: Progressing Towards Goal     Problem: Pain  Goal: *Control of Pain  Outcome: Progressing Towards Goal  Goal: *PALLIATIVE CARE:  Alleviation of Pain  Outcome: Progressing Towards Goal     Problem: Diabetes Maintenance:Admission  Goal: *Blood glucose 80 to 180 mg/dl  Outcome: Progressing Towards Goal

## 2020-04-26 NOTE — PROGRESS NOTES
Problem: Breathing Pattern - Ineffective  Goal: *Use of effective breathing techniques  Outcome: Progressing Towards Goal     Problem: Patient Education: Go to Patient Education Activity  Goal: Patient/Family Education  Outcome: Progressing Towards Goal     Problem: Falls - Risk of  Goal: *Absence of Falls  Description: Document José Luis Mess Fall Risk and appropriate interventions in the flowsheet. Outcome: Progressing Towards Goal  Note: Fall Risk Interventions:  Mobility Interventions: Patient to call before getting OOB, Strengthening exercises (ROM-active/passive)         Medication Interventions: Teach patient to arise slowly, Patient to call before getting OOB    Elimination Interventions: Call light in reach, Toilet paper/wipes in reach, Urinal in reach              Problem: Pressure Injury - Risk of  Goal: *Prevention of pressure injury  Description: Document Saroj Scale and appropriate interventions in the flowsheet. Outcome: Progressing Towards Goal  Note: Pressure Injury Interventions: Activity Interventions: Pressure redistribution bed/mattress(bed type), Increase time out of bed    Mobility Interventions: Pressure redistribution bed/mattress (bed type), Assess need for specialty bed, Turn and reposition approx.  every two hours(pillow and wedges)    Nutrition Interventions: Document food/fluid/supplement intake                     Problem: Pain  Goal: *Control of Pain  Outcome: Progressing Towards Goal

## 2020-04-26 NOTE — ROUTINE PROCESS
1916 Verbal report received from off going nurse, Natalia Holly. Assumed care of patient from off going nurse. Patient resting in bed. No distress noted. Patient currently on hi flow 40L FiO2 65% Sats 92-98%. Call bell within reach, siderails up x 3, bed in lowest position, and patient instructed to use call bell for assistance. Will continue to monitor. [id number] Patient sleeping in prone position. Tolerating well. No distress noted. 1347  Verbal shift change report given to Ghazala James RN (oncoming nurse) by Tree Linda RN(offgoing nurse).  Report included the following information SBAR, Intake/Output, MAR, Recent Results and Cardiac Rhythm SR.

## 2020-04-27 LAB
ANION GAP SERPL CALC-SCNC: 8 MMOL/L (ref 3–18)
BASOPHILS # BLD: 0 K/UL (ref 0–0.06)
BASOPHILS NFR BLD: 0 % (ref 0–3)
BUN SERPL-MCNC: 18 MG/DL (ref 7–18)
BUN/CREAT SERPL: 21 (ref 12–20)
CALCIUM SERPL-MCNC: 7.8 MG/DL (ref 8.5–10.1)
CHLORIDE SERPL-SCNC: 107 MMOL/L (ref 100–111)
CK SERPL-CCNC: 71 U/L (ref 39–308)
CO2 SERPL-SCNC: 24 MMOL/L (ref 21–32)
CREAT SERPL-MCNC: 0.84 MG/DL (ref 0.6–1.3)
CRP SERPL-MCNC: 0.3 MG/DL (ref 0–0.3)
D DIMER PPP FEU-MCNC: 0.87 UG/ML(FEU)
DIFFERENTIAL METHOD BLD: ABNORMAL
EOSINOPHIL # BLD: 0.3 K/UL (ref 0–0.4)
EOSINOPHIL NFR BLD: 2 % (ref 0–5)
ERYTHROCYTE [DISTWIDTH] IN BLOOD BY AUTOMATED COUNT: 16.4 % (ref 11.6–14.5)
FERRITIN SERPL-MCNC: 497 NG/ML (ref 8–388)
GLUCOSE BLD STRIP.AUTO-MCNC: 116 MG/DL (ref 70–110)
GLUCOSE SERPL-MCNC: 107 MG/DL (ref 74–99)
HCT VFR BLD AUTO: 38.6 % (ref 36–48)
HGB BLD-MCNC: 12.4 G/DL (ref 13–16)
LDH SERPL L TO P-CCNC: 357 U/L (ref 81–234)
LYMPHOCYTES # BLD: 12.2 K/UL (ref 0.8–3.5)
LYMPHOCYTES NFR BLD: 73 % (ref 20–51)
MCH RBC QN AUTO: 25.4 PG (ref 24–34)
MCHC RBC AUTO-ENTMCNC: 32.1 G/DL (ref 31–37)
MCV RBC AUTO: 79.1 FL (ref 74–97)
MONOCYTES # BLD: 0.5 K/UL (ref 0–1)
MONOCYTES NFR BLD: 3 % (ref 2–9)
NEUTS SEG # BLD: 3.7 K/UL (ref 1.8–8)
NEUTS SEG NFR BLD: 22 % (ref 42–75)
PLATELET # BLD AUTO: 320 K/UL (ref 135–420)
PLATELET COMMENTS,PCOM: ABNORMAL
PMV BLD AUTO: 9.6 FL (ref 9.2–11.8)
POTASSIUM SERPL-SCNC: 4.4 MMOL/L (ref 3.5–5.5)
RBC # BLD AUTO: 4.88 M/UL (ref 4.7–5.5)
RBC MORPH BLD: ABNORMAL
SODIUM SERPL-SCNC: 139 MMOL/L (ref 136–145)
WBC # BLD AUTO: 16.7 K/UL (ref 4.6–13.2)

## 2020-04-27 PROCEDURE — 36415 COLL VENOUS BLD VENIPUNCTURE: CPT

## 2020-04-27 PROCEDURE — 82962 GLUCOSE BLOOD TEST: CPT

## 2020-04-27 PROCEDURE — 74011250637 HC RX REV CODE- 250/637: Performed by: HOSPITALIST

## 2020-04-27 PROCEDURE — 86140 C-REACTIVE PROTEIN: CPT

## 2020-04-27 PROCEDURE — 83615 LACTATE (LD) (LDH) ENZYME: CPT

## 2020-04-27 PROCEDURE — 74011250637 HC RX REV CODE- 250/637: Performed by: INTERNAL MEDICINE

## 2020-04-27 PROCEDURE — 77010033711 HC HIGH FLOW OXYGEN

## 2020-04-27 PROCEDURE — 85379 FIBRIN DEGRADATION QUANT: CPT

## 2020-04-27 PROCEDURE — 80048 BASIC METABOLIC PNL TOTAL CA: CPT

## 2020-04-27 PROCEDURE — 85025 COMPLETE CBC W/AUTO DIFF WBC: CPT

## 2020-04-27 PROCEDURE — 82550 ASSAY OF CK (CPK): CPT

## 2020-04-27 PROCEDURE — 74011250636 HC RX REV CODE- 250/636: Performed by: INTERNAL MEDICINE

## 2020-04-27 PROCEDURE — 94762 N-INVAS EAR/PLS OXIMTRY CONT: CPT

## 2020-04-27 PROCEDURE — 65660000000 HC RM CCU STEPDOWN

## 2020-04-27 PROCEDURE — 82728 ASSAY OF FERRITIN: CPT

## 2020-04-27 RX ORDER — INSULIN LISPRO 100 [IU]/ML
INJECTION, SOLUTION INTRAVENOUS; SUBCUTANEOUS DAILY PRN
Status: DISCONTINUED | OUTPATIENT
Start: 2020-04-27 | End: 2020-05-07 | Stop reason: HOSPADM

## 2020-04-27 RX ADMIN — ZINC SULFATE 220 MG (50 MG) CAPSULE 1 CAPSULE: CAPSULE at 08:33

## 2020-04-27 RX ADMIN — MELATONIN TAB 3 MG 6 MG: 3 TAB at 21:43

## 2020-04-27 RX ADMIN — FAMOTIDINE 20 MG: 20 TABLET ORAL at 21:43

## 2020-04-27 RX ADMIN — Medication 10 ML: at 21:44

## 2020-04-27 RX ADMIN — CHOLECALCIFEROL (VITAMIN D3) 10 MCG (400 UNIT) TABLET 1 TABLET: at 08:34

## 2020-04-27 RX ADMIN — TENOFOVIR DISPROXIL FUMARATE 300 MG: 300 TABLET ORAL at 09:00

## 2020-04-27 RX ADMIN — EMTRICITABINE 200 MG: 200 CAPSULE ORAL at 09:00

## 2020-04-27 RX ADMIN — GUAIFENESIN 600 MG: 600 TABLET, EXTENDED RELEASE ORAL at 21:43

## 2020-04-27 RX ADMIN — Medication 500 MG: at 08:34

## 2020-04-27 RX ADMIN — GUAIFENESIN 600 MG: 600 TABLET, EXTENDED RELEASE ORAL at 08:33

## 2020-04-27 RX ADMIN — ATORVASTATIN CALCIUM 40 MG: 40 TABLET, FILM COATED ORAL at 08:34

## 2020-04-27 RX ADMIN — CARVEDILOL 12.5 MG: 12.5 TABLET, FILM COATED ORAL at 08:33

## 2020-04-27 RX ADMIN — ENOXAPARIN SODIUM 40 MG: 40 INJECTION SUBCUTANEOUS at 08:34

## 2020-04-27 RX ADMIN — FAMOTIDINE 20 MG: 20 TABLET ORAL at 08:34

## 2020-04-27 RX ADMIN — Medication 500 MG: at 21:43

## 2020-04-27 RX ADMIN — Medication 10 ML: at 04:34

## 2020-04-27 RX ADMIN — Medication 10 ML: at 11:55

## 2020-04-27 RX ADMIN — ASPIRIN 81 MG: 81 TABLET, COATED ORAL at 08:32

## 2020-04-27 RX ADMIN — DICLOFENAC 4 G: 10 GEL TOPICAL at 17:00

## 2020-04-27 RX ADMIN — CARVEDILOL 12.5 MG: 12.5 TABLET, FILM COATED ORAL at 21:43

## 2020-04-27 NOTE — ROUTINE PROCESS
1908 Assumed care of patient from off going nurse. Patient resting in bed. No distress noted. Patient currently on hi flow 40L FiO2 65% Sats 92-94%. Call bell within reach, siderails up x 3, bed in lowest position, and patient instructed to use call bell for assistance. Will continue to monitor. 0439  Uneventful night. Patient declined to sleep in prone position overnight. Sats maintain 92-96%. 0710 Verbal shift change report given to [employer] (oncoming nurse) by Memo Schulz RN(offgoing nurse).  Report included the following information SBAR, Intake/Output, MAR, Recent Results and Cardiac Rhythm SR.

## 2020-04-27 NOTE — PROGRESS NOTES
[address] [place] [address] [facility]  Pulmonary, Critical Care, and Sleep Medicine    Progress Note    Name: Ana Luisa Perez MRN: [id number]   : 1960 Hospital: [address] [place] [address]   Date: 2020        IMPRESSION:   · Acute hypoxic respiratory failure due to COVID pneumonia, requiring High flow nasal cannula  · COVID sepsis with multifocal pneumonia and JOSE. Given Tocilizumab . Completed Solumedrol course. · History of CLL  · DM 2  · HTN on ACE I prior to admission  · History of ablation  · Dyslipidemia       RECOMMENDATIONS:   · Titrate supplemental O2 to saturation 89% or greater, currently on Vapotherm. Would avoid mechanical ventilation if possible. Review of CXR appear to show improvement in infiltrates although pt reports intermittently increased dyspnea  · Continue self proning. Explained use and benefits of proning  · Anti inflammatory medications per ID, will discuss w ID  · Anti inflammatory markers appear to be decreasing except for D Dimer  · DVT prophylaxis roula in the setting of COVID which appears to increase thrombotic risk. No indication for full anticoagulation dosing  · Monitor for bleeding and DIC  · Aspiration precautions, HOB >30 degrees  · Encourage regular incentive spirometry  · Will follow with you     Subjective:     Chart reviewed. Overnight events noted. Discussed with nursing. 60 y.o. male with a past medical history of CLL, PVC ablation, hypertension, presented to [facility] about 6 days ago with complaints of fever and cough for a few days prior to presentation. Patient reports that his wife and daughter were sick at home, but he does not know any other sick contacts. Patient then developed worsening shortness of breath over the last 5 days. Patient also reports that he lost his sense of taste during that time. While in the ER, patient was found to be hypoxic at 2 L by nasal cannula along with fever and had acute renal failure.   During the course of hospitalization, patient has been having productive cough, which patient reports that resolved over the last day. Patient reports now that his cough is better and he reports that his dyspnea is somewhat improved however patient is becoming increasingly more hypoxic, patient has been going up from 2 L nasal cannula up to 8 L yesterday and 10 L today. Patient reports he has been having issues with insomnia, not being able to sleep. Patient also reports some malaise. Patient otherwise denies any chest pain, nausea, vomiting, diarrhea, hemoptysis. CLL is followed by Dr. Jonathan Mehta.    04/27/20    Pt complains of intermittent dyspnea although he seems to be ok with occasional exertion. Also complains of depression and frustration on apparent lack of improvement2   Afebrile  No chest pain or hemoptysis  Pt self prones HS and during the day as tolerated        Past Medical History:   Diagnosis Date    Abnormal nuclear cardiac imaging test 11/30/2006    Mild anterior ischemia. Inferior scar w/border zone ischemia. LVE. EF 26%. (Gated imaging may be inaccurate.)  Global hypk. Neg EKG on max EST. Ex time 10:30.  Aorto-iliac duplex 09/10/2012    No AAA identified.  Carotid duplex 09/10/2012    No significant occlusive disease bilaterallly.  [facility]     Diabetes ([facility]     Echocardiogram 06/02/2015    EF 55%. No WMA. Mild LVH. Gr 1 DDfx. IMELDA. No significant chg from study of 5/17/10.  History of echocardiogram 06/20/2011    EF 50-55%. Gr 2 DDfx. Mild RVE. Mild LAE. Mild-mod IMELDA.  Hypercholesterolemia     Hypertension     Nonischemic cardiomyopathy ([address]     s/p right coronary cusp PVC ablation  (7/65/38) without complication    Prostate CA ([facility]     S/P cardiac cath 12/11/2006    Patent coronary arteries. LVEDP 12.  EF 25-30%. Mod-significant global hypk.         Past Surgical History:   Procedure Laterality Date    HX HEART CATHETERIZATION  12/11/06 The right coronary artery is dominant. It is widely patent. The left main coronary artery is widely patent. The circumflex artery is widely patent. The left anterior descending coronary artery is widely patent. The LVEDP is 12 mmHg. The overall left ventricular systolic function is significantly diminished with an estimated ejection fraction of 25-30%. ** See report. Prior to Admission medications    Medication Sig Start Date End Date Taking? Authorizing Provider   SHELBY BCISE 2 mg/0.85 mL atIn INJECT 2MG EVERY WEEK UNDER THE SKIN 6/7/19   Provider, Historical   ergocalciferol (ERGOCALCIFEROL) 50,000 unit capsule Take 1 Cap by mouth every seven (7) days. 1/7/19   Tyrone MCCORD NP   sitagliptin phos/metformin HCl (JANUMET PO) Take  by mouth. Provider, Historical   lisinopril (PRINIVIL, ZESTRIL) 5 mg tablet Take 1 Tab by mouth daily. 6/29/15   Wenceslao Denise MD   carvedilol (COREG) 12.5 mg tablet Take 1 Tab by mouth two (2) times a day. 6/4/15   Wenceslao Denise MD   simvastatin (ZOCOR) 10 mg tablet Take  by mouth daily. Provider, Historical   aspirin delayed-release 81 mg tablet Take 81 mg by mouth daily. 6/9/11   Provider, Historical     No Known Allergies   Social History     Tobacco Use    Smoking status: Former Smoker     Packs/day: 1.00    Smokeless tobacco: Never Used   Substance Use Topics    Alcohol use: Yes      History reviewed. No pertinent family history.      Current Facility-Administered Medications   Medication Dose Route Frequency    enoxaparin (LOVENOX) injection 40 mg  40 mg SubCUTAneous Q24H    emtricitabine (EMTRIVA) capsule 200 mg  200 mg Oral DAILY    tenofovir DISOPROXIL FUMARATE (VIREAD) tablet 300 mg  300 mg Oral DAILY    guaiFENesin ER (MUCINEX) tablet 600 mg  600 mg Oral Q12H    diclofenac (VOLTAREN) 1 % topical gel 4 g  4 g Topical QID    melatonin tablet 6 mg  6 mg Oral QHS    famotidine (PEPCID) tablet 20 mg  20 mg Oral BID    atorvastatin (LIPITOR) tablet 40 mg  40 mg Oral DAILY    sodium chloride (NS) flush 5-40 mL  5-40 mL IntraVENous Q8H    aspirin delayed-release tablet 81 mg  81 mg Oral DAILY    carvediloL (COREG) tablet 12.5 mg  12.5 mg Oral BID    zinc sulfate (ZINCATE) 220 mg capsule 1 Cap  1 Cap Oral DAILY    ascorbic acid (vitamin C) (VITAMIN C) tablet 500 mg  500 mg Oral BID    cholecalciferol (VITAMIN D3) (400 Units /10 mcg) tablet 1 Tab  400 Units Oral DAILY       Review of Systems:  Pertinent items are noted in HPI. Objective:   Vital Signs:    Visit Vitals  /65 (BP 1 Location: Right arm, BP Patient Position: Sitting)   Pulse 79   Temp 97 °F (36.1 °C)   Resp 21   Ht 5' 9\" (1.753 m)   Wt 80 kg (176 lb 4.8 oz)   SpO2 93%   BMI 26.03 kg/m²       O2 Device: Hi flow nasal cannula   O2 Flow Rate (L/min): 40 l/min   Temp (24hrs), Av.1 °F (36.7 °C), Min:97 °F (36.1 °C), Max:99 °F (37.2 °C)       Intake/Output:   Last shift:      No intake/output data recorded. Last 3 shifts:  1901 -  0700  In: 2280 [P.O.:2280]  Out: 0 [Urine:0]    Intake/Output Summary (Last 24 hours) at 2020 1530  Last data filed at 2020 0402  Gross per 24 hour   Intake 1080 ml   Output 700 ml   Net 380 ml      Physical Exam:   General:  Alert, cooperative, no distress, appears stated age. Head:  Normocephalic, without obvious abnormality, atraumatic. Eyes:  Conjunctivae/corneas clear. PERRL, EOMs intact. Nose: Nares normal. Mucosa normal. No drainage or sinus tenderness. Throat: Lips, mucosa, and tongue normal. Teeth and gums normal.   Neck: Supple, symmetrical, trachea midline, no adenopathy, thyroid: no enlargment/tenderness/nodules    Back:   Symmetric    Lungs:   Clear to auscultation bilaterally. Chest wall:  No tenderness or deformity. Heart:  Regular rate and rhythm, S1, S2 normal, no murmur, click, rub or gallop. Abdomen:   Soft, non-tender. Bowel sounds normal. No masses,  No organomegaly.    Extremities: Extremities normal, atraumatic, no cyanosis or edema. Pulses: 2+ and symmetric all extremities. Skin: Skin color, texture, turgor normal. No rashes or lesions   Lymph nodes: Cervical, supraclavicular nodes normal.   Neurologic: Grossly nonfocal     Data review:     Recent Results (from the past 24 hour(s))   GLUCOSE, POC    Collection Time: 04/26/20  4:32 PM   Result Value Ref Range    Glucose (POC) 125 (H) 70 - 110 mg/dL   GLUCOSE, POC    Collection Time: 04/26/20  9:04 PM   Result Value Ref Range    Glucose (POC) 135 (H) 70 - 110 mg/dL   FERRITIN    Collection Time: 04/27/20  1:16 AM   Result Value Ref Range    Ferritin 497 (H) 8 - 388 NG/ML   CK    Collection Time: 04/27/20  1:16 AM   Result Value Ref Range    CK 71 39 - 308 U/L   C REACTIVE PROTEIN, QT    Collection Time: 04/27/20  1:16 AM   Result Value Ref Range    C-Reactive protein 0.3 0 - 0.3 mg/dL   LD    Collection Time: 04/27/20  1:16 AM   Result Value Ref Range     (H) 81 - 234 U/L   D DIMER    Collection Time: 04/27/20  1:16 AM   Result Value Ref Range    D DIMER 0.87 (H) <0.46 ug/ml(FEU)   CBC WITH AUTOMATED DIFF    Collection Time: 04/27/20  1:16 AM   Result Value Ref Range    WBC 16.7 (H) 4.6 - 13.2 K/uL    RBC 4.88 4.70 - 5.50 M/uL    HGB 12.4 (L) 13.0 - 16.0 g/dL    HCT 38.6 36.0 - 48.0 %    MCV 79.1 74.0 - 97.0 FL    MCH 25.4 24.0 - 34.0 PG    MCHC 32.1 31.0 - 37.0 g/dL    RDW 16.4 (H) 11.6 - 14.5 %    PLATELET 126 246 - 391 K/uL    MPV 9.6 9.2 - 11.8 FL    NEUTROPHILS 22 (L) 42 - 75 %    LYMPHOCYTES 73 (H) 20 - 51 %    MONOCYTES 3 2 - 9 %    EOSINOPHILS 2 0 - 5 %    BASOPHILS 0 0 - 3 %    ABS. NEUTROPHILS 3.7 1.8 - 8.0 K/UL    ABS. LYMPHOCYTES 12.2 (H) 0.8 - 3.5 K/UL    ABS. MONOCYTES 0.5 0 - 1.0 K/UL    ABS. EOSINOPHILS 0.3 0.0 - 0.4 K/UL    ABS.  BASOPHILS 0.0 0.0 - 0.06 K/UL    DF AUTOMATED      PLATELET COMMENTS ADEQUATE PLATELETS      RBC COMMENTS ANISOCYTOSIS  1+       METABOLIC PANEL, BASIC    Collection Time: 04/27/20  1:16 AM   Result Value Ref Range    Sodium 139 136 - 145 mmol/L    Potassium 4.4 3.5 - 5.5 mmol/L    Chloride 107 100 - 111 mmol/L    CO2 24 21 - 32 mmol/L    Anion gap 8 3.0 - 18 mmol/L    Glucose 107 (H) 74 - 99 mg/dL    BUN 18 7.0 - 18 MG/DL    Creatinine 0.84 0.6 - 1.3 MG/DL    BUN/Creatinine ratio 21 (H) 12 - 20      GFR est AA >60 >60 ml/min/1.73m2    GFR est non-AA >60 >60 ml/min/1.73m2    Calcium 7.8 (L) 8.5 - 10.1 MG/DL   GLUCOSE, POC    Collection Time: 04/27/20  7:30 AM   Result Value Ref Range    Glucose (POC) 116 (H) 70 - 110 mg/dL       Imaging:  I have personally reviewed the patients radiographs and have reviewed the reports:  XR Results (most recent):  Results from Hospital Encounter encounter on 04/16/20   XR CHEST PORT    Narrative EXAM: XR CHEST PORT    INDICATION: 60 years Male. compare with prior x ray. ADDITIONAL HISTORY: COVID positive    TECHNIQUE: Frontal view of the chest.    COMPARISON: 4/22/2020 at 0525 hours    FINDINGS:    The lungs are hypoinflated. The cardiac silhouette is within normal limits in size. Bilateral reticular and confluent opacities are noted within the lungs,  most  pronounced in the left mid and bilateral lower lung zones. These opacities are  slightly improved at the lung bases as compared to prior. No definite pleural effusion. No appreciable pneumothorax. Osseous structures are unchanged in appearance. Impression IMPRESSION:  Bilateral reticular and confluent lung opacities with slight interval  improvement at the lung bases.              Ronald Abdi MD

## 2020-04-27 NOTE — DIABETES MGMT
GLYCEMIC CONTROL PLAN OF CARE     Assessment/Recommendations:  Fasting lab glucose this am 107 mg/dl   Blood glucose currently controlled  Continue corrective insulin coverage as needed. Will continue inpatient monitoring. Most recent blood glucose values:        Results for Iris Ortiz (MRN [id number]) as of 4/27/2020 11:22   Ref. Range 4/26/2020 08:52 4/26/2020 11:34 4/26/2020 16:32 4/26/2020 21:04 4/27/2020 07:30   GLUCOSE,FAST - POC Latest Ref Range: 70 - 110 mg/dL 118 (H) 143 (H) 125 (H) 135 (H) 116 (H)       Current A1C of 8.2 % is equivalent to average blood glucose of 189 mg/dl over the past 2-3 months.     Current hospital diabetes medications:   Lispro corrective insulin coverage AC&HS  Previous day's insulin requirements:   none  Home diabetes medications:  Janumet   Bydureon weekly  Diet:    Diabetic consistent carb with supplements  Education:  __x_Refer to Diabetes Education Record             ____Education not indicated at this time      Rodney Pulse, [address] [facility] CDE  Ext [phone]

## 2020-04-27 NOTE — PROGRESS NOTES
Spoke with pt by phone regarding discharge. Pt wants to return home at discharge and states he is not interested in SNF. Also spoke with pt's bedside RN who indicated that pt had been ambulating to the bathroom last week but is not able to do this at this time due to increased  oxygen needs. CM will continue to follow for transition needs.  Chito Ramirez MSW, [place]- [phone]

## 2020-04-27 NOTE — PROGRESS NOTES
[facility]  Progress Note    Patient: Salima Yepez Age: 60 y.o. : 1960 MR#: [id number] SSN: [id number]  Date: 2020     Subjective/24-hour events:     Patient lying prone currently. Denies any new worsening shortness of breath but has continued to have some mild hypoxia at times. Assessment:   Acute hypoxic respiratory failure  COVID-19 pneumonia with suspected underlying bacterial pneumonia  ARDS secondary to above  JOSE on CKD 3  DM2 with hyperglycemia, hemoglobin A1c 8.2%  HTN  Hyperlipidemia  Hyponatremia resolved  CLL history    Plan:  Continue high flow oxygen as necessary and wean as able. Encourage prone positioning as much as able and continued ICS use. Continue to trend CRP, ferritin, LDH - improvement noted in all values over the last 2 to 3 days. Continue current management/supportive care as ordered otherwise. Follow. Objective:   VS:   Visit Vitals  /65 (BP 1 Location: Right arm, BP Patient Position: Sitting)   Pulse 79   Temp 97 °F (36.1 °C)   Resp 21   Ht 5' 9\" (1.753 m)   Wt 80 kg (176 lb 4.8 oz)   SpO2 93%   BMI 26.03 kg/m²      Tmax/24hrs: Temp (24hrs), Av.1 °F (36.7 °C), Min:97 °F (36.1 °C), Max:99 °F (37.2 °C)      Intake/Output Summary (Last 24 hours) at 2020 1402  Last data filed at 2020 0402  Gross per 24 hour   Intake 1080 ml   Output 700 ml   Net 380 ml       Gen: In NAD. Nontoxic-appearing. Cardiovascular:  RRR  Pulmonary:  Breath sounds decreased bilaterally. No accesory muscle use. GI:  Soft, NTTP. Extremities:  Warm, no edema or ischemia.   Neuro:  Awake and alert, motor grossly nonfocal.    Labs:    Recent Results (from the past 24 hour(s))   GLUCOSE, POC    Collection Time: 20  4:32 PM   Result Value Ref Range    Glucose (POC) 125 (H) 70 - 110 mg/dL   GLUCOSE, POC    Collection Time: 20  9:04 PM   Result Value Ref Range    Glucose (POC) 135 (H) 70 - 110 mg/dL   FERRITIN Collection Time: 04/27/20  1:16 AM   Result Value Ref Range    Ferritin 497 (H) 8 - 388 NG/ML   CK    Collection Time: 04/27/20  1:16 AM   Result Value Ref Range    CK 71 39 - 308 U/L   C REACTIVE PROTEIN, QT    Collection Time: 04/27/20  1:16 AM   Result Value Ref Range    C-Reactive protein 0.3 0 - 0.3 mg/dL   LD    Collection Time: 04/27/20  1:16 AM   Result Value Ref Range     (H) 81 - 234 U/L   D DIMER    Collection Time: 04/27/20  1:16 AM   Result Value Ref Range    D DIMER 0.87 (H) <0.46 ug/ml(FEU)   CBC WITH AUTOMATED DIFF    Collection Time: 04/27/20  1:16 AM   Result Value Ref Range    WBC 16.7 (H) 4.6 - 13.2 K/uL    RBC 4.88 4.70 - 5.50 M/uL    HGB 12.4 (L) 13.0 - 16.0 g/dL    HCT 38.6 36.0 - 48.0 %    MCV 79.1 74.0 - 97.0 FL    MCH 25.4 24.0 - 34.0 PG    MCHC 32.1 31.0 - 37.0 g/dL    RDW 16.4 (H) 11.6 - 14.5 %    PLATELET 535 452 - 415 K/uL    MPV 9.6 9.2 - 11.8 FL    NEUTROPHILS 22 (L) 42 - 75 %    LYMPHOCYTES 73 (H) 20 - 51 %    MONOCYTES 3 2 - 9 %    EOSINOPHILS 2 0 - 5 %    BASOPHILS 0 0 - 3 %    ABS. NEUTROPHILS 3.7 1.8 - 8.0 K/UL    ABS. LYMPHOCYTES 12.2 (H) 0.8 - 3.5 K/UL    ABS. MONOCYTES 0.5 0 - 1.0 K/UL    ABS. EOSINOPHILS 0.3 0.0 - 0.4 K/UL    ABS.  BASOPHILS 0.0 0.0 - 0.06 K/UL    DF AUTOMATED      PLATELET COMMENTS ADEQUATE PLATELETS      RBC COMMENTS ANISOCYTOSIS  1+       METABOLIC PANEL, BASIC    Collection Time: 04/27/20  1:16 AM   Result Value Ref Range    Sodium 139 136 - 145 mmol/L    Potassium 4.4 3.5 - 5.5 mmol/L    Chloride 107 100 - 111 mmol/L    CO2 24 21 - 32 mmol/L    Anion gap 8 3.0 - 18 mmol/L    Glucose 107 (H) 74 - 99 mg/dL    BUN 18 7.0 - 18 MG/DL    Creatinine 0.84 0.6 - 1.3 MG/DL    BUN/Creatinine ratio 21 (H) 12 - 20      GFR est AA >60 >60 ml/min/1.73m2    GFR est non-AA >60 >60 ml/min/1.73m2    Calcium 7.8 (L) 8.5 - 10.1 MG/DL   GLUCOSE, POC    Collection Time: 04/27/20  7:30 AM   Result Value Ref Range    Glucose (POC) 116 (H) 70 - 110 mg/dL       Signed By: Gentry Ellis MD     April 27, 2020

## 2020-04-27 NOTE — PROGRESS NOTES
Problem: Breathing Pattern - Ineffective  Goal: *Absence of hypoxia  Outcome: Progressing Towards Goal  Goal: *Use of effective breathing techniques  Outcome: Progressing Towards Goal  Goal: *PALLIATIVE CARE:  Alleviation of Dyspnea  Outcome: Progressing Towards Goal     Problem: Patient Education: Go to Patient Education Activity  Goal: Patient/Family Education  Outcome: Progressing Towards Goal     Problem: Diabetes Self-Management  Goal: *Disease process and treatment process  Description: Define diabetes and identify own type of diabetes; list 3 options for treating diabetes. Outcome: Progressing Towards Goal  Goal: *Incorporating nutritional management into lifestyle  Description: Describe effect of type, amount and timing of food on blood glucose; list 3 methods for planning meals. Outcome: Progressing Towards Goal  Goal: *Incorporating physical activity into lifestyle  Description: State effect of exercise on blood glucose levels. Outcome: Progressing Towards Goal  Goal: *Developing strategies to promote health/change behavior  Description: Define the ABC's of diabetes; identify appropriate screenings, schedule and personal plan for screenings. Outcome: Progressing Towards Goal  Goal: *Using medications safely  Description: State effect of diabetes medications on diabetes; name diabetes medication taking, action and side effects. Outcome: Progressing Towards Goal  Goal: *Monitoring blood glucose, interpreting and using results  Description: Identify recommended blood glucose targets  and personal targets. Outcome: Progressing Towards Goal  Goal: *Prevention, detection, treatment of acute complications  Description: List symptoms of hyper- and hypoglycemia; describe how to treat low blood sugar and actions for lowering  high blood glucose level.   Outcome: Progressing Towards Goal  Goal: *Prevention, detection and treatment of chronic complications  Description: Define the natural course of diabetes and describe the relationship of blood glucose levels to long term complications of diabetes. Outcome: Progressing Towards Goal  Goal: *Developing strategies to address psychosocial issues  Description: Describe feelings about living with diabetes; identify support needed and support network  Outcome: Progressing Towards Goal     Problem: Falls - Risk of  Goal: *Absence of Falls  Description: Document Baylee Mai Fall Risk and appropriate interventions in the flowsheet. Outcome: Progressing Towards Goal  Note: Fall Risk Interventions:  Mobility Interventions: Assess mobility with egress test, Bed/chair exit alarm, Communicate number of staff needed for ambulation/transfer, Patient to call before getting OOB         Medication Interventions: Evaluate medications/consider consulting pharmacy, Patient to call before getting OOB, Teach patient to arise slowly    Elimination Interventions: Bed/chair exit alarm, Call light in reach, Patient to call for help with toileting needs, Stay With Me (per policy), Toilet paper/wipes in reach, Toileting schedule/hourly rounds, Urinal in reach              Problem: Patient Education: Go to Patient Education Activity  Goal: Patient/Family Education  Outcome: Progressing Towards Goal     Problem: Pressure Injury - Risk of  Goal: *Prevention of pressure injury  Description: Document Saroj Scale and appropriate interventions in the flowsheet. Outcome: Progressing Towards Goal  Note: Pressure Injury Interventions: Activity Interventions: Assess need for specialty bed, Pressure redistribution bed/mattress(bed type)    Mobility Interventions: Float heels, HOB 30 degrees or less, Pressure redistribution bed/mattress (bed type), Suspension boots, Turn and reposition approx.  every two hours(pillow and wedges), Assess need for specialty bed    Nutrition Interventions: Document food/fluid/supplement intake, Discuss nutritional consult with provider, Offer support with meals,snacks and hydration                     Problem: Patient Education: Go to Patient Education Activity  Goal: Patient/Family Education  Outcome: Progressing Towards Goal     Problem: Pain  Goal: *Control of Pain  Outcome: Progressing Towards Goal  Goal: *PALLIATIVE CARE:  Alleviation of Pain  Outcome: Progressing Towards Goal     Problem: Diabetes Maintenance:Admission  Goal: *Blood glucose 80 to 180 mg/dl  Outcome: Progressing Towards Goal

## 2020-04-27 NOTE — PROGRESS NOTES
[employer] [facility]. Pulmonary, Critical Care, and Sleep Medicine    F/U Patient Consult    Name: Manjit Molina MRN: [id number]   : 1960 Hospital: [facility]   Date: 2020        This patient has been seen and evaluated at the request of Dr. Vincenzo Rosa for resp failure/hypoxia. IMPRESSION:   · Acute hypoxic respiratory failure: Etiology secondary to COVID-19 pneumonia. Pt still requiring HFNC at 65%  · COVID-19 infection with multifocal pneumonia  · Moderate ARDS:  Secondary to above  · Severe sepsis:  Secondary to above  · JOSE on CKD  · Hx of HTN: pt on an ACEi prior to admission  · Hx of CLL  · Hx of PVC ablation in 2010  · Hx of dyslipidemia     Patient Active Problem List   Diagnosis Code    Nonischemic cardiomyopathy ([facility] I42.8    PVC (premature ventricular contraction) I49.3    S/P ablation operation for arrhythmia Z98.890, Z86.79    Lymphocytosis D72.820    CLL (chronic lymphocytic leukemia) ([facility] C91.10    Skin rash R21    Vitamin D deficiency E55.9    Pneumonia J18.9    COVID-19 virus infection U07.1    Suspected Covid-19 Virus Infection R68.89      RECOMMENDATIONS:   Supplemental oxygen to maintain SpO2 >88% --- continue pt on HFNC with Vapotherm. Pt appears comfortable at this setting so would continue to advise against mech ventilation at this point, only if pt decompensates.   Continue proning, QHS and 2-4h during day and as tolerated  No indication for repeat CXR unless pt worsens, please avoid daily CXR in the setting of ARDS not on mech vent  Defer antiinflammatory medications (including antimalarials, steroids, and trace minerals) and ABx to ID service -- data for novel coronavirus is limited, please weigh risks and benefits  Serial fibrinogen, LDH, triglyceride, CRP, ferritin  Aggressive pulmonary toileting/bronchial hygiene  Frequent incentive spirometry - counseled to perform 4-5x every hour  Aspiration precautions including elevating HOB >30deg  PT/OT, OOB, ambulate with assistance as tolerated  DVT ppx per primary service -- Please monitor for blood loss and DIC  Please assess for home oxygen need prior to discharge  Continue supportive care  Will follow    Prognosis guarded       Subjective:   04/26/20  Pt seen and examined at bedside. No acute events overnight. Patient reports that he is in good spirits, has good energy. Patient reports that \"if I get my breathing right, I feel I can beat this\". Patient remains on high flow nasal cannula at 65%, 35 L of flow. Patient denies any chest pain, nausea, vomiting, diarrhea, sputum production. Patient reports cough persists, but somewhat improved from a few days ago. HPI:  Patient is a 60 y.o. male with a past medical history of CLL, PVC ablation, hypertension, presented to [facility] about 6 days ago with complaints of fever and cough for a few days prior to presentation. Patient reports that his wife and daughter were sick at home, but he does not know any other sick contacts. Patient then developed worsening shortness of breath over the last 5 days. Patient also reports that he lost his sense of taste during that time. While in the ER, patient was found to be hypoxic at 2 L by nasal cannula along with fever and had acute renal failure. During the course of hospitalization, patient has been having productive cough, which patient reports that resolved over the last day. Patient reports now that his cough is better and he reports that his dyspnea is somewhat improved however patient is becoming increasingly more hypoxic, patient has been going up from 2 L nasal cannula up to 8 L yesterday and 10 L today. Patient reports he has been having issues with insomnia, not being able to sleep. Patient also reports some malaise. Patient otherwise denies any chest pain, nausea, vomiting, diarrhea, hemoptysis. Patient reports smoking a pack a day until a number of years ago. Patient denies any occupational exposures to coal/silica/asbestos, patient reports he is a former . Patient reports he follows with Dr. Silver Gurrola for CLL, has not been on any chemotherapy. Past Medical History:   Diagnosis Date    Abnormal nuclear cardiac imaging test 11/30/2006    Mild anterior ischemia. Inferior scar w/border zone ischemia. LVE. EF 26%. (Gated imaging may be inaccurate.)  Global hypk. Neg EKG on max EST. Ex time 10:30.  Aorto-iliac duplex 09/10/2012    No AAA identified.  Carotid duplex 09/10/2012    No significant occlusive disease bilaterallly.  CLL (chronic lymphocytic leukemia) ([facility]     Diabetes ([facility]     Echocardiogram 06/02/2015    EF 55%. No WMA. Mild LVH. Gr 1 DDfx. IMELDA. No significant chg from study of 5/17/10.  History of echocardiogram 06/20/2011    EF 50-55%. Gr 2 DDfx. Mild RVE. Mild LAE. Mild-mod IMELDA.  Hypercholesterolemia     Hypertension     Nonischemic cardiomyopathy ([facility]     s/p right coronary cusp PVC ablation  (6/56/80) without complication    Prostate CA ([facility]     S/P cardiac cath 12/11/2006    Patent coronary arteries. LVEDP 12.  EF 25-30%. Mod-significant global hypk. Past Surgical History:   Procedure Laterality Date    HX HEART CATHETERIZATION  12/11/06    The right coronary artery is dominant. It is widely patent. The left main coronary artery is widely patent. The circumflex artery is widely patent. The left anterior descending coronary artery is widely patent. The LVEDP is 12 mmHg. The overall left ventricular systolic function is significantly diminished with an estimated ejection fraction of 25-30%. ** See report. Prior to Admission medications    Medication Sig Start Date End Date Taking?  Authorizing Provider   SHELBY BCISE 2 mg/0.85 mL atIn INJECT 2MG EVERY WEEK UNDER THE SKIN 6/7/19   Provider, Historical   ergocalciferol (ERGOCALCIFEROL) 50,000 unit capsule Take 1 Cap by mouth every seven (7) days. 1/7/19   Nevaeh MCCORD NP   sitagliptin phos/metformin HCl (JANUMET PO) Take  by mouth. Provider, Historical   lisinopril (PRINIVIL, ZESTRIL) 5 mg tablet Take 1 Tab by mouth daily. 6/29/15   Nicolette Denise MD   carvedilol (COREG) 12.5 mg tablet Take 1 Tab by mouth two (2) times a day. 6/4/15   Nicolette Denise MD   simvastatin (ZOCOR) 10 mg tablet Take  by mouth daily. Provider, Historical   aspirin delayed-release 81 mg tablet Take 81 mg by mouth daily. 6/9/11   Provider, Historical     No Known Allergies   Social History     Tobacco Use    Smoking status: Former Smoker     Packs/day: 1.00    Smokeless tobacco: Never Used   Substance Use Topics    Alcohol use: Yes      History reviewed. No pertinent family history.      Current Facility-Administered Medications   Medication Dose Route Frequency    enoxaparin (LOVENOX) injection 40 mg  40 mg SubCUTAneous Q24H    emtricitabine (EMTRIVA) capsule 200 mg  200 mg Oral DAILY    tenofovir DISOPROXIL FUMARATE (VIREAD) tablet 300 mg  300 mg Oral DAILY    insulin lispro (HUMALOG) injection   SubCUTAneous AC&HS    guaiFENesin ER (MUCINEX) tablet 600 mg  600 mg Oral Q12H    diclofenac (VOLTAREN) 1 % topical gel 4 g  4 g Topical QID    melatonin tablet 6 mg  6 mg Oral QHS    famotidine (PEPCID) tablet 20 mg  20 mg Oral BID    atorvastatin (LIPITOR) tablet 40 mg  40 mg Oral DAILY    sodium chloride (NS) flush 5-40 mL  5-40 mL IntraVENous Q8H    aspirin delayed-release tablet 81 mg  81 mg Oral DAILY    carvediloL (COREG) tablet 12.5 mg  12.5 mg Oral BID    zinc sulfate (ZINCATE) 220 mg capsule 1 Cap  1 Cap Oral DAILY    ascorbic acid (vitamin C) (VITAMIN C) tablet 500 mg  500 mg Oral BID    cholecalciferol (VITAMIN D3) (400 Units /10 mcg) tablet 1 Tab  400 Units Oral DAILY       Review of Systems:  A comprehensive ROS was obtained as stated in HPI, all others are negative      Objective:   Vital Signs:    Visit Vitals  /78 (BP 1 Location: Right arm, BP Patient Position: At rest)   Pulse 80   Temp 98.6 °F (37 °C)   Resp 23   Ht 5' 9\" (1.753 m)   Wt 84.6 kg (186 lb 8.2 oz)   SpO2 92%   BMI 27.54 kg/m²       O2 Device: Hi flow nasal cannula   O2 Flow Rate (L/min): 40 l/min   Temp (24hrs), Av.7 °F (37.1 °C), Min:98.1 °F (36.7 °C), Max:99 °F (37.2 °C)       Intake/Output:   Last shift:      1901 -  0700  In: 480 [P.O.:480]  Out: 200 [Urine:200]  Last 3 shifts:  07 -  1900  In: 2160 [P.O.:2160]  Out: 2300 [Urine:2300]    Intake/Output Summary (Last 24 hours) at 2020 2317  Last data filed at 2020 2200  Gross per 24 hour   Intake 1920 ml   Output 1350 ml   Net 570 ml      Physical Exam:   General:  Alert, cooperative, no distress, appears stated age, sitting up on side of bed eating food, wearing high flow nasal cannula, appears comfortable   Head:  Normocephalic, without obvious abnormality, atraumatic. Eyes:  Conjunctivae/corneas clear. ANicteric, PERRLA, EOMI   Nose: Nares normal. Mucosa normal. No drainage or sinus tenderness. Throat: Lips, mucosa dry. NO thrush; poor dentition, no oral lesions   Neck: Supple, symmetrical, trachea midline, no adenopathy,no carotid bruit and no JVD. No crepitus   Back:   Symmetric, no curvature, no spine tenderness or flank pain   Lungs:    Unchanged, poor air entry bilaterally, CTA BL, no wheezes/rales/rhonchi throughout all lung fields   Chest wall:  No tenderness or deformity. NO CREPITUS   Heart:  Regular rate and rhythm, S1, S2 normal, no murmur, click, rub or gallop. Abdomen:   Soft, non-tender. Bowel sounds normal. No masses,  No organomegaly. No paradoxical motion   Extremities: normal, atraumatic, no cyanosis or edema. No clubbing   Pulses: 1-2+ and symmetric all extremities.    Skin: Skin color, texture, turgor normal. No rashes or lesions   Lymph nodes: Cervical, supraclavicular, and axillary nodes normal.   Neurologic: Grossly nonfocal, strength and coordination grossly intact throughout all extremities, sensation also grossly intact. Alert and oriented x3, good memory now          Data review:   Labs:  Recent Results (from the past 24 hour(s))   CK    Collection Time: 04/26/20  1:35 AM   Result Value Ref Range    CK 90 39 - 308 U/L   C REACTIVE PROTEIN, QT    Collection Time: 04/26/20  1:35 AM   Result Value Ref Range    C-Reactive protein 0.4 (H) 0 - 0.3 mg/dL   LD    Collection Time: 04/26/20  1:35 AM   Result Value Ref Range     (H) 81 - 234 U/L   D DIMER    Collection Time: 04/26/20  1:35 AM   Result Value Ref Range    D DIMER 0.68 (H) <0.46 ug/ml(FEU)   FERRITIN    Collection Time: 04/26/20  1:35 AM   Result Value Ref Range    Ferritin 537 (H) 8 - 388 NG/ML   GLUCOSE, POC    Collection Time: 04/26/20  8:52 AM   Result Value Ref Range    Glucose (POC) 118 (H) 70 - 110 mg/dL   GLUCOSE, POC    Collection Time: 04/26/20 11:34 AM   Result Value Ref Range    Glucose (POC) 143 (H) 70 - 110 mg/dL   GLUCOSE, POC    Collection Time: 04/26/20  4:32 PM   Result Value Ref Range    Glucose (POC) 125 (H) 70 - 110 mg/dL   GLUCOSE, POC    Collection Time: 04/26/20  9:04 PM   Result Value Ref Range    Glucose (POC) 135 (H) 70 - 110 mg/dL     ABG:  No results found for: PH, PHI, PCO2, PCO2I, PO2, PO2I, HCO3, HCO3I, FIO2, FIO2I    PFT Results  (Last 48 hours)    None        Echo Results  (Last 48 hours)    None        Imaging:  I have personally reviewed the patients radiographs and have reviewed the reports:  No new studies in the interval  Chest x-ray from 4/21/2020 compared to chest x-ray from 4/17 and 4/16 shows worsening bilateral infiltrates consistent with multifocal pneumonia and ARDS. No masses, effusion seen, however due to dense nature of infiltrates, unable to assess further.   CXR Results  (Last 48 hours)    None        CT Results  (Last 48 hours)    None            High complexity decision making was performed during the evaluation of this patient at high risk for decompensation with multiple organ involvement    Total of 32 min critical care time spent at bedside during the course of care providing evaluation,management and care decisions and ordering appropriate treatment related to critical care problems exclusive of procedures. The reason for providing this level of medical care for this critically ill patient was due a critical illness that impaired one or more vital organ systems such that there was a high probability of imminent or life threatening deterioration in the patients condition. This care involved high complexity decision making to assess, manipulate, and support vital system functions, to treat this degree vital organ system failure and to prevent further life threatening deterioration of the patients condition. Above mentioned total time spent on reviewing the case/medical record/data/notes/EMR/patient examination/documentation/coordinating care with nurse/consultants, exclusive of procedures with complex decision making performed and > 50% time spent in face to face evaluation.            Mercedez Perez MD/MPH     Pulmonary, Critical Care Medicine  [address] [place] [address] [facility]

## 2020-04-27 NOTE — PROGRESS NOTES
Infectious Disease progress Note        Reason: sepsis, COVID-19 infection    Current abx Prior abx     Emtricitabine, tenofovir since 4/24 Ceftriaxone, azithromycin 4/16/20-4/18/2020  Hydroxychloroquine  4/16/20-4/20/20  Cefepime, doxycycline  4/18/2020-4/24  ribavarin since 4/21-4/24     Lines:       Assessment :    60 y.o. male  with history of CLL, type 2 diabetes, prostate cancer, history of atrial fibrillation with ablation, hypertension, CKD stage 3 who presented to the emergency room on 4/16/2020 secondary to shortness of breath. CXR: mild opacities at lung bases. Now with increasing shortness of breath, hypoxia requiring oxygen, worsening renal function    Patient presents with a highly complex clinical picture. Clinical picture consistent with sepsis (present on admission ) due to bilateral  covid-19 infection. Labs 4/16 reviewed-ferritin 233, C-reactive protein 6.3, , , d-dimer 0.75  Labs 4/17 reviewed- ferritin 266, C-reactive protein 8.0, , LDH: 219, d-dimer 1.20   Labs 4/19 reviewed-ferritin 346, C-reactive protein 10.4, , , d-dimer 0.84  Labs on 4/20 reviewed-ferritin 628, C-reactive protein 6.2, , , d-dimer 0.7  Labs on 4/21 reviewed ferritin 628, CRP 3, , , d-dimer 0.49  Labs on 4/22 reviewed ferritin 653, CRP 1.8, , , d-dimer 0.73  Labs on 4/23 reviewed ferritin 652, CRP 1.2, , , d-dimer 0.64  Labs on 4/24 reviewed ferritin 644, ,D-dimer 0.65  Labs on 4/27 reviewed ferritin 497, CK: 71, d-dimer 0.87    Worsening respiratory status 4/18-4/19 with worsening inflammatory parameters suggestive of cytokine storm. Status post Solu-Medrol since 4/18. Status post tocilizumab on 4/19. Sputum culture 4/18 light normal respiratory giselle, yeast    Acute on chronic renal insufficiency-could be prerenal versus ATN secondary to sepsis. Nephrology consult appreciated.   Creatinine stable    EKG 4/16-QTc 416, 456 on 4/20, 431 on 4/21    Procalcitonin 0.38 on 4/20    Chest x-ray 4/21-worsening bilateral infiltrates suggestive of ARDS. Chest x-ray 4/22-some improvement in the infiltrates. Still on high flow oxygen. Unable to titrated down. Started on emtricitabine/tenofovir on 4/24/2020    Recommendations:    1. Continue emtricitabine/tenofovir  2. Continue zinc, ascorbic acid. vitamin D3, melatonin  3. Recommend prone position ventilation. I discussed this with patient. 4.   Follow-up nephrology/pulmonary recommendations  5. Titrate oxygen as tolerated  6. Follow pulmonary recommendations  7. Follow-up repeat covid test sent on 4/26/2020. If the repeat test is positive, patient will qualify for the remdesivir trial.  I contacted [facility] coordinator and if the repeat test is positive, patient will be enrolled in the clinical trial.  Patient will need to be transferred to [facility] for this. I discussed this in details with the patient. He agrees with this and willing to sign the consent when needed      Above plan was discussed in details with patient, dr. Bethany Hines. All questions answered to their full satisfaction. Spent additional 35 minutes in management and evaluation of this patient. >50% time spent in counselling and coordination of care. please call me if any further questions or concerns. Will continue to participate in the care of this patient. HPI:    Denies worsening shortness of breath. Feels the same. Patient denies headaches, visual disturbances, sore throat, runny nose, earaches, abdominal pain, diarrhea, burning micturition, pain or weakness in extremities. He denies back pain/flank pain. home Medication List    Details   BYDUREON BCISE 2 mg/0.85 mL atIn INJECT 2MG EVERY WEEK UNDER THE SKIN  Refills: 0      ergocalciferol (ERGOCALCIFEROL) 50,000 unit capsule Take 1 Cap by mouth every seven (7) days.   Qty: 24 Cap, Refills: 1    Associated Diagnoses: Vitamin D deficiency sitagliptin phos/metformin HCl (JANUMET PO) Take  by mouth.      lisinopril (PRINIVIL, ZESTRIL) 5 mg tablet Take 1 Tab by mouth daily. Qty: 30 Tab, Refills: 6      carvedilol (COREG) 12.5 mg tablet Take 1 Tab by mouth two (2) times a day. Qty: 60 Tab, Refills: 6      simvastatin (ZOCOR) 10 mg tablet Take  by mouth daily. aspirin delayed-release 81 mg tablet Take 81 mg by mouth daily. Current Facility-Administered Medications   Medication Dose Route Frequency    insulin lispro (HUMALOG) injection   SubCUTAneous DAILY PRN    enoxaparin (LOVENOX) injection 40 mg  40 mg SubCUTAneous Q24H    emtricitabine (EMTRIVA) capsule 200 mg  200 mg Oral DAILY    tenofovir DISOPROXIL FUMARATE (VIREAD) tablet 300 mg  300 mg Oral DAILY    guaiFENesin ER (MUCINEX) tablet 600 mg  600 mg Oral Q12H    diclofenac (VOLTAREN) 1 % topical gel 4 g  4 g Topical QID    melatonin tablet 6 mg  6 mg Oral QHS    famotidine (PEPCID) tablet 20 mg  20 mg Oral BID    LORazepam (ATIVAN) tablet 1 mg  1 mg Oral Q6H PRN    atorvastatin (LIPITOR) tablet 40 mg  40 mg Oral DAILY    sodium chloride (NS) flush 5-40 mL  5-40 mL IntraVENous Q8H    aspirin delayed-release tablet 81 mg  81 mg Oral DAILY    carvediloL (COREG) tablet 12.5 mg  12.5 mg Oral BID    zinc sulfate (ZINCATE) 220 mg capsule 1 Cap  1 Cap Oral DAILY    ascorbic acid (vitamin C) (VITAMIN C) tablet 500 mg  500 mg Oral BID    glucose chewable tablet 16 g  4 Tab Oral PRN    glucagon (GLUCAGEN) injection 1 mg  1 mg IntraMUSCular PRN    dextrose 10% infusion 125-250 mL  125-250 mL IntraVENous PRN    acetaminophen (TYLENOL) tablet 650 mg  650 mg Oral Q6H PRN    cholecalciferol (VITAMIN D3) (400 Units /10 mcg) tablet 1 Tab  400 Units Oral DAILY       Allergies: Patient has no known allergies.     Temp (24hrs), Av.1 °F (36.7 °C), Min:97 °F (36.1 °C), Max:99 °F (37.2 °C)    Visit Vitals  /65 (BP 1 Location: Right arm, BP Patient Position: Sitting) Pulse 79   Temp 97 °F (36.1 °C)   Resp 21   Ht 5' 9\" (1.753 m)   Wt 80 kg (176 lb 4.8 oz)   SpO2 93%   BMI 26.03 kg/m²       ROS: 12 point ROS obtained in details. Pertinent positives as mentioned in HPI,   otherwise negative    Physical Exam:    General: Well developed, well nourished male sitting on the bed AAOx3. On high flow oxygen    General:   awake alert and oriented   HEENT:  Normocephalic, atraumatic, PERRL, EOMI, no scleral icterus or pallor; no conjunctival hemmohage   Lymph Nodes:   no cervical, axillary or inguinal adenopathy   Lungs:   non-labored, bilaterally clear to auscultation- no crackles wheezes rales or rhonchi   Heart:  RRR, s1 and s2; no rubs or gallops, no edema, + pedal pulses   Abdomen:  soft, non-distended, active bowel sounds, no hepatomegaly, no splenomegaly. Non-tender   Genitourinary:  deferred   Extremities:   no clubbing, cyanosis; no joint effusions or swelling; Full ROM of all large joints to the upper and lower extremities; muscle mass appropriate for age   Neurologic:  No gross focal motor or sensory abnormalities. Speech appropriate. Cranial nerves intact                        Skin:  No rash or ulcers noted   Back:   not examined     Psychiatric:  No suicidal or homicidal ideations, appropriate mood and affect         Labs: Results:   Chemistry Recent Labs     04/27/20  0116   *      K 4.4      CO2 24   BUN 18   CREA 0.84   CA 7.8*   AGAP 8   BUCR 21*      CBC w/Diff Recent Labs     04/27/20  0116   WBC 16.7*   RBC 4.88   HGB 12.4*   HCT 38.6      GRANS 22*   LYMPH 73*   EOS 2      Microbiology No results for input(s): CULT in the last 72 hours.        RADIOLOGY:    All available imaging studies/reports in [facility] for this admission were reviewed    Dr. Tho Singletary, Infectious Disease Specialist  [phone]  April 27, 2020  9:57 AM

## 2020-04-27 NOTE — PROGRESS NOTES
Increased Fio2 to 70%.  Pt was unable to keep sats above 88%     04/27/20 1119   Oxygen Therapy   O2 Sat (%) (!) 89 %   O2 Device Hi flow nasal cannula   O2 Flow Rate (L/min) 40 l/min   FIO2 (%) 70 %

## 2020-04-28 LAB — SARS-COV-2, COV2NT: DETECTED

## 2020-04-28 PROCEDURE — 65660000000 HC RM CCU STEPDOWN

## 2020-04-28 PROCEDURE — 74011250636 HC RX REV CODE- 250/636: Performed by: INTERNAL MEDICINE

## 2020-04-28 PROCEDURE — 74011250637 HC RX REV CODE- 250/637: Performed by: INTERNAL MEDICINE

## 2020-04-28 PROCEDURE — 77030038269 HC DRN EXT URIN PURWCK BARD -A

## 2020-04-28 PROCEDURE — 94762 N-INVAS EAR/PLS OXIMTRY CONT: CPT

## 2020-04-28 PROCEDURE — 74011250637 HC RX REV CODE- 250/637: Performed by: HOSPITALIST

## 2020-04-28 PROCEDURE — 77010033711 HC HIGH FLOW OXYGEN

## 2020-04-28 RX ADMIN — Medication 10 ML: at 21:11

## 2020-04-28 RX ADMIN — EMTRICITABINE 200 MG: 200 CAPSULE ORAL at 09:38

## 2020-04-28 RX ADMIN — Medication 500 MG: at 21:11

## 2020-04-28 RX ADMIN — FAMOTIDINE 20 MG: 20 TABLET ORAL at 08:03

## 2020-04-28 RX ADMIN — CARVEDILOL 12.5 MG: 12.5 TABLET, FILM COATED ORAL at 21:11

## 2020-04-28 RX ADMIN — DICLOFENAC 4 G: 10 GEL TOPICAL at 13:00

## 2020-04-28 RX ADMIN — FAMOTIDINE 20 MG: 20 TABLET ORAL at 21:11

## 2020-04-28 RX ADMIN — DICLOFENAC 4 G: 10 GEL TOPICAL at 08:05

## 2020-04-28 RX ADMIN — Medication 10 ML: at 13:00

## 2020-04-28 RX ADMIN — DICLOFENAC 4 G: 10 GEL TOPICAL at 17:00

## 2020-04-28 RX ADMIN — TENOFOVIR DISPROXIL FUMARATE 300 MG: 300 TABLET ORAL at 09:38

## 2020-04-28 RX ADMIN — Medication 500 MG: at 08:04

## 2020-04-28 RX ADMIN — MELATONIN TAB 3 MG 6 MG: 3 TAB at 21:11

## 2020-04-28 RX ADMIN — GUAIFENESIN 600 MG: 600 TABLET, EXTENDED RELEASE ORAL at 08:04

## 2020-04-28 RX ADMIN — ASPIRIN 81 MG: 81 TABLET, COATED ORAL at 08:04

## 2020-04-28 RX ADMIN — Medication 10 ML: at 06:03

## 2020-04-28 RX ADMIN — ENOXAPARIN SODIUM 40 MG: 40 INJECTION SUBCUTANEOUS at 08:03

## 2020-04-28 RX ADMIN — GUAIFENESIN 600 MG: 600 TABLET, EXTENDED RELEASE ORAL at 21:11

## 2020-04-28 RX ADMIN — CHOLECALCIFEROL (VITAMIN D3) 10 MCG (400 UNIT) TABLET 1 TABLET: at 08:03

## 2020-04-28 RX ADMIN — ATORVASTATIN CALCIUM 40 MG: 40 TABLET, FILM COATED ORAL at 08:03

## 2020-04-28 RX ADMIN — ZINC SULFATE 220 MG (50 MG) CAPSULE 1 CAPSULE: CAPSULE at 08:03

## 2020-04-28 NOTE — PROGRESS NOTES
Infectious Disease progress Note        Reason: sepsis, COVID-19 infection    Current abx Prior abx     Emtricitabine, tenofovir since 4/24 Ceftriaxone, azithromycin 4/16/20-4/18/2020  Hydroxychloroquine  4/16/20-4/20/20  Cefepime, doxycycline  4/18/2020-4/24  ribavarin since 4/21-4/24     Lines:       Assessment :    60 y.o. male  with history of CLL, type 2 diabetes, prostate cancer, history of atrial fibrillation with ablation, hypertension, CKD stage 3 who presented to the emergency room on 4/16/2020 secondary to shortness of breath. CXR: mild opacities at lung bases. Now with increasing shortness of breath, hypoxia requiring oxygen, worsening renal function    Patient presents with a highly complex clinical picture. Clinical picture consistent with sepsis (present on admission ) due to bilateral  covid-19 infection. Labs 4/16 reviewed-ferritin 233, C-reactive protein 6.3, , , d-dimer 0.75  Labs 4/17 reviewed- ferritin 266, C-reactive protein 8.0, , LDH: 219, d-dimer 1.20   Labs 4/19 reviewed-ferritin 346, C-reactive protein 10.4, , , d-dimer 0.84  Labs on 4/20 reviewed-ferritin 628, C-reactive protein 6.2, , , d-dimer 0.7  Labs on 4/21 reviewed ferritin 628, CRP 3, , , d-dimer 0.49  Labs on 4/22 reviewed ferritin 653, CRP 1.8, , , d-dimer 0.73  Labs on 4/23 reviewed ferritin 652, CRP 1.2, , , d-dimer 0.64  Labs on 4/24 reviewed ferritin 644, ,D-dimer 0.65  Labs on 4/27 reviewed ferritin 497, CK: 71, d-dimer 0.87    Worsening respiratory status 4/18-4/19 with worsening inflammatory parameters suggestive of cytokine storm. Status post Solu-Medrol since 4/18. Status post tocilizumab on 4/19. Sputum culture 4/18 light normal respiratory giselle, yeast    Acute on chronic renal insufficiency-could be prerenal versus ATN secondary to sepsis. Nephrology consult appreciated.   Creatinine stable    EKG 4/16-QTc 416, 456 on 4/20, 431 on 4/21    Procalcitonin 0.38 on 4/20    Chest x-ray 4/21-worsening bilateral infiltrates suggestive of ARDS. Chest x-ray 4/22, 4/24/20-some improvement in the infiltrates. Still on high flow oxygen. Discussed with nursing staff to attempt to titrated down. Respiratory therapist attempted to decrease FiO2 to 60%. However patient desaturated. Started on emtricitabine/tenofovir on 4/24/2020    Subjective sense of improvement. Recommendations:    1. Continue emtricitabine/tenofovir  2. Continue zinc, ascorbic acid. vitamin D3, melatonin  3. Recommend prone position ventilation. I discussed this with patient. 4.   Follow-up nephrology/pulmonary recommendations  5. Titrate oxygen as tolerated  6. Follow pulmonary recommendations  7. I detailed discussion with the patient regarding transfer to [facility] for him does have a trial.  Patient qualifies for this trial since repeat SARS-CoV-2 4/26 positive. Patient states that he is feeling better and does not want to get enrolled in the trial since there is no definitive evidence that this medication works. I will contact [facility] coordinator if patient changes his mind and signs consent for remdesivir trial.     above plan was discussed in details with patient, dr. Oracio Shields, dr. Rodney Cummings.    please call me if any further questions or concerns. Will continue to participate in the care of this patient. HPI:    Denies worsening shortness of breath. Feels the same. States that he wants to go home patient denies headaches, visual disturbances, sore throat, runny nose, earaches, abdominal pain, diarrhea, burning micturition, pain or weakness in extremities. He denies back pain/flank pain. home Medication List    Details   BYDUREON BCISE 2 mg/0.85 mL atIn INJECT 2MG EVERY WEEK UNDER THE SKIN  Refills: 0      ergocalciferol (ERGOCALCIFEROL) 50,000 unit capsule Take 1 Cap by mouth every seven (7) days.   Qty: 24 Cap, Refills: 1 Associated Diagnoses: Vitamin D deficiency      sitagliptin phos/metformin HCl (JANUMET PO) Take  by mouth.      lisinopril (PRINIVIL, ZESTRIL) 5 mg tablet Take 1 Tab by mouth daily. Qty: 30 Tab, Refills: 6      carvedilol (COREG) 12.5 mg tablet Take 1 Tab by mouth two (2) times a day. Qty: 60 Tab, Refills: 6      simvastatin (ZOCOR) 10 mg tablet Take  by mouth daily. aspirin delayed-release 81 mg tablet Take 81 mg by mouth daily. Current Facility-Administered Medications   Medication Dose Route Frequency    insulin lispro (HUMALOG) injection   SubCUTAneous DAILY PRN    enoxaparin (LOVENOX) injection 40 mg  40 mg SubCUTAneous Q24H    emtricitabine (EMTRIVA) capsule 200 mg  200 mg Oral DAILY    tenofovir DISOPROXIL FUMARATE (VIREAD) tablet 300 mg  300 mg Oral DAILY    guaiFENesin ER (MUCINEX) tablet 600 mg  600 mg Oral Q12H    diclofenac (VOLTAREN) 1 % topical gel 4 g  4 g Topical QID    melatonin tablet 6 mg  6 mg Oral QHS    famotidine (PEPCID) tablet 20 mg  20 mg Oral BID    LORazepam (ATIVAN) tablet 1 mg  1 mg Oral Q6H PRN    atorvastatin (LIPITOR) tablet 40 mg  40 mg Oral DAILY    sodium chloride (NS) flush 5-40 mL  5-40 mL IntraVENous Q8H    aspirin delayed-release tablet 81 mg  81 mg Oral DAILY    carvediloL (COREG) tablet 12.5 mg  12.5 mg Oral BID    zinc sulfate (ZINCATE) 220 mg capsule 1 Cap  1 Cap Oral DAILY    ascorbic acid (vitamin C) (VITAMIN C) tablet 500 mg  500 mg Oral BID    glucose chewable tablet 16 g  4 Tab Oral PRN    glucagon (GLUCAGEN) injection 1 mg  1 mg IntraMUSCular PRN    dextrose 10% infusion 125-250 mL  125-250 mL IntraVENous PRN    acetaminophen (TYLENOL) tablet 650 mg  650 mg Oral Q6H PRN    cholecalciferol (VITAMIN D3) (400 Units /10 mcg) tablet 1 Tab  400 Units Oral DAILY       Allergies: Patient has no known allergies.     Temp (24hrs), Av.1 °F (36.7 °C), Min:97 °F (36.1 °C), Max:99.1 °F (37.3 °C)    Visit Vitals  /82 (BP 1 Location: Right arm, BP Patient Position: Sitting)   Pulse 72   Temp 98.9 °F (37.2 °C)   Resp 24   Ht 5' 9\" (1.753 m)   Wt 80 kg (176 lb 4.8 oz)   SpO2 96%   BMI 26.03 kg/m²       ROS: 12 point ROS obtained in details. Pertinent positives as mentioned in HPI,   otherwise negative    Physical Exam:    General: Well developed, well nourished male sitting on the bed AAOx3. On high flow oxygen    General:   awake alert and oriented   HEENT:  Normocephalic, atraumatic, PERRL, EOMI, no scleral icterus or pallor; no conjunctival hemmohage   Lymph Nodes:   no cervical, axillary or inguinal adenopathy   Lungs:   non-labored, bilaterally clear to auscultation- no crackles wheezes rales or rhonchi   Heart:  RRR, s1 and s2; no rubs or gallops, no edema, + pedal pulses   Abdomen:  soft, non-distended, active bowel sounds, no hepatomegaly, no splenomegaly. Non-tender   Genitourinary:  deferred   Extremities:   no clubbing, cyanosis; no joint effusions or swelling; Full ROM of all large joints to the upper and lower extremities; muscle mass appropriate for age   Neurologic:  No gross focal motor or sensory abnormalities. Speech appropriate. Cranial nerves intact                        Skin:  No rash or ulcers noted   Back:   not examined     Psychiatric:  No suicidal or homicidal ideations, appropriate mood and affect         Labs: Results:   Chemistry Recent Labs     04/27/20  0116   *      K 4.4      CO2 24   BUN 18   CREA 0.84   CA 7.8*   AGAP 8   BUCR 21*      CBC w/Diff Recent Labs     04/27/20  0116   WBC 16.7*   RBC 4.88   HGB 12.4*   HCT 38.6      GRANS 22*   LYMPH 73*   EOS 2      Microbiology No results for input(s): CULT in the last 72 hours.        RADIOLOGY:    All available imaging studies/reports in [facility] care for this admission were reviewed    Dr. Bear Crooks, Infectious Disease Specialist  [phone]  April 28, 2020  9:57 AM

## 2020-04-28 NOTE — PROGRESS NOTES
Problem: Breathing Pattern - Ineffective  Goal: *Use of effective breathing techniques  Outcome: Progressing Towards Goal     Problem: Falls - Risk of  Goal: *Absence of Falls  Description: Document Salvatore Fall Risk and appropriate interventions in the flowsheet. Outcome: Progressing Towards Goal  Note: Fall Risk Interventions:  Mobility Interventions: Patient to call before getting OOB, Strengthening exercises (ROM-active/passive)         Medication Interventions: Patient to call before getting OOB, Teach patient to arise slowly    Elimination Interventions: Call light in reach, Toilet paper/wipes in reach, Urinal in reach              Problem: Pressure Injury - Risk of  Goal: *Prevention of pressure injury  Description: Document Saroj Scale and appropriate interventions in the flowsheet. Outcome: Progressing Towards Goal  Note: Pressure Injury Interventions: Activity Interventions: Pressure redistribution bed/mattress(bed type)    Mobility Interventions: Pressure redistribution bed/mattress (bed type)    Nutrition Interventions: Document food/fluid/supplement intake                     Problem: Risk for Spread of Infection  Goal: Prevent transmission of infectious organism to others  Description: Prevent the transmission of infectious organisms to other patients, staff members, and visitors.   Outcome: Progressing Towards Goal

## 2020-04-28 NOTE — ROUTINE PROCESS
Verbal shift change report given to Fernando Denton RN (oncoming nurse) by Rani Martinez RN (offgoing nurse). Report included the following information SBAR, Intake/Output, Recent Results and Med Rec Status.

## 2020-04-28 NOTE — PROGRESS NOTES
[facility]  Pulmonary, Critical Care, and Sleep Medicine    Progress Note    Name: Kika Yanez MRN: [id number]   : 1960 Hospital: [facility]   Date: 2020        IMPRESSION:   · Acute hypoxic respiratory failure due to COVID pneumonia, requiring High flow nasal cannula  · COVID sepsis with multifocal pneumonia and JOSE. Given Tocilizumab . Completed Solumedrol course. · History of CLL  · DM 2  · HTN on ACE I prior to admission  · History of ablation  · Dyslipidemia       RECOMMENDATIONS:   · TSupplemental O2 to saturation 89% or greater now on 65% and 30 LPM via Vapotherm. Would avoid mechanical ventilation if possible. Review of CXR appear to show improvement in infiltrates   · Continue self proning. Explained use and benefits of proning  · Anti inflammatory medications per ID   · Anti inflammatory markers downtrending except for D dimer  · DVT prophylaxis roula in the setting of COVID which may increase thrombotic risk. No indication for full anticoagulation   · Monitor for bleeding and DIC  · Aspiration precautions, HOB >30 degrees  · Encourage regular incentive spirometry  · Will follow with you     Subjective:     Chart reviewed. Overnight events noted. Discussed with nursing. 60 y.o. male with a past medical history of CLL, PVC ablation, hypertension, presented to [facility] about 6 days ago with complaints of fever and cough for a few days prior to presentation. Patient reports that his wife and daughter were sick at home, but he does not know any other sick contacts. Patient then developed worsening shortness of breath over the last 5 days. Patient also reports that he lost his sense of taste during that time. While in the ER, patient was found to be hypoxic at 2 L by nasal cannula along with fever and had acute renal failure.   During the course of hospitalization, patient has been having productive cough, which patient reports that resolved over the last day. Patient reports now that his cough is better and he reports that his dyspnea is somewhat improved however patient is becoming increasingly more hypoxic, patient has been going up from 2 L nasal cannula up to 8 L yesterday and 10 L today. Patient reports he has been having issues with insomnia, not being able to sleep. Patient also reports some malaise. Patient otherwise denies any chest pain, nausea, vomiting, diarrhea, hemoptysis. CLL is followed by Dr. Kelsey Lindsay.    04/28/20    States that dyspnea is improved. FiO2 down to 65%   Expressed frustration with prolonged hospital stay. Afebrile  No chest pain or hemoptysis. Decreased cough  Pt self prones HS and during the day as tolerated        Past Medical History:   Diagnosis Date    Abnormal nuclear cardiac imaging test 11/30/2006    Mild anterior ischemia. Inferior scar w/border zone ischemia. LVE. EF 26%. (Gated imaging may be inaccurate.)  Global hypk. Neg EKG on max EST. Ex time 10:30.  Aorto-iliac duplex 09/10/2012    No AAA identified.  Carotid duplex 09/10/2012    No significant occlusive disease bilaterallly.  [facility] [address]     Diabetes ([facility] [address]     Echocardiogram 06/02/2015    EF 55%. No WMA. Mild LVH. Gr 1 DDfx. IMELDA. No significant chg from study of 5/17/10.  History of echocardiogram 06/20/2011    EF 50-55%. Gr 2 DDfx. Mild RVE. Mild LAE. Mild-mod IMELDA.  Hypercholesterolemia     Hypertension     Nonischemic cardiomyopathy ([address]     s/p right coronary cusp PVC ablation  (7/50/10) without complication    Prostate CA ([facility] [address]     S/P cardiac cath 12/11/2006    Patent coronary arteries. LVEDP 12.  EF 25-30%. Mod-significant global hypk. Past Surgical History:   Procedure Laterality Date    HX HEART CATHETERIZATION  12/11/06    The right coronary artery is dominant. It is widely patent. The left main coronary artery is widely patent.  The circumflex artery is widely patent. The left anterior descending coronary artery is widely patent. The LVEDP is 12 mmHg. The overall left ventricular systolic function is significantly diminished with an estimated ejection fraction of 25-30%. ** See report. Prior to Admission medications    Medication Sig Start Date End Date Taking? Authorizing Provider   SHELBY BCISE 2 mg/0.85 mL atIn INJECT 2MG EVERY WEEK UNDER THE SKIN 6/7/19   Provider, Historical   ergocalciferol (ERGOCALCIFEROL) 50,000 unit capsule Take 1 Cap by mouth every seven (7) days. 1/7/19   Carmencita MCCORD NP   sitagliptin phos/metformin HCl (JANUMET PO) Take  by mouth. Provider, Historical   lisinopril (PRINIVIL, ZESTRIL) 5 mg tablet Take 1 Tab by mouth daily. 6/29/15   Najma Denise MD   carvedilol (COREG) 12.5 mg tablet Take 1 Tab by mouth two (2) times a day. 6/4/15   Najma Denise MD   simvastatin (ZOCOR) 10 mg tablet Take  by mouth daily. Provider, Historical   aspirin delayed-release 81 mg tablet Take 81 mg by mouth daily. 6/9/11   Provider, Historical     No Known Allergies   Social History     Tobacco Use    Smoking status: Former Smoker     Packs/day: 1.00    Smokeless tobacco: Never Used   Substance Use Topics    Alcohol use: Yes      History reviewed. No pertinent family history.      Current Facility-Administered Medications   Medication Dose Route Frequency    enoxaparin (LOVENOX) injection 40 mg  40 mg SubCUTAneous Q24H    emtricitabine (EMTRIVA) capsule 200 mg  200 mg Oral DAILY    tenofovir DISOPROXIL FUMARATE (VIREAD) tablet 300 mg  300 mg Oral DAILY    guaiFENesin ER (MUCINEX) tablet 600 mg  600 mg Oral Q12H    diclofenac (VOLTAREN) 1 % topical gel 4 g  4 g Topical QID    melatonin tablet 6 mg  6 mg Oral QHS    famotidine (PEPCID) tablet 20 mg  20 mg Oral BID    atorvastatin (LIPITOR) tablet 40 mg  40 mg Oral DAILY    sodium chloride (NS) flush 5-40 mL  5-40 mL IntraVENous Q8H    aspirin delayed-release tablet 81 mg 81 mg Oral DAILY    carvediloL (COREG) tablet 12.5 mg  12.5 mg Oral BID    zinc sulfate (ZINCATE) 220 mg capsule 1 Cap  1 Cap Oral DAILY    ascorbic acid (vitamin C) (VITAMIN C) tablet 500 mg  500 mg Oral BID    cholecalciferol (VITAMIN D3) (400 Units /10 mcg) tablet 1 Tab  400 Units Oral DAILY       Review of Systems:  Pertinent items are noted in HPI. Objective:   Vital Signs:    Visit Vitals  /54 (BP 1 Location: Right arm, BP Patient Position: Sitting)   Pulse 69   Temp 98.6 °F (37 °C)   Resp 30   Ht 5' 9\" (1.753 m)   Wt 80 kg (176 lb 4.8 oz)   SpO2 94%   BMI 26.03 kg/m²       O2 Device: Hi flow nasal cannula   O2 Flow Rate (L/min): 35 l/min   Temp (24hrs), Av.5 °F (36.9 °C), Min:97.3 °F (36.3 °C), Max:99.1 °F (37.3 °C)       Intake/Output:   Last shift:       0701 -  1900  In: 480 [P.O.:480]  Out: 300 [Urine:300]  Last 3 shifts:  1901 -  0700  In: 700 [P.O.:700]  Out: 1375 [Urine:1375]    Intake/Output Summary (Last 24 hours) at 2020 1634  Last data filed at 2020 1200  Gross per 24 hour   Intake 580 ml   Output 975 ml   Net -395 ml      Physical Exam:   General:  Alert, cooperative, no distress, appears stated age. Head:  Normocephalic, without obvious abnormality, atraumatic. Eyes:  Conjunctivae/corneas clear. PERRL, EOMs intact. Nose: Nares normal. Mucosa normal. No drainage or sinus tenderness. Throat: Lips, mucosa, and tongue normal. Teeth and gums normal.   Neck: Supple, symmetrical, trachea midline, no adenopathy, thyroid: no enlargment/tenderness/nodules    Back:   Symmetric    Lungs:   Clear to auscultation bilaterally. Chest wall:  No tenderness or deformity. Heart:  Regular rate and rhythm, S1, S2 normal, no murmur, click, rub or gallop. Abdomen:   Soft, non-tender. Bowel sounds normal. No masses,  No organomegaly. Extremities: Extremities normal, atraumatic, no cyanosis or edema. Pulses: 2+ and symmetric all extremities.    Skin: Skin color, texture, turgor normal. No rashes or lesions   Lymph nodes: Cervical, supraclavicular nodes normal.   Neurologic: Grossly nonfocal     Data review:     No results found for this or any previous visit (from the past 24 hour(s)). Imaging:  I have personally reviewed the patients radiographs and have reviewed the reports:  XR Results (most recent):  Results from Hospital Encounter encounter on 04/16/20   XR CHEST PORT    Narrative EXAM: XR CHEST PORT    INDICATION: 60 years Male. compare with prior x ray. ADDITIONAL HISTORY: COVID positive    TECHNIQUE: Frontal view of the chest.    COMPARISON: 4/22/2020 at 0525 hours    FINDINGS:    The lungs are hypoinflated. The cardiac silhouette is within normal limits in size. Bilateral reticular and confluent opacities are noted within the lungs,  most  pronounced in the left mid and bilateral lower lung zones. These opacities are  slightly improved at the lung bases as compared to prior. No definite pleural effusion. No appreciable pneumothorax. Osseous structures are unchanged in appearance. Impression IMPRESSION:  Bilateral reticular and confluent lung opacities with slight interval  improvement at the lung bases.              Paul Muñiz MD

## 2020-04-28 NOTE — PROGRESS NOTES
Problem: Breathing Pattern - Ineffective  Goal: *Absence of hypoxia  Outcome: Progressing Towards Goal  Goal: *Use of effective breathing techniques  Outcome: Progressing Towards Goal  Goal: *PALLIATIVE CARE:  Alleviation of Dyspnea  Outcome: Progressing Towards Goal     Problem: Patient Education: Go to Patient Education Activity  Goal: Patient/Family Education  Outcome: Progressing Towards Goal     Problem: Diabetes Self-Management  Goal: *Disease process and treatment process  Description: Define diabetes and identify own type of diabetes; list 3 options for treating diabetes. Outcome: Progressing Towards Goal  Goal: *Incorporating nutritional management into lifestyle  Description: Describe effect of type, amount and timing of food on blood glucose; list 3 methods for planning meals. Outcome: Progressing Towards Goal  Goal: *Incorporating physical activity into lifestyle  Description: State effect of exercise on blood glucose levels. Outcome: Progressing Towards Goal  Goal: *Developing strategies to promote health/change behavior  Description: Define the ABC's of diabetes; identify appropriate screenings, schedule and personal plan for screenings. Outcome: Progressing Towards Goal  Goal: *Using medications safely  Description: State effect of diabetes medications on diabetes; name diabetes medication taking, action and side effects. Outcome: Progressing Towards Goal  Goal: *Monitoring blood glucose, interpreting and using results  Description: Identify recommended blood glucose targets  and personal targets. Outcome: Progressing Towards Goal  Goal: *Prevention, detection, treatment of acute complications  Description: List symptoms of hyper- and hypoglycemia; describe how to treat low blood sugar and actions for lowering  high blood glucose level.   Outcome: Progressing Towards Goal  Goal: *Prevention, detection and treatment of chronic complications  Description: Define the natural course of diabetes and describe the relationship of blood glucose levels to long term complications of diabetes. Outcome: Progressing Towards Goal  Goal: *Developing strategies to address psychosocial issues  Description: Describe feelings about living with diabetes; identify support needed and support network  Outcome: Progressing Towards Goal  Goal: *Insulin pump training  Outcome: Progressing Towards Goal  Goal: *Sick day guidelines  Outcome: Progressing Towards Goal  Goal: *Patient Specific Goal (EDIT GOAL, INSERT TEXT)  Outcome: Progressing Towards Goal     Problem: Patient Education: Go to Patient Education Activity  Goal: Patient/Family Education  Outcome: Progressing Towards Goal     Problem: Falls - Risk of  Goal: *Absence of Falls  Description: Document Salvatore Fall Risk and appropriate interventions in the flowsheet. Outcome: Progressing Towards Goal  Note: Fall Risk Interventions:  Mobility Interventions: Assess mobility with egress test, Patient to call before getting OOB, Strengthening exercises (ROM-active/passive)         Medication Interventions: Assess postural VS orthostatic hypotension, Evaluate medications/consider consulting pharmacy, Teach patient to arise slowly    Elimination Interventions: Call light in reach, Toilet paper/wipes in reach, Urinal in reach              Problem: Patient Education: Go to Patient Education Activity  Goal: Patient/Family Education  Outcome: Progressing Towards Goal     Problem: Diabetes Maintenance:Admission  Goal: *Blood glucose 80 to 180 mg/dl  Outcome: Progressing Towards Goal     Problem: Pressure Injury - Risk of  Goal: *Prevention of pressure injury  Description: Document Saroj Scale and appropriate interventions in the flowsheet. Outcome: Progressing Towards Goal  Note: Pressure Injury Interventions:             Activity Interventions: Chair cushion, Pressure redistribution bed/mattress(bed type)    Mobility Interventions: Chair cushion, HOB 30 degrees or less, Pressure redistribution bed/mattress (bed type)    Nutrition Interventions: Document food/fluid/supplement intake                     Problem: Patient Education: Go to Patient Education Activity  Goal: Patient/Family Education  Outcome: Progressing Towards Goal     Problem: Pain  Goal: *Control of Pain  Outcome: Progressing Towards Goal  Goal: *PALLIATIVE CARE:  Alleviation of Pain  Outcome: Progressing Towards Goal     Problem: Patient Education: Go to Patient Education Activity  Goal: Patient/Family Education  Outcome: Progressing Towards Goal     Problem: Pneumonia: Discharge Outcomes  Goal: *Demonstrates progressive activity  Outcome: Progressing Towards Goal  Goal: *Describes follow-up/return visits to physicians  Outcome: Progressing Towards Goal  Goal: *Tolerating diet  Outcome: Progressing Towards Goal  Goal: *Verbalizes name, dosage, time, side effects, and number of days to continue medications  Outcome: Progressing Towards Goal  Goal: *Influenza immunization  Outcome: Progressing Towards Goal  Goal: *Pneumococcal immunization  Outcome: Progressing Towards Goal  Goal: *Respiratory status at baseline  Outcome: Progressing Towards Goal  Goal: *Vital signs within defined limits  Outcome: Progressing Towards Goal  Goal: *Describes available resources and support systems  Outcome: Progressing Towards Goal  Goal: *Optimal pain control at patient's stated goal  Outcome: Progressing Towards Goal     Problem: Risk for Spread of Infection  Goal: Prevent transmission of infectious organism to others  Description: Prevent the transmission of infectious organisms to other patients, staff members, and visitors.   Outcome: Progressing Towards Goal     Problem: Patient Education:  Go to Education Activity  Goal: Patient/Family Education  Outcome: Progressing Towards Goal

## 2020-04-28 NOTE — ROUTINE PROCESS
Verbal shift change report given to Tommy Perdomo RN (oncoming nurse) by Emiliano Alcocer RN (offgoing nurse). Report included the following information SBAR, Intake/Output, Recent Results and Med Rec Status.

## 2020-04-28 NOTE — PROGRESS NOTES
[facility] Hospitalist Group  Progress Note    Patient: Salima Yepez Age: 60 y.o. : 1960 MR#: [id number] SSN: [id number]  Date: 2020     Subjective/24-hour events:     Patient is sitting in bed in no apparent distress, awake and alert, states that he feels his breathing is a little bit better today. Patient is on 35 L high flow nasal cannula at this time. Earlier today patient was on 36 L. Assessment:   Acute hypoxic respiratory failure  COVID-19 pneumonia with suspected underlying bacterial pneumonia  ARDS secondary to above  JOSE on CKD 3  DM2 with hyperglycemia, hemoglobin A1c 8.2%  HTN  Hyperlipidemia  Hyponatremia resolved  CLL history    Plan:  Patient declines plasma therapy  Patient declines trial at [address] [place] [address]  On Emtriva and tenofovir per ID  Monitor labs  Wean oxygen, still on 35 L high flow nasal cannula  Encouraged incentive spirometry,  With patient's permission I called and updated his wife regarding his care. Discussed with RN, discussed with ID    Objective:   VS:   Visit Vitals  /72 (BP 1 Location: Right arm, BP Patient Position: Sitting)   Pulse 71   Temp 98.8 °F (37.1 °C)   Resp 18   Ht 5' 9\" (1.753 m)   Wt 80 kg (176 lb 4.8 oz)   SpO2 95%   BMI 26.03 kg/m²      Tmax/24hrs: Temp (24hrs), Av.6 °F (37 °C), Min:97.3 °F (36.3 °C), Max:99.1 °F (37.3 °C)      Intake/Output Summary (Last 24 hours) at 2020 1758  Last data filed at 2020 1600  Gross per 24 hour   Intake 700 ml   Output 1275 ml   Net -575 ml       General:  Awake, alert, nontoxic appearing  Cardiovascular:  S1S2+, RRR  Pulmonary: Coarse breath sounds bilaterally  GI:  Soft, BS+, NT, ND  Extremities:  No edema      Labs:    No results found for this or any previous visit (from the past 24 hour(s)).     Signed By: Mary Tristan MD     2020

## 2020-04-28 NOTE — ROUTINE PROCESS
Received report from [address] [place] [address]. Pt AAOx3, NAD, breathing non labored, on hi flow O2, HOB up. IV site clean, dry and intact. Bed at the lowest level on lock position, call bell w/i reach. Bedside and Verbal shift change report given to [facility] (oncoming nurse) by me (offgoing nurse).  Report included the following information SBAR, Kardex, Intake/Output, MAR, Recent Results and Cardiac Rhythm SR.

## 2020-04-28 NOTE — PROGRESS NOTES
MEWS score of 3 d/t low BP. Pt's asymptomatic, w/ no complaints. Pt was given Coreg tonight.  Will continue to monitor BP.     04/28/20 0005   Vitals   Temp 97.3 °F (36.3 °C)   Pulse (Heart Rate) 75   Resp Rate 24   O2 Sat (%) 96 %   Level of Consciousness Alert   BP 95/51   MAP (Calculated) 66   MEWS Score 3   Patient Observation   Special Appliances/Equipment Bedside Commode   Repositioned Head of bed elevated (degrees)   Patient Turned Prone   Observations 5p's   Activity In bed;Sleeping

## 2020-04-29 ENCOUNTER — APPOINTMENT (OUTPATIENT)
Dept: GENERAL RADIOLOGY | Age: 60
DRG: 871 | End: 2020-04-29
Attending: INTERNAL MEDICINE
Payer: COMMERCIAL

## 2020-04-29 LAB
ALBUMIN SERPL-MCNC: 2.8 G/DL (ref 3.4–5)
ALBUMIN/GLOB SERPL: 0.8 {RATIO} (ref 0.8–1.7)
ALP SERPL-CCNC: 50 U/L (ref 45–117)
ALT SERPL-CCNC: 57 U/L (ref 16–61)
ANION GAP SERPL CALC-SCNC: 6 MMOL/L (ref 3–18)
AST SERPL-CCNC: 43 U/L (ref 10–38)
BASOPHILS # BLD: 0 K/UL (ref 0–0.06)
BASOPHILS NFR BLD: 0 % (ref 0–3)
BILIRUB SERPL-MCNC: 1.1 MG/DL (ref 0.2–1)
BUN SERPL-MCNC: 14 MG/DL (ref 7–18)
BUN/CREAT SERPL: 18 (ref 12–20)
CALCIUM SERPL-MCNC: 7.9 MG/DL (ref 8.5–10.1)
CHLORIDE SERPL-SCNC: 103 MMOL/L (ref 100–111)
CO2 SERPL-SCNC: 28 MMOL/L (ref 21–32)
CREAT SERPL-MCNC: 0.79 MG/DL (ref 0.6–1.3)
CRP SERPL-MCNC: <0.3 MG/DL (ref 0–0.3)
D DIMER PPP FEU-MCNC: 1.77 UG/ML(FEU)
DIFFERENTIAL METHOD BLD: ABNORMAL
EOSINOPHIL # BLD: 0.2 K/UL (ref 0–0.4)
EOSINOPHIL NFR BLD: 1 % (ref 0–5)
ERYTHROCYTE [DISTWIDTH] IN BLOOD BY AUTOMATED COUNT: 16.6 % (ref 11.6–14.5)
FERRITIN SERPL-MCNC: 425 NG/ML (ref 8–388)
GLOBULIN SER CALC-MCNC: 3.5 G/DL (ref 2–4)
GLUCOSE SERPL-MCNC: 94 MG/DL (ref 74–99)
HCT VFR BLD AUTO: 35.9 % (ref 36–48)
HGB BLD-MCNC: 11 G/DL (ref 13–16)
LDH SERPL L TO P-CCNC: 312 U/L (ref 81–234)
LYMPHOCYTES # BLD: 12.6 K/UL (ref 0.8–3.5)
LYMPHOCYTES NFR BLD: 71 % (ref 20–51)
MAGNESIUM SERPL-MCNC: 1.9 MG/DL (ref 1.6–2.6)
MCH RBC QN AUTO: 24.4 PG (ref 24–34)
MCHC RBC AUTO-ENTMCNC: 30.6 G/DL (ref 31–37)
MCV RBC AUTO: 79.8 FL (ref 74–97)
MONOCYTES # BLD: 0.4 K/UL (ref 0–1)
MONOCYTES NFR BLD: 2 % (ref 2–9)
NEUTS BAND NFR BLD MANUAL: 2 % (ref 0–5)
NEUTS SEG # BLD: 4.6 K/UL (ref 1.8–8)
NEUTS SEG NFR BLD: 24 % (ref 42–75)
PLATELET # BLD AUTO: 284 K/UL (ref 135–420)
PLATELET COMMENTS,PCOM: ABNORMAL
PMV BLD AUTO: 10.6 FL (ref 9.2–11.8)
POTASSIUM SERPL-SCNC: 4.1 MMOL/L (ref 3.5–5.5)
PROT SERPL-MCNC: 6.3 G/DL (ref 6.4–8.2)
RBC # BLD AUTO: 4.5 M/UL (ref 4.7–5.5)
RBC MORPH BLD: ABNORMAL
SODIUM SERPL-SCNC: 137 MMOL/L (ref 136–145)
WBC # BLD AUTO: 17.8 K/UL (ref 4.6–13.2)
WBC MORPH BLD: ABNORMAL

## 2020-04-29 PROCEDURE — 74011250637 HC RX REV CODE- 250/637: Performed by: INTERNAL MEDICINE

## 2020-04-29 PROCEDURE — 83735 ASSAY OF MAGNESIUM: CPT

## 2020-04-29 PROCEDURE — 65660000000 HC RM CCU STEPDOWN

## 2020-04-29 PROCEDURE — 71045 X-RAY EXAM CHEST 1 VIEW: CPT

## 2020-04-29 PROCEDURE — 86140 C-REACTIVE PROTEIN: CPT

## 2020-04-29 PROCEDURE — 80053 COMPREHEN METABOLIC PANEL: CPT

## 2020-04-29 PROCEDURE — 85379 FIBRIN DEGRADATION QUANT: CPT

## 2020-04-29 PROCEDURE — 74011250637 HC RX REV CODE- 250/637: Performed by: HOSPITALIST

## 2020-04-29 PROCEDURE — 74011250636 HC RX REV CODE- 250/636: Performed by: INTERNAL MEDICINE

## 2020-04-29 PROCEDURE — 83615 LACTATE (LD) (LDH) ENZYME: CPT

## 2020-04-29 PROCEDURE — 94762 N-INVAS EAR/PLS OXIMTRY CONT: CPT

## 2020-04-29 PROCEDURE — 77010033711 HC HIGH FLOW OXYGEN

## 2020-04-29 PROCEDURE — 36415 COLL VENOUS BLD VENIPUNCTURE: CPT

## 2020-04-29 PROCEDURE — 82728 ASSAY OF FERRITIN: CPT

## 2020-04-29 PROCEDURE — 85025 COMPLETE CBC W/AUTO DIFF WBC: CPT

## 2020-04-29 RX ADMIN — ENOXAPARIN SODIUM 40 MG: 40 INJECTION SUBCUTANEOUS at 08:45

## 2020-04-29 RX ADMIN — Medication 10 ML: at 13:12

## 2020-04-29 RX ADMIN — Medication 500 MG: at 21:15

## 2020-04-29 RX ADMIN — FAMOTIDINE 20 MG: 20 TABLET ORAL at 21:15

## 2020-04-29 RX ADMIN — ZINC SULFATE 220 MG (50 MG) CAPSULE 1 CAPSULE: CAPSULE at 08:46

## 2020-04-29 RX ADMIN — CARVEDILOL 12.5 MG: 12.5 TABLET, FILM COATED ORAL at 08:46

## 2020-04-29 RX ADMIN — ASPIRIN 81 MG: 81 TABLET, COATED ORAL at 08:46

## 2020-04-29 RX ADMIN — DICLOFENAC 4 G: 10 GEL TOPICAL at 13:00

## 2020-04-29 RX ADMIN — CARVEDILOL 12.5 MG: 12.5 TABLET, FILM COATED ORAL at 21:15

## 2020-04-29 RX ADMIN — GUAIFENESIN 600 MG: 600 TABLET, EXTENDED RELEASE ORAL at 21:15

## 2020-04-29 RX ADMIN — TENOFOVIR DISPROXIL FUMARATE 300 MG: 300 TABLET ORAL at 09:00

## 2020-04-29 RX ADMIN — Medication 10 ML: at 21:15

## 2020-04-29 RX ADMIN — EMTRICITABINE 200 MG: 200 CAPSULE ORAL at 09:00

## 2020-04-29 RX ADMIN — ATORVASTATIN CALCIUM 40 MG: 40 TABLET, FILM COATED ORAL at 08:46

## 2020-04-29 RX ADMIN — DICLOFENAC 4 G: 10 GEL TOPICAL at 17:00

## 2020-04-29 RX ADMIN — GUAIFENESIN 600 MG: 600 TABLET, EXTENDED RELEASE ORAL at 08:46

## 2020-04-29 RX ADMIN — FAMOTIDINE 20 MG: 20 TABLET ORAL at 08:46

## 2020-04-29 RX ADMIN — Medication 10 ML: at 05:01

## 2020-04-29 RX ADMIN — DICLOFENAC 4 G: 10 GEL TOPICAL at 09:00

## 2020-04-29 RX ADMIN — Medication 500 MG: at 08:46

## 2020-04-29 RX ADMIN — CHOLECALCIFEROL (VITAMIN D3) 10 MCG (400 UNIT) TABLET 1 TABLET: at 08:46

## 2020-04-29 RX ADMIN — MELATONIN TAB 3 MG 6 MG: 3 TAB at 21:15

## 2020-04-29 NOTE — PROGRESS NOTES
[facility]  Pulmonary, Critical Care, and Sleep Medicine    Progress Note    Name: Nafisa Rendon MRN: [id number]   : 1960 Hospital: [facility]   Date: 2020        IMPRESSION:   · Acute hypoxic respiratory failure due to COVID pneumonia, requiring High flow nasal cannula  · COVID sepsis with multifocal pneumonia and JOSE. Given Tocilizumab . Completed Solumedrol course. · History of CLL  · DM 2  · HTN on ACE I prior to admission  · History of ablation  · Dyslipidemia   · Overweight      RECOMMENDATIONS:   · TSupplemental O2 to saturation 90% or greater now on 55% and 30 LPM via Vapotherm. Would avoid mechanical ventilation if possible. Review of CXR appear to show improvement in infiltrates   · Continue self proning. Explained use and benefits of pronning- patient states he will continue to performe  · Anti inflammatory medications per ID   · Anti inflammatory markers downtrending except for D dimer  · DVT prophylaxis roula in the setting of COVID which may increase thrombotic risk. No indication for full anticoagulation   · Encourage net even to negative fluid balance  · Monitor for bleeding and DIC  · Aspiration precautions, HOB >30 degrees  · Encourage regular incentive spirometry  · Will follow with you- further recommendations pending clinical course     Subjective:     Chart reviewed. Overnight events noted. Discussed with nursing. 60 y.o. male with a past medical history of CLL, PVC ablation, hypertension, presented to [facility] about 6 days ago with complaints of fever and cough for a few days prior to presentation. Patient reports that his wife and daughter were sick at home, but he does not know any other sick contacts. Patient then developed worsening shortness of breath over the last 5 days. Patient also reports that he lost his sense of taste during that time.   While in the ER, patient was found to be hypoxic at 2 L by nasal cannula along with fever and had acute renal failure. During the course of hospitalization, patient has been having productive cough, which patient reports that resolved over the last day. Patient reports now that his cough is better and he reports that his dyspnea is somewhat improved however patient is becoming increasingly more hypoxic, patient has been going up from 2 L nasal cannula up to 8 L yesterday and 10 L today. Patient reports he has been having issues with insomnia, not being able to sleep. Patient also reports some malaise. Patient otherwise denies any chest pain, nausea, vomiting, diarrhea, hemoptysis. CLL is followed by Dr. Kelsey Lindsay.    04/29/20    · Patient remains on Vapotherm- 30/55  · States he has less conversational dyspnea today and he was talking on phone with family when I came to see him  · He does continue to self-prone - prefers to do more in the morning  · No hemotpysis  · No pain, no nausea or emesis, he is tolerating PO  · D-Dimer elevated today  · CRP, Ferritin and LD downtrending        Past Medical History:   Diagnosis Date    Abnormal nuclear cardiac imaging test 11/30/2006    Mild anterior ischemia. Inferior scar w/border zone ischemia. LVE. EF 26%. (Gated imaging may be inaccurate.)  Global hypk. Neg EKG on max EST. Ex time 10:30.  Aorto-iliac duplex 09/10/2012    No AAA identified.  Carotid duplex 09/10/2012    No significant occlusive disease bilaterallly.  [facility]) [facility]     Diabetes ([facility]     Echocardiogram 06/02/2015    EF 55%. No WMA. Mild LVH. Gr 1 DDfx. IMELDA. No significant chg from study of 5/17/10.  History of echocardiogram 06/20/2011    EF 50-55%. Gr 2 DDfx. Mild RVE. Mild LAE. Mild-mod IMELDA.      Hypercholesterolemia     Hypertension     Nonischemic cardiomyopathy ([address]     s/p right coronary cusp PVC ablation  (8/82/19) without complication    Prostate CA ([facility]     S/P cardiac cath 12/11/2006    Patent coronary arteries. LVEDP 12.  EF 25-30%. Mod-significant global hypk. Past Surgical History:   Procedure Laterality Date    HX HEART CATHETERIZATION  12/11/06    The right coronary artery is dominant. It is widely patent. The left main coronary artery is widely patent. The circumflex artery is widely patent. The left anterior descending coronary artery is widely patent. The LVEDP is 12 mmHg. The overall left ventricular systolic function is significantly diminished with an estimated ejection fraction of 25-30%. ** See report. Prior to Admission medications    Medication Sig Start Date End Date Taking? Authorizing Provider   SHLEBY BCISE 2 mg/0.85 mL atIn INJECT 2MG EVERY WEEK UNDER THE SKIN 6/7/19   Provider, Historical   ergocalciferol (ERGOCALCIFEROL) 50,000 unit capsule Take 1 Cap by mouth every seven (7) days. 1/7/19   Skylar MCCORD NP   sitagliptin phos/metformin HCl (JANUMET PO) Take  by mouth. Provider, Historical   lisinopril (PRINIVIL, ZESTRIL) 5 mg tablet Take 1 Tab by mouth daily. 6/29/15   Gracie Denise MD   carvedilol (COREG) 12.5 mg tablet Take 1 Tab by mouth two (2) times a day. 6/4/15   Gracie Denise MD   simvastatin (ZOCOR) 10 mg tablet Take  by mouth daily. Provider, Historical   aspirin delayed-release 81 mg tablet Take 81 mg by mouth daily. 6/9/11   Provider, Historical     No Known Allergies   Social History     Tobacco Use    Smoking status: Former Smoker     Packs/day: 1.00    Smokeless tobacco: Never Used   Substance Use Topics    Alcohol use: Yes      History reviewed. No pertinent family history.      Current Facility-Administered Medications   Medication Dose Route Frequency    enoxaparin (LOVENOX) injection 40 mg  40 mg SubCUTAneous Q24H    emtricitabine (EMTRIVA) capsule 200 mg  200 mg Oral DAILY    tenofovir DISOPROXIL FUMARATE (VIREAD) tablet 300 mg  300 mg Oral DAILY    guaiFENesin ER (MUCINEX) tablet 600 mg  600 mg Oral Q12H    diclofenac (VOLTAREN) 1 % topical gel 4 g  4 g Topical QID    melatonin tablet 6 mg  6 mg Oral QHS    famotidine (PEPCID) tablet 20 mg  20 mg Oral BID    atorvastatin (LIPITOR) tablet 40 mg  40 mg Oral DAILY    sodium chloride (NS) flush 5-40 mL  5-40 mL IntraVENous Q8H    aspirin delayed-release tablet 81 mg  81 mg Oral DAILY    carvediloL (COREG) tablet 12.5 mg  12.5 mg Oral BID    zinc sulfate (ZINCATE) 220 mg capsule 1 Cap  1 Cap Oral DAILY    ascorbic acid (vitamin C) (VITAMIN C) tablet 500 mg  500 mg Oral BID    cholecalciferol (VITAMIN D3) (400 Units /10 mcg) tablet 1 Tab  400 Units Oral DAILY       Review of Systems:  Pertinent items are noted in HPI. Objective:   Vital Signs:    Visit Vitals  /64 (BP 1 Location: Right arm, BP Patient Position: At rest)   Pulse 71   Temp 98 °F (36.7 °C)   Resp 16   Ht 5' 9\" (1.753 m)   Wt 79 kg (174 lb 3.2 oz)   SpO2 100%   BMI 25.72 kg/m²       O2 Device: Hi flow nasal cannula   O2 Flow Rate (L/min): 30 l/min   Temp (24hrs), Av.1 °F (36.7 °C), Min:97.9 °F (36.6 °C), Max:98.3 °F (36.8 °C)       Intake/Output:   Last shift:      No intake/output data recorded. Last 3 shifts:  0701 -  1900  In: 1520 [P.O.:1520]  Out: 1900 [Urine:1900]    Intake/Output Summary (Last 24 hours) at 2020 1929  Last data filed at 2020 1609  Gross per 24 hour   Intake 680 ml   Output 1300 ml   Net -620 ml      Physical Exam:   General:  Alert, cooperative, no distress, appears stated age. Head:  Normocephalic, without obvious abnormality, atraumatic. Eyes:  Conjunctivae/corneas clear. PERRL, EOMs intact. Nose: Nares normal. Mucosa normal. No drainage or sinus tenderness. Throat: Lips, mucosa, and tongue normal. Teeth and gums normal.   Neck: Supple, symmetrical, trachea midline, no adenopathy, thyroid: no enlargment/tenderness/nodules    Back:   Symmetric    Lungs: Auscultation not performed   Chest wall:  No tenderness or deformity. Heart:   Auscultation not performed Abdomen:   Soft, non-tender. Bowel sounds normal. No masses,  No organomegaly. Extremities: Extremities normal, atraumatic, no cyanosis or edema. Pulses: 2+ and symmetric all extremities. Skin: Skin color, texture, turgor normal. No rashes or lesions   Lymph nodes: Cervical, supraclavicular nodes unremarkable   Neurologic: Grossly nonfocal     Data review:     Recent Results (from the past 24 hour(s))   CBC WITH AUTOMATED DIFF    Collection Time: 04/29/20  3:50 AM   Result Value Ref Range    WBC 17.8 (H) 4.6 - 13.2 K/uL    RBC 4.50 (L) 4.70 - 5.50 M/uL    HGB 11.0 (L) 13.0 - 16.0 g/dL    HCT 35.9 (L) 36.0 - 48.0 %    MCV 79.8 74.0 - 97.0 FL    MCH 24.4 24.0 - 34.0 PG    MCHC 30.6 (L) 31.0 - 37.0 g/dL    RDW 16.6 (H) 11.6 - 14.5 %    PLATELET 275 139 - 713 K/uL    MPV 10.6 9.2 - 11.8 FL    NEUTROPHILS 24 (L) 42 - 75 %    BAND NEUTROPHILS 2 0 - 5 %    LYMPHOCYTES 71 (H) 20 - 51 %    MONOCYTES 2 2 - 9 %    EOSINOPHILS 1 0 - 5 %    BASOPHILS 0 0 - 3 %    ABS. NEUTROPHILS 4.6 1.8 - 8.0 K/UL    ABS. LYMPHOCYTES 12.6 (H) 0.8 - 3.5 K/UL    ABS. MONOCYTES 0.4 0 - 1.0 K/UL    ABS. EOSINOPHILS 0.2 0.0 - 0.4 K/UL    ABS. BASOPHILS 0.0 0.0 - 0.06 K/UL    DF MANUAL      PLATELET COMMENTS ADEQUATE PLATELETS      RBC COMMENTS ANISOCYTOSIS  1+        WBC COMMENTS ATYPICAL LYMPHOCYTES PRESENT     METABOLIC PANEL, COMPREHENSIVE    Collection Time: 04/29/20  3:50 AM   Result Value Ref Range    Sodium 137 136 - 145 mmol/L    Potassium 4.1 3.5 - 5.5 mmol/L    Chloride 103 100 - 111 mmol/L    CO2 28 21 - 32 mmol/L    Anion gap 6 3.0 - 18 mmol/L    Glucose 94 74 - 99 mg/dL    BUN 14 7.0 - 18 MG/DL    Creatinine 0.79 0.6 - 1.3 MG/DL    BUN/Creatinine ratio 18 12 - 20      GFR est AA >60 >60 ml/min/1.73m2    GFR est non-AA >60 >60 ml/min/1.73m2    Calcium 7.9 (L) 8.5 - 10.1 MG/DL    Bilirubin, total 1.1 (H) 0.2 - 1.0 MG/DL    ALT (SGPT) 57 16 - 61 U/L    AST (SGOT) 43 (H) 10 - 38 U/L    Alk.  phosphatase 50 45 - 117 U/L    Protein, total 6.3 (L) 6.4 - 8.2 g/dL    Albumin 2.8 (L) 3.4 - 5.0 g/dL    Globulin 3.5 2.0 - 4.0 g/dL    A-G Ratio 0.8 0.8 - 1.7     MAGNESIUM    Collection Time: 04/29/20  3:50 AM   Result Value Ref Range    Magnesium 1.9 1.6 - 2.6 mg/dL   LD    Collection Time: 04/29/20  3:50 AM   Result Value Ref Range     (H) 81 - 234 U/L   FERRITIN    Collection Time: 04/29/20  3:50 AM   Result Value Ref Range    Ferritin 425 (H) 8 - 388 NG/ML   C REACTIVE PROTEIN, QT    Collection Time: 04/29/20  3:50 AM   Result Value Ref Range    C-Reactive protein <0.3 0 - 0.3 mg/dL   D DIMER    Collection Time: 04/29/20  3:50 AM   Result Value Ref Range    D DIMER 1.77 (H) <0.46 ug/ml(FEU)       Results for Willa Bone (MRN [id number]) as of 4/29/2020 19:33   Ref. Range 4/24/2020 03:07 4/25/2020 01:53 4/26/2020 01:35 4/27/2020 01:16 4/29/2020 03:50   Ferritin Latest Ref Range: 8 - 388 NG/ (H) 595 (H) 537 (H) 497 (H) 425 (H)     Results for Willa Bone (MRN [id number]) as of 4/29/2020 19:33   Ref. Range 4/24/2020 03:07 4/25/2020 10:15 4/26/2020 01:35 4/27/2020 01:16 4/29/2020 03:50   D DIMER Latest Ref Range: <0.46 ug/ml(FEU) 0.65 (H) 0.75 (H) 0.68 (H) 0.87 (H) 1.77 (H)     Results for Willa Bone (MRN [id number]) as of 4/29/2020 19:33   Ref. Range 4/24/2020 03:07 4/25/2020 01:53 4/26/2020 01:35 4/27/2020 01:16 4/29/2020 03:50   LD Latest Ref Range: 81 - 234 U/L 734 (H) 430 (H) 412 (H) 357 (H) 312 (H)     Imaging:  I have personally reviewed the patients radiographs and have reviewed the reports:  XR Results (most recent):  Results from Hospital Encounter encounter on 04/16/20   XR CHEST PORT    Narrative Portable Chest    CPT CODE: 77033    HISTORY: Infiltrate follow-up. FINDINGS:     Comparison 4/24/2020    Wires overlie the patient. Left upper lobe and right lower lobe infiltrates are  without significant change. There are no new areas of consolidation. No definite  effusion or pneumothorax.  No acute osseous abnormality. Impression IMPRESSION:    No significant change of multifocal airspace opacity               Complex decision making was made in the evaluation and management plans during this consultation. More than 50% of time was spent in counseling and coordination of care including review of data and discussion with other team members.       Clinical care, chart review and time spent coordinating care: 25 min      Inez Barrow DO, [facility]  Pulmonary, Critical Care, and Sleep Medicine

## 2020-04-29 NOTE — PROGRESS NOTES
Chart reviewed. COVID + and remains on high flow oxygen at this time. CM continues to follow along for d/c planning.      Susana Shepherd RN BSN  Care Manager  [phone]

## 2020-04-29 NOTE — PROGRESS NOTES
NUTRITION    Nutrition Screen      RECOMMENDATIONS / PLAN:     - Continue current nutrition interventions. - Continue RD inpatient monitoring and evaluation. NUTRITION INTERVENTIONS & DIAGNOSIS:     - Meals/snacks: modified composition    Nutrition Diagnosis: Unintended weight loss related to acute illness, decreased appetite as evidenced by 15 lb, 8% weight loss PTA. ASSESSMENT:     Consent obtained from pt for remote assessment via telephone. Tolerating diet, denies nausea/vomiting, appetite improved and consuming most of recent meals, not interested in nutritional supplements.      Nutritional intake adequate to meet patients estimated nutritional needs:  Yes    Diet: DIET DIABETIC CONSISTENT CARB Regular      Food Allergies: NKFA  Current Appetite: Good  Appetite/meal intake prior to admission: Fair x several days PTA, eating well prior to onset of symptoms   Feeding Limitations:  [] Swallowing difficulty    [] Chewing difficulty    [] Other:  Current Meal Intake:   Patient Vitals for the past 100 hrs:   % Diet Eaten   04/29/20 0847 95 %   04/28/20 1800 95 %   04/28/20 1439 90 %   04/28/20 0800 90 %   04/26/20 1200 80 %   04/26/20 0839 75 %   04/25/20 1200 80 %   04/25/20 0808 90 %       BM: 4/29  Skin Integrity: WDL  Edema:   [x] No     [] Yes   Pertinent Medications: Reviewed    Recent Labs     04/29/20  0350 04/27/20  0116    139   K 4.1 4.4    107   CO2 28 24   GLU 94 107*   BUN 14 18   CREA 0.79 0.84   CA 7.9* 7.8*   MG 1.9  --    ALB 2.8*  --    SGOT 43*  --    ALT 57  --        Intake/Output Summary (Last 24 hours) at 4/29/2020 1159  Last data filed at 4/29/2020 0847  Gross per 24 hour   Intake 1080 ml   Output 1400 ml   Net -320 ml       Anthropometrics:  Ht Readings from Last 1 Encounters:   04/20/20 5' 9\" (1.753 m)     Last 3 Recorded Weights in this Encounter    04/26/20 0500 04/27/20 0402 04/29/20 0400   Weight: 84.6 kg (186 lb 8.2 oz) 80 kg (176 lb 4.8 oz) 79 kg (174 lb 3.2 oz) Body mass index is 25.72 kg/m². Weight History: patient reports usual weight of 189 lb and recent weight loss over several pounds PTA (15 lb, 8% weight loss)     Weight Metrics 4/29/2020 9/20/2019 6/27/2019 6/20/2019 5/24/2019 1/11/2019 12/20/2018   Weight 174 lb 3.2 oz 189 lb 187 lb 188 lb 192 lb 192 lb 188 lb   BMI 25.72 kg/m2 27.91 kg/m2 27.62 kg/m2 27.76 kg/m2 28.35 kg/m2 28.35 kg/m2 27.76 kg/m2        Admitting Diagnosis: Pneumonia [J18.9]  COVID-19 virus infection [U07.1]  Pertinent PMHx: DM, CLL, HTN, HLD    Education Needs:        [x] None identified  [] Identified - Not appropriate at this time  []  Identified and addressed - refer to education log  Learning Limitations:   [x] None identified  [] Identified    Cultural, Samaritan & ethnic food preferences:  [x] None identified    [] Identified and addressed     ESTIMATED NUTRITION NEEDS:     Calories: 6896-2380 kcal (MSJx1.2-1.3) based on  [x] Actual BW 79 kg     [] IBW   Protein: 63-79 gm (0.8-1 gm/kg) based on  [x] Actual BW      [] IBW   Fluid: 1 mL/kcal     MONITORING & EVALUATION:     Nutrition Goal(s):   - PO nutrition intake will meet >75% of patient estimated nutritional needs within the next 7 days. Outcome: New/Initial goal     Monitoring:   [x] Food and nutrient intake   [x] Food and nutrient administration  [x] Comparative standards   [x] Nutrition-focused physical findings   [x] Anthropometric Measurements   [x] Treatment/therapy   [x] Biochemical data, medical tests, and procedures        Previous Recommendations (for follow-up assessments only):  Not Applicable     Discharge Planning: No nutritional discharge needs at this time. Participated in care planning, discharge planning, & interdisciplinary rounds as appropriate.       Amie Rosa RD, [address]   Pager: [phone]

## 2020-04-29 NOTE — PROGRESS NOTES
0720: Shift verbal report received from Darin Jon, [facility] (Outgoing nurse). Assumed pt care. Pt resting,  No distress observed. Will monitor.

## 2020-04-29 NOTE — ROUTINE PROCESS
1905 Verbal report received from off going nurse. Assumed care of patient from off going nurse. Patient resting in bed. No distress noted. Patient currently on hi flow 40L FiO2 65% Sats 92-98%. Call bell within reach, siderails up x 3, bed in lowest position, and patient instructed to use call bell for assistance. Will continue to monitor. [id number] Patient sleeping in prone position. Tolerating well. No distress noted. Sats 90-96%. 2007 Verbal shift change report given to Genaro Montenegro/Chiquita MURPHY (oncoming nurse) by Shasta Acosta RN(offgoing nurse).  Report included the following information SBAR, Intake/Output, MAR, Recent Results and Cardiac Rhythm SR.

## 2020-04-29 NOTE — PROGRESS NOTES
Infectious Disease progress Note        Reason: sepsis, COVID-19 infection    Current abx Prior abx     Emtricitabine, tenofovir since 4/24 Ceftriaxone, azithromycin 4/16/20-4/18/2020  Hydroxychloroquine  4/16/20-4/20/20  Cefepime, doxycycline  4/18/2020-4/24  ribavarin since 4/21-4/24     Lines:       Assessment :    60 y.o. male  with history of CLL, type 2 diabetes, prostate cancer, history of atrial fibrillation with ablation, hypertension, CKD stage 3 who presented to the emergency room on 4/16/2020 secondary to shortness of breath. CXR: mild opacities at lung bases. Now with increasing shortness of breath, hypoxia requiring oxygen, worsening renal function    Patient presents with a highly complex clinical picture. Clinical picture consistent with sepsis (present on admission ) due to bilateral  covid-19 infection. Labs 4/16 reviewed-ferritin 233, C-reactive protein 6.3, , , d-dimer 0.75  Labs 4/17 reviewed- ferritin 266, C-reactive protein 8.0, , LDH: 219, d-dimer 1.20   Labs 4/19 reviewed-ferritin 346, C-reactive protein 10.4, , , d-dimer 0.84  Labs on 4/20 reviewed-ferritin 628, C-reactive protein 6.2, , , d-dimer 0.7  Labs on 4/21 reviewed ferritin 628, CRP 3, , , d-dimer 0.49  Labs on 4/22 reviewed ferritin 653, CRP 1.8, , , d-dimer 0.73  Labs on 4/23 reviewed ferritin 652, CRP 1.2, , , d-dimer 0.64  Labs on 4/24 reviewed ferritin 644, ,D-dimer 0.65  Labs on 4/27 reviewed ferritin 497, CK: 71, d-dimer 0.87  Labs on 4/29 reviewed ferritin 425, CRP less than 0.3    Worsening respiratory status 4/18-4/19 with worsening inflammatory parameters suggestive of cytokine storm. Status post Solu-Medrol since 4/18. Status post tocilizumab on 4/19. Sputum culture 4/18 light normal respiratory giselle, yeast    Acute on chronic renal insufficiency-could be prerenal versus ATN secondary to sepsis.   Nephrology consult appreciated. Creatinine stable    EKG 4/16-QTc 416, 456 on 4/20, 431 on 4/21    Procalcitonin 0.38 on 4/20    Chest x-ray 4/21-worsening bilateral infiltrates suggestive of ARDS. Chest x-ray 4/22, 4/24/20-some improvement in the infiltrates. Started on emtricitabine/tenofovir on 4/24/2020    Subjective sense of improvement. Repeat COVID test 4/26/2020 positive. Appeared comfortable on 50% FiO2 when I examined him today. Recommendations:    1. Continue emtricitabine/tenofovir  2. Continue zinc, ascorbic acid. vitamin D3, melatonin  3. Recommend prone position ventilation. I discussed this with patient. 4.   Follow-up nephrology/pulmonary recommendations  5. Titrate oxygen as tolerated  6. Follow pulmonary recommendations  7. Repeat chest x-ray    above plan was discussed in details with patient, dr. Kleber Bolton, dr. Maya Olvera.    please call me if any further questions or concerns. Will continue to participate in the care of this patient. HPI:    Feels much better. States that he wants to go home patient denies headaches, visual disturbances, sore throat, runny nose, earaches, abdominal pain, diarrhea, burning micturition, pain or weakness in extremities. He denies back pain/flank pain. home Medication List    Details   BYDUREON BCISE 2 mg/0.85 mL atIn INJECT 2MG EVERY WEEK UNDER THE SKIN  Refills: 0      ergocalciferol (ERGOCALCIFEROL) 50,000 unit capsule Take 1 Cap by mouth every seven (7) days. Qty: 24 Cap, Refills: 1    Associated Diagnoses: Vitamin D deficiency      sitagliptin phos/metformin HCl (JANUMET PO) Take  by mouth.      lisinopril (PRINIVIL, ZESTRIL) 5 mg tablet Take 1 Tab by mouth daily. Qty: 30 Tab, Refills: 6      carvedilol (COREG) 12.5 mg tablet Take 1 Tab by mouth two (2) times a day. Qty: 60 Tab, Refills: 6      simvastatin (ZOCOR) 10 mg tablet Take  by mouth daily. aspirin delayed-release 81 mg tablet Take 81 mg by mouth daily.              Current Facility-Administered Medications   Medication Dose Route Frequency    insulin lispro (HUMALOG) injection   SubCUTAneous DAILY PRN    enoxaparin (LOVENOX) injection 40 mg  40 mg SubCUTAneous Q24H    emtricitabine (EMTRIVA) capsule 200 mg  200 mg Oral DAILY    tenofovir DISOPROXIL FUMARATE (VIREAD) tablet 300 mg  300 mg Oral DAILY    guaiFENesin ER (MUCINEX) tablet 600 mg  600 mg Oral Q12H    diclofenac (VOLTAREN) 1 % topical gel 4 g  4 g Topical QID    melatonin tablet 6 mg  6 mg Oral QHS    famotidine (PEPCID) tablet 20 mg  20 mg Oral BID    LORazepam (ATIVAN) tablet 1 mg  1 mg Oral Q6H PRN    atorvastatin (LIPITOR) tablet 40 mg  40 mg Oral DAILY    sodium chloride (NS) flush 5-40 mL  5-40 mL IntraVENous Q8H    aspirin delayed-release tablet 81 mg  81 mg Oral DAILY    carvediloL (COREG) tablet 12.5 mg  12.5 mg Oral BID    zinc sulfate (ZINCATE) 220 mg capsule 1 Cap  1 Cap Oral DAILY    ascorbic acid (vitamin C) (VITAMIN C) tablet 500 mg  500 mg Oral BID    glucose chewable tablet 16 g  4 Tab Oral PRN    glucagon (GLUCAGEN) injection 1 mg  1 mg IntraMUSCular PRN    dextrose 10% infusion 125-250 mL  125-250 mL IntraVENous PRN    acetaminophen (TYLENOL) tablet 650 mg  650 mg Oral Q6H PRN    cholecalciferol (VITAMIN D3) (400 Units /10 mcg) tablet 1 Tab  400 Units Oral DAILY       Allergies: Patient has no known allergies. Temp (24hrs), Av.3 °F (36.8 °C), Min:97.9 °F (36.6 °C), Max:98.8 °F (37.1 °C)    Visit Vitals  /69 (BP 1 Location: Right arm, BP Patient Position: At rest)   Pulse 79   Temp 98 °F (36.7 °C)   Resp 18   Ht 5' 9\" (1.753 m)   Wt 79 kg (174 lb 3.2 oz)   SpO2 95%   BMI 25.72 kg/m²       ROS: 12 point ROS obtained in details. Pertinent positives as mentioned in HPI,   otherwise negative    Physical Exam:    General: Well developed, well nourished male sitting on the bed AAOx3.   On high flow oxygen    General:   awake alert and oriented   HEENT:  Normocephalic, atraumatic, PERRL, EOMI, no scleral icterus or pallor; no conjunctival hemmohage   Lymph Nodes:   no cervical, axillary or inguinal adenopathy   Lungs:   non-labored, bilaterally clear to auscultation- no crackles wheezes rales or rhonchi   Heart:  RRR, s1 and s2; no rubs or gallops, no edema, + pedal pulses   Abdomen:  soft, non-distended, active bowel sounds, no hepatomegaly, no splenomegaly. Non-tender   Genitourinary:  deferred   Extremities:   no clubbing, cyanosis; no joint effusions or swelling; Full ROM of all large joints to the upper and lower extremities; muscle mass appropriate for age   Neurologic:  No gross focal motor or sensory abnormalities. Speech appropriate. Cranial nerves intact                        Skin:  No rash or ulcers noted   Back:   not examined     Psychiatric:  No suicidal or homicidal ideations, appropriate mood and affect         Labs: Results:   Chemistry Recent Labs     04/29/20  0350 04/27/20  0116   GLU 94 107*    139   K 4.1 4.4    107   CO2 28 24   BUN 14 18   CREA 0.79 0.84   CA 7.9* 7.8*   AGAP 6 8   BUCR 18 21*   AP 50  --    TP 6.3*  --    ALB 2.8*  --    GLOB 3.5  --    AGRAT 0.8  --       CBC w/Diff Recent Labs     04/29/20  0350 04/27/20  0116   WBC 17.8* 16.7*   RBC 4.50* 4.88   HGB 11.0* 12.4*   HCT 35.9* 38.6    320   GRANS 24* 22*   LYMPH 71* 73*   EOS 1 2      Microbiology No results for input(s): CULT in the last 72 hours.        RADIOLOGY:    All available imaging studies/reports in [facility] for this admission were reviewed    Dr. Francisco Staley, Infectious Disease Specialist  [phone]  April 29, 2020  9:57 AM

## 2020-04-29 NOTE — PROGRESS NOTES
Problem: Breathing Pattern - Ineffective  Goal: *Absence of hypoxia  Outcome: Progressing Towards Goal  Goal: *Use of effective breathing techniques  Outcome: Progressing Towards Goal  Goal: *PALLIATIVE CARE:  Alleviation of Dyspnea  Outcome: Progressing Towards Goal     Problem: Patient Education: Go to Patient Education Activity  Goal: Patient/Family Education  Outcome: Progressing Towards Goal     Problem: Diabetes Self-Management  Goal: *Disease process and treatment process  Description: Define diabetes and identify own type of diabetes; list 3 options for treating diabetes. Outcome: Progressing Towards Goal  Goal: *Incorporating nutritional management into lifestyle  Description: Describe effect of type, amount and timing of food on blood glucose; list 3 methods for planning meals. Outcome: Progressing Towards Goal  Goal: *Incorporating physical activity into lifestyle  Description: State effect of exercise on blood glucose levels. Outcome: Progressing Towards Goal  Goal: *Developing strategies to promote health/change behavior  Description: Define the ABC's of diabetes; identify appropriate screenings, schedule and personal plan for screenings. Outcome: Progressing Towards Goal  Goal: *Using medications safely  Description: State effect of diabetes medications on diabetes; name diabetes medication taking, action and side effects. Outcome: Progressing Towards Goal  Goal: *Monitoring blood glucose, interpreting and using results  Description: Identify recommended blood glucose targets  and personal targets. Outcome: Progressing Towards Goal  Goal: *Prevention, detection, treatment of acute complications  Description: List symptoms of hyper- and hypoglycemia; describe how to treat low blood sugar and actions for lowering  high blood glucose level.   Outcome: Progressing Towards Goal  Goal: *Prevention, detection and treatment of chronic complications  Description: Define the natural course of diabetes and describe the relationship of blood glucose levels to long term complications of diabetes. Outcome: Progressing Towards Goal  Goal: *Developing strategies to address psychosocial issues  Description: Describe feelings about living with diabetes; identify support needed and support network  Outcome: Progressing Towards Goal  Goal: *Sick day guidelines  Outcome: Progressing Towards Goal     Problem: Falls - Risk of  Goal: *Absence of Falls  Description: Document Salvatore Fall Risk and appropriate interventions in the flowsheet.   Outcome: Progressing Towards Goal  Note: Fall Risk Interventions:  Mobility Interventions: Assess mobility with egress test, Communicate number of staff needed for ambulation/transfer, Patient to call before getting OOB         Medication Interventions: Evaluate medications/consider consulting pharmacy, Teach patient to arise slowly, Patient to call before getting OOB    Elimination Interventions: Call light in reach, Patient to call for help with toileting needs, Stay With Me (per policy), Toilet paper/wipes in reach, Toileting schedule/hourly rounds, Urinal in reach              Problem: Patient Education: Go to Patient Education Activity  Goal: Patient/Family Education  Outcome: Progressing Towards Goal     Problem: Patient Education: Go to Patient Education Activity  Goal: Patient/Family Education  Outcome: Progressing Towards Goal     Problem: Pneumonia: Discharge Outcomes  Goal: *Demonstrates progressive activity  Outcome: Progressing Towards Goal  Goal: *Describes follow-up/return visits to physicians  Outcome: Progressing Towards Goal  Goal: *Verbalizes name, dosage, time, side effects, and number of days to continue medications  Outcome: Progressing Towards Goal  Goal: *Influenza immunization  Outcome: Progressing Towards Goal  Goal: *Pneumococcal immunization  Outcome: Progressing Towards Goal  Goal: *Respiratory status at baseline  Outcome: Progressing Towards Goal  Goal: *Vital signs within defined limits  Outcome: Progressing Towards Goal  Goal: *Describes available resources and support systems  Outcome: Progressing Towards Goal

## 2020-04-29 NOTE — PROGRESS NOTES
[facility]    Patient: Shady Victor Age: 60 y.o. : 1960 MR#: [id number] SSN: [id number]  Date: 2020     Subjective/24-hour events:     Patient is sitting in bed in no apparent distress, awake and alert, denies shortness of breath at rest.  Overall states that he is feeling better than yesterday and the day before.     Assessment:   Acute hypoxic respiratory failure  COVID-19 pneumonia with suspected underlying bacterial pneumonia  ARDS secondary to above  JOSE on CKD 3  DM2 with hyperglycemia, hemoglobin A1c 8.2%  HTN  Hyperlipidemia  Hyponatremia resolved  CLL history    Plan:  Patient declines plasma therapy  Patient declines trial at [facility]  Continue Emtriva and tenofovir  Monitor labs  Weaning O2, on 30 L  Encouraged incentive spirometry,  Discussed with patient, called and discussed with patient's wife  Discussed with RN    Objective:   VS:   Visit Vitals  /53 (BP 1 Location: Right arm, BP Patient Position: At rest)   Pulse 79   Temp 98 °F (36.7 °C)   Resp 18   Ht 5' 9\" (1.753 m)   Wt 79 kg (174 lb 3.2 oz)   SpO2 94%   BMI 25.72 kg/m²      Tmax/24hrs: Temp (24hrs), Av.1 °F (36.7 °C), Min:97.9 °F (36.6 °C), Max:98.3 °F (36.8 °C)      Intake/Output Summary (Last 24 hours) at 2020 1637  Last data filed at 2020 1200  Gross per 24 hour   Intake 920 ml   Output 1100 ml   Net -180 ml       General:  Awake, follows commands  Cardiovascular:  S1S2+, RRR  Pulmonary: Coarse breath sounds bilaterally  GI:  Soft, BS+, NT, ND  Extremities:  No edema        Labs:    Recent Results (from the past 24 hour(s))   CBC WITH AUTOMATED DIFF    Collection Time: 20  3:50 AM   Result Value Ref Range    WBC 17.8 (H) 4.6 - 13.2 K/uL    RBC 4.50 (L) 4.70 - 5.50 M/uL    HGB 11.0 (L) 13.0 - 16.0 g/dL    HCT 35.9 (L) 36.0 - 48.0 %    MCV 79.8 74.0 - 97.0 FL    MCH 24.4 24.0 - 34.0 PG    MCHC 30.6 (L) 31.0 - 37.0 g/dL    RDW 16.6 (H) 11.6 - 14.5 % PLATELET 800 891 - 246 K/uL    MPV 10.6 9.2 - 11.8 FL    NEUTROPHILS 24 (L) 42 - 75 %    BAND NEUTROPHILS 2 0 - 5 %    LYMPHOCYTES 71 (H) 20 - 51 %    MONOCYTES 2 2 - 9 %    EOSINOPHILS 1 0 - 5 %    BASOPHILS 0 0 - 3 %    ABS. NEUTROPHILS 4.6 1.8 - 8.0 K/UL    ABS. LYMPHOCYTES 12.6 (H) 0.8 - 3.5 K/UL    ABS. MONOCYTES 0.4 0 - 1.0 K/UL    ABS. EOSINOPHILS 0.2 0.0 - 0.4 K/UL    ABS. BASOPHILS 0.0 0.0 - 0.06 K/UL    DF MANUAL      PLATELET COMMENTS ADEQUATE PLATELETS      RBC COMMENTS ANISOCYTOSIS  1+        WBC COMMENTS ATYPICAL LYMPHOCYTES PRESENT     METABOLIC PANEL, COMPREHENSIVE    Collection Time: 04/29/20  3:50 AM   Result Value Ref Range    Sodium 137 136 - 145 mmol/L    Potassium 4.1 3.5 - 5.5 mmol/L    Chloride 103 100 - 111 mmol/L    CO2 28 21 - 32 mmol/L    Anion gap 6 3.0 - 18 mmol/L    Glucose 94 74 - 99 mg/dL    BUN 14 7.0 - 18 MG/DL    Creatinine 0.79 0.6 - 1.3 MG/DL    BUN/Creatinine ratio 18 12 - 20      GFR est AA >60 >60 ml/min/1.73m2    GFR est non-AA >60 >60 ml/min/1.73m2    Calcium 7.9 (L) 8.5 - 10.1 MG/DL    Bilirubin, total 1.1 (H) 0.2 - 1.0 MG/DL    ALT (SGPT) 57 16 - 61 U/L    AST (SGOT) 43 (H) 10 - 38 U/L    Alk.  phosphatase 50 45 - 117 U/L    Protein, total 6.3 (L) 6.4 - 8.2 g/dL    Albumin 2.8 (L) 3.4 - 5.0 g/dL    Globulin 3.5 2.0 - 4.0 g/dL    A-G Ratio 0.8 0.8 - 1.7     MAGNESIUM    Collection Time: 04/29/20  3:50 AM   Result Value Ref Range    Magnesium 1.9 1.6 - 2.6 mg/dL   LD    Collection Time: 04/29/20  3:50 AM   Result Value Ref Range     (H) 81 - 234 U/L   FERRITIN    Collection Time: 04/29/20  3:50 AM   Result Value Ref Range    Ferritin 425 (H) 8 - 388 NG/ML   C REACTIVE PROTEIN, QT    Collection Time: 04/29/20  3:50 AM   Result Value Ref Range    C-Reactive protein <0.3 0 - 0.3 mg/dL   D DIMER    Collection Time: 04/29/20  3:50 AM   Result Value Ref Range    D DIMER 1.77 (H) <0.46 ug/ml(FEU)       Signed By: Carrol Starr MD     April 29, 2020

## 2020-04-29 NOTE — PROGRESS NOTES
Problem: Breathing Pattern - Ineffective  Goal: *Absence of hypoxia  Outcome: Progressing Towards Goal  Goal: *Use of effective breathing techniques  Outcome: Progressing Towards Goal  Goal: *PALLIATIVE CARE:  Alleviation of Dyspnea  Outcome: Progressing Towards Goal     Problem: Patient Education: Go to Patient Education Activity  Goal: Patient/Family Education  Outcome: Progressing Towards Goal     Problem: Diabetes Self-Management  Goal: *Disease process and treatment process  Description: Define diabetes and identify own type of diabetes; list 3 options for treating diabetes. Outcome: Progressing Towards Goal  Goal: *Incorporating nutritional management into lifestyle  Description: Describe effect of type, amount and timing of food on blood glucose; list 3 methods for planning meals. Outcome: Progressing Towards Goal  Goal: *Incorporating physical activity into lifestyle  Description: State effect of exercise on blood glucose levels. Outcome: Progressing Towards Goal  Goal: *Developing strategies to promote health/change behavior  Description: Define the ABC's of diabetes; identify appropriate screenings, schedule and personal plan for screenings. Outcome: Progressing Towards Goal  Goal: *Using medications safely  Description: State effect of diabetes medications on diabetes; name diabetes medication taking, action and side effects. Outcome: Progressing Towards Goal  Goal: *Monitoring blood glucose, interpreting and using results  Description: Identify recommended blood glucose targets  and personal targets. Outcome: Progressing Towards Goal  Goal: *Prevention, detection, treatment of acute complications  Description: List symptoms of hyper- and hypoglycemia; describe how to treat low blood sugar and actions for lowering  high blood glucose level.   Outcome: Progressing Towards Goal  Goal: *Prevention, detection and treatment of chronic complications  Description: Define the natural course of diabetes and describe the relationship of blood glucose levels to long term complications of diabetes. Outcome: Progressing Towards Goal  Goal: *Developing strategies to address psychosocial issues  Description: Describe feelings about living with diabetes; identify support needed and support network  Outcome: Progressing Towards Goal  Goal: *Insulin pump training  Outcome: Progressing Towards Goal  Goal: *Sick day guidelines  Outcome: Progressing Towards Goal  Goal: *Patient Specific Goal (EDIT GOAL, INSERT TEXT)  Outcome: Progressing Towards Goal     Problem: Patient Education: Go to Patient Education Activity  Goal: Patient/Family Education  Outcome: Progressing Towards Goal     Problem: Falls - Risk of  Goal: *Absence of Falls  Description: Document Salvatore Fall Risk and appropriate interventions in the flowsheet. Outcome: Progressing Towards Goal  Note: Fall Risk Interventions:  Mobility Interventions: Assess mobility with egress test, Patient to call before getting OOB, Communicate number of staff needed for ambulation/transfer         Medication Interventions: Evaluate medications/consider consulting pharmacy, Patient to call before getting OOB, Teach patient to arise slowly    Elimination Interventions: Bed/chair exit alarm, Call light in reach, Elevated toilet seat, Toilet paper/wipes in reach, Toileting schedule/hourly rounds              Problem: Patient Education: Go to Patient Education Activity  Goal: Patient/Family Education  Outcome: Progressing Towards Goal     Problem: Diabetes Maintenance:Admission  Goal: *Blood glucose 80 to 180 mg/dl  Outcome: Progressing Towards Goal     Problem: Pressure Injury - Risk of  Goal: *Prevention of pressure injury  Description: Document Saroj Scale and appropriate interventions in the flowsheet.   Outcome: Progressing Towards Goal  Note: Pressure Injury Interventions:  Sensory Interventions: Assess changes in LOC         Activity Interventions: Increase time out of bed, Pressure redistribution bed/mattress(bed type), Assess need for specialty bed    Mobility Interventions: Assess need for specialty bed, HOB 30 degrees or less, Pressure redistribution bed/mattress (bed type), Turn and reposition approx. every two hours(pillow and wedges)    Nutrition Interventions: Document food/fluid/supplement intake                     Problem: Patient Education: Go to Patient Education Activity  Goal: Patient/Family Education  Outcome: Progressing Towards Goal     Problem: Pain  Goal: *Control of Pain  Outcome: Progressing Towards Goal  Goal: *PALLIATIVE CARE:  Alleviation of Pain  Outcome: Progressing Towards Goal     Problem: Patient Education: Go to Patient Education Activity  Goal: Patient/Family Education  Outcome: Progressing Towards Goal     Problem: Pneumonia: Discharge Outcomes  Goal: *Demonstrates progressive activity  Outcome: Progressing Towards Goal  Goal: *Describes follow-up/return visits to physicians  Outcome: Progressing Towards Goal  Goal: *Verbalizes name, dosage, time, side effects, and number of days to continue medications  Outcome: Progressing Towards Goal  Goal: *Influenza immunization  Outcome: Progressing Towards Goal  Goal: *Pneumococcal immunization  Outcome: Progressing Towards Goal  Goal: *Respiratory status at baseline  Outcome: Progressing Towards Goal  Goal: *Vital signs within defined limits  Outcome: Progressing Towards Goal  Goal: *Describes available resources and support systems  Outcome: Progressing Towards Goal     Problem: Risk for Spread of Infection  Goal: Prevent transmission of infectious organism to others  Description: Prevent the transmission of infectious organisms to other patients, staff members, and visitors.   Outcome: Progressing Towards Goal     Problem: Patient Education:  Go to Education Activity  Goal: Patient/Family Education  Outcome: Progressing Towards Goal

## 2020-04-30 PROCEDURE — 74011250636 HC RX REV CODE- 250/636: Performed by: INTERNAL MEDICINE

## 2020-04-30 PROCEDURE — 65660000000 HC RM CCU STEPDOWN

## 2020-04-30 PROCEDURE — 77010033711 HC HIGH FLOW OXYGEN

## 2020-04-30 PROCEDURE — 74011250637 HC RX REV CODE- 250/637: Performed by: INTERNAL MEDICINE

## 2020-04-30 PROCEDURE — 74011250637 HC RX REV CODE- 250/637: Performed by: HOSPITALIST

## 2020-04-30 RX ADMIN — Medication 500 MG: at 20:30

## 2020-04-30 RX ADMIN — DICLOFENAC 4 G: 10 GEL TOPICAL at 13:00

## 2020-04-30 RX ADMIN — ZINC SULFATE 220 MG (50 MG) CAPSULE 1 CAPSULE: CAPSULE at 09:00

## 2020-04-30 RX ADMIN — Medication 10 ML: at 12:26

## 2020-04-30 RX ADMIN — TENOFOVIR DISPROXIL FUMARATE 300 MG: 300 TABLET ORAL at 09:00

## 2020-04-30 RX ADMIN — DICLOFENAC 4 G: 10 GEL TOPICAL at 09:00

## 2020-04-30 RX ADMIN — CARVEDILOL 12.5 MG: 12.5 TABLET, FILM COATED ORAL at 20:30

## 2020-04-30 RX ADMIN — FAMOTIDINE 20 MG: 20 TABLET ORAL at 20:30

## 2020-04-30 RX ADMIN — Medication 10 ML: at 20:31

## 2020-04-30 RX ADMIN — CHOLECALCIFEROL (VITAMIN D3) 10 MCG (400 UNIT) TABLET 1 TABLET: at 08:46

## 2020-04-30 RX ADMIN — GUAIFENESIN 600 MG: 600 TABLET, EXTENDED RELEASE ORAL at 08:46

## 2020-04-30 RX ADMIN — GUAIFENESIN 600 MG: 600 TABLET, EXTENDED RELEASE ORAL at 20:30

## 2020-04-30 RX ADMIN — ASPIRIN 81 MG: 81 TABLET, COATED ORAL at 08:46

## 2020-04-30 RX ADMIN — ENOXAPARIN SODIUM 40 MG: 40 INJECTION SUBCUTANEOUS at 12:27

## 2020-04-30 RX ADMIN — ATORVASTATIN CALCIUM 40 MG: 40 TABLET, FILM COATED ORAL at 08:46

## 2020-04-30 RX ADMIN — CARVEDILOL 12.5 MG: 12.5 TABLET, FILM COATED ORAL at 08:46

## 2020-04-30 RX ADMIN — EMTRICITABINE 200 MG: 200 CAPSULE ORAL at 09:00

## 2020-04-30 RX ADMIN — MELATONIN TAB 3 MG 6 MG: 3 TAB at 20:29

## 2020-04-30 RX ADMIN — FAMOTIDINE 20 MG: 20 TABLET ORAL at 08:46

## 2020-04-30 RX ADMIN — DICLOFENAC 4 G: 10 GEL TOPICAL at 17:00

## 2020-04-30 RX ADMIN — Medication 10 ML: at 05:38

## 2020-04-30 RX ADMIN — Medication 500 MG: at 08:46

## 2020-04-30 NOTE — PROGRESS NOTES
[facility]  Progress Note    Patient: Jerman Wong Age: 60 y.o. : 1960 MR#: [id number] SSN: [id number]  Date: 2020     Subjective/24-hour events:     Patient is sitting in bed in no apparent distress, awake and alert. Patient states that he has been using the incentive spirometer frequently and is hitting 1000 on that, 2 days ago patient was only able to hit 750. Still on high flow nasal cannula 30 L    Assessment:   Acute hypoxic respiratory failure  COVID-19 pneumonia with suspected underlying bacterial pneumonia  ARDS secondary to above  JOSE on CKD 3  DM2 with hyperglycemia, hemoglobin A1c 8.2%  HTN  Hyperlipidemia  Hyponatremia resolved  CLL history    Plan:  Patient declines plasma therapy  Patient declines trial at [address] [place] [address]  Pulmonary and ID are following  Continue Emtriva and tenofovir with  Check labs in a.m. Continue to wean oxygen  Encouraged incentive spirometry,  Encouraged patient to lay prone  Discussed with patient, discussed with RN, called and updated patient's wife over the phone    Objective:   VS:   Visit Vitals  /87 (BP 1 Location: Right arm, BP Patient Position: Sitting)   Pulse 84   Temp 97.3 °F (36.3 °C)   Resp 24   Ht 5' 9\" (1.753 m)   Wt 78.4 kg (172 lb 13.5 oz)   SpO2 96%   BMI 25.52 kg/m²      Tmax/24hrs: Temp (24hrs), Av.4 °F (36.9 °C), Min:97.3 °F (36.3 °C), Max:98.8 °F (37.1 °C)      Intake/Output Summary (Last 24 hours) at 2020 1552  Last data filed at 2020 1210  Gross per 24 hour   Intake 480 ml   Output 1550 ml   Net -1070 ml           Labs:    No results found for this or any previous visit (from the past 24 hour(s)).     Signed By: Blanche Hernandez MD     2020

## 2020-04-30 NOTE — ROUTINE PROCESS
Bedside and Verbal shift change report given to [address] [place] [address] (oncoming nurse) by Wayne Monique (offgoing nurse). Report included the following information SBAR, Kardex, Intake/Output, MAR, Recent Results and Alarm Parameters .

## 2020-04-30 NOTE — PROGRESS NOTES
Problem: Breathing Pattern - Ineffective  Goal: *Absence of hypoxia  Outcome: Progressing Towards Goal  Goal: *Use of effective breathing techniques  Outcome: Progressing Towards Goal  Goal: *PALLIATIVE CARE:  Alleviation of Dyspnea  Outcome: Progressing Towards Goal     Problem: Patient Education: Go to Patient Education Activity  Goal: Patient/Family Education  Outcome: Progressing Towards Goal     Problem: Diabetes Self-Management  Goal: *Disease process and treatment process  Description: Define diabetes and identify own type of diabetes; list 3 options for treating diabetes. Outcome: Progressing Towards Goal  Goal: *Incorporating nutritional management into lifestyle  Description: Describe effect of type, amount and timing of food on blood glucose; list 3 methods for planning meals. Outcome: Progressing Towards Goal  Goal: *Incorporating physical activity into lifestyle  Description: State effect of exercise on blood glucose levels. Outcome: Progressing Towards Goal  Goal: *Developing strategies to promote health/change behavior  Description: Define the ABC's of diabetes; identify appropriate screenings, schedule and personal plan for screenings. Outcome: Progressing Towards Goal  Goal: *Using medications safely  Description: State effect of diabetes medications on diabetes; name diabetes medication taking, action and side effects. Outcome: Progressing Towards Goal  Goal: *Monitoring blood glucose, interpreting and using results  Description: Identify recommended blood glucose targets  and personal targets. Outcome: Progressing Towards Goal  Goal: *Prevention, detection, treatment of acute complications  Description: List symptoms of hyper- and hypoglycemia; describe how to treat low blood sugar and actions for lowering  high blood glucose level.   Outcome: Progressing Towards Goal  Goal: *Prevention, detection and treatment of chronic complications  Description: Define the natural course of diabetes and describe the relationship of blood glucose levels to long term complications of diabetes. Outcome: Progressing Towards Goal  Goal: *Developing strategies to address psychosocial issues  Description: Describe feelings about living with diabetes; identify support needed and support network  Outcome: Progressing Towards Goal  Goal: *Insulin pump training  Outcome: Progressing Towards Goal  Goal: *Sick day guidelines  Outcome: Progressing Towards Goal  Goal: *Patient Specific Goal (EDIT GOAL, INSERT TEXT)  Outcome: Progressing Towards Goal     Problem: Patient Education: Go to Patient Education Activity  Goal: Patient/Family Education  Outcome: Progressing Towards Goal     Problem: Falls - Risk of  Goal: *Absence of Falls  Description: Document Salvatore Fall Risk and appropriate interventions in the flowsheet. Outcome: Progressing Towards Goal  Note: Fall Risk Interventions:  Mobility Interventions: Assess mobility with egress test, Communicate number of staff needed for ambulation/transfer, OT consult for ADLs, Patient to call before getting OOB         Medication Interventions: Evaluate medications/consider consulting pharmacy, Patient to call before getting OOB, Teach patient to arise slowly    Elimination Interventions: Call light in reach, Patient to call for help with toileting needs, Stay With Me (per policy), Toilet paper/wipes in reach, Toileting schedule/hourly rounds, Urinal in reach              Problem: Patient Education: Go to Patient Education Activity  Goal: Patient/Family Education  Outcome: Progressing Towards Goal     Problem: Pressure Injury - Risk of  Goal: *Prevention of pressure injury  Description: Document Saroj Scale and appropriate interventions in the flowsheet.   Outcome: Progressing Towards Goal  Note: Pressure Injury Interventions:  Sensory Interventions: Assess changes in LOC         Activity Interventions: Pressure redistribution bed/mattress(bed type), Increase time out of bed, Assess need for specialty bed    Mobility Interventions: Assess need for specialty bed, Float heels, HOB 30 degrees or less, Pressure redistribution bed/mattress (bed type), Turn and reposition approx. every two hours(pillow and wedges)    Nutrition Interventions: Document food/fluid/supplement intake, Discuss nutritional consult with provider, Offer support with meals,snacks and hydration                     Problem: Patient Education: Go to Patient Education Activity  Goal: Patient/Family Education  Outcome: Progressing Towards Goal     Problem: Pain  Goal: *Control of Pain  Outcome: Progressing Towards Goal  Goal: *PALLIATIVE CARE:  Alleviation of Pain  Outcome: Progressing Towards Goal     Problem: Patient Education: Go to Patient Education Activity  Goal: Patient/Family Education  Outcome: Progressing Towards Goal     Problem: Pneumonia: Discharge Outcomes  Goal: *Demonstrates progressive activity  Outcome: Progressing Towards Goal  Goal: *Describes follow-up/return visits to physicians  Outcome: Progressing Towards Goal  Goal: *Verbalizes name, dosage, time, side effects, and number of days to continue medications  Outcome: Progressing Towards Goal  Goal: *Influenza immunization  Outcome: Progressing Towards Goal  Goal: *Pneumococcal immunization  Outcome: Progressing Towards Goal  Goal: *Respiratory status at baseline  Outcome: Progressing Towards Goal  Goal: *Vital signs within defined limits  Outcome: Progressing Towards Goal  Goal: *Describes available resources and support systems  Outcome: Progressing Towards Goal     Problem: Risk for Spread of Infection  Goal: Prevent transmission of infectious organism to others  Description: Prevent the transmission of infectious organisms to other patients, staff members, and visitors.   Outcome: Progressing Towards Goal     Problem: Patient Education:  Go to Education Activity  Goal: Patient/Family Education  Outcome: Progressing Towards Goal     Problem: Nutrition Deficit  Goal: *Optimize nutritional status  Outcome: Progressing Towards Goal     Problem: Diabetes Maintenance:Admission  Goal: *Blood glucose 80 to 180 mg/dl  Outcome: Progressing Towards Goal Alert and oriented, no focal deficits, no motor or sensory deficits. 5/5 motor of upper and lower extremities. Sensation intact

## 2020-04-30 NOTE — PROGRESS NOTES
Infectious Disease progress Note        Reason: sepsis, COVID-19 infection    Current abx Prior abx     Emtricitabine/ tenofovir since 4/24 Ceftriaxone, azithromycin 4/16/20-4/18/2020  Hydroxychloroquine  4/16/20-4/20/20  Cefepime, doxycycline  4/18/2020-4/24  ribavarin since 4/21-4/24     Lines:       Assessment :    60 y.o. male  with history of CLL, type 2 diabetes, prostate cancer, history of atrial fibrillation with ablation, hypertension, CKD stage 3 who presented to the emergency room on 4/16/2020 secondary to shortness of breath. CXR: mild opacities at lung bases. Now with increasing shortness of breath, hypoxia requiring oxygen, worsening renal function    Patient presents with a highly complex clinical picture. Clinical picture consistent with sepsis (present on admission ) due to bilateral  covid-19 infection. Labs 4/16 reviewed-ferritin 233, C-reactive protein 6.3, , , d-dimer 0.75  Labs 4/17 reviewed- ferritin 266, C-reactive protein 8.0, , LDH: 219, d-dimer 1.20   Labs 4/19 reviewed-ferritin 346, C-reactive protein 10.4, , , d-dimer 0.84  Labs on 4/20 reviewed-ferritin 628, C-reactive protein 6.2, , , d-dimer 0.7  Labs on 4/21 reviewed ferritin 628, CRP 3, , , d-dimer 0.49  Labs on 4/22 reviewed ferritin 653, CRP 1.8, , , d-dimer 0.73  Labs on 4/23 reviewed ferritin 652, CRP 1.2, , , d-dimer 0.64  Labs on 4/24 reviewed ferritin 644, ,D-dimer 0.65  Labs on 4/27 reviewed ferritin 497, CK: 71, d-dimer 0.87  Labs on 4/29 reviewed ferritin 425, CRP less than 0.3    Worsening respiratory status 4/18-4/19 with worsening inflammatory parameters suggestive of cytokine storm. Status post Solu-Medrol since 4/18. Status post tocilizumab on 4/19. Sputum culture 4/18 light normal respiratory giselle, yeast    Acute on chronic renal insufficiency-could be prerenal versus ATN secondary to sepsis.   Nephrology consult appreciated. Creatinine stable    EKG 4/16-QTc 416, 456 on 4/20, 431 on 4/21    Procalcitonin 0.38 on 4/20    Chest x-ray 4/21-worsening bilateral infiltrates suggestive of ARDS. Chest x-ray 4/22, 4/24/20-some improvement in the infiltrates. Started on emtricitabine/tenofovir on 4/24/2020    Subjective sense of improvement. Repeat COVID test 4/26/2020 positive. Appeared comfortable on  when I examined him today. Chest x-ray 4/29 reviewed. No significant change compared to prior chest x-ray 4/24    Recommendations:    1. Continue emtricitabine/tenofovir  2. Continue, ascorbic acid. vitamin D3, melatonin. D/c zinc  3. Recommend prone position ventilation. I discussed this with patient. 4.   Follow-up nephrology/pulmonary recommendations  5. Titrate oxygen as tolerated  6. Follow pulmonary recommendations      above plan was discussed in details with patient, dr. Shawna Storm. please call me if any further questions or concerns. Will continue to participate in the care of this patient. HPI:    Feels much better. States that he wants to go home patient denies headaches, visual disturbances, sore throat, runny nose, earaches, abdominal pain, diarrhea, burning micturition, pain or weakness in extremities. He denies back pain/flank pain. home Medication List    Details   BYDUREON BCISE 2 mg/0.85 mL atIn INJECT 2MG EVERY WEEK UNDER THE SKIN  Refills: 0      ergocalciferol (ERGOCALCIFEROL) 50,000 unit capsule Take 1 Cap by mouth every seven (7) days. Qty: 24 Cap, Refills: 1    Associated Diagnoses: Vitamin D deficiency      sitagliptin phos/metformin HCl (JANUMET PO) Take  by mouth.      lisinopril (PRINIVIL, ZESTRIL) 5 mg tablet Take 1 Tab by mouth daily. Qty: 30 Tab, Refills: 6      carvedilol (COREG) 12.5 mg tablet Take 1 Tab by mouth two (2) times a day. Qty: 60 Tab, Refills: 6      simvastatin (ZOCOR) 10 mg tablet Take  by mouth daily.         aspirin delayed-release 81 mg tablet Take 81 mg by mouth daily. Current Facility-Administered Medications   Medication Dose Route Frequency    insulin lispro (HUMALOG) injection   SubCUTAneous DAILY PRN    enoxaparin (LOVENOX) injection 40 mg  40 mg SubCUTAneous Q24H    emtricitabine (EMTRIVA) capsule 200 mg  200 mg Oral DAILY    tenofovir DISOPROXIL FUMARATE (VIREAD) tablet 300 mg  300 mg Oral DAILY    guaiFENesin ER (MUCINEX) tablet 600 mg  600 mg Oral Q12H    diclofenac (VOLTAREN) 1 % topical gel 4 g  4 g Topical QID    melatonin tablet 6 mg  6 mg Oral QHS    famotidine (PEPCID) tablet 20 mg  20 mg Oral BID    LORazepam (ATIVAN) tablet 1 mg  1 mg Oral Q6H PRN    atorvastatin (LIPITOR) tablet 40 mg  40 mg Oral DAILY    sodium chloride (NS) flush 5-40 mL  5-40 mL IntraVENous Q8H    aspirin delayed-release tablet 81 mg  81 mg Oral DAILY    carvediloL (COREG) tablet 12.5 mg  12.5 mg Oral BID    zinc sulfate (ZINCATE) 220 mg capsule 1 Cap  1 Cap Oral DAILY    ascorbic acid (vitamin C) (VITAMIN C) tablet 500 mg  500 mg Oral BID    glucose chewable tablet 16 g  4 Tab Oral PRN    glucagon (GLUCAGEN) injection 1 mg  1 mg IntraMUSCular PRN    dextrose 10% infusion 125-250 mL  125-250 mL IntraVENous PRN    acetaminophen (TYLENOL) tablet 650 mg  650 mg Oral Q6H PRN    cholecalciferol (VITAMIN D3) (400 Units /10 mcg) tablet 1 Tab  400 Units Oral DAILY       Allergies: Patient has no known allergies. Temp (24hrs), Av.5 °F (36.9 °C), Min:98.3 °F (36.8 °C), Max:98.8 °F (37.1 °C)    Visit Vitals  /75 (BP 1 Location: Right arm, BP Patient Position: Sitting)   Pulse 76   Temp 98.8 °F (37.1 °C)   Resp 24   Ht 5' 9\" (1.753 m)   Wt 78.4 kg (172 lb 13.5 oz)   SpO2 97%   BMI 25.52 kg/m²       ROS: 12 point ROS obtained in details. Pertinent positives as mentioned in HPI,   otherwise negative    Physical Exam:    General: Well developed, well nourished male sitting on the bed AAOx3.   On high flow oxygen    General:   awake alert and oriented   HEENT:  Normocephalic, atraumatic, PERRL, EOMI, no scleral icterus or pallor; no conjunctival hemmohage   Lymph Nodes:   no cervical, axillary or inguinal adenopathy   Lungs:   non-labored, bilaterally clear to auscultation- no crackles wheezes rales or rhonchi   Heart:  RRR, s1 and s2; no rubs or gallops, no edema, + pedal pulses   Abdomen:  soft, non-distended, active bowel sounds, no hepatomegaly, no splenomegaly. Non-tender   Genitourinary:  deferred   Extremities:   no clubbing, cyanosis; no joint effusions or swelling; Full ROM of all large joints to the upper and lower extremities; muscle mass appropriate for age   Neurologic:  No gross focal motor or sensory abnormalities. Speech appropriate. Cranial nerves intact                        Skin:  No rash or ulcers noted   Back:   not examined     Psychiatric:  No suicidal or homicidal ideations, appropriate mood and affect         Labs: Results:   Chemistry Recent Labs     04/29/20  0350   GLU 94      K 4.1      CO2 28   BUN 14   CREA 0.79   CA 7.9*   AGAP 6   BUCR 18   AP 50   TP 6.3*   ALB 2.8*   GLOB 3.5   AGRAT 0.8      CBC w/Diff Recent Labs     04/29/20  0350   WBC 17.8*   RBC 4.50*   HGB 11.0*   HCT 35.9*      GRANS 24*   LYMPH 71*   EOS 1      Microbiology No results for input(s): CULT in the last 72 hours.        RADIOLOGY:    All available imaging studies/reports in [facility] for this admission were reviewed    Dr. Nakia Aguayo, Infectious Disease Specialist  [phone] April 30, 2020  9:57 AM

## 2020-04-30 NOTE — ROUTINE PROCESS
Bedside and Verbal shift change report given to Divina (oncoming nurse) by Trinda Boast (offgoing nurse). Report included the following information SBAR, Kardex, Intake/Output, MAR, Recent Results and Alarm Parameters .

## 2020-04-30 NOTE — PROGRESS NOTES
Problem: Breathing Pattern - Ineffective  Goal: *Absence of hypoxia  Outcome: Progressing Towards Goal  Goal: *Use of effective breathing techniques  Outcome: Progressing Towards Goal  Goal: *PALLIATIVE CARE:  Alleviation of Dyspnea  Outcome: Progressing Towards Goal     Problem: Patient Education: Go to Patient Education Activity  Goal: Patient/Family Education  Outcome: Progressing Towards Goal     Problem: Pressure Injury - Risk of  Goal: *Prevention of pressure injury  Description: Document Saroj Scale and appropriate interventions in the flowsheet. Outcome: Progressing Towards Goal  Note: Pressure Injury Interventions:  Sensory Interventions: Assess changes in LOC         Activity Interventions: Pressure redistribution bed/mattress(bed type), Increase time out of bed, Assess need for specialty bed    Mobility Interventions: Assess need for specialty bed, Float heels, HOB 30 degrees or less, Pressure redistribution bed/mattress (bed type), Turn and reposition approx.  every two hours(pillow and wedges)    Nutrition Interventions: Document food/fluid/supplement intake, Discuss nutritional consult with provider, Offer support with meals,snacks and hydration                     Problem: Patient Education: Go to Patient Education Activity  Goal: Patient/Family Education  Outcome: Progressing Towards Goal     Problem: Pain  Goal: *Control of Pain  Outcome: Progressing Towards Goal  Goal: *PALLIATIVE CARE:  Alleviation of Pain  Outcome: Progressing Towards Goal     Problem: Patient Education: Go to Patient Education Activity  Goal: Patient/Family Education  Outcome: Progressing Towards Goal     Problem: Pneumonia: Discharge Outcomes  Goal: *Demonstrates progressive activity  Outcome: Progressing Towards Goal  Goal: *Describes follow-up/return visits to physicians  Outcome: Progressing Towards Goal  Goal: *Verbalizes name, dosage, time, side effects, and number of days to continue medications  Outcome: Progressing Towards Goal  Goal: *Influenza immunization  Outcome: Progressing Towards Goal  Goal: *Pneumococcal immunization  Outcome: Progressing Towards Goal  Goal: *Respiratory status at baseline  Outcome: Progressing Towards Goal  Goal: *Vital signs within defined limits  Outcome: Progressing Towards Goal  Goal: *Describes available resources and support systems  Outcome: Progressing Towards Goal     Problem: Risk for Spread of Infection  Goal: Prevent transmission of infectious organism to others  Description: Prevent the transmission of infectious organisms to other patients, staff members, and visitors.   Outcome: Progressing Towards Goal     Problem: Patient Education:  Go to Education Activity  Goal: Patient/Family Education  Outcome: Progressing Towards Goal     Problem: Nutrition Deficit  Goal: *Optimize nutritional status  Outcome: Progressing Towards Goal     Problem: Diabetes Maintenance:Admission  Goal: *Blood glucose 80 to 180 mg/dl  Outcome: Progressing Towards Goal

## 2020-04-30 NOTE — ROUTINE PROCESS
0577 Received verbal report outside patient's room d/t COVID status. Assumed care of patient from off going nurse. Patient resting in bed. No distress noted. Patient currently on hi flow 35L FiO2 55% Sats %. Call bell within reach, siderails up x 3, bed in lowest position, and patient instructed to use sam l benitez for assistance. Will continue to monitor. [id number] Patient sleeping in prone position. Tolerating well. No distress noted. Sats %. [id number] Verbal shift change report given to Madison Scott RN (oncoming nurse) by Casey Tracy RN(offgoing nurse).  Report included the following information SBAR, Intake/Output, MAR, Recent Results and Cardiac Rhythm SR.

## 2020-05-01 LAB
ALBUMIN SERPL-MCNC: 2.8 G/DL (ref 3.4–5)
ALBUMIN/GLOB SERPL: 0.8 {RATIO} (ref 0.8–1.7)
ALP SERPL-CCNC: 60 U/L (ref 45–117)
ALT SERPL-CCNC: 125 U/L (ref 16–61)
ANION GAP SERPL CALC-SCNC: 3 MMOL/L (ref 3–18)
AST SERPL-CCNC: 71 U/L (ref 10–38)
BASOPHILS # BLD: 0 K/UL (ref 0–0.06)
BASOPHILS NFR BLD: 0 % (ref 0–3)
BILIRUB SERPL-MCNC: 0.3 MG/DL (ref 0.2–1)
BUN SERPL-MCNC: 14 MG/DL (ref 7–18)
BUN/CREAT SERPL: 15 (ref 12–20)
CALCIUM SERPL-MCNC: 7.7 MG/DL (ref 8.5–10.1)
CHLORIDE SERPL-SCNC: 106 MMOL/L (ref 100–111)
CO2 SERPL-SCNC: 31 MMOL/L (ref 21–32)
CREAT SERPL-MCNC: 0.96 MG/DL (ref 0.6–1.3)
CRP SERPL-MCNC: <0.3 MG/DL (ref 0–0.3)
D DIMER PPP FEU-MCNC: 1.07 UG/ML(FEU)
DIFFERENTIAL METHOD BLD: ABNORMAL
EOSINOPHIL # BLD: 0.4 K/UL (ref 0–0.4)
EOSINOPHIL NFR BLD: 2 % (ref 0–5)
ERYTHROCYTE [DISTWIDTH] IN BLOOD BY AUTOMATED COUNT: 16.7 % (ref 11.6–14.5)
FERRITIN SERPL-MCNC: 442 NG/ML (ref 8–388)
GLOBULIN SER CALC-MCNC: 3.4 G/DL (ref 2–4)
GLUCOSE SERPL-MCNC: 138 MG/DL (ref 74–99)
HCT VFR BLD AUTO: 35.6 % (ref 36–48)
HGB BLD-MCNC: 11 G/DL (ref 13–16)
LDH SERPL L TO P-CCNC: 292 U/L (ref 81–234)
LYMPHOCYTES # BLD: 12.7 K/UL (ref 0.8–3.5)
LYMPHOCYTES NFR BLD: 72 % (ref 20–51)
MAGNESIUM SERPL-MCNC: 2 MG/DL (ref 1.6–2.6)
MCH RBC QN AUTO: 24.7 PG (ref 24–34)
MCHC RBC AUTO-ENTMCNC: 30.9 G/DL (ref 31–37)
MCV RBC AUTO: 79.8 FL (ref 74–97)
MONOCYTES # BLD: 0.4 K/UL (ref 0–1)
MONOCYTES NFR BLD: 2 % (ref 2–9)
NEUTS BAND NFR BLD MANUAL: 1 % (ref 0–5)
NEUTS SEG # BLD: 4.2 K/UL (ref 1.8–8)
NEUTS SEG NFR BLD: 23 % (ref 42–75)
PLATELET # BLD AUTO: 249 K/UL (ref 135–420)
PLATELET COMMENTS,PCOM: ABNORMAL
PMV BLD AUTO: 10.6 FL (ref 9.2–11.8)
POTASSIUM SERPL-SCNC: 4.1 MMOL/L (ref 3.5–5.5)
PROT SERPL-MCNC: 6.2 G/DL (ref 6.4–8.2)
RBC # BLD AUTO: 4.46 M/UL (ref 4.7–5.5)
RBC MORPH BLD: ABNORMAL
RBC MORPH BLD: ABNORMAL
SODIUM SERPL-SCNC: 140 MMOL/L (ref 136–145)
WBC # BLD AUTO: 17.7 K/UL (ref 4.6–13.2)
WBC MORPH BLD: ABNORMAL

## 2020-05-01 PROCEDURE — 77010033711 HC HIGH FLOW OXYGEN

## 2020-05-01 PROCEDURE — 74011250637 HC RX REV CODE- 250/637: Performed by: INTERNAL MEDICINE

## 2020-05-01 PROCEDURE — 83615 LACTATE (LD) (LDH) ENZYME: CPT

## 2020-05-01 PROCEDURE — 36415 COLL VENOUS BLD VENIPUNCTURE: CPT

## 2020-05-01 PROCEDURE — 83735 ASSAY OF MAGNESIUM: CPT

## 2020-05-01 PROCEDURE — 74011250636 HC RX REV CODE- 250/636: Performed by: INTERNAL MEDICINE

## 2020-05-01 PROCEDURE — 74011250637 HC RX REV CODE- 250/637: Performed by: HOSPITALIST

## 2020-05-01 PROCEDURE — 82728 ASSAY OF FERRITIN: CPT

## 2020-05-01 PROCEDURE — 85025 COMPLETE CBC W/AUTO DIFF WBC: CPT

## 2020-05-01 PROCEDURE — 80053 COMPREHEN METABOLIC PANEL: CPT

## 2020-05-01 PROCEDURE — 87635 SARS-COV-2 COVID-19 AMP PRB: CPT

## 2020-05-01 PROCEDURE — 85379 FIBRIN DEGRADATION QUANT: CPT

## 2020-05-01 PROCEDURE — 86140 C-REACTIVE PROTEIN: CPT

## 2020-05-01 PROCEDURE — 65660000000 HC RM CCU STEPDOWN

## 2020-05-01 RX ADMIN — GUAIFENESIN 600 MG: 600 TABLET, EXTENDED RELEASE ORAL at 20:21

## 2020-05-01 RX ADMIN — ASPIRIN 81 MG: 81 TABLET, COATED ORAL at 08:45

## 2020-05-01 RX ADMIN — Medication 500 MG: at 08:45

## 2020-05-01 RX ADMIN — CHOLECALCIFEROL (VITAMIN D3) 10 MCG (400 UNIT) TABLET 1 TABLET: at 08:45

## 2020-05-01 RX ADMIN — GUAIFENESIN 600 MG: 600 TABLET, EXTENDED RELEASE ORAL at 08:46

## 2020-05-01 RX ADMIN — DICLOFENAC 4 G: 10 GEL TOPICAL at 09:00

## 2020-05-01 RX ADMIN — CARVEDILOL 12.5 MG: 12.5 TABLET, FILM COATED ORAL at 08:46

## 2020-05-01 RX ADMIN — MELATONIN TAB 3 MG 6 MG: 3 TAB at 20:21

## 2020-05-01 RX ADMIN — CARVEDILOL 12.5 MG: 12.5 TABLET, FILM COATED ORAL at 20:21

## 2020-05-01 RX ADMIN — Medication 10 ML: at 04:10

## 2020-05-01 RX ADMIN — DICLOFENAC 4 G: 10 GEL TOPICAL at 15:00

## 2020-05-01 RX ADMIN — Medication 10 ML: at 20:23

## 2020-05-01 RX ADMIN — Medication 10 ML: at 12:34

## 2020-05-01 RX ADMIN — Medication 500 MG: at 20:21

## 2020-05-01 RX ADMIN — TENOFOVIR DISPROXIL FUMARATE 300 MG: 300 TABLET ORAL at 09:00

## 2020-05-01 RX ADMIN — FAMOTIDINE 20 MG: 20 TABLET ORAL at 08:45

## 2020-05-01 RX ADMIN — EMTRICITABINE 200 MG: 200 CAPSULE ORAL at 09:00

## 2020-05-01 RX ADMIN — ATORVASTATIN CALCIUM 40 MG: 40 TABLET, FILM COATED ORAL at 08:45

## 2020-05-01 RX ADMIN — DICLOFENAC 4 G: 10 GEL TOPICAL at 13:00

## 2020-05-01 RX ADMIN — ENOXAPARIN SODIUM 40 MG: 40 INJECTION SUBCUTANEOUS at 08:45

## 2020-05-01 NOTE — PROGRESS NOTES
Discharge planning    Chart reviewed. COVID + and remains on high flow oxygen at this time. CM will continue to monitor for transitional needs for discharge.     MICHEAL AlemanN, RN  Pager # [phone]  Care Manager

## 2020-05-01 NOTE — ROUTINE PROCESS
Bedside shift change report given to Andriy Sandoval (oncoming nurse) by Demar Ivey RN (offgoing nurse). Report included the following information SBAR, Kardex, Intake/Output and MAR.

## 2020-05-01 NOTE — PROGRESS NOTES
Problem: Breathing Pattern - Ineffective  Goal: *Absence of hypoxia  Outcome: Progressing Towards Goal  Goal: *Use of effective breathing techniques  Outcome: Progressing Towards Goal  Goal: *PALLIATIVE CARE:  Alleviation of Dyspnea  Outcome: Progressing Towards Goal     Problem: Patient Education: Go to Patient Education Activity  Goal: Patient/Family Education  Outcome: Progressing Towards Goal     Problem: Diabetes Self-Management  Goal: *Disease process and treatment process  Description: Define diabetes and identify own type of diabetes; list 3 options for treating diabetes. Outcome: Progressing Towards Goal  Goal: *Incorporating nutritional management into lifestyle  Description: Describe effect of type, amount and timing of food on blood glucose; list 3 methods for planning meals. Outcome: Progressing Towards Goal  Goal: *Incorporating physical activity into lifestyle  Description: State effect of exercise on blood glucose levels. Outcome: Progressing Towards Goal  Goal: *Developing strategies to promote health/change behavior  Description: Define the ABC's of diabetes; identify appropriate screenings, schedule and personal plan for screenings. Outcome: Progressing Towards Goal  Goal: *Using medications safely  Description: State effect of diabetes medications on diabetes; name diabetes medication taking, action and side effects. Outcome: Progressing Towards Goal  Goal: *Monitoring blood glucose, interpreting and using results  Description: Identify recommended blood glucose targets  and personal targets. Outcome: Progressing Towards Goal  Goal: *Prevention, detection, treatment of acute complications  Description: List symptoms of hyper- and hypoglycemia; describe how to treat low blood sugar and actions for lowering  high blood glucose level.   Outcome: Progressing Towards Goal  Goal: *Prevention, detection and treatment of chronic complications  Description: Define the natural course of diabetes and describe the relationship of blood glucose levels to long term complications of diabetes. Outcome: Progressing Towards Goal  Goal: *Developing strategies to address psychosocial issues  Description: Describe feelings about living with diabetes; identify support needed and support network  Outcome: Progressing Towards Goal  Goal: *Insulin pump training  Outcome: Progressing Towards Goal  Goal: *Sick day guidelines  Outcome: Progressing Towards Goal  Goal: *Patient Specific Goal (EDIT GOAL, INSERT TEXT)  Outcome: Progressing Towards Goal     Problem: Patient Education: Go to Patient Education Activity  Goal: Patient/Family Education  Outcome: Progressing Towards Goal     Problem: Falls - Risk of  Goal: *Absence of Falls  Description: Document Salvatore Fall Risk and appropriate interventions in the flowsheet. Outcome: Progressing Towards Goal  Note: Fall Risk Interventions:  Mobility Interventions: Assess mobility with egress test         Medication Interventions: Evaluate medications/consider consulting pharmacy, Teach patient to arise slowly    Elimination Interventions: Toilet paper/wipes in reach, Elevated toilet seat, Call light in reach              Problem: Patient Education: Go to Patient Education Activity  Goal: Patient/Family Education  Outcome: Progressing Towards Goal     Problem: Diabetes Maintenance:Admission  Goal: *Blood glucose 80 to 180 mg/dl  Outcome: Progressing Towards Goal     Problem: Pressure Injury - Risk of  Goal: *Prevention of pressure injury  Description: Document Saroj Scale and appropriate interventions in the flowsheet.   Outcome: Progressing Towards Goal  Note: Pressure Injury Interventions:  Sensory Interventions: Assess changes in LOC         Activity Interventions: Assess need for specialty bed, Pressure redistribution bed/mattress(bed type)    Mobility Interventions: HOB 30 degrees or less    Nutrition Interventions: Document food/fluid/supplement intake Problem: Patient Education: Go to Patient Education Activity  Goal: Patient/Family Education  Outcome: Progressing Towards Goal     Problem: Pain  Goal: *Control of Pain  Outcome: Progressing Towards Goal  Goal: *PALLIATIVE CARE:  Alleviation of Pain  Outcome: Progressing Towards Goal     Problem: Patient Education: Go to Patient Education Activity  Goal: Patient/Family Education  Outcome: Progressing Towards Goal     Problem: Pneumonia: Discharge Outcomes  Goal: *Demonstrates progressive activity  Outcome: Progressing Towards Goal  Goal: *Describes follow-up/return visits to physicians  Outcome: Progressing Towards Goal  Goal: *Verbalizes name, dosage, time, side effects, and number of days to continue medications  Outcome: Progressing Towards Goal  Goal: *Influenza immunization  Outcome: Progressing Towards Goal  Goal: *Pneumococcal immunization  Outcome: Progressing Towards Goal  Goal: *Respiratory status at baseline  Outcome: Progressing Towards Goal  Goal: *Vital signs within defined limits  Outcome: Progressing Towards Goal  Goal: *Describes available resources and support systems  Outcome: Progressing Towards Goal     Problem: Risk for Spread of Infection  Goal: Prevent transmission of infectious organism to others  Description: Prevent the transmission of infectious organisms to other patients, staff members, and visitors.   Outcome: Progressing Towards Goal     Problem: Patient Education:  Go to Education Activity  Goal: Patient/Family Education  Outcome: Progressing Towards Goal     Problem: Nutrition Deficit  Goal: *Optimize nutritional status  Outcome: Progressing Towards Goal

## 2020-05-01 NOTE — PROGRESS NOTES
Infectious Disease progress Note        Reason: sepsis, COVID-19 infection    Current abx Prior abx     Emtricitabine/ tenofovir since 4/24 Ceftriaxone, azithromycin 4/16/20-4/18/2020  Hydroxychloroquine  4/16/20-4/20/20  Cefepime, doxycycline  4/18/2020-4/24  ribavarin since 4/21-4/24     Lines:       Assessment :    60 y.o. male  with history of CLL, type 2 diabetes, prostate cancer, history of atrial fibrillation with ablation, hypertension, CKD stage 3 who presented to the emergency room on 4/16/2020 secondary to shortness of breath. CXR: mild opacities at lung bases. Now with increasing shortness of breath, hypoxia requiring oxygen, worsening renal function    Patient presents with a highly complex clinical picture. Clinical picture consistent with sepsis (present on admission ) due to bilateral  covid-19 infection. Labs 4/16 reviewed-ferritin 233, C-reactive protein 6.3, , , d-dimer 0.75  Labs 4/17 reviewed- ferritin 266, C-reactive protein 8.0, , LDH: 219, d-dimer 1.20   Labs 4/19 reviewed-ferritin 346, C-reactive protein 10.4, , , d-dimer 0.84  Labs on 4/20 reviewed-ferritin 628, C-reactive protein 6.2, , , d-dimer 0.7  Labs on 4/21 reviewed ferritin 628, CRP 3, , , d-dimer 0.49  Labs on 4/22 reviewed ferritin 653, CRP 1.8, , , d-dimer 0.73  Labs on 4/23 reviewed ferritin 652, CRP 1.2, , , d-dimer 0.64  Labs on 4/24 reviewed ferritin 644, ,D-dimer 0.65  Labs on 4/27 reviewed ferritin 497, CK: 71, d-dimer 0.87  Labs on 4/29 reviewed ferritin 425, CRP less than 0.3, d-dimer 1.77  Labs on 5/1 reviewed ferritin 442, CRP less than 0.3, , d-dimer 1.07    Worsening respiratory status 4/18-4/19 with worsening inflammatory parameters suggestive of cytokine storm. Status post Solu-Medrol since 4/18. Status post tocilizumab on 4/19.     Sputum culture 4/18 light normal respiratory giselle, yeast    Acute on chronic renal insufficiency-could be prerenal versus ATN secondary to sepsis. Nephrology consult appreciated. Creatinine stable    EKG 4/16-QTc 416, 456 on 4/20, 431 on 4/21    Procalcitonin 0.38 on 4/20    Chest x-ray 4/21-worsening bilateral infiltrates suggestive of ARDS. Chest x-ray 4/22, 4/24/20-some improvement in the infiltrates. Started on emtricitabine/tenofovir on 4/24/2020    Subjective sense of improvement. Repeat COVID test 4/26/2020 positive. Appeared comfortable  when I examined him today. Chest x-ray 4/29 reviewed. No significant change compared to prior chest x-ray 4/24    Recommendations:    1. Continue emtricitabine/tenofovir  2. Continue, ascorbic acid. vitamin D3, melatonin. 3.  Recommend prone position ventilation. I discussed this with patient. 4.   Follow-up nephrology/pulmonary recommendations  5. Titrate oxygen as tolerated  6. Hopefully discharge patient once transition to nasal cannula  7. Repeat COVID-19 test      above plan was discussed in details with patient, dr. Cm Sep   please call me if any further questions or concerns. Will continue to participate in the care of this patient. HPI:    Feels much better. States that he wants to go home patient denies headaches, visual disturbances, sore throat, runny nose, earaches, abdominal pain, diarrhea, burning micturition, pain or weakness in extremities. He denies back pain/flank pain. home Medication List    Details   BYDUREON BCISE 2 mg/0.85 mL atIn INJECT 2MG EVERY WEEK UNDER THE SKIN  Refills: 0      ergocalciferol (ERGOCALCIFEROL) 50,000 unit capsule Take 1 Cap by mouth every seven (7) days. Qty: 24 Cap, Refills: 1    Associated Diagnoses: Vitamin D deficiency      sitagliptin phos/metformin HCl (JANUMET PO) Take  by mouth.      lisinopril (PRINIVIL, ZESTRIL) 5 mg tablet Take 1 Tab by mouth daily.   Qty: 30 Tab, Refills: 6      carvedilol (COREG) 12.5 mg tablet Take 1 Tab by mouth two (2) times a day. Qty: 60 Tab, Refills: 6      simvastatin (ZOCOR) 10 mg tablet Take  by mouth daily. aspirin delayed-release 81 mg tablet Take 81 mg by mouth daily. Current Facility-Administered Medications   Medication Dose Route Frequency    insulin lispro (HUMALOG) injection   SubCUTAneous DAILY PRN    enoxaparin (LOVENOX) injection 40 mg  40 mg SubCUTAneous Q24H    emtricitabine (EMTRIVA) capsule 200 mg  200 mg Oral DAILY    tenofovir DISOPROXIL FUMARATE (VIREAD) tablet 300 mg  300 mg Oral DAILY    guaiFENesin ER (MUCINEX) tablet 600 mg  600 mg Oral Q12H    diclofenac (VOLTAREN) 1 % topical gel 4 g  4 g Topical QID    melatonin tablet 6 mg  6 mg Oral QHS    famotidine (PEPCID) tablet 20 mg  20 mg Oral BID    LORazepam (ATIVAN) tablet 1 mg  1 mg Oral Q6H PRN    atorvastatin (LIPITOR) tablet 40 mg  40 mg Oral DAILY    sodium chloride (NS) flush 5-40 mL  5-40 mL IntraVENous Q8H    aspirin delayed-release tablet 81 mg  81 mg Oral DAILY    carvediloL (COREG) tablet 12.5 mg  12.5 mg Oral BID    ascorbic acid (vitamin C) (VITAMIN C) tablet 500 mg  500 mg Oral BID    glucose chewable tablet 16 g  4 Tab Oral PRN    glucagon (GLUCAGEN) injection 1 mg  1 mg IntraMUSCular PRN    dextrose 10% infusion 125-250 mL  125-250 mL IntraVENous PRN    acetaminophen (TYLENOL) tablet 650 mg  650 mg Oral Q6H PRN    cholecalciferol (VITAMIN D3) (400 Units /10 mcg) tablet 1 Tab  400 Units Oral DAILY       Allergies: Patient has no known allergies. Temp (24hrs), Av °F (36.7 °C), Min:97.3 °F (36.3 °C), Max:98.9 °F (37.2 °C)    Visit Vitals  /78 (BP 1 Location: Left arm, BP Patient Position: Sitting)   Pulse 70   Temp 98.2 °F (36.8 °C)   Resp 15   Ht 5' 9\" (1.753 m)   Wt 78.4 kg (172 lb 13.5 oz)   SpO2 97%   BMI 25.52 kg/m²       ROS: 12 point ROS obtained in details.  Pertinent positives as mentioned in HPI,   otherwise negative    Physical Exam:    General: Well developed, well nourished male sitting on the bed AAOx3. On high flow oxygen    General:   awake alert and oriented   HEENT:  Normocephalic, atraumatic, PERRL, EOMI, no scleral icterus or pallor; no conjunctival hemmohage   Lymph Nodes:   no cervical, axillary or inguinal adenopathy   Lungs:   non-labored, bilaterally clear to auscultation- no crackles wheezes rales or rhonchi   Heart:  RRR, s1 and s2; no rubs or gallops, no edema, + pedal pulses   Abdomen:  soft, non-distended, active bowel sounds, no hepatomegaly, no splenomegaly. Non-tender   Genitourinary:  deferred   Extremities:   no clubbing, cyanosis; no joint effusions or swelling; Full ROM of all large joints to the upper and lower extremities; muscle mass appropriate for age   Neurologic:  No gross focal motor or sensory abnormalities. Speech appropriate. Cranial nerves intact                        Skin:  No rash or ulcers noted   Back:   not examined     Psychiatric:  No suicidal or homicidal ideations, appropriate mood and affect         Labs: Results:   Chemistry Recent Labs     05/01/20  0450 04/29/20  0350   * 94    137   K 4.1 4.1    103   CO2 31 28   BUN 14 14   CREA 0.96 0.79   CA 7.7* 7.9*   AGAP 3 6   BUCR 15 18   AP 60 50   TP 6.2* 6.3*   ALB 2.8* 2.8*   GLOB 3.4 3.5   AGRAT 0.8 0.8      CBC w/Diff Recent Labs     05/01/20  0450 04/29/20  0350   WBC 17.7* 17.8*   RBC 4.46* 4.50*   HGB 11.0* 11.0*   HCT 35.6* 35.9*    284   GRANS 23* 24*   LYMPH 72* 71*   EOS 2 1      Microbiology No results for input(s): CULT in the last 72 hours.        RADIOLOGY:    All available imaging studies/reports in [facility] for this admission were reviewed    Dr. Tamir Kingsley, Infectious Disease Specialist  [phone]  May 1, 2020  9:57 AM

## 2020-05-01 NOTE — PROGRESS NOTES
Hospitalist Progress Note    Patient: Shady Victor Age: 60 y.o. : 1960 MR#: [id number] SSN: [id number]  Date/Time: 2020       Subjective: In order to reduce the transmission of disease, I have talked to patient's over the phone to get history and discuss the plan of care and spoke to Dr. Amber Garza about examination findings as patient was evaluated by her in person under strict droplet plus precaution. Patient is feeling much better. Continues to have shortness of breath but no cough. Denies any chest pain and abdominal pain. Objective:     BP 92/77 (BP 1 Location: Left arm, BP Patient Position: Sitting)   Pulse 72   Temp 97.5 °F (36.4 °C)   Resp 15   Ht 5' 9\" (1.753 m)   Wt 78.4 kg (172 lb 13.5 oz)   SpO2 97%   BMI 25.52 kg/m²         Assessment/Plan:     1. COVID-19 pneumonia with suspected superimposed bacterial pneumonia   2. Sepsis POA due to COVID-19 infection, stable  3. Acute hypoxic respiratory failure due to COVID-19 infection, stable  4. ARDS, stable  5. JOSE on CKD3 associated with sepsis, hypovolemia, resolved   6. Leukocytosis in the setting of steroid, stable  7. H/O CLL, not treated previously   8. Hyperglycemia with DM2, HgbA1c 8.2%   9. Hyperlipidemia   10. Hypertension   11. Hypovolemic hyponatremia, resolved   12. Hypoalbuminemia   13. Chronic back pain     PLAN:    Continue current management  Discussed with ID: We will continue current management. Patient does not want any experimental medications and want to continue the current treatment plan. Chest x-ray from 2020 report reviewed.     Case discussed with:  [x]Patient  [x]Family  [x]Nursing  [x]Case Management  DVT Prophylaxis:  [x]Lovenox  []Hep SQ  []SCDs  []Coumadin   []On Heparin gtt    Recent Results (from the past 24 hour(s))   CBC WITH AUTOMATED DIFF    Collection Time: 20  4:50 AM   Result Value Ref Range    WBC 17.7 (H) 4.6 - 13.2 K/uL    RBC 4.46 (L) 4.70 - 5.50 M/uL HGB 11.0 (L) 13.0 - 16.0 g/dL    HCT 35.6 (L) 36.0 - 48.0 %    MCV 79.8 74.0 - 97.0 FL    MCH 24.7 24.0 - 34.0 PG    MCHC 30.9 (L) 31.0 - 37.0 g/dL    RDW 16.7 (H) 11.6 - 14.5 %    PLATELET 709 696 - 943 K/uL    MPV 10.6 9.2 - 11.8 FL    NEUTROPHILS 23 (L) 42 - 75 %    BAND NEUTROPHILS 1 0 - 5 %    LYMPHOCYTES 72 (H) 20 - 51 %    MONOCYTES 2 2 - 9 %    EOSINOPHILS 2 0 - 5 %    BASOPHILS 0 0 - 3 %    ABS. NEUTROPHILS 4.2 1.8 - 8.0 K/UL    ABS. LYMPHOCYTES 12.7 (H) 0.8 - 3.5 K/UL    ABS. MONOCYTES 0.4 0 - 1.0 K/UL    ABS. EOSINOPHILS 0.4 0.0 - 0.4 K/UL    ABS. BASOPHILS 0.0 0.0 - 0.06 K/UL    DF MANUAL      PLATELET COMMENTS ADEQUATE PLATELETS      RBC COMMENTS ANISOCYTOSIS  1+        RBC COMMENTS MICROCYTOSIS  1+        WBC COMMENTS SMUDGE CELLS     METABOLIC PANEL, COMPREHENSIVE    Collection Time: 05/01/20  4:50 AM   Result Value Ref Range    Sodium 140 136 - 145 mmol/L    Potassium 4.1 3.5 - 5.5 mmol/L    Chloride 106 100 - 111 mmol/L    CO2 31 21 - 32 mmol/L    Anion gap 3 3.0 - 18 mmol/L    Glucose 138 (H) 74 - 99 mg/dL    BUN 14 7.0 - 18 MG/DL    Creatinine 0.96 0.6 - 1.3 MG/DL    BUN/Creatinine ratio 15 12 - 20      GFR est AA >60 >60 ml/min/1.73m2    GFR est non-AA >60 >60 ml/min/1.73m2    Calcium 7.7 (L) 8.5 - 10.1 MG/DL    Bilirubin, total 0.3 0.2 - 1.0 MG/DL    ALT (SGPT) 125 (H) 16 - 61 U/L    AST (SGOT) 71 (H) 10 - 38 U/L    Alk.  phosphatase 60 45 - 117 U/L    Protein, total 6.2 (L) 6.4 - 8.2 g/dL    Albumin 2.8 (L) 3.4 - 5.0 g/dL    Globulin 3.4 2.0 - 4.0 g/dL    A-G Ratio 0.8 0.8 - 1.7     MAGNESIUM    Collection Time: 05/01/20  4:50 AM   Result Value Ref Range    Magnesium 2.0 1.6 - 2.6 mg/dL   LD    Collection Time: 05/01/20  4:50 AM   Result Value Ref Range     (H) 81 - 234 U/L   FERRITIN    Collection Time: 05/01/20  4:50 AM   Result Value Ref Range    Ferritin 442 (H) 8 - 388 NG/ML   C REACTIVE PROTEIN, QT    Collection Time: 05/01/20  4:50 AM   Result Value Ref Range    C-Reactive protein <0.3 0 - 0.3 mg/dL   D DIMER    Collection Time: 05/01/20  4:50 AM   Result Value Ref Range    D DIMER 1.07 (H) <0.46 ug/ml(FEU)         Signed By: Anisha Unger MD     May 1, 2020

## 2020-05-01 NOTE — PROGRESS NOTES
0720: Verbal shift report received from KATHERINE Estes (Outgoing nurse). Assumed pt care. No distress noted. Will monitor. 1907: Bedside shift change report given to KATHERINE Estes (oncoming nurse) by Bishop Justyn RN (offgoing nurse). Report included the following information SBAR, Kardex, Intake/Output, MAR, Cardiac Rhythm NSR and Alarm Parameters .

## 2020-05-01 NOTE — PROGRESS NOTES
Problem: Breathing Pattern - Ineffective  Goal: *Absence of hypoxia  Outcome: Progressing Towards Goal  Goal: *Use of effective breathing techniques  Outcome: Progressing Towards Goal  Goal: *PALLIATIVE CARE:  Alleviation of Dyspnea  Outcome: Progressing Towards Goal     Problem: Patient Education: Go to Patient Education Activity  Goal: Patient/Family Education  Outcome: Progressing Towards Goal     Problem: Diabetes Self-Management  Goal: *Disease process and treatment process  Description: Define diabetes and identify own type of diabetes; list 3 options for treating diabetes. Outcome: Progressing Towards Goal  Goal: *Incorporating nutritional management into lifestyle  Description: Describe effect of type, amount and timing of food on blood glucose; list 3 methods for planning meals. Outcome: Progressing Towards Goal  Goal: *Incorporating physical activity into lifestyle  Description: State effect of exercise on blood glucose levels. Outcome: Progressing Towards Goal  Goal: *Developing strategies to promote health/change behavior  Description: Define the ABC's of diabetes; identify appropriate screenings, schedule and personal plan for screenings. Outcome: Progressing Towards Goal  Goal: *Using medications safely  Description: State effect of diabetes medications on diabetes; name diabetes medication taking, action and side effects. Outcome: Progressing Towards Goal  Goal: *Monitoring blood glucose, interpreting and using results  Description: Identify recommended blood glucose targets  and personal targets. Outcome: Progressing Towards Goal  Goal: *Prevention, detection, treatment of acute complications  Description: List symptoms of hyper- and hypoglycemia; describe how to treat low blood sugar and actions for lowering  high blood glucose level.   Outcome: Progressing Towards Goal  Goal: *Prevention, detection and treatment of chronic complications  Description: Define the natural course of diabetes and describe the relationship of blood glucose levels to long term complications of diabetes. Outcome: Progressing Towards Goal  Goal: *Developing strategies to address psychosocial issues  Description: Describe feelings about living with diabetes; identify support needed and support network  Outcome: Progressing Towards Goal  Goal: *Insulin pump training  Outcome: Progressing Towards Goal  Goal: *Sick day guidelines  Outcome: Progressing Towards Goal  Goal: *Patient Specific Goal (EDIT GOAL, INSERT TEXT)  Outcome: Progressing Towards Goal     Problem: Patient Education: Go to Patient Education Activity  Goal: Patient/Family Education  Outcome: Progressing Towards Goal     Problem: Falls - Risk of  Goal: *Absence of Falls  Description: Document Salvatore Fall Risk and appropriate interventions in the flowsheet. Outcome: Progressing Towards Goal  Note: Fall Risk Interventions:  Mobility Interventions: Patient to call before getting OOB         Medication Interventions: Patient to call before getting OOB    Elimination Interventions: Call light in reach, Patient to call for help with toileting needs              Problem: Patient Education: Go to Patient Education Activity  Goal: Patient/Family Education  Outcome: Progressing Towards Goal     Problem: Diabetes Maintenance:Admission  Goal: *Blood glucose 80 to 180 mg/dl  Outcome: Progressing Towards Goal     Problem: Pressure Injury - Risk of  Goal: *Prevention of pressure injury  Description: Document Saroj Scale and appropriate interventions in the flowsheet.   Outcome: Progressing Towards Goal  Note: Pressure Injury Interventions:  Sensory Interventions: Assess changes in LOC         Activity Interventions: Pressure redistribution bed/mattress(bed type)    Mobility Interventions: HOB 30 degrees or less    Nutrition Interventions: Document food/fluid/supplement intake                     Problem: Patient Education: Go to Patient Education Activity  Goal: Patient/Family Education  Outcome: Progressing Towards Goal     Problem: Pain  Goal: *Control of Pain  Outcome: Progressing Towards Goal  Goal: *PALLIATIVE CARE:  Alleviation of Pain  Outcome: Progressing Towards Goal     Problem: Patient Education: Go to Patient Education Activity  Goal: Patient/Family Education  Outcome: Progressing Towards Goal     Problem: Pneumonia: Discharge Outcomes  Goal: *Demonstrates progressive activity  Outcome: Progressing Towards Goal  Goal: *Describes follow-up/return visits to physicians  Outcome: Progressing Towards Goal  Goal: *Verbalizes name, dosage, time, side effects, and number of days to continue medications  Outcome: Progressing Towards Goal  Goal: *Influenza immunization  Outcome: Progressing Towards Goal  Goal: *Pneumococcal immunization  Outcome: Progressing Towards Goal  Goal: *Respiratory status at baseline  Outcome: Progressing Towards Goal  Goal: *Vital signs within defined limits  Outcome: Progressing Towards Goal  Goal: *Describes available resources and support systems  Outcome: Progressing Towards Goal     Problem: Risk for Spread of Infection  Goal: Prevent transmission of infectious organism to others  Description: Prevent the transmission of infectious organisms to other patients, staff members, and visitors.   Outcome: Progressing Towards Goal     Problem: Patient Education:  Go to Education Activity  Goal: Patient/Family Education  Outcome: Progressing Towards Goal     Problem: Nutrition Deficit  Goal: *Optimize nutritional status  Outcome: Progressing Towards Goal

## 2020-05-02 LAB
CRP SERPL-MCNC: <0.3 MG/DL (ref 0–0.3)
D DIMER PPP FEU-MCNC: 0.91 UG/ML(FEU)
FERRITIN SERPL-MCNC: 402 NG/ML (ref 8–388)
LDH SERPL L TO P-CCNC: 273 U/L (ref 81–234)

## 2020-05-02 PROCEDURE — 74011000250 HC RX REV CODE- 250: Performed by: INTERNAL MEDICINE

## 2020-05-02 PROCEDURE — 36415 COLL VENOUS BLD VENIPUNCTURE: CPT

## 2020-05-02 PROCEDURE — 74011250637 HC RX REV CODE- 250/637: Performed by: INTERNAL MEDICINE

## 2020-05-02 PROCEDURE — 86140 C-REACTIVE PROTEIN: CPT

## 2020-05-02 PROCEDURE — 74011250637 HC RX REV CODE- 250/637: Performed by: HOSPITALIST

## 2020-05-02 PROCEDURE — 94762 N-INVAS EAR/PLS OXIMTRY CONT: CPT

## 2020-05-02 PROCEDURE — 85379 FIBRIN DEGRADATION QUANT: CPT

## 2020-05-02 PROCEDURE — 82728 ASSAY OF FERRITIN: CPT

## 2020-05-02 PROCEDURE — 65660000000 HC RM CCU STEPDOWN

## 2020-05-02 PROCEDURE — 83615 LACTATE (LD) (LDH) ENZYME: CPT

## 2020-05-02 PROCEDURE — 74011250636 HC RX REV CODE- 250/636: Performed by: INTERNAL MEDICINE

## 2020-05-02 PROCEDURE — 77010033711 HC HIGH FLOW OXYGEN

## 2020-05-02 RX ORDER — IPRATROPIUM BROMIDE AND ALBUTEROL SULFATE 2.5; .5 MG/3ML; MG/3ML
3 SOLUTION RESPIRATORY (INHALATION)
Status: DISCONTINUED | OUTPATIENT
Start: 2020-05-02 | End: 2020-05-04

## 2020-05-02 RX ORDER — GLUCOSAM/CHONDRO/HERB 149/HYAL 750-100 MG
1 TABLET ORAL DAILY
Status: DISCONTINUED | OUTPATIENT
Start: 2020-05-03 | End: 2020-05-07 | Stop reason: HOSPADM

## 2020-05-02 RX ADMIN — ENOXAPARIN SODIUM 40 MG: 40 INJECTION SUBCUTANEOUS at 08:26

## 2020-05-02 RX ADMIN — Medication 500 MG: at 21:32

## 2020-05-02 RX ADMIN — CARVEDILOL 12.5 MG: 12.5 TABLET, FILM COATED ORAL at 21:30

## 2020-05-02 RX ADMIN — IPRATROPIUM BROMIDE AND ALBUTEROL SULFATE 3 ML: .5; 3 SOLUTION RESPIRATORY (INHALATION) at 21:29

## 2020-05-02 RX ADMIN — Medication 10 ML: at 15:00

## 2020-05-02 RX ADMIN — DICLOFENAC 4 G: 10 GEL TOPICAL at 09:00

## 2020-05-02 RX ADMIN — GUAIFENESIN 600 MG: 600 TABLET, EXTENDED RELEASE ORAL at 21:29

## 2020-05-02 RX ADMIN — FAMOTIDINE 20 MG: 20 TABLET ORAL at 21:29

## 2020-05-02 RX ADMIN — GUAIFENESIN 600 MG: 600 TABLET, EXTENDED RELEASE ORAL at 08:26

## 2020-05-02 RX ADMIN — TENOFOVIR DISPROXIL FUMARATE 300 MG: 300 TABLET ORAL at 09:00

## 2020-05-02 RX ADMIN — CHOLECALCIFEROL (VITAMIN D3) 10 MCG (400 UNIT) TABLET 1 TABLET: at 08:26

## 2020-05-02 RX ADMIN — FAMOTIDINE 20 MG: 20 TABLET ORAL at 08:26

## 2020-05-02 RX ADMIN — Medication 500 MG: at 08:26

## 2020-05-02 RX ADMIN — DICLOFENAC 4 G: 10 GEL TOPICAL at 15:00

## 2020-05-02 RX ADMIN — Medication 10 ML: at 21:30

## 2020-05-02 RX ADMIN — CARVEDILOL 12.5 MG: 12.5 TABLET, FILM COATED ORAL at 08:26

## 2020-05-02 RX ADMIN — ASPIRIN 81 MG: 81 TABLET, COATED ORAL at 08:27

## 2020-05-02 RX ADMIN — MELATONIN TAB 3 MG 6 MG: 3 TAB at 21:29

## 2020-05-02 RX ADMIN — ATORVASTATIN CALCIUM 40 MG: 40 TABLET, FILM COATED ORAL at 08:26

## 2020-05-02 RX ADMIN — DICLOFENAC 4 G: 10 GEL TOPICAL at 21:00

## 2020-05-02 RX ADMIN — EMTRICITABINE 200 MG: 200 CAPSULE ORAL at 09:00

## 2020-05-02 RX ADMIN — IPRATROPIUM BROMIDE AND ALBUTEROL SULFATE 3 ML: .5; 3 SOLUTION RESPIRATORY (INHALATION) at 16:00

## 2020-05-02 RX ADMIN — Medication 10 ML: at 08:27

## 2020-05-02 NOTE — PROGRESS NOTES
Problem: Breathing Pattern - Ineffective  Goal: *Absence of hypoxia  Outcome: Progressing Towards Goal  Goal: *Use of effective breathing techniques  Outcome: Progressing Towards Goal  Goal: *PALLIATIVE CARE:  Alleviation of Dyspnea  Outcome: Progressing Towards Goal     Problem: Patient Education: Go to Patient Education Activity  Goal: Patient/Family Education  Outcome: Progressing Towards Goal     Problem: Diabetes Self-Management  Goal: *Disease process and treatment process  Description: Define diabetes and identify own type of diabetes; list 3 options for treating diabetes. Outcome: Progressing Towards Goal  Goal: *Incorporating nutritional management into lifestyle  Description: Describe effect of type, amount and timing of food on blood glucose; list 3 methods for planning meals. Outcome: Progressing Towards Goal  Goal: *Incorporating physical activity into lifestyle  Description: State effect of exercise on blood glucose levels. Outcome: Progressing Towards Goal  Goal: *Developing strategies to promote health/change behavior  Description: Define the ABC's of diabetes; identify appropriate screenings, schedule and personal plan for screenings. Outcome: Progressing Towards Goal  Goal: *Using medications safely  Description: State effect of diabetes medications on diabetes; name diabetes medication taking, action and side effects. Outcome: Progressing Towards Goal  Goal: *Monitoring blood glucose, interpreting and using results  Description: Identify recommended blood glucose targets  and personal targets. Outcome: Progressing Towards Goal  Goal: *Prevention, detection, treatment of acute complications  Description: List symptoms of hyper- and hypoglycemia; describe how to treat low blood sugar and actions for lowering  high blood glucose level.   Outcome: Progressing Towards Goal  Goal: *Prevention, detection and treatment of chronic complications  Description: Define the natural course of diabetes and describe the relationship of blood glucose levels to long term complications of diabetes. Outcome: Progressing Towards Goal  Goal: *Developing strategies to address psychosocial issues  Description: Describe feelings about living with diabetes; identify support needed and support network  Outcome: Progressing Towards Goal  Goal: *Insulin pump training  Outcome: Progressing Towards Goal  Goal: *Sick day guidelines  Outcome: Progressing Towards Goal  Goal: *Patient Specific Goal (EDIT GOAL, INSERT TEXT)  Outcome: Progressing Towards Goal     Problem: Patient Education: Go to Patient Education Activity  Goal: Patient/Family Education  Outcome: Progressing Towards Goal     Problem: Falls - Risk of  Goal: *Absence of Falls  Description: Document Salvatore Fall Risk and appropriate interventions in the flowsheet. Outcome: Progressing Towards Goal  Note: Fall Risk Interventions:  Mobility Interventions: Patient to call before getting OOB         Medication Interventions: Patient to call before getting OOB    Elimination Interventions: Call light in reach, Patient to call for help with toileting needs              Problem: Patient Education: Go to Patient Education Activity  Goal: Patient/Family Education  Outcome: Progressing Towards Goal     Problem: Diabetes Maintenance:Admission  Goal: *Blood glucose 80 to 180 mg/dl  Outcome: Progressing Towards Goal     Problem: Pressure Injury - Risk of  Goal: *Prevention of pressure injury  Description: Document Saroj Scale and appropriate interventions in the flowsheet.   Outcome: Progressing Towards Goal  Note: Pressure Injury Interventions:  Sensory Interventions: Assess changes in LOC, Maintain/enhance activity level         Activity Interventions: Pressure redistribution bed/mattress(bed type)    Mobility Interventions: HOB 30 degrees or less    Nutrition Interventions: Document food/fluid/supplement intake                     Problem: Patient Education: Go to Patient Education Activity  Goal: Patient/Family Education  Outcome: Progressing Towards Goal     Problem: Pain  Goal: *Control of Pain  Outcome: Progressing Towards Goal  Goal: *PALLIATIVE CARE:  Alleviation of Pain  Outcome: Progressing Towards Goal     Problem: Patient Education: Go to Patient Education Activity  Goal: Patient/Family Education  Outcome: Progressing Towards Goal     Problem: Pneumonia: Discharge Outcomes  Goal: *Demonstrates progressive activity  Outcome: Progressing Towards Goal  Goal: *Describes follow-up/return visits to physicians  Outcome: Progressing Towards Goal  Goal: *Verbalizes name, dosage, time, side effects, and number of days to continue medications  Outcome: Progressing Towards Goal  Goal: *Influenza immunization  Outcome: Progressing Towards Goal  Goal: *Pneumococcal immunization  Outcome: Progressing Towards Goal  Goal: *Respiratory status at baseline  Outcome: Progressing Towards Goal  Goal: *Vital signs within defined limits  Outcome: Progressing Towards Goal  Goal: *Describes available resources and support systems  Outcome: Progressing Towards Goal     Problem: Risk for Spread of Infection  Goal: Prevent transmission of infectious organism to others  Description: Prevent the transmission of infectious organisms to other patients, staff members, and visitors.   Outcome: Progressing Towards Goal     Problem: Patient Education:  Go to Education Activity  Goal: Patient/Family Education  Outcome: Progressing Towards Goal     Problem: Nutrition Deficit  Goal: *Optimize nutritional status  Outcome: Progressing Towards Goal

## 2020-05-02 NOTE — ROUTINE PROCESS
Bedside shift change report given to Kylie Montiel RN (oncoming nurse) by Zoraida Warren RN (offgoing nurse). Report included the following information SBAR, Kardex, Intake/Output, Recent Results and Cardiac Rhythm NSR.

## 2020-05-02 NOTE — PROGRESS NOTES
Hospitalist Progress Note    Patient: Ramone Brown Age: 60 y.o. : 1960 MR#: [id number] SSN: [id number]  Date/Time: 2020       Subjective:     Patient is feeling much better. He says he has been walking in the room without any problem. Cough is present. No shortness of breath. Denies any chest pain or abdominal pain. No nausea or vomiting. Objective:     /63   Pulse 76   Temp 98 °F (36.7 °C)   Resp 18   Ht 5' 9\" (1.753 m)   Wt 78.4 kg (172 lb 13.5 oz)   SpO2 95%   BMI 25.52 kg/m²       Intake/Output Summary (Last 24 hours) at 2020 1550  Last data filed at 2020 0350  Gross per 24 hour   Intake 300 ml   Output 780 ml   Net -480 ml     General appearance - alert, well appearing, and in no distress  Eyes - sclera anicteric, no pallor  Nose - no obvious nasal discharge. Neck - supple, no JVD, trachea is midline  Chest -clear air entry noted in bases, no wheezes  Heart - S1 and S2 normal  Abdomen - soft, nontender, nondistended, Bowel sounds present  Neurological - alert, oriented, normal speech, no focal findings noted  Musculoskeletal - no joint tenderness or swelling of knees bilaterally  Extremities - no pedal edema noted    Assessment/Plan:     1. COVID-19 pneumonia with suspected superimposed bacterial pneumonia   2. Sepsis POA due to COVID-19 infection, stable  3. Acute hypoxic respiratory failure due to COVID-19 infection, stable  4. ARDS, stable  5. JOSE on CKD3 associated with sepsis, hypovolemia, resolved   6. Leukocytosis in the setting of steroid, stable  7. H/O CLL, not treated previously   8. Hyperglycemia with DM2, HgbA1c 8.2%   9. Hyperlipidemia   10. Hypertension   11. Hypovolemic hyponatremia, resolved   12. Hypoalbuminemia   13. Chronic back pain     PLAN:    Continue current management  Patient does not want any experimental medications and want to continue the current treatment plan. Chest x-ray from 2020 report reviewed.   We will add DuoNebs. Discussed with ID  We will add fish oil and monitor  Discussed with patient's wife over the phone    Total time to take care of this patient was greater than 35 minutes including reviewing chart, examining the patient, obtaining history from the patient, analyzing the data, coordinating the care with family, nurses and consultant, reviewing MAR and entering orders, and documentation. Disclaimer: Sections of this note are dictated using utilizing voice recognition software, which may have resulted in some phonetic based errors in grammar and contents. Even though attempts were made to correct all the mistakes, some may have been missed, and remained in the body of the document. If questions arise, please contact our department.       Case discussed with:  [x]Patient  [x]Family  [x]Nursing  [x]Case Management  DVT Prophylaxis:  [x]Lovenox  []Hep SQ  []SCDs  []Coumadin   []On Heparin gtt    Recent Results (from the past 24 hour(s))   LD    Collection Time: 05/02/20  3:52 AM   Result Value Ref Range     (H) 81 - 234 U/L   FERRITIN    Collection Time: 05/02/20  3:52 AM   Result Value Ref Range    Ferritin 402 (H) 8 - 388 NG/ML   C REACTIVE PROTEIN, QT    Collection Time: 05/02/20  3:52 AM   Result Value Ref Range    C-Reactive protein <0.3 0 - 0.3 mg/dL   D DIMER    Collection Time: 05/02/20  3:52 AM   Result Value Ref Range    D DIMER 0.91 (H) <0.46 ug/ml(FEU)         Signed By: Karla Ruano MD     May 2, 2020

## 2020-05-02 NOTE — ROUTINE PROCESS
Bedside shift change report given to Mercy Huang RN (oncoming nurse) by Kanu Arce RN (offgoing nurse). Report included the following information SBAR, Kardex, Intake/Output and MAR.

## 2020-05-03 LAB
ANION GAP SERPL CALC-SCNC: 3 MMOL/L (ref 3–18)
BASOPHILS # BLD: 0 K/UL (ref 0–0.06)
BASOPHILS NFR BLD: 0 % (ref 0–3)
BUN SERPL-MCNC: 11 MG/DL (ref 7–18)
BUN/CREAT SERPL: 13 (ref 12–20)
CALCIUM SERPL-MCNC: 7.8 MG/DL (ref 8.5–10.1)
CHLORIDE SERPL-SCNC: 107 MMOL/L (ref 100–111)
CO2 SERPL-SCNC: 31 MMOL/L (ref 21–32)
CREAT SERPL-MCNC: 0.86 MG/DL (ref 0.6–1.3)
CRP SERPL-MCNC: <0.3 MG/DL (ref 0–0.3)
DIFFERENTIAL METHOD BLD: ABNORMAL
EOSINOPHIL # BLD: 0 K/UL (ref 0–0.4)
EOSINOPHIL NFR BLD: 0 % (ref 0–5)
ERYTHROCYTE [DISTWIDTH] IN BLOOD BY AUTOMATED COUNT: 16.9 % (ref 11.6–14.5)
FERRITIN SERPL-MCNC: 394 NG/ML (ref 8–388)
GLUCOSE SERPL-MCNC: 115 MG/DL (ref 74–99)
HCT VFR BLD AUTO: 34.1 % (ref 36–48)
HGB BLD-MCNC: 10.5 G/DL (ref 13–16)
LDH SERPL L TO P-CCNC: 267 U/L (ref 81–234)
LYMPHOCYTES # BLD: 10.9 K/UL (ref 0.8–3.5)
LYMPHOCYTES NFR BLD: 72 % (ref 20–51)
MCH RBC QN AUTO: 24.6 PG (ref 24–34)
MCHC RBC AUTO-ENTMCNC: 30.8 G/DL (ref 31–37)
MCV RBC AUTO: 79.9 FL (ref 74–97)
MONOCYTES # BLD: 0.8 K/UL (ref 0–1)
MONOCYTES NFR BLD: 5 % (ref 2–9)
NEUTS SEG # BLD: 3.5 K/UL (ref 1.8–8)
NEUTS SEG NFR BLD: 23 % (ref 42–75)
PLATELET # BLD AUTO: 203 K/UL (ref 135–420)
PLATELET COMMENTS,PCOM: ABNORMAL
PMV BLD AUTO: 10.5 FL (ref 9.2–11.8)
POTASSIUM SERPL-SCNC: 4 MMOL/L (ref 3.5–5.5)
RBC # BLD AUTO: 4.27 M/UL (ref 4.7–5.5)
RBC MORPH BLD: ABNORMAL
SODIUM SERPL-SCNC: 141 MMOL/L (ref 136–145)
WBC # BLD AUTO: 15.2 K/UL (ref 4.6–13.2)
WBC MORPH BLD: ABNORMAL

## 2020-05-03 PROCEDURE — 36415 COLL VENOUS BLD VENIPUNCTURE: CPT

## 2020-05-03 PROCEDURE — 74011250637 HC RX REV CODE- 250/637: Performed by: INTERNAL MEDICINE

## 2020-05-03 PROCEDURE — 83615 LACTATE (LD) (LDH) ENZYME: CPT

## 2020-05-03 PROCEDURE — 74011000250 HC RX REV CODE- 250: Performed by: INTERNAL MEDICINE

## 2020-05-03 PROCEDURE — 74011250637 HC RX REV CODE- 250/637: Performed by: HOSPITALIST

## 2020-05-03 PROCEDURE — 94762 N-INVAS EAR/PLS OXIMTRY CONT: CPT

## 2020-05-03 PROCEDURE — 74011250636 HC RX REV CODE- 250/636: Performed by: INTERNAL MEDICINE

## 2020-05-03 PROCEDURE — 80048 BASIC METABOLIC PNL TOTAL CA: CPT

## 2020-05-03 PROCEDURE — 85025 COMPLETE CBC W/AUTO DIFF WBC: CPT

## 2020-05-03 PROCEDURE — 77010033711 HC HIGH FLOW OXYGEN

## 2020-05-03 PROCEDURE — 82728 ASSAY OF FERRITIN: CPT

## 2020-05-03 PROCEDURE — 65660000000 HC RM CCU STEPDOWN

## 2020-05-03 PROCEDURE — 86140 C-REACTIVE PROTEIN: CPT

## 2020-05-03 RX ORDER — FUROSEMIDE 40 MG/1
40 TABLET ORAL ONCE
Status: COMPLETED | OUTPATIENT
Start: 2020-05-03 | End: 2020-05-03

## 2020-05-03 RX ADMIN — EMTRICITABINE 200 MG: 200 CAPSULE ORAL at 11:35

## 2020-05-03 RX ADMIN — MELATONIN TAB 3 MG 6 MG: 3 TAB at 20:54

## 2020-05-03 RX ADMIN — IPRATROPIUM BROMIDE AND ALBUTEROL SULFATE 3 ML: .5; 3 SOLUTION RESPIRATORY (INHALATION) at 08:22

## 2020-05-03 RX ADMIN — Medication 10 ML: at 14:30

## 2020-05-03 RX ADMIN — TENOFOVIR DISPROXIL FUMARATE 300 MG: 300 TABLET ORAL at 11:35

## 2020-05-03 RX ADMIN — DICLOFENAC 4 G: 10 GEL TOPICAL at 15:00

## 2020-05-03 RX ADMIN — Medication 10 ML: at 20:55

## 2020-05-03 RX ADMIN — FUROSEMIDE 40 MG: 40 TABLET ORAL at 14:23

## 2020-05-03 RX ADMIN — ATORVASTATIN CALCIUM 40 MG: 40 TABLET, FILM COATED ORAL at 08:21

## 2020-05-03 RX ADMIN — Medication 500 MG: at 20:54

## 2020-05-03 RX ADMIN — DICLOFENAC 4 G: 10 GEL TOPICAL at 03:00

## 2020-05-03 RX ADMIN — FAMOTIDINE 20 MG: 20 TABLET ORAL at 20:54

## 2020-05-03 RX ADMIN — GUAIFENESIN 600 MG: 600 TABLET, EXTENDED RELEASE ORAL at 20:54

## 2020-05-03 RX ADMIN — DICLOFENAC 4 G: 10 GEL TOPICAL at 08:30

## 2020-05-03 RX ADMIN — ASPIRIN 81 MG: 81 TABLET, COATED ORAL at 08:22

## 2020-05-03 RX ADMIN — Medication 10 ML: at 08:22

## 2020-05-03 RX ADMIN — IPRATROPIUM BROMIDE AND ALBUTEROL SULFATE 3 ML: .5; 3 SOLUTION RESPIRATORY (INHALATION) at 14:23

## 2020-05-03 RX ADMIN — ENOXAPARIN SODIUM 40 MG: 40 INJECTION SUBCUTANEOUS at 08:21

## 2020-05-03 RX ADMIN — Medication 500 MG: at 08:22

## 2020-05-03 RX ADMIN — IPRATROPIUM BROMIDE AND ALBUTEROL SULFATE 3 ML: .5; 3 SOLUTION RESPIRATORY (INHALATION) at 20:54

## 2020-05-03 RX ADMIN — CARVEDILOL 12.5 MG: 12.5 TABLET, FILM COATED ORAL at 08:21

## 2020-05-03 RX ADMIN — FAMOTIDINE 20 MG: 20 TABLET ORAL at 08:21

## 2020-05-03 RX ADMIN — OMEGA-3 FATTY ACIDS CAP 1000 MG 1 CAPSULE: 1000 CAP at 08:22

## 2020-05-03 RX ADMIN — CHOLECALCIFEROL (VITAMIN D3) 10 MCG (400 UNIT) TABLET 1 TABLET: at 08:22

## 2020-05-03 RX ADMIN — GUAIFENESIN 600 MG: 600 TABLET, EXTENDED RELEASE ORAL at 08:21

## 2020-05-03 NOTE — PROGRESS NOTES
Hospitalist Progress Note    Patient: Maite Berg Age: 60 y.o. : 1960 MR#: [id number] SSN: [id number]  Date/Time: 5/3/2020       Subjective:     No new issues. He says no more cough now. Shortness of breath is getting better. Denies chest pain headaches or dizziness. No nausea vomiting or abdominal pain. Objective:     /73 (BP 1 Location: Left arm, BP Patient Position: Sitting)   Pulse 69   Temp 97 °F (36.1 °C)   Resp (!) 33 Comment: notified nurse  Ht 5' 9\" (1.753 m)   Wt 77.6 kg (171 lb)   SpO2 95%   BMI 25.25 kg/m²       Intake/Output Summary (Last 24 hours) at 5/3/2020 1242  Last data filed at 5/3/2020 0753  Gross per 24 hour   Intake    Output 1950 ml   Net -1950 ml     General appearance - alert, well appearing, and in no distress  Chest -clear air entry noted in bases, no wheezes  Heart - S1 and S2 normal  Abdomen - soft, nontender, nondistended, Bowel sounds present  Neurological - alert, oriented, normal speech, no focal findings noted  Extremities - no pedal edema noted    Assessment/Plan:     1. COVID-19 pneumonia with suspected superimposed bacterial pneumonia   2. Sepsis POA due to COVID-19 infection, stable  3. Acute hypoxic respiratory failure due to COVID-19 infection, stable  4. ARDS, stable  5. JOSE on CKD3 associated with sepsis, hypovolemia, resolved   6. Leukocytosis in the setting of steroid, stable  7. H/O CLL, not treated previously   8. Hyperglycemia with DM2, HgbA1c 8.2%   9. Hyperlipidemia   10. Hypertension   11. Hypovolemic hyponatremia, resolved   12. Hypoalbuminemia   13. Chronic back pain     PLAN:    Continue current management  Patient does not want any experimental medications and want to continue the current treatment plan. Nothing to offer at this time    Disclaimer: Sections of this note are dictated using utilizing voice recognition software, which may have resulted in some phonetic based errors in grammar and contents.  Even though attempts were made to correct all the mistakes, some may have been missed, and remained in the body of the document. If questions arise, please contact our department. Case discussed with:  [x]Patient  [x]Family  [x]Nursing  [x]Case Management  DVT Prophylaxis:  [x]Lovenox  []Hep SQ  []SCDs  []Coumadin   []On Heparin gtt    Recent Results (from the past 24 hour(s))   LD    Collection Time: 05/03/20  2:51 AM   Result Value Ref Range     (H) 81 - 234 U/L   FERRITIN    Collection Time: 05/03/20  2:51 AM   Result Value Ref Range    Ferritin 394 (H) 8 - 388 NG/ML   C REACTIVE PROTEIN, QT    Collection Time: 05/03/20  2:51 AM   Result Value Ref Range    C-Reactive protein <0.3 0 - 0.3 mg/dL   CBC WITH AUTOMATED DIFF    Collection Time: 05/03/20  2:51 AM   Result Value Ref Range    WBC 15.2 (H) 4.6 - 13.2 K/uL    RBC 4.27 (L) 4.70 - 5.50 M/uL    HGB 10.5 (L) 13.0 - 16.0 g/dL    HCT 34.1 (L) 36.0 - 48.0 %    MCV 79.9 74.0 - 97.0 FL    MCH 24.6 24.0 - 34.0 PG    MCHC 30.8 (L) 31.0 - 37.0 g/dL    RDW 16.9 (H) 11.6 - 14.5 %    PLATELET 631 839 - 493 K/uL    MPV 10.5 9.2 - 11.8 FL    NEUTROPHILS 23 (L) 42 - 75 %    LYMPHOCYTES 72 (H) 20 - 51 %    MONOCYTES 5 2 - 9 %    EOSINOPHILS 0 0 - 5 %    BASOPHILS 0 0 - 3 %    ABS. NEUTROPHILS 3.5 1.8 - 8.0 K/UL    ABS. LYMPHOCYTES 10.9 (H) 0.8 - 3.5 K/UL    ABS. MONOCYTES 0.8 0 - 1.0 K/UL    ABS. EOSINOPHILS 0.0 0.0 - 0.4 K/UL    ABS.  BASOPHILS 0.0 0.0 - 0.06 K/UL    DF MANUAL      PLATELET COMMENTS ADEQUATE PLATELETS      RBC COMMENTS ANISOCYTOSIS  1+        RBC COMMENTS MICROCYTOSIS  1+        RBC COMMENTS HYPOCHROMIA  1+        WBC COMMENTS SMUDGE CELLS     METABOLIC PANEL, BASIC    Collection Time: 05/03/20  2:51 AM   Result Value Ref Range    Sodium 141 136 - 145 mmol/L    Potassium 4.0 3.5 - 5.5 mmol/L    Chloride 107 100 - 111 mmol/L    CO2 31 21 - 32 mmol/L    Anion gap 3 3.0 - 18 mmol/L    Glucose 115 (H) 74 - 99 mg/dL    BUN 11 7.0 - 18 MG/DL    Creatinine 0.86 0.6 - 1.3 MG/DL    BUN/Creatinine ratio 13 12 - 20      GFR est AA >60 >60 ml/min/1.73m2    GFR est non-AA >60 >60 ml/min/1.73m2    Calcium 7.8 (L) 8.5 - 10.1 MG/DL         Signed By: Radha Monroe MD     May 3, 2020

## 2020-05-03 NOTE — ROUTINE PROCESS
Verbal shift change report given to Bethany1 Maxwell Layne (oncoming nurse) by Yanet Warren (offgoing nurse). Report included the following information SBAR, Kardex, Med Rec Status and Alarm Parameters . 5 Ps addressed throughout shift during rounds.

## 2020-05-03 NOTE — ROUTINE PROCESS
Verbal shift change report given to Letha Diaz (oncoming nurse) by Veronica Phelps (offgoing nurse). Report included the following information SBAR, Kardex, Recent Results and Alarm Parameters .

## 2020-05-03 NOTE — PROGRESS NOTES
05/03/20 1536   Oxygen Therapy   O2 Sat (%) 92 %  (pt talking on the phone)   Pulse via Oximetry 71 beats per minute   O2 Device Hi flow nasal cannula   O2 Flow Rate (L/min) 15 l/min   O2 Temperature 91.4 °F (33 °C)   FIO2 (%) 30 %

## 2020-05-03 NOTE — PROGRESS NOTES
Problem: Breathing Pattern - Ineffective  Goal: *Absence of hypoxia  Outcome: Progressing Towards Goal  Goal: *Use of effective breathing techniques  Outcome: Progressing Towards Goal  Goal: *PALLIATIVE CARE:  Alleviation of Dyspnea  Outcome: Progressing Towards Goal     Problem: Patient Education: Go to Patient Education Activity  Goal: Patient/Family Education  Outcome: Progressing Towards Goal     Problem: Diabetes Self-Management  Goal: *Disease process and treatment process  Description: Define diabetes and identify own type of diabetes; list 3 options for treating diabetes. Outcome: Progressing Towards Goal  Goal: *Incorporating nutritional management into lifestyle  Description: Describe effect of type, amount and timing of food on blood glucose; list 3 methods for planning meals. Outcome: Progressing Towards Goal  Goal: *Incorporating physical activity into lifestyle  Description: State effect of exercise on blood glucose levels. Outcome: Progressing Towards Goal  Goal: *Developing strategies to promote health/change behavior  Description: Define the ABC's of diabetes; identify appropriate screenings, schedule and personal plan for screenings. Outcome: Progressing Towards Goal  Goal: *Using medications safely  Description: State effect of diabetes medications on diabetes; name diabetes medication taking, action and side effects. Outcome: Progressing Towards Goal  Goal: *Monitoring blood glucose, interpreting and using results  Description: Identify recommended blood glucose targets  and personal targets. Outcome: Progressing Towards Goal  Goal: *Prevention, detection, treatment of acute complications  Description: List symptoms of hyper- and hypoglycemia; describe how to treat low blood sugar and actions for lowering  high blood glucose level.   Outcome: Progressing Towards Goal  Goal: *Prevention, detection and treatment of chronic complications  Description: Define the natural course of diabetes and describe the relationship of blood glucose levels to long term complications of diabetes. Outcome: Progressing Towards Goal  Goal: *Developing strategies to address psychosocial issues  Description: Describe feelings about living with diabetes; identify support needed and support network  Outcome: Progressing Towards Goal  Goal: *Insulin pump training  Outcome: Progressing Towards Goal  Goal: *Sick day guidelines  Outcome: Progressing Towards Goal  Goal: *Patient Specific Goal (EDIT GOAL, INSERT TEXT)  Outcome: Progressing Towards Goal     Problem: Patient Education: Go to Patient Education Activity  Goal: Patient/Family Education  Outcome: Progressing Towards Goal     Problem: Falls - Risk of  Goal: *Absence of Falls  Description: Document Salvatore Fall Risk and appropriate interventions in the flowsheet. Outcome: Progressing Towards Goal  Note: Fall Risk Interventions:  Mobility Interventions: Communicate number of staff needed for ambulation/transfer         Medication Interventions: Evaluate medications/consider consulting pharmacy    Elimination Interventions: Call light in reach, Patient to call for help with toileting needs              Problem: Patient Education: Go to Patient Education Activity  Goal: Patient/Family Education  Outcome: Progressing Towards Goal     Problem: Pressure Injury - Risk of  Goal: *Prevention of pressure injury  Description: Document Saroj Scale and appropriate interventions in the flowsheet.   Outcome: Progressing Towards Goal  Note: Pressure Injury Interventions:  Sensory Interventions: Keep linens dry and wrinkle-free, Minimize linen layers         Activity Interventions: Increase time out of bed, Pressure redistribution bed/mattress(bed type)    Mobility Interventions: Assess need for specialty bed, HOB 30 degrees or less, Pressure redistribution bed/mattress (bed type)    Nutrition Interventions: Document food/fluid/supplement intake                     Problem: Patient Education: Go to Patient Education Activity  Goal: Patient/Family Education  Outcome: Progressing Towards Goal     Problem: Pain  Goal: *Control of Pain  Outcome: Progressing Towards Goal  Goal: *PALLIATIVE CARE:  Alleviation of Pain  Outcome: Progressing Towards Goal     Problem: Patient Education: Go to Patient Education Activity  Goal: Patient/Family Education  Outcome: Progressing Towards Goal     Problem: Pneumonia: Discharge Outcomes  Goal: *Demonstrates progressive activity  Outcome: Progressing Towards Goal  Goal: *Describes follow-up/return visits to physicians  Outcome: Progressing Towards Goal  Goal: *Verbalizes name, dosage, time, side effects, and number of days to continue medications  Outcome: Progressing Towards Goal  Goal: *Influenza immunization  Outcome: Progressing Towards Goal  Goal: *Pneumococcal immunization  Outcome: Progressing Towards Goal  Goal: *Respiratory status at baseline  Outcome: Progressing Towards Goal  Goal: *Vital signs within defined limits  Outcome: Progressing Towards Goal  Goal: *Describes available resources and support systems  Outcome: Progressing Towards Goal     Problem: Risk for Spread of Infection  Goal: Prevent transmission of infectious organism to others  Description: Prevent the transmission of infectious organisms to other patients, staff members, and visitors.   Outcome: Progressing Towards Goal     Problem: Patient Education:  Go to Education Activity  Goal: Patient/Family Education  Outcome: Progressing Towards Goal     Problem: Nutrition Deficit  Goal: *Optimize nutritional status  Outcome: Progressing Towards Goal     Problem: Diabetes Maintenance:Admission  Goal: *Blood glucose 80 to 180 mg/dl  Outcome: Progressing Towards Goal

## 2020-05-03 NOTE — ROUTINE PROCESS
Bedside and Verbal shift change report given to KATHERINE Pearl (oncoming nurse) by Nusrat Mcdaniel RN 
 (offgoing nurse). Report included the following information SBAR and Kardex.

## 2020-05-04 LAB
CRP SERPL-MCNC: <0.3 MG/DL (ref 0–0.3)
FERRITIN SERPL-MCNC: 392 NG/ML (ref 8–388)
LDH SERPL L TO P-CCNC: 254 U/L (ref 81–234)
MAGNESIUM SERPL-MCNC: 2.1 MG/DL (ref 1.6–2.6)
POTASSIUM SERPL-SCNC: 4.2 MMOL/L (ref 3.5–5.5)
SARS-COV-2, COV2NT: DETECTED

## 2020-05-04 PROCEDURE — 74011250637 HC RX REV CODE- 250/637: Performed by: INTERNAL MEDICINE

## 2020-05-04 PROCEDURE — 84132 ASSAY OF SERUM POTASSIUM: CPT

## 2020-05-04 PROCEDURE — 77010033711 HC HIGH FLOW OXYGEN

## 2020-05-04 PROCEDURE — 74011250636 HC RX REV CODE- 250/636: Performed by: INTERNAL MEDICINE

## 2020-05-04 PROCEDURE — 82728 ASSAY OF FERRITIN: CPT

## 2020-05-04 PROCEDURE — 94762 N-INVAS EAR/PLS OXIMTRY CONT: CPT

## 2020-05-04 PROCEDURE — 74011000250 HC RX REV CODE- 250: Performed by: INTERNAL MEDICINE

## 2020-05-04 PROCEDURE — 65660000000 HC RM CCU STEPDOWN

## 2020-05-04 PROCEDURE — 83735 ASSAY OF MAGNESIUM: CPT

## 2020-05-04 PROCEDURE — 74011250637 HC RX REV CODE- 250/637: Performed by: HOSPITALIST

## 2020-05-04 PROCEDURE — 36415 COLL VENOUS BLD VENIPUNCTURE: CPT

## 2020-05-04 PROCEDURE — 83615 LACTATE (LD) (LDH) ENZYME: CPT

## 2020-05-04 PROCEDURE — 86140 C-REACTIVE PROTEIN: CPT

## 2020-05-04 RX ADMIN — DICLOFENAC 4 G: 10 GEL TOPICAL at 21:00

## 2020-05-04 RX ADMIN — GUAIFENESIN 600 MG: 600 TABLET, EXTENDED RELEASE ORAL at 20:46

## 2020-05-04 RX ADMIN — CARVEDILOL 12.5 MG: 12.5 TABLET, FILM COATED ORAL at 20:46

## 2020-05-04 RX ADMIN — IPRATROPIUM BROMIDE AND ALBUTEROL 1 PUFF: 20; 100 SPRAY, METERED RESPIRATORY (INHALATION) at 14:00

## 2020-05-04 RX ADMIN — OMEGA-3 FATTY ACIDS CAP 1000 MG 1 CAPSULE: 1000 CAP at 08:40

## 2020-05-04 RX ADMIN — CHOLECALCIFEROL (VITAMIN D3) 10 MCG (400 UNIT) TABLET 1 TABLET: at 08:41

## 2020-05-04 RX ADMIN — IPRATROPIUM BROMIDE AND ALBUTEROL 1 PUFF: 20; 100 SPRAY, METERED RESPIRATORY (INHALATION) at 20:00

## 2020-05-04 RX ADMIN — Medication 10 ML: at 15:56

## 2020-05-04 RX ADMIN — IPRATROPIUM BROMIDE AND ALBUTEROL SULFATE 3 ML: .5; 3 SOLUTION RESPIRATORY (INHALATION) at 08:40

## 2020-05-04 RX ADMIN — EMTRICITABINE 200 MG: 200 CAPSULE ORAL at 09:00

## 2020-05-04 RX ADMIN — ENOXAPARIN SODIUM 40 MG: 40 INJECTION SUBCUTANEOUS at 08:40

## 2020-05-04 RX ADMIN — ASPIRIN 81 MG: 81 TABLET, COATED ORAL at 08:41

## 2020-05-04 RX ADMIN — DICLOFENAC 4 G: 10 GEL TOPICAL at 09:00

## 2020-05-04 RX ADMIN — FAMOTIDINE 20 MG: 20 TABLET ORAL at 08:41

## 2020-05-04 RX ADMIN — CARVEDILOL 12.5 MG: 12.5 TABLET, FILM COATED ORAL at 08:41

## 2020-05-04 RX ADMIN — FAMOTIDINE 20 MG: 20 TABLET ORAL at 20:46

## 2020-05-04 RX ADMIN — ATORVASTATIN CALCIUM 40 MG: 40 TABLET, FILM COATED ORAL at 08:41

## 2020-05-04 RX ADMIN — Medication 500 MG: at 08:41

## 2020-05-04 RX ADMIN — GUAIFENESIN 600 MG: 600 TABLET, EXTENDED RELEASE ORAL at 08:41

## 2020-05-04 RX ADMIN — TENOFOVIR DISPROXIL FUMARATE 300 MG: 300 TABLET ORAL at 09:00

## 2020-05-04 RX ADMIN — MELATONIN TAB 3 MG 6 MG: 3 TAB at 20:46

## 2020-05-04 RX ADMIN — DICLOFENAC 4 G: 10 GEL TOPICAL at 15:00

## 2020-05-04 RX ADMIN — Medication 10 ML: at 20:46

## 2020-05-04 RX ADMIN — Medication 500 MG: at 20:46

## 2020-05-04 RX ADMIN — Medication 10 ML: at 08:43

## 2020-05-04 NOTE — ROUTINE PROCESS
Received verbal report outside patient's room d/t COVID status. Assumed care of patient from off going nurse. Patient resting in bed. No distress noted. Patient currently on hi flow 15 L FiO2 30% Sats 90-95%. Call bell within reach, siderails up x 3, bed in lowest position, and patient instructed to use sam l benitez for assistance. Will continue to monitor. 0708 Verbal shift change report given to Eli Apgar RN (oncoming nurse) by Fausto Rico RN(offgoing nurse).  Report included the following information SBAR, Intake/Output, MAR, Recent Results and Cardiac Rhythm SR.

## 2020-05-04 NOTE — PROGRESS NOTES
Spoke with pt by phone. Wants to go home and is agreeable to [facility], if needed. Pt remains on HiFlow 15 L. Per Rn report, pt is ambulating around room.  SERENE Gonzalez, [place]- [phone]

## 2020-05-04 NOTE — PROGRESS NOTES
Problem: Breathing Pattern - Ineffective  Goal: *Absence of hypoxia  Outcome: Progressing Towards Goal  Goal: *Use of effective breathing techniques  Outcome: Progressing Towards Goal  Goal: *PALLIATIVE CARE:  Alleviation of Dyspnea  Outcome: Progressing Towards Goal     Problem: Patient Education: Go to Patient Education Activity  Goal: Patient/Family Education  Outcome: Progressing Towards Goal     Problem: Falls - Risk of  Goal: *Absence of Falls  Description: Document Salvatore Fall Risk and appropriate interventions in the flowsheet. Outcome: Progressing Towards Goal  Note: Fall Risk Interventions:  Mobility Interventions: Assess mobility with egress test, Bed/chair exit alarm, Communicate number of staff needed for ambulation/transfer, OT consult for ADLs, Patient to call before getting OOB         Medication Interventions: Evaluate medications/consider consulting pharmacy, Patient to call before getting OOB, Teach patient to arise slowly    Elimination Interventions: Call light in reach, Elevated toilet seat, Patient to call for help with toileting needs, Stay With Me (per policy), Toilet paper/wipes in reach, Toileting schedule/hourly rounds, Urinal in reach              Problem: Patient Education: Go to Patient Education Activity  Goal: Patient/Family Education  Outcome: Progressing Towards Goal     Problem: Pressure Injury - Risk of  Goal: *Prevention of pressure injury  Description: Document Saroj Scale and appropriate interventions in the flowsheet. Outcome: Progressing Towards Goal  Note: Pressure Injury Interventions:  Sensory Interventions: Keep linens dry and wrinkle-free, Minimize linen layers         Activity Interventions: Increase time out of bed, Pressure redistribution bed/mattress(bed type), Assess need for specialty bed    Mobility Interventions: Assess need for specialty bed, Float heels, HOB 30 degrees or less, Pressure redistribution bed/mattress (bed type), Turn and reposition approx. every two hours(pillow and wedges)    Nutrition Interventions: Document food/fluid/supplement intake, Discuss nutritional consult with provider, Offer support with meals,snacks and hydration                     Problem: Patient Education: Go to Patient Education Activity  Goal: Patient/Family Education  Outcome: Progressing Towards Goal     Problem: Pain  Goal: *Control of Pain  Outcome: Progressing Towards Goal  Goal: *PALLIATIVE CARE:  Alleviation of Pain  Outcome: Progressing Towards Goal     Problem: Pneumonia: Discharge Outcomes  Goal: *Demonstrates progressive activity  Outcome: Progressing Towards Goal  Goal: *Describes follow-up/return visits to physicians  Outcome: Progressing Towards Goal  Goal: *Verbalizes name, dosage, time, side effects, and number of days to continue medications  Outcome: Progressing Towards Goal  Goal: *Influenza immunization  Outcome: Progressing Towards Goal  Goal: *Pneumococcal immunization  Outcome: Progressing Towards Goal  Goal: *Respiratory status at baseline  Outcome: Progressing Towards Goal  Goal: *Vital signs within defined limits  Outcome: Progressing Towards Goal  Goal: *Describes available resources and support systems  Outcome: Progressing Towards Goal     Problem: Risk for Spread of Infection  Goal: Prevent transmission of infectious organism to others  Description: Prevent the transmission of infectious organisms to other patients, staff members, and visitors.   Outcome: Progressing Towards Goal     Problem: Patient Education:  Go to Education Activity  Goal: Patient/Family Education  Outcome: Progressing Towards Goal     Problem: Nutrition Deficit  Goal: *Optimize nutritional status  Outcome: Progressing Towards Goal     Problem: Diabetes Maintenance:Admission  Goal: *Blood glucose 80 to 180 mg/dl  Outcome: Progressing Towards Goal

## 2020-05-04 NOTE — PROGRESS NOTES
Infectious Disease progress Note        Reason: sepsis, COVID-19 infection    Current abx Prior abx     Emtricitabine/ tenofovir since 4/24 Ceftriaxone, azithromycin 4/16/20-4/18/2020  Hydroxychloroquine  4/16/20-4/20/20  Cefepime, doxycycline  4/18/2020-4/24  ribavarin since 4/21-4/24     Lines:       Assessment :    60 y.o. male  with history of CLL, type 2 diabetes, prostate cancer, history of atrial fibrillation with ablation, hypertension, CKD stage 3 who presented to the emergency room on 4/16/2020 secondary to shortness of breath. CXR: mild opacities at lung bases. Now with increasing shortness of breath, hypoxia requiring oxygen, worsening renal function    Patient presents with a highly complex clinical picture. Clinical picture consistent with sepsis (present on admission ) due to bilateral  covid-19 infection. Labs 4/16 reviewed-ferritin 233, C-reactive protein 6.3, , , d-dimer 0.75  Labs 4/17 reviewed- ferritin 266, C-reactive protein 8.0, , LDH: 219, d-dimer 1.20   Labs 4/19 reviewed-ferritin 346, C-reactive protein 10.4, , , d-dimer 0.84  Labs on 4/20 reviewed-ferritin 628, C-reactive protein 6.2, , , d-dimer 0.7  Labs on 4/21 reviewed ferritin 628, CRP 3, , , d-dimer 0.49  Labs on 4/22 reviewed ferritin 653, CRP 1.8, , , d-dimer 0.73  Labs on 4/23 reviewed ferritin 652, CRP 1.2, , , d-dimer 0.64  Labs on 4/24 reviewed ferritin 644, ,D-dimer 0.65  Labs on 4/27 reviewed ferritin 497, CK: 71, d-dimer 0.87  Labs on 4/29 reviewed ferritin 425, CRP less than 0.3, d-dimer 1.77  Labs on 5/1 reviewed ferritin 442, CRP less than 0.3, , d-dimer 1.07  Labs on 5/4 reviewed ferritin 392, CRP less than 0.3, , d-dimer 0.9    Worsening respiratory status 4/18-4/19 with worsening inflammatory parameters suggestive of cytokine storm. Status post Solu-Medrol since 4/18.   Status post tocilizumab on 4/19.    Sputum culture 4/18 light normal respiratory giselle, yeast    Acute on chronic renal insufficiency-could be prerenal versus ATN secondary to sepsis. Nephrology consult appreciated. Creatinine stable    EKG 4/16-QTc 416, 456 on 4/20, 431 on 4/21    Procalcitonin 0.38 on 4/20    Chest x-ray 4/21-worsening bilateral infiltrates suggestive of ARDS. Chest x-ray 4/22, 4/24/20-some improvement in the infiltrates. Started on emtricitabine/tenofovir on 4/24/2020    Subjective sense of improvement. Repeat COVID test 4/26/2020 positive. Appeared comfortable  when I examined him today. Chest x-ray 4/29 reviewed. No significant change compared to prior chest x-ray 4/24    Repeat COVID-19 test 5/1-positive    Recommendations:    1. Continue emtricitabine/tenofovir till 5/7/20  2. Continue, ascorbic acid. vitamin D3, melatonin. 3.  Recommend prone position ventilation. I discussed this with patient. 4.   Follow-up nephrology/pulmonary recommendations  5. Titrate oxygen as tolerated  6. Hopefully discharge patient once transition to nasal cannula    Will sign off. Please call if any new questions or concerns. above plan was discussed in details with patient, dr. Tahira Pro. please call me if any further questions or concerns. Will continue to participate in the care of this patient. HPI:    Feels much better. States that he wants to go home patient denies headaches, visual disturbances, sore throat, runny nose, earaches, abdominal pain, diarrhea, burning micturition, pain or weakness in extremities. He denies back pain/flank pain. home Medication List    Details   BYDUREON BCISE 2 mg/0.85 mL atIn INJECT 2MG EVERY WEEK UNDER THE SKIN  Refills: 0      ergocalciferol (ERGOCALCIFEROL) 50,000 unit capsule Take 1 Cap by mouth every seven (7) days. Qty: 24 Cap, Refills: 1    Associated Diagnoses: Vitamin D deficiency      sitagliptin phos/metformin HCl (JANUMET PO) Take  by mouth. lisinopril (PRINIVIL, ZESTRIL) 5 mg tablet Take 1 Tab by mouth daily. Qty: 30 Tab, Refills: 6      carvedilol (COREG) 12.5 mg tablet Take 1 Tab by mouth two (2) times a day. Qty: 60 Tab, Refills: 6      simvastatin (ZOCOR) 10 mg tablet Take  by mouth daily. aspirin delayed-release 81 mg tablet Take 81 mg by mouth daily. Current Facility-Administered Medications   Medication Dose Route Frequency    ipratropium-albuterol (COMBIVENT RESPIMAT) 20 mcg-100 mcg inhalation spray  1 Puff Inhalation Q6H RT    omega 3-DHA-EPA-fish oil 1,000 mg (120 mg-180 mg) capsule 1 Cap  1 Cap Oral DAILY    insulin lispro (HUMALOG) injection   SubCUTAneous DAILY PRN    enoxaparin (LOVENOX) injection 40 mg  40 mg SubCUTAneous Q24H    emtricitabine (EMTRIVA) capsule 200 mg  200 mg Oral DAILY    tenofovir DISOPROXIL FUMARATE (VIREAD) tablet 300 mg  300 mg Oral DAILY    guaiFENesin ER (MUCINEX) tablet 600 mg  600 mg Oral Q12H    diclofenac (VOLTAREN) 1 % topical gel 4 g  4 g Topical QID    melatonin tablet 6 mg  6 mg Oral QHS    famotidine (PEPCID) tablet 20 mg  20 mg Oral BID    LORazepam (ATIVAN) tablet 1 mg  1 mg Oral Q6H PRN    atorvastatin (LIPITOR) tablet 40 mg  40 mg Oral DAILY    sodium chloride (NS) flush 5-40 mL  5-40 mL IntraVENous Q8H    aspirin delayed-release tablet 81 mg  81 mg Oral DAILY    carvediloL (COREG) tablet 12.5 mg  12.5 mg Oral BID    ascorbic acid (vitamin C) (VITAMIN C) tablet 500 mg  500 mg Oral BID    glucose chewable tablet 16 g  4 Tab Oral PRN    glucagon (GLUCAGEN) injection 1 mg  1 mg IntraMUSCular PRN    dextrose 10% infusion 125-250 mL  125-250 mL IntraVENous PRN    acetaminophen (TYLENOL) tablet 650 mg  650 mg Oral Q6H PRN    cholecalciferol (VITAMIN D3) (400 Units /10 mcg) tablet 1 Tab  400 Units Oral DAILY       Allergies: Patient has no known allergies.     Temp (24hrs), Av.9 °F (36.6 °C), Min:97 °F (36.1 °C), Max:98.4 °F (36.9 °C)    Visit Vitals  /60 (BP 1 Location: Left arm, BP Patient Position: At rest)   Pulse 80   Temp 98.4 °F (36.9 °C)   Resp 14   Ht 5' 9\" (1.753 m)   Wt 78.3 kg (172 lb 9.9 oz)   SpO2 97%   BMI 25.49 kg/m²       ROS: 12 point ROS obtained in details. Pertinent positives as mentioned in HPI,   otherwise negative    Physical Exam:    General: Well developed, well nourished male sitting on the bed AAOx3. On high flow oxygen    General:   awake alert and oriented   HEENT:  Normocephalic, atraumatic, PERRL, EOMI, no scleral icterus or pallor; no conjunctival hemmohage   Lymph Nodes:   no cervical, axillary or inguinal adenopathy   Lungs:   non-labored, bilaterally clear to auscultation- no crackles wheezes rales or rhonchi   Heart:  RRR, s1 and s2; no rubs or gallops, no edema, + pedal pulses   Abdomen:  soft, non-distended, active bowel sounds, no hepatomegaly, no splenomegaly. Non-tender   Genitourinary:  deferred   Extremities:   no clubbing, cyanosis; no joint effusions or swelling; Full ROM of all large joints to the upper and lower extremities; muscle mass appropriate for age   Neurologic:  No gross focal motor or sensory abnormalities. Speech appropriate. Cranial nerves intact                        Skin:  No rash or ulcers noted   Back:   not examined     Psychiatric:  No suicidal or homicidal ideations, appropriate mood and affect         Labs: Results:   Chemistry Recent Labs     05/04/20  0308 05/03/20  0251   GLU  --  115*   NA  --  141   K 4.2 4.0   CL  --  107   CO2  --  31   BUN  --  11   CREA  --  0.86   CA  --  7.8*   AGAP  --  3   BUCR  --  13      CBC w/Diff Recent Labs     05/03/20  0251   WBC 15.2*   RBC 4.27*   HGB 10.5*   HCT 34.1*      GRANS 23*   LYMPH 72*   EOS 0      Microbiology No results for input(s): CULT in the last 72 hours.        RADIOLOGY:    All available imaging studies/reports in [facility] for this admission were reviewed    Dr. Nakia Aguayo, Infectious Disease Specialist  [phone]  May 4, 2020  9:57 AM

## 2020-05-04 NOTE — PROGRESS NOTES
Problem: Breathing Pattern - Ineffective  Goal: *Absence of hypoxia  Outcome: Progressing Towards Goal  Goal: *Use of effective breathing techniques  Outcome: Progressing Towards Goal  Goal: *PALLIATIVE CARE:  Alleviation of Dyspnea  Outcome: Progressing Towards Goal     Problem: Patient Education: Go to Patient Education Activity  Goal: Patient/Family Education  Outcome: Progressing Towards Goal     Problem: Diabetes Self-Management  Goal: *Disease process and treatment process  Description: Define diabetes and identify own type of diabetes; list 3 options for treating diabetes. Outcome: Progressing Towards Goal  Goal: *Incorporating nutritional management into lifestyle  Description: Describe effect of type, amount and timing of food on blood glucose; list 3 methods for planning meals. Outcome: Progressing Towards Goal  Goal: *Incorporating physical activity into lifestyle  Description: State effect of exercise on blood glucose levels. Outcome: Progressing Towards Goal  Goal: *Developing strategies to promote health/change behavior  Description: Define the ABC's of diabetes; identify appropriate screenings, schedule and personal plan for screenings. Outcome: Progressing Towards Goal  Goal: *Using medications safely  Description: State effect of diabetes medications on diabetes; name diabetes medication taking, action and side effects. Outcome: Progressing Towards Goal  Goal: *Monitoring blood glucose, interpreting and using results  Description: Identify recommended blood glucose targets  and personal targets. Outcome: Progressing Towards Goal  Goal: *Prevention, detection, treatment of acute complications  Description: List symptoms of hyper- and hypoglycemia; describe how to treat low blood sugar and actions for lowering  high blood glucose level.   Outcome: Progressing Towards Goal  Goal: *Prevention, detection and treatment of chronic complications  Description: Define the natural course of diabetes and describe the relationship of blood glucose levels to long term complications of diabetes. Outcome: Progressing Towards Goal  Goal: *Developing strategies to address psychosocial issues  Description: Describe feelings about living with diabetes; identify support needed and support network  Outcome: Progressing Towards Goal  Goal: *Insulin pump training  Outcome: Progressing Towards Goal  Goal: *Sick day guidelines  Outcome: Progressing Towards Goal  Goal: *Patient Specific Goal (EDIT GOAL, INSERT TEXT)  Outcome: Progressing Towards Goal     Problem: Patient Education: Go to Patient Education Activity  Goal: Patient/Family Education  Outcome: Progressing Towards Goal     Problem: Falls - Risk of  Goal: *Absence of Falls  Description: Document Salvatore Fall Risk and appropriate interventions in the flowsheet. Outcome: Progressing Towards Goal  Note: Fall Risk Interventions:  Mobility Interventions: Assess mobility with egress test, Bed/chair exit alarm         Medication Interventions: Bed/chair exit alarm, Evaluate medications/consider consulting pharmacy    Elimination Interventions: Call light in reach              Problem: Patient Education: Go to Patient Education Activity  Goal: Patient/Family Education  Outcome: Progressing Towards Goal     Problem: Pressure Injury - Risk of  Goal: *Prevention of pressure injury  Description: Document Saroj Scale and appropriate interventions in the flowsheet.   Outcome: Progressing Towards Goal  Note: Pressure Injury Interventions:  Sensory Interventions: Assess changes in LOC         Activity Interventions: Increase time out of bed    Mobility Interventions: Assess need for specialty bed    Nutrition Interventions: Document food/fluid/supplement intake                     Problem: Patient Education: Go to Patient Education Activity  Goal: Patient/Family Education  Outcome: Progressing Towards Goal     Problem: Pain  Goal: *Control of Pain  Outcome: Progressing Towards Goal  Goal: *PALLIATIVE CARE:  Alleviation of Pain  Outcome: Progressing Towards Goal     Problem: Patient Education: Go to Patient Education Activity  Goal: Patient/Family Education  Outcome: Progressing Towards Goal     Problem: Pneumonia: Discharge Outcomes  Goal: *Demonstrates progressive activity  Outcome: Progressing Towards Goal  Goal: *Describes follow-up/return visits to physicians  Outcome: Progressing Towards Goal  Goal: *Verbalizes name, dosage, time, side effects, and number of days to continue medications  Outcome: Progressing Towards Goal  Goal: *Influenza immunization  Outcome: Progressing Towards Goal  Goal: *Pneumococcal immunization  Outcome: Progressing Towards Goal  Goal: *Respiratory status at baseline  Outcome: Progressing Towards Goal  Goal: *Vital signs within defined limits  Outcome: Progressing Towards Goal  Goal: *Describes available resources and support systems  Outcome: Progressing Towards Goal     Problem: Risk for Spread of Infection  Goal: Prevent transmission of infectious organism to others  Description: Prevent the transmission of infectious organisms to other patients, staff members, and visitors.   Outcome: Progressing Towards Goal     Problem: Patient Education:  Go to Education Activity  Goal: Patient/Family Education  Outcome: Progressing Towards Goal     Problem: Nutrition Deficit  Goal: *Optimize nutritional status  Outcome: Progressing Towards Goal     Problem: Diabetes Maintenance:Admission  Goal: *Blood glucose 80 to 180 mg/dl  Outcome: Progressing Towards Goal

## 2020-05-04 NOTE — ROUTINE PROCESS
Verbal shift change report given to Yanira Gomez (oncoming nurse) by Kobe Morrison RN (offgoing nurse). Report included the following information SBAR, Kardex, Recent Results and Alarm Parameters .

## 2020-05-05 LAB
CRP SERPL-MCNC: <0.3 MG/DL (ref 0–0.3)
FERRITIN SERPL-MCNC: 334 NG/ML (ref 8–388)
LDH SERPL L TO P-CCNC: 214 U/L (ref 81–234)

## 2020-05-05 PROCEDURE — 77010033711 HC HIGH FLOW OXYGEN

## 2020-05-05 PROCEDURE — 74011250637 HC RX REV CODE- 250/637: Performed by: INTERNAL MEDICINE

## 2020-05-05 PROCEDURE — 65660000000 HC RM CCU STEPDOWN

## 2020-05-05 PROCEDURE — 77010033678 HC OXYGEN DAILY

## 2020-05-05 PROCEDURE — 36415 COLL VENOUS BLD VENIPUNCTURE: CPT

## 2020-05-05 PROCEDURE — 82728 ASSAY OF FERRITIN: CPT

## 2020-05-05 PROCEDURE — 86140 C-REACTIVE PROTEIN: CPT

## 2020-05-05 PROCEDURE — 74011250637 HC RX REV CODE- 250/637: Performed by: HOSPITALIST

## 2020-05-05 PROCEDURE — 94762 N-INVAS EAR/PLS OXIMTRY CONT: CPT

## 2020-05-05 PROCEDURE — 74011250636 HC RX REV CODE- 250/636: Performed by: INTERNAL MEDICINE

## 2020-05-05 PROCEDURE — 83615 LACTATE (LD) (LDH) ENZYME: CPT

## 2020-05-05 RX ADMIN — CARVEDILOL 12.5 MG: 12.5 TABLET, FILM COATED ORAL at 08:28

## 2020-05-05 RX ADMIN — DICLOFENAC 4 G: 10 GEL TOPICAL at 09:00

## 2020-05-05 RX ADMIN — Medication 10 ML: at 21:40

## 2020-05-05 RX ADMIN — ASPIRIN 81 MG: 81 TABLET, COATED ORAL at 08:29

## 2020-05-05 RX ADMIN — FAMOTIDINE 20 MG: 20 TABLET ORAL at 08:29

## 2020-05-05 RX ADMIN — Medication 10 ML: at 08:30

## 2020-05-05 RX ADMIN — IPRATROPIUM BROMIDE AND ALBUTEROL 1 PUFF: 20; 100 SPRAY, METERED RESPIRATORY (INHALATION) at 14:00

## 2020-05-05 RX ADMIN — GUAIFENESIN 600 MG: 600 TABLET, EXTENDED RELEASE ORAL at 08:29

## 2020-05-05 RX ADMIN — IPRATROPIUM BROMIDE AND ALBUTEROL 1 PUFF: 20; 100 SPRAY, METERED RESPIRATORY (INHALATION) at 08:00

## 2020-05-05 RX ADMIN — Medication 10 ML: at 15:02

## 2020-05-05 RX ADMIN — DICLOFENAC 4 G: 10 GEL TOPICAL at 15:00

## 2020-05-05 RX ADMIN — TENOFOVIR DISPROXIL FUMARATE 300 MG: 300 TABLET ORAL at 09:00

## 2020-05-05 RX ADMIN — IPRATROPIUM BROMIDE AND ALBUTEROL 1 PUFF: 20; 100 SPRAY, METERED RESPIRATORY (INHALATION) at 21:47

## 2020-05-05 RX ADMIN — OMEGA-3 FATTY ACIDS CAP 1000 MG 1 CAPSULE: 1000 CAP at 08:29

## 2020-05-05 RX ADMIN — CHOLECALCIFEROL (VITAMIN D3) 10 MCG (400 UNIT) TABLET 1 TABLET: at 08:29

## 2020-05-05 RX ADMIN — MELATONIN TAB 3 MG 6 MG: 3 TAB at 21:40

## 2020-05-05 RX ADMIN — CARVEDILOL 12.5 MG: 12.5 TABLET, FILM COATED ORAL at 21:40

## 2020-05-05 RX ADMIN — FAMOTIDINE 20 MG: 20 TABLET ORAL at 21:40

## 2020-05-05 RX ADMIN — EMTRICITABINE 200 MG: 200 CAPSULE ORAL at 09:00

## 2020-05-05 RX ADMIN — Medication 500 MG: at 08:28

## 2020-05-05 RX ADMIN — ENOXAPARIN SODIUM 40 MG: 40 INJECTION SUBCUTANEOUS at 08:28

## 2020-05-05 RX ADMIN — GUAIFENESIN 600 MG: 600 TABLET, EXTENDED RELEASE ORAL at 21:40

## 2020-05-05 RX ADMIN — Medication 500 MG: at 21:40

## 2020-05-05 RX ADMIN — ATORVASTATIN CALCIUM 40 MG: 40 TABLET, FILM COATED ORAL at 08:29

## 2020-05-05 NOTE — PROGRESS NOTES
0725: Verbal shift report received from Rajiv [facility] (Outgoing nurse) to Carmencita Perry RN (Incoming nurse). Assumed pt care. No distress noted, pt up and sitting at bedside. Will continue to monitor. 1905: Verbal shift change report given to KATHERINE Vance (oncoming nurse) by Carmencita Perry RN (offgoing nurse). Report included the following information SBAR, Kardex, Intake/Output, MAR and Cardiac Rhythm NSR.

## 2020-05-05 NOTE — ROUTINE PROCESS
Quyen care of patient from [facility] (off going). Patient resting in bed with no distress. Bed in lowest position, side rails up x3, call bell and personal belongings within reach. Will continue to monitor. 6630 Verbal shift change report given to Roly Joiner RN (oncoming nurse) by Noemi Santos RN (offgoing nurse). Report included the following information SBAR, Kardex, Intake/Output, MAR, Recent Results and Cardiac Rhythm NSR on monitor.

## 2020-05-05 NOTE — PROGRESS NOTES
Problem: Breathing Pattern - Ineffective  Goal: *Absence of hypoxia  Outcome: Progressing Towards Goal  Goal: *Use of effective breathing techniques  Outcome: Progressing Towards Goal  Goal: *PALLIATIVE CARE:  Alleviation of Dyspnea  Outcome: Progressing Towards Goal     Problem: Patient Education: Go to Patient Education Activity  Goal: Patient/Family Education  Outcome: Progressing Towards Goal     Problem: Diabetes Self-Management  Goal: *Disease process and treatment process  Description: Define diabetes and identify own type of diabetes; list 3 options for treating diabetes. Outcome: Progressing Towards Goal  Goal: *Incorporating nutritional management into lifestyle  Description: Describe effect of type, amount and timing of food on blood glucose; list 3 methods for planning meals. Outcome: Progressing Towards Goal  Goal: *Incorporating physical activity into lifestyle  Description: State effect of exercise on blood glucose levels. Outcome: Progressing Towards Goal  Goal: *Developing strategies to promote health/change behavior  Description: Define the ABC's of diabetes; identify appropriate screenings, schedule and personal plan for screenings. Outcome: Progressing Towards Goal  Goal: *Using medications safely  Description: State effect of diabetes medications on diabetes; name diabetes medication taking, action and side effects. Outcome: Progressing Towards Goal  Goal: *Monitoring blood glucose, interpreting and using results  Description: Identify recommended blood glucose targets  and personal targets. Outcome: Progressing Towards Goal  Goal: *Prevention, detection, treatment of acute complications  Description: List symptoms of hyper- and hypoglycemia; describe how to treat low blood sugar and actions for lowering  high blood glucose level.   Outcome: Progressing Towards Goal  Goal: *Prevention, detection and treatment of chronic complications  Description: Define the natural course of diabetes and describe the relationship of blood glucose levels to long term complications of diabetes. Outcome: Progressing Towards Goal  Goal: *Developing strategies to address psychosocial issues  Description: Describe feelings about living with diabetes; identify support needed and support network  Outcome: Progressing Towards Goal  Goal: *Insulin pump training  Outcome: Progressing Towards Goal  Goal: *Sick day guidelines  Outcome: Progressing Towards Goal  Goal: *Patient Specific Goal (EDIT GOAL, INSERT TEXT)  Outcome: Progressing Towards Goal     Problem: Patient Education: Go to Patient Education Activity  Goal: Patient/Family Education  Outcome: Progressing Towards Goal     Problem: Falls - Risk of  Goal: *Absence of Falls  Description: Document Salvatore Fall Risk and appropriate interventions in the flowsheet. Outcome: Progressing Towards Goal  Note: Fall Risk Interventions:  Mobility Interventions: Bed/chair exit alarm         Medication Interventions: Bed/chair exit alarm, Evaluate medications/consider consulting pharmacy    Elimination Interventions: Bed/chair exit alarm, Call light in reach, Urinal in reach              Problem: Patient Education: Go to Patient Education Activity  Goal: Patient/Family Education  Outcome: Progressing Towards Goal     Problem: Diabetes Maintenance:Admission  Goal: *Blood glucose 80 to 180 mg/dl  Outcome: Progressing Towards Goal     Problem: Pressure Injury - Risk of  Goal: *Prevention of pressure injury  Description: Document Saroj Scale and appropriate interventions in the flowsheet.   Outcome: Progressing Towards Goal  Note: Pressure Injury Interventions:  Sensory Interventions: Assess changes in LOC, Keep linens dry and wrinkle-free, Maintain/enhance activity level         Activity Interventions: Increase time out of bed, Pressure redistribution bed/mattress(bed type)    Mobility Interventions: HOB 30 degrees or less, Pressure redistribution bed/mattress (bed type)    Nutrition Interventions: Document food/fluid/supplement intake                     Problem: Patient Education: Go to Patient Education Activity  Goal: Patient/Family Education  Outcome: Progressing Towards Goal     Problem: Pain  Goal: *Control of Pain  Outcome: Progressing Towards Goal  Goal: *PALLIATIVE CARE:  Alleviation of Pain  Outcome: Progressing Towards Goal     Problem: Patient Education: Go to Patient Education Activity  Goal: Patient/Family Education  Outcome: Progressing Towards Goal     Problem: Pneumonia: Discharge Outcomes  Goal: *Demonstrates progressive activity  Outcome: Progressing Towards Goal  Goal: *Describes follow-up/return visits to physicians  Outcome: Progressing Towards Goal  Goal: *Verbalizes name, dosage, time, side effects, and number of days to continue medications  Outcome: Progressing Towards Goal  Goal: *Influenza immunization  Outcome: Progressing Towards Goal  Goal: *Pneumococcal immunization  Outcome: Progressing Towards Goal  Goal: *Respiratory status at baseline  Outcome: Progressing Towards Goal  Goal: *Vital signs within defined limits  Outcome: Progressing Towards Goal  Goal: *Describes available resources and support systems  Outcome: Progressing Towards Goal     Problem: Risk for Spread of Infection  Goal: Prevent transmission of infectious organism to others  Description: Prevent the transmission of infectious organisms to other patients, staff members, and visitors.   Outcome: Progressing Towards Goal     Problem: Patient Education:  Go to Education Activity  Goal: Patient/Family Education  Outcome: Progressing Towards Goal     Problem: Nutrition Deficit  Goal: *Optimize nutritional status  Outcome: Progressing Towards Goal

## 2020-05-05 NOTE — ROUTINE PROCESS
Weaned pt from high flow nasal cannula to 3 LPM NC. SpO2 94% with normal respiratory rate and effort.

## 2020-05-05 NOTE — PROGRESS NOTES
[employer] [facility]  Pulmonary, Critical Care, and Sleep Medicine    Name: Prince Siddiqui MRN: [id number]   : 1960 Hospital: [facility]   Date: 2020        IMPRESSION:   Acute hypoxic respiratory failure- improving. COVID-19 pneumonia with suspected underlying bacterial pneumonia- improved. ARDS secondary to above-improving  JOSE on CKD 3  DM2 with hyperglycemia, hemoglobin A1c 8.2%  HTN  Hyperlipidemia  Hyponatremia resolved  CLL history     Patient Active Problem List   Diagnosis Code    Nonischemic cardiomyopathy ([facility] I42.8    PVC (premature ventricular contraction) I49.3    S/P ablation operation for arrhythmia Z98.890, Z86.79    Lymphocytosis D72.820    CLL (chronic lymphocytic leukemia) ([facility] C91.10    Skin rash R21    Vitamin D deficiency E55.9    Pneumonia J18.9    COVID-19 virus infection U07.1    Suspected Covid-19 Virus Infection R68.89      PLAN:   · Continue weaning supplemental oxygen as tolerated. · Per ID Continue emtricitabine/tenofovir till 20. · Continue, ascorbic acid. vitamin D3, melatonin. · Recommend prone position ventilation. I discussed this with patient. · Follow-up ID recommendations. · Titrate oxygen as tolerated  · Hopefully discharge patient once transition to nasal cannula     Subjective/Interval History:     20   Conducted via video synchronous zooming  Patient states he feels significantly improved. He has been up and moving in the room-he stood up and demonstrated some walking as well as using his walker to show his lower body strength  Denies much cough  Denies any chest discomfort  Still on Vapotherm-saturating 100%    ROS:A comprehensive review of systems was negative except for that written in the HPI.     Objective:   Vital Signs:    Visit Vitals  /63 (BP 1 Location: Left arm, BP Patient Position: Sitting)   Pulse 79   Temp 99.3 °F (37.4 °C)   Resp 18   Ht 5' 9\" (1.753 m)   Wt 77.7 kg (171 lb 4.8 oz) SpO2 96%   BMI 25.30 kg/m²       O2 Device: Hi flow nasal cannula   O2 Flow Rate (L/min): 15 l/min   Temp (24hrs), Av.6 °F (37 °C), Min:97.3 °F (36.3 °C), Max:99.3 °F (37.4 °C)       Intake/Output:   Last shift:      701 - 1900  In: 540 [P.O.:540]  Out: 450 [Urine:450]  Last 3 shifts: 1901 -  0700  In: 840 [P.O.:840]  Out: 1800 [Urine:1800]    Intake/Output Summary (Last 24 hours) at 2020 1357  Last data filed at 2020 1208  Gross per 24 hour   Intake 780 ml   Output 1050 ml   Net -270 ml        Physical Exam: -Limited exam from distance     General: in no apparent distress, oriented times 3, afebrile and normal vitals   HEENT: Normal  Not using accessory muscles  Bedside monitor-stable vitals  Moving all extremities. DATA:  Labs:  Recent Labs     20  0251   WBC 15.2*   HGB 10.5*   HCT 34.1*        Recent Labs     20  0308 20  0251   NA  --  141   K 4.2 4.0   CL  --  107   CO2  --  31   GLU  --  115*   BUN  --  11   CREA  --  0.86   CA  --  7.8*   MG 2.1  --      No results for input(s): PH, PCO2, PO2, HCO3, FIO2 in the last 72 hours. Imaging:  [x]I have personally reviewed the patients radiographs      Moderate High complexity decision making was performed during the evaluation of this patient at high risk for decompensation with multiple organ involvement     Above mentioned total time spent on reviewing the case/medical record/data/notes/EMR/patient examination/documentation/coordinating care with nurse/consultants, exclusive of procedures with complex decision making performed and > 50% time spent in face to face evaluation. MD Jerman Martinez is a 60 y.o. male being evaluated by a Virtual Visit (video visit) encounter to address concerns as mentioned above. A caregiver was present when appropriate.  Due to this being a TeleHealth encounter (During UWNBY-70 public health emergency), evaluation of the following organ systems was limited: Vitals/Constitutional/EENT/Resp/CV/GI//MS/Neuro/Skin/Heme-Lymph-Imm. Pursuant to the emergency declaration under the [address] [place] [address] and the [employer] and [employer] Act, this Virtual Visit was conducted with patient's (and/or legal guardian's) consent, to reduce the risk of exposure to COVID-19 and provide necessary medical care. Services were provided through a video synchronous discussion virtually to substitute for in-person encounter to save PPE. I was present in the hospital read the patient is admitted and being treated. Visit conducted with the help of patient's bedside nurse    --Yecenia Dobson MD on 5/5/2020 at 1:58 PM    An electronic signature was used to authenticate this note.

## 2020-05-05 NOTE — PROGRESS NOTES
[facility]  Progress Note    Patient: Prince Siddiqui Age: 60 y.o. : 1960 MR#: [id number] SSN: [id number]  Date: 2020     Subjective/24-hour events:     Continues to feel better by the day. No acute shortness of breath or chest pain. Wants to know when he will be able to go home. Assessment:   Acute hypoxic respiratory failure  COVID-19 pneumonia with suspected underlying bacterial pneumonia  ARDS secondary to above  JOSE on CKD 3  DM2 with hyperglycemia, hemoglobin A1c 8.2%  HTN  Hyperlipidemia  Hyponatremia resolved  CLL history    Plan:  Continue to wean oxygen as able/tolerated. Assess oxygen needs prior to discharge. Continue antiviral therapy per ID recommendations. Final dose to be given on 2020. Best supportive care and medical management as prescribed otherwise. Follow-up. Objective:   VS:   Visit Vitals  /63 (BP 1 Location: Left arm, BP Patient Position: Sitting)   Pulse 79   Temp 99.3 °F (37.4 °C)   Resp 18   Ht 5' 9\" (1.753 m)   Wt 77.7 kg (171 lb 4.8 oz)   SpO2 96%   BMI 25.30 kg/m²      Tmax/24hrs: Temp (24hrs), Av.6 °F (37 °C), Min:97.3 °F (36.3 °C), Max:99.3 °F (37.4 °C)      Intake/Output Summary (Last 24 hours) at 2020 1536  Last data filed at 2020 1208  Gross per 24 hour   Intake 780 ml   Output 1050 ml   Net -270 ml       Gen: In NAD. Nontoxic-appearing. Cardiovascular:  RRR  Pulmonary: No active wheezing, effort nonlabored. GI:  Soft, NTTP. Extremities:  Warm, no edema or ischemia.   Neuro:  Awake and alert, motor grossly nonfocal.    Labs:    Recent Results (from the past 24 hour(s))   LD    Collection Time: 20  3:40 AM   Result Value Ref Range     81 - 234 U/L   FERRITIN    Collection Time: 20  3:40 AM   Result Value Ref Range    Ferritin 334 8 - 388 NG/ML   C REACTIVE PROTEIN, QT    Collection Time: 20  3:40 AM   Result Value Ref Range    C-Reactive protein <0.3 0 - 0.3 mg/dL Signed By: Phani Darby MD     May 5, 2020

## 2020-05-06 VITALS
HEIGHT: 69 IN | SYSTOLIC BLOOD PRESSURE: 103 MMHG | TEMPERATURE: 98.4 F | RESPIRATION RATE: 27 BRPM | DIASTOLIC BLOOD PRESSURE: 85 MMHG | OXYGEN SATURATION: 96 % | HEART RATE: 72 BPM | BODY MASS INDEX: 25.56 KG/M2 | WEIGHT: 172.6 LBS

## 2020-05-06 LAB
CRP SERPL-MCNC: <0.3 MG/DL (ref 0–0.3)
ERYTHROCYTE [DISTWIDTH] IN BLOOD BY AUTOMATED COUNT: 17.1 % (ref 11.6–14.5)
FERRITIN SERPL-MCNC: 297 NG/ML (ref 8–388)
HCT VFR BLD AUTO: 34.5 % (ref 36–48)
HGB BLD-MCNC: 10.5 G/DL (ref 13–16)
LDH SERPL L TO P-CCNC: 208 U/L (ref 81–234)
MCH RBC QN AUTO: 24.5 PG (ref 24–34)
MCHC RBC AUTO-ENTMCNC: 30.4 G/DL (ref 31–37)
MCV RBC AUTO: 80.4 FL (ref 74–97)
PLATELET # BLD AUTO: 178 K/UL (ref 135–420)
PMV BLD AUTO: 10.9 FL (ref 9.2–11.8)
RBC # BLD AUTO: 4.29 M/UL (ref 4.7–5.5)
WBC # BLD AUTO: 14.5 K/UL (ref 4.6–13.2)

## 2020-05-06 PROCEDURE — 74011250637 HC RX REV CODE- 250/637: Performed by: INTERNAL MEDICINE

## 2020-05-06 PROCEDURE — 85027 COMPLETE CBC AUTOMATED: CPT

## 2020-05-06 PROCEDURE — 74011250637 HC RX REV CODE- 250/637: Performed by: HOSPITALIST

## 2020-05-06 PROCEDURE — 77010033678 HC OXYGEN DAILY

## 2020-05-06 PROCEDURE — 36415 COLL VENOUS BLD VENIPUNCTURE: CPT

## 2020-05-06 PROCEDURE — 82728 ASSAY OF FERRITIN: CPT

## 2020-05-06 PROCEDURE — 94762 N-INVAS EAR/PLS OXIMTRY CONT: CPT

## 2020-05-06 PROCEDURE — 86140 C-REACTIVE PROTEIN: CPT

## 2020-05-06 PROCEDURE — 74011250636 HC RX REV CODE- 250/636: Performed by: INTERNAL MEDICINE

## 2020-05-06 PROCEDURE — 83615 LACTATE (LD) (LDH) ENZYME: CPT

## 2020-05-06 RX ORDER — SIMVASTATIN 10 MG/1
40 TABLET, FILM COATED ORAL
Qty: 30 TAB | Refills: 0 | Status: SHIPPED | OUTPATIENT
Start: 2020-05-06

## 2020-05-06 RX ORDER — CYANOCOBALAMIN (VITAMIN B-12) 500 MCG
400 TABLET ORAL DAILY
Qty: 30 TAB | Refills: 0 | Status: SHIPPED | OUTPATIENT
Start: 2020-05-07

## 2020-05-06 RX ORDER — ASCORBIC ACID 500 MG
500 TABLET ORAL 2 TIMES DAILY
Qty: 30 TAB | Refills: 1 | Status: SHIPPED | OUTPATIENT
Start: 2020-05-06

## 2020-05-06 RX ADMIN — OMEGA-3 FATTY ACIDS CAP 1000 MG 1 CAPSULE: 1000 CAP at 09:09

## 2020-05-06 RX ADMIN — Medication 500 MG: at 09:08

## 2020-05-06 RX ADMIN — IPRATROPIUM BROMIDE AND ALBUTEROL 1 PUFF: 20; 100 SPRAY, METERED RESPIRATORY (INHALATION) at 08:00

## 2020-05-06 RX ADMIN — ENOXAPARIN SODIUM 40 MG: 40 INJECTION SUBCUTANEOUS at 09:08

## 2020-05-06 RX ADMIN — Medication 10 ML: at 09:11

## 2020-05-06 RX ADMIN — GUAIFENESIN 600 MG: 600 TABLET, EXTENDED RELEASE ORAL at 09:08

## 2020-05-06 RX ADMIN — FAMOTIDINE 20 MG: 20 TABLET ORAL at 09:09

## 2020-05-06 RX ADMIN — Medication 20 ML: at 15:21

## 2020-05-06 RX ADMIN — ATORVASTATIN CALCIUM 40 MG: 40 TABLET, FILM COATED ORAL at 09:08

## 2020-05-06 RX ADMIN — DICLOFENAC 4 G: 10 GEL TOPICAL at 15:00

## 2020-05-06 RX ADMIN — EMTRICITABINE 200 MG: 200 CAPSULE ORAL at 09:00

## 2020-05-06 RX ADMIN — CHOLECALCIFEROL (VITAMIN D3) 10 MCG (400 UNIT) TABLET 1 TABLET: at 09:08

## 2020-05-06 RX ADMIN — IPRATROPIUM BROMIDE AND ALBUTEROL 1 PUFF: 20; 100 SPRAY, METERED RESPIRATORY (INHALATION) at 14:00

## 2020-05-06 RX ADMIN — TENOFOVIR DISPROXIL FUMARATE 300 MG: 300 TABLET ORAL at 09:00

## 2020-05-06 RX ADMIN — CARVEDILOL 12.5 MG: 12.5 TABLET, FILM COATED ORAL at 09:09

## 2020-05-06 RX ADMIN — DICLOFENAC 4 G: 10 GEL TOPICAL at 09:00

## 2020-05-06 RX ADMIN — ASPIRIN 81 MG: 81 TABLET, COATED ORAL at 09:08

## 2020-05-06 NOTE — ROUTINE PROCESS
1912 Received verbal report outside patient's room d/t COVID status. Assumed care of patient from off going nurse. Patient resting in bed. No distress noted. Patient currently on 3L NC Sats 94%. Call bell within reach, siderails up x 3, bed in lowest position, and patient instructed to use sam l benitez for assistance. Will continue to monitor. 0708 Verbal shift change report given to [address] [place] [address] (oncoming nurse) by Devan Mueller RN(offgoing nurse).  Report included the following information SBAR, Intake/Output, MAR, Recent Results and Cardiac Rhythm SR.

## 2020-05-06 NOTE — PROGRESS NOTES
Problem: Breathing Pattern - Ineffective  Goal: *Absence of hypoxia  Outcome: Progressing Towards Goal  Goal: *Use of effective breathing techniques  Outcome: Progressing Towards Goal  Goal: *PALLIATIVE CARE:  Alleviation of Dyspnea  Outcome: Progressing Towards Goal     Problem: Patient Education: Go to Patient Education Activity  Goal: Patient/Family Education  Outcome: Progressing Towards Goal     Problem: Diabetes Self-Management  Goal: *Disease process and treatment process  Description: Define diabetes and identify own type of diabetes; list 3 options for treating diabetes. Outcome: Progressing Towards Goal  Goal: *Incorporating nutritional management into lifestyle  Description: Describe effect of type, amount and timing of food on blood glucose; list 3 methods for planning meals. Outcome: Progressing Towards Goal  Goal: *Incorporating physical activity into lifestyle  Description: State effect of exercise on blood glucose levels. Outcome: Progressing Towards Goal  Goal: *Developing strategies to promote health/change behavior  Description: Define the ABC's of diabetes; identify appropriate screenings, schedule and personal plan for screenings. Outcome: Progressing Towards Goal  Goal: *Using medications safely  Description: State effect of diabetes medications on diabetes; name diabetes medication taking, action and side effects. Outcome: Progressing Towards Goal  Goal: *Monitoring blood glucose, interpreting and using results  Description: Identify recommended blood glucose targets  and personal targets. Outcome: Progressing Towards Goal  Goal: *Prevention, detection, treatment of acute complications  Description: List symptoms of hyper- and hypoglycemia; describe how to treat low blood sugar and actions for lowering  high blood glucose level.   Outcome: Progressing Towards Goal  Goal: *Prevention, detection and treatment of chronic complications  Description: Define the natural course of diabetes and describe the relationship of blood glucose levels to long term complications of diabetes. Outcome: Progressing Towards Goal  Goal: *Developing strategies to address psychosocial issues  Description: Describe feelings about living with diabetes; identify support needed and support network  Outcome: Progressing Towards Goal  Goal: *Insulin pump training  Outcome: Progressing Towards Goal  Goal: *Sick day guidelines  Outcome: Progressing Towards Goal  Goal: *Patient Specific Goal (EDIT GOAL, INSERT TEXT)  Outcome: Progressing Towards Goal     Problem: Patient Education: Go to Patient Education Activity  Goal: Patient/Family Education  Outcome: Progressing Towards Goal     Problem: Falls - Risk of  Goal: *Absence of Falls  Description: Document Salvatore Fall Risk and appropriate interventions in the flowsheet. Outcome: Progressing Towards Goal  Note: Fall Risk Interventions:  Mobility Interventions: Assess mobility with egress test, Communicate number of staff needed for ambulation/transfer, Utilize walker, cane, or other assistive device         Medication Interventions: Evaluate medications/consider consulting pharmacy, Teach patient to arise slowly    Elimination Interventions: Call light in reach              Problem: Patient Education: Go to Patient Education Activity  Goal: Patient/Family Education  Outcome: Progressing Towards Goal     Problem: Diabetes Maintenance:Admission  Goal: *Blood glucose 80 to 180 mg/dl  Outcome: Progressing Towards Goal     Problem: Pressure Injury - Risk of  Goal: *Prevention of pressure injury  Description: Document Saroj Scale and appropriate interventions in the flowsheet.   Outcome: Progressing Towards Goal  Note: Pressure Injury Interventions:  Sensory Interventions: Assess changes in LOC         Activity Interventions: Assess need for specialty bed, Increase time out of bed, Pressure redistribution bed/mattress(bed type)    Mobility Interventions: Pressure redistribution bed/mattress (bed type), Assess need for specialty bed, HOB 30 degrees or less    Nutrition Interventions: Document food/fluid/supplement intake                     Problem: Patient Education: Go to Patient Education Activity  Goal: Patient/Family Education  Outcome: Progressing Towards Goal     Problem: Pain  Goal: *Control of Pain  Outcome: Progressing Towards Goal  Goal: *PALLIATIVE CARE:  Alleviation of Pain  Outcome: Progressing Towards Goal     Problem: Patient Education: Go to Patient Education Activity  Goal: Patient/Family Education  Outcome: Progressing Towards Goal     Problem: Pneumonia: Discharge Outcomes  Goal: *Demonstrates progressive activity  Outcome: Progressing Towards Goal  Goal: *Describes follow-up/return visits to physicians  Outcome: Progressing Towards Goal  Goal: *Verbalizes name, dosage, time, side effects, and number of days to continue medications  Outcome: Progressing Towards Goal  Goal: *Influenza immunization  Outcome: Progressing Towards Goal  Goal: *Pneumococcal immunization  Outcome: Progressing Towards Goal  Goal: *Respiratory status at baseline  Outcome: Progressing Towards Goal  Goal: *Vital signs within defined limits  Outcome: Progressing Towards Goal  Goal: *Describes available resources and support systems  Outcome: Progressing Towards Goal     Problem: Risk for Spread of Infection  Goal: Prevent transmission of infectious organism to others  Description: Prevent the transmission of infectious organisms to other patients, staff members, and visitors.   Outcome: Progressing Towards Goal     Problem: Patient Education:  Go to Education Activity  Goal: Patient/Family Education  Outcome: Progressing Towards Goal     Problem: Nutrition Deficit  Goal: *Optimize nutritional status  Outcome: Progressing Towards Goal

## 2020-05-06 NOTE — PROGRESS NOTES
NUTRITION    Nutrition Screen      RECOMMENDATIONS / PLAN:     - Add double meat portion once daily; double vegetable potion BID  - Continue RD inpatient monitoring and evaluation. NUTRITION INTERVENTIONS & DIAGNOSIS:     - Meals/snacks: modified composition    Nutrition Diagnosis: Unintended weight loss related to acute illness, decreased appetite as evidenced by 15 lb, 8% weight loss PTA. ASSESSMENT:     5/6: Consent obtained from pt for remote assessment via telephone. Pt continues to have excellent appetite and meal intake. Tolerating diet. Continues to decline nutrition supplement. Pt reported still being hungry after meals; agreeable to additional portion of meat and vegetables. 4/29: Consent obtained from pt for remote assessment via telephone. Tolerating diet, denies nausea/vomiting, appetite improved and consuming most of recent meals, not interested in nutritional supplements.      Nutritional intake adequate to meet patients estimated nutritional needs:  Yes    Diet: DIET DIABETIC CONSISTENT CARB Regular      Food Allergies: NKFA  Current Appetite: Good  Appetite/meal intake prior to admission: Fair x several days PTA, eating well prior to onset of symptoms   Feeding Limitations:  [] Swallowing difficulty    [] Chewing difficulty    [] Other:  Current Meal Intake:   Patient Vitals for the past 100 hrs:   % Diet Eaten   05/06/20 1225 100 %   05/06/20 0737 100 %   05/05/20 1656 100 %   05/05/20 1208 100 %   05/05/20 0840 100 %       BM: 5/5  Skin Integrity: WDL  Edema:   [x] No     [] Yes   Pertinent Medications: Reviewed: ascorbic acid, atorvastatin, cholecalciferol, famotidine, SSI, fish oil    Recent Labs     05/04/20  0308   K 4.2   MG 2.1       Intake/Output Summary (Last 24 hours) at 5/6/2020 1244  Last data filed at 5/6/2020 1225  Gross per 24 hour   Intake 970 ml   Output 1980 ml   Net -1010 ml       Anthropometrics:  Ht Readings from Last 1 Encounters:   04/20/20 5' 9\" (1.753 m)     Last 3 Recorded Weights in this Encounter    05/04/20 0500 05/05/20 0400 05/06/20 0409   Weight: 78.3 kg (172 lb 9.9 oz) 77.7 kg (171 lb 4.8 oz) 78.3 kg (172 lb 9.6 oz)     Body mass index is 25.49 kg/m². Weight History: patient reports usual weight of 189 lb and recent weight loss over several pounds PTA (15 lb, 8% weight loss)     Weight Metrics 5/6/2020 9/20/2019 6/27/2019 6/20/2019 5/24/2019 1/11/2019 12/20/2018   Weight 172 lb 9.6 oz 189 lb 187 lb 188 lb 192 lb 192 lb 188 lb   BMI 25.49 kg/m2 27.91 kg/m2 27.62 kg/m2 27.76 kg/m2 28.35 kg/m2 28.35 kg/m2 27.76 kg/m2        Admitting Diagnosis: Pneumonia [J18.9]  COVID-19 virus infection [U07.1]  Pertinent PMHx: DM, CLL, HTN, HLD    Education Needs:        [x] None identified  [] Identified - Not appropriate at this time  []  Identified and addressed - refer to education log  Learning Limitations:   [x] None identified  [] Identified    Cultural, Baptist & ethnic food preferences:  [x] None identified    [] Identified and addressed     ESTIMATED NUTRITION NEEDS:     Calories: 7884-2106 kcal (MSJx1.2-1.3) based on  [x] Actual BW 79 kg     [] IBW   Protein: 63-79 gm (0.8-1 gm/kg) based on  [x] Actual BW      [] IBW   Fluid: 1 mL/kcal     MONITORING & EVALUATION:     Nutrition Goal(s):   Goals modified  - PO nutrition intake will meet >75% of patient estimated nutritional needs within the next 7 days. Outcome: Met/Continue   - PO nutrition intake will continue to meet >75% of patients estimated nutritional needs over the next 7 days. Outcome: New/Initial goal      Monitoring:   [x] Food and nutrient intake   [x] Food and nutrient administration  [x] Comparative standards   [x] Nutrition-focused physical findings   [x] Anthropometric Measurements   [x] Treatment/therapy   [x] Biochemical data, medical tests, and procedures        Previous Recommendations (for follow-up assessments only):   Implemented      Discharge Planning: No nutritional discharge needs at this time.    Participated in care planning, discharge planning, & interdisciplinary rounds as appropriate.       Ernie Trinidad, RD    Pager: [phone]

## 2020-05-06 NOTE — PROGRESS NOTES
0725: Verbal shift report received from [facility], [address] [facility] (Outgoing nurse) to Ben Perdomo RN (Incoming nurse). Assumed pt care. Pt sitting up in bed. No apparent distress noted. Will continue to monitor.

## 2020-05-06 NOTE — PROGRESS NOTES
[facility]  Pulmonary, Critical Care, and Sleep Medicine  Progress note-telemedicine    Name: Gerry June MRN: [id number]   : 1960 Hospital: [facility]   Date: 2020        IMPRESSION:   Acute hypoxic respiratory failure- improving. Weaned down to 3 L via nasal cannula  COVID-19 pneumonia with suspected underlying bacterial pneumonia- improved. ARDS secondary to above-improving  JOSE on CKD 3  DM2 with hyperglycemia, hemoglobin A1c 8.2%  HTN  Hyperlipidemia  Hyponatremia resolved  CLL history     Patient Active Problem List   Diagnosis Code    Nonischemic cardiomyopathy ([facility] I42.8    PVC (premature ventricular contraction) I49.3    S/P ablation operation for arrhythmia Z98.890, Z86.79    Lymphocytosis D72.820    CLL (chronic lymphocytic leukemia) ([facility] C91.10    Skin rash R21    Vitamin D deficiency E55.9    Pneumonia J18.9    COVID-19 virus infection U07.1    Suspected Covid-19 Virus Infection R68.89      PLAN:   · Continue weaning supplemental oxygen as tolerated. -Should be able to tolerate further weaning-SaO2 goal more than 92%  · Per ID Continue emtricitabine/tenofovir till 20. · Continue, ascorbic acid. vitamin D3, melatonin. · Recommend prone position ventilation. I discussed this with patient. · Follow-up ID recommendations. · Titrate oxygen as tolerated  · Hopefully discharge patient soon with or without home oxygen supplementation     Subjective/Interval History:     20     Conducted via video synchronous zooming-patient's RN at bedside assisting with the visit-utilizing's and touch stethoscope and patient communication via review  Patient states he feels significantly improved.   He has been up and moving in the room  Appetite improved-ate his entire breakfast  Denies any chest discomfort  Overnight weaned down to 3 L via nasal cannula  Has no other complaints    ROS:A comprehensive review of systems was negative except for that written in the HPI. Objective:   Vital Signs:    Visit Vitals  /66 (BP 1 Location: Left arm, BP Patient Position: At rest)   Pulse 72   Temp 97.9 °F (36.6 °C)   Resp 26   Ht 5' 9\" (1.753 m)   Wt 78.3 kg (172 lb 9.6 oz)   SpO2 93%   BMI 25.49 kg/m²       O2 Device: Nasal cannula   O2 Flow Rate (L/min): 3 l/min   Temp (24hrs), Av.4 °F (36.9 °C), Min:97.9 °F (36.6 °C), Max:99.3 °F (37.4 °C)       Intake/Output:   Last shift:      No intake/output data recorded. Last 3 shifts:  1901 -  0700  In: 1200 [P.O.:1200]  Out: 2650 [Urine:2650]    Intake/Output Summary (Last 24 hours) at 2020 0956  Last data filed at 2020 0409  Gross per 24 hour   Intake 720 ml   Output 1800 ml   Net -1080 ml        Physical Exam: -Limited     General: in no apparent distress, oriented times 3, afebrile and normal vitals   HEENT: Normal  Lungs-few basal rales  Not using accessory muscles  Bedside monitor-stable vitals  Moving all extremities. DATA:  Labs:  Recent Labs     20  0428   WBC 14.5*   HGB 10.5*   HCT 34.5*        Recent Labs     20  0308   K 4.2   MG 2.1     No results for input(s): PH, PCO2, PO2, HCO3, FIO2 in the last 72 hours. Imaging:  [x]I have personally reviewed the patients radiographs      Moderate High complexity decision making was performed during the evaluation of this patient at high risk for decompensation with multiple organ involvement     Above mentioned total time spent on reviewing the case/medical record/data/notes/EMR/patient examination/documentation/coordinating care with nurse/consultants, exclusive of procedures with complex decision making performed and > 50% time spent in face to face evaluation. Presley Lora MD     Ramone Brown is a 60 y.o. male being evaluated by a Virtual Visit (video visit) encounter to address concerns as mentioned above. A caregiver was present when appropriate.  Due to this being a TeleHealth encounter (During SWQBW-48 public health emergency), evaluation of the following organ systems was limited: Vitals/Constitutional/EENT/Resp/CV/GI//MS/Neuro/Skin/Heme-Lymph-Imm. Pursuant to the emergency declaration under the [facility], [address] [place] [address] authority and the [employer] and [employer] Act, this Virtual Visit was conducted with patient's (and/or legal guardian's) consent, to reduce the risk of exposure to COVID-19 and provide necessary medical care. Services were provided through a video synchronous discussion virtually to substitute for in-person encounter to save PPE. I was present in the hospital read the patient is admitted and being treated. Visit conducted with the help of patient's bedside nurse    --April Vuong MD on 5/6/2020     An electronic signature was used to authenticate this note.

## 2020-05-06 NOTE — PROGRESS NOTES
Spoke with pt by phone. Pt is aware that he needs oxygen to go home and is also aware to wait until oxygen is delivered to hospital to leave. [facility] Advised pt that he will need to call oxygen company once home to have concentrator delivered. Pt verbalized understanding. Pt states he has family to transport home. Confirmed with colette with oxygen company that referral was received.  ESRENE Stanford, [facility]- [phone]

## 2020-05-06 NOTE — PROGRESS NOTES
[facility]  Progress Note    Patient: Ricardo Moore Age: 60 y.o. : 1960 MR#: [id number] SSN: [id number]  Date: 2020     Subjective/24-hour events:     Feels great and in good spirits. Transition from high flow to oxygen via nasal cannula without difficulty. Patient continues to tolerate well. Assessment:   Acute hypoxic respiratory failure  COVID-19 pneumonia with suspected underlying bacterial pneumonia  ARDS secondary to above  JOSE on CKD 3  DM2 with hyperglycemia, hemoglobin A1c 8.2%  HTN  Hyperlipidemia  Hyponatremia resolved  CLL history    Plan:  Home today with outpatient follow-up as advised. Needs home oxygen and this has been ordered. Case discussed with care management. Objective:   VS:   Visit Vitals  /85 (BP 1 Location: Left arm, BP Patient Position: Sitting)   Pulse 72   Temp 98.4 °F (36.9 °C)   Resp 27   Ht 5' 9\" (1.753 m)   Wt 78.3 kg (172 lb 9.6 oz)   SpO2 96%   BMI 25.49 kg/m²      Tmax/24hrs: Temp (24hrs), Av.3 °F (36.8 °C), Min:97.9 °F (36.6 °C), Max:98.5 °F (36.9 °C)      Intake/Output Summary (Last 24 hours) at 2020 1330  Last data filed at 2020 1225  Gross per 24 hour   Intake 970 ml   Output 1980 ml   Net -1010 ml       Gen: In NAD. Nontoxic-appearing. Cardiovascular:  RRR  Pulmonary: No active wheezing, effort nonlabored. GI:  Soft, NTTP. Extremities:  Warm, no edema or ischemia.   Neuro:  Awake and alert, motor grossly nonfocal.    Labs:    Recent Results (from the past 24 hour(s))   LD    Collection Time: 20  4:28 AM   Result Value Ref Range     81 - 234 U/L   FERRITIN    Collection Time: 20  4:28 AM   Result Value Ref Range    Ferritin 297 8 - 388 NG/ML   C REACTIVE PROTEIN, QT    Collection Time: 20  4:28 AM   Result Value Ref Range    C-Reactive protein <0.3 0 - 0.3 mg/dL   CBC W/O DIFF    Collection Time: 20  4:28 AM   Result Value Ref Range    WBC 14.5 (H) 4.6 - 13.2 K/uL    RBC 4.29 (L) 4.70 - 5.50 M/uL    HGB 10.5 (L) 13.0 - 16.0 g/dL    HCT 34.5 (L) 36.0 - 48.0 %    MCV 80.4 74.0 - 97.0 FL    MCH 24.5 24.0 - 34.0 PG    MCHC 30.4 (L) 31.0 - 37.0 g/dL    RDW 17.1 (H) 11.6 - 14.5 %    PLATELET 749 336 - 405 K/uL    MPV 10.9 9.2 - 11.8 FL       Signed By: John Rendon MD     May 6, 2020

## 2020-05-06 NOTE — PROGRESS NOTES
Problem: Breathing Pattern - Ineffective  Goal: *Absence of hypoxia  Outcome: Progressing Towards Goal  Goal: *Use of effective breathing techniques  Outcome: Progressing Towards Goal  Goal: *PALLIATIVE CARE:  Alleviation of Dyspnea  Outcome: Progressing Towards Goal     Problem: Patient Education: Go to Patient Education Activity  Goal: Patient/Family Education  Outcome: Progressing Towards Goal     Problem: Diabetes Self-Management  Goal: *Disease process and treatment process  Description: Define diabetes and identify own type of diabetes; list 3 options for treating diabetes. Outcome: Progressing Towards Goal  Goal: *Incorporating nutritional management into lifestyle  Description: Describe effect of type, amount and timing of food on blood glucose; list 3 methods for planning meals. Outcome: Progressing Towards Goal  Goal: *Incorporating physical activity into lifestyle  Description: State effect of exercise on blood glucose levels. Outcome: Progressing Towards Goal  Goal: *Developing strategies to promote health/change behavior  Description: Define the ABC's of diabetes; identify appropriate screenings, schedule and personal plan for screenings. Outcome: Progressing Towards Goal  Goal: *Using medications safely  Description: State effect of diabetes medications on diabetes; name diabetes medication taking, action and side effects. Outcome: Progressing Towards Goal  Goal: *Monitoring blood glucose, interpreting and using results  Description: Identify recommended blood glucose targets  and personal targets. Outcome: Progressing Towards Goal  Goal: *Prevention, detection, treatment of acute complications  Description: List symptoms of hyper- and hypoglycemia; describe how to treat low blood sugar and actions for lowering  high blood glucose level.   Outcome: Progressing Towards Goal  Goal: *Prevention, detection and treatment of chronic complications  Description: Define the natural course of diabetes and describe the relationship of blood glucose levels to long term complications of diabetes. Outcome: Progressing Towards Goal  Goal: *Developing strategies to address psychosocial issues  Description: Describe feelings about living with diabetes; identify support needed and support network  Outcome: Progressing Towards Goal  Goal: *Insulin pump training  Outcome: Progressing Towards Goal  Goal: *Patient Specific Goal (EDIT GOAL, INSERT TEXT)  Outcome: Progressing Towards Goal     Problem: Patient Education: Go to Patient Education Activity  Goal: Patient/Family Education  Outcome: Progressing Towards Goal     Problem: Falls - Risk of  Goal: *Absence of Falls  Description: Document Salvatore Fall Risk and appropriate interventions in the flowsheet. Outcome: Progressing Towards Goal  Note: Fall Risk Interventions:  Mobility Interventions: Bed/chair exit alarm         Medication Interventions: Evaluate medications/consider consulting pharmacy, Patient to call before getting OOB, Teach patient to arise slowly    Elimination Interventions: Call light in reach, Patient to call for help with toileting needs, Stay With Me (per policy), Toilet paper/wipes in reach, Toileting schedule/hourly rounds, Urinal in reach              Problem: Patient Education: Go to Patient Education Activity  Goal: Patient/Family Education  Outcome: Progressing Towards Goal     Problem: Patient Education: Go to Patient Education Activity  Goal: Patient/Family Education  Outcome: Progressing Towards Goal     Problem: Pressure Injury - Risk of  Goal: *Prevention of pressure injury  Description: Document Saroj Scale and appropriate interventions in the flowsheet.   Outcome: Progressing Towards Goal  Note: Pressure Injury Interventions:  Sensory Interventions: Assess changes in LOC, Pressure redistribution bed/mattress (bed type), Maintain/enhance activity level         Activity Interventions: Assess need for specialty bed, Increase time out of bed, Pressure redistribution bed/mattress(bed type)    Mobility Interventions: Assess need for specialty bed, HOB 30 degrees or less, Pressure redistribution bed/mattress (bed type), Turn and reposition approx. every two hours(pillow and wedges)    Nutrition Interventions: Document food/fluid/supplement intake, Discuss nutritional consult with provider, Offer support with meals,snacks and hydration                     Problem: Patient Education: Go to Patient Education Activity  Goal: Patient/Family Education  Outcome: Progressing Towards Goal     Problem: Pain  Goal: *Control of Pain  Outcome: Progressing Towards Goal  Goal: *PALLIATIVE CARE:  Alleviation of Pain  Outcome: Progressing Towards Goal     Problem: Patient Education: Go to Patient Education Activity  Goal: Patient/Family Education  Outcome: Progressing Towards Goal     Problem: Pneumonia: Discharge Outcomes  Goal: *Demonstrates progressive activity  Outcome: Progressing Towards Goal  Goal: *Describes follow-up/return visits to physicians  Outcome: Progressing Towards Goal  Goal: *Verbalizes name, dosage, time, side effects, and number of days to continue medications  Outcome: Progressing Towards Goal  Goal: *Influenza immunization  Outcome: Progressing Towards Goal  Goal: *Pneumococcal immunization  Outcome: Progressing Towards Goal  Goal: *Respiratory status at baseline  Outcome: Progressing Towards Goal  Goal: *Vital signs within defined limits  Outcome: Progressing Towards Goal  Goal: *Describes available resources and support systems  Outcome: Progressing Towards Goal     Problem: Risk for Spread of Infection  Goal: Prevent transmission of infectious organism to others  Description: Prevent the transmission of infectious organisms to other patients, staff members, and visitors.   Outcome: Progressing Towards Goal     Problem: Patient Education:  Go to Education Activity  Goal: Patient/Family Education  Outcome: Progressing Towards Goal     Problem: Nutrition Deficit  Goal: *Optimize nutritional status  Outcome: Progressing Towards Goal     Problem: Diabetes Maintenance:Admission  Goal: *Blood glucose 80 to 180 mg/dl  Outcome: Progressing Towards Goal

## 2020-05-07 ENCOUNTER — PATIENT OUTREACH (OUTPATIENT)
Dept: CASE MANAGEMENT | Age: 60
End: 2020-05-07

## 2020-05-07 NOTE — PROGRESS NOTES
Patient contacted regarding COVID-19 diagnosis. CTN Attempt to contact patient via telephone on 5/7/20 for follow up. Unable to reach. Left message on voicemail with office contact information. No Patient medical information left on message.

## 2020-05-08 ENCOUNTER — PATIENT OUTREACH (OUTPATIENT)
Dept: CASE MANAGEMENT | Age: 60
End: 2020-05-08

## 2020-05-08 NOTE — PROGRESS NOTES
Patient contacted regarding COVID-19 diagnosis. Care Transition Nurse/ Ambulatory Care Manager contacted the Pt. spouse by telephone to perform post discharge assessment. Verified name and  with Pt. spouse as identifiers. Provided introduction to self, and explanation of the CTN/ACM role, and reason for call due to risk factors for infection and/or exposure to COVID-19. PHI verified and Pt. Spouse is listed. Patient's spouse reported  That Pt is doing well. Pt. Spouse denied CP , SOB, fever and chills. Pt. Spouse reported that Per hospitalist that Pt. Is clear with Covid but Pt. Is self isolating self at home. CDC guidelines, self quarantine and Red flags on when to go back to ED (Chest pain, difficulty breathing, shortness of breath, high fevers and/or worsening of symptoms) were reviewed and discuss with Pt. Spouse,  Pt. Spouse  Verbalized and repeated back understanding. Reviewed self-quarantine instructions with patient's spouse . Explained to patient self-quarantine is isolating self to one room and using one bathroom, if possible and to avoid contact with family and others for a period of 14 days to avoid spreading illness. Symptoms reviewed with patient who verbalized the following symptoms: no new symptoms and no worsening symptoms. Due to no new or worsening symptoms encounter was not routed to provider for escalation. Patient has following risk factors of: diabetes. CTN/ACM reviewed discharge instructions, medical action plan and red flags such as increased shortness of breath, increasing fever and signs of decompensation with patient's spouse who verbalized understanding. Discussed exposure protocols and quarantine with CDC Guidelines What to do if you are sick with coronavirus disease 2019.  Pt. spouse was given an opportunity for questions and concerns. The Pt.  spouse agrees to contact the [facility] exposure line [phone], [facility] Department of Health  ([phone]) and PCP office for questions related to their healthcare. CTN/ACM provided contact information for future needs. Reviewed and educated Pt. spouse on any new and changed medications related to discharge diagnosis. Plan for follow call in 7-14 days based on severity of symptoms and risk factors.

## 2020-05-09 ENCOUNTER — PATIENT OUTREACH (OUTPATIENT)
Dept: CASE MANAGEMENT | Age: 60
End: 2020-05-09

## 2020-05-19 NOTE — DISCHARGE SUMMARY
Discharge Summary    Patient: Lg Fraga MRN: [id number]  CSN: [id number]    YOB: 1960  Age: 60 y.o. Sex: male    DOA: 4/16/2020 LOS:  LOS: 20 days   Discharge Date: 5/6/2020     Admission Diagnoses: Pneumonia [J18.9]  TESPW-20 virus infection [U07.1]    Discharge Diagnoses:    Acute hypoxic respiratory failure  COVID-19 pneumonia with suspected underlying bacterial pneumonia  ARDS secondary to above  JOSE on CKD 3  DM2 with hyperglycemia, hemoglobin A1c 8.2%  HTN  Hyperlipidemia  Hyponatremia resolved  CLL history    Discharge Condition: Stable    PHYSICAL EXAM  Visit Vitals  /85 (BP 1 Location: Left arm, BP Patient Position: Sitting)   Pulse 72   Temp 98.4 °F (36.9 °C)   Resp 27   Ht 5' 9\" (1.753 m)   Wt 78.3 kg (172 lb 9.6 oz)   SpO2 96%   BMI 25.49 kg/m²       General: In NAD. HEENT: NCAT. Sclerae anicteric, EOMI. OP clear  Lungs:  Breath sounds somewhat decreased but clear no wheezes. Effort nonlabored. Heart:  RRR. Abdomen: Soft, nontender/nondistended. Normoactive bowel sounds present. Extremities: Warm, no edema or ischemia. Psych:   In good spirits, mood normal.  Neurologic:  Awake and alert, motor nonfocal.      Hospital Course:  See admission H&P for full details of HPI. Patient was admitted to the hospital after presenting to the emergency department with complaints of progressively worsening cough and shortness of breath of several days duration. There was concern for possible community-acquired pneumonia but there was also high suspicion for novel coronavirus infection. Testing was completed and patient was found to be positive for COVID-19. He was subsequently transferred to the [facility] unit where his hospitalization was prolonged. Respiratory status was quite tenuous at times and multiple treatments including high flow oxygen and prone ventilation were attempted.   Infectious diseases and pulmonology have continue to follow patient and provide recommendations with regard to acute management of the course of this hospitalization. Patient did turn the corner several days ago and has continued to improve with regard to overall respiratory status. He has been able to be weaned from high flow oxygen and is been able to be transitioned to supplemental oxygen via nasal cannula. He has been evaluated for home oxygen and does meet criteria for this and this is been ordered. Patient has continued to remain stable with this management and is currently felt to be stable for discharge home with outpatient follow-up as advised. Consults:   Infectious diseases  Pulmonology    Significant Diagnostic Studies:  Renal ultrasound:  IMPRESSION:     No hydronephrosis or nephrolithiasis. CXR 4/29/2020:  IMPRESSION:  No significant change of multifocal airspace opacity        Discharge Medications:     Discharge Medication List as of 5/6/2020  5:40 PM      START taking these medications    Details   cholecalciferol (VITAMIN D3) (400 Units /10 mcg) tab tablet Take 1 Tab by mouth daily. , Normal, Disp-30 Tab, R-0      ascorbic acid, vitamin C, (VITAMIN C) 500 mg tablet Take 1 Tab by mouth two (2) times a day., Normal, Disp-30 Tab, R-1         CONTINUE these medications which have CHANGED    Details   simvastatin (ZOCOR) 10 mg tablet Take 4 Tabs by mouth nightly., Normal, Disp-30 Tab, R-0         CONTINUE these medications which have NOT CHANGED    Details   BYDUREON BCISE 2 mg/0.85 mL atIn INJECT 2MG EVERY WEEK UNDER THE SKIN, Historical Med, R-0, RAJEEV      sitagliptin phos/metformin HCl (JANUMET PO) Take  by mouth., Historical Med      carvedilol (COREG) 12.5 mg tablet Take 1 Tab by mouth two (2) times a day., Normal, Disp-60 Tab, R-6      aspirin delayed-release 81 mg tablet Take 81 mg by mouth daily. , Historical Med         STOP taking these medications       ergocalciferol (ERGOCALCIFEROL) 50,000 unit capsule Comments:   Reason for Stopping:         lisinopril (PRINIVIL, ZESTRIL) 5 mg tablet Comments:   Reason for Stopping:                 Activity: As tolerated    Diet: Cardiac Diet and Diabetic Diet    Disposition: Home    Follow-up: with PCP, Enrrique Oliva MD in 1 week and pulmonology as directed    Minutes spent on discharge: >30 minutes spent coordinating this discharge.

## 2020-05-22 ENCOUNTER — PATIENT OUTREACH (OUTPATIENT)
Dept: CASE MANAGEMENT | Age: 60
End: 2020-05-22

## 2020-05-22 NOTE — PROGRESS NOTES
Patient contacted regarding COVID-19 risk and screening. Multiple Attempts to contact patient via telephone on 5/22/20 for follow up. Unable to reach. Left message on voicemail with office contact information. No Patient medical information left on message.

## 2021-01-20 NOTE — CDMP QUERY
Patient admitted with CAP/r/o COVID 19 PNA. Patient noted to have WBC 15.5; VS on adm 102.2, HR 98, RR 35; and acute renal failure. If possible, please document in the progress notes and d/c summary if you are evaluating and / or treating any of the following:  Sepsis, present on admission, suspected causative organism (please specify)  No Sepsis  Other, please specify  Clinically unable to determine The medical record reflects the following: 
   Risk Factors: CAP; Covid test pending Clinical Indicators: WBC 15.5; T 102.2, HR 98, RR 35; acute renal failure/CR 2.19 Treatment: Rocephin and Zithromax, ID consult [address] Sepsis and Septic Shock Clinical Criteria:  
 
1. At least 2 SIRS Criteria (CMS) 
    -Temp > 100.9 or < 96.8; HR > 90; RR > 20; WBC > 12,000 or < 4,000 or > 10% bands 2. Documented suspected or confirmed source of infection. (CMS) 3. Documented Organ Dysfunction by any ONE of the following. (CMS) 
       [address] Definition) This is a modification of the SOFA score & incorporates the CMS guidelines 
-AMS (from baseline if known) -SBP<90 or MAP<65 
-Doc of respiratory failure and use of invasive mech vent or non-invasive mech vent (CPAP/BIPAP) -Creat. > 2.0 (with no hx of CKD) -Bilirubin > 2 mg / dl (no hx of liver dz) -PLT < 100,000 (no hx thrombocytopenia) -INR > 1.5 or aPTT > 60 sec (for pts not on Warfarin therapy) -Lactic Acid > 2 mmol/ L Thank you, Renay Nguyen [email or web address] 

Patient admitted with CP/rule out COVID 19. Patient noted to have hypoxia. If possible, please document in the progress notes and d/c summary if you are evaluating and / or treating any of the following:  Acute hypoxic respiratory failure-POA  Other, please specify  Clinically unable to determine The medical record reflects the following: 
   Risk Factors:  Acute illness PNA r/o COVID 19 Clinical Indicators:  ER: RR 35, RA sat 90%, with drop to 87% with 2L NC, 2L NC with sat drop to 91% w/ RR32. Treatment: O2 Acute Respiratory Failure indicators include: - Respirations <12 or >25 - [employer] - Use of accessory muscles of respiration  
- Inability to speak in full sentences - Cyanosis AND - Pulse ox <90% RA or <95% on O2  
- pH <7.35 or >7.45  
- pO2 < 60 mm Hg (or 10mm below COPD patient's baseline) - pCO2 >50mm Hg (or 10mm above COPD patient's baseline) Thank you, Mariel Briones [email or web address] 

Possible Home

## 2021-11-17 ENCOUNTER — OFFICE VISIT (OUTPATIENT)
Dept: CARDIOLOGY CLINIC | Age: 61
End: 2021-11-17
Payer: COMMERCIAL

## 2021-11-17 VITALS
HEART RATE: 67 BPM | BODY MASS INDEX: 27.25 KG/M2 | OXYGEN SATURATION: 98 % | HEIGHT: 69 IN | SYSTOLIC BLOOD PRESSURE: 128 MMHG | WEIGHT: 184 LBS | DIASTOLIC BLOOD PRESSURE: 76 MMHG

## 2021-11-17 DIAGNOSIS — I49.3 PVC (PREMATURE VENTRICULAR CONTRACTION): Primary | ICD-10-CM

## 2021-11-17 DIAGNOSIS — F32.A DEPRESSION, UNSPECIFIED DEPRESSION TYPE: ICD-10-CM

## 2021-11-17 DIAGNOSIS — I10 PRIMARY HYPERTENSION: ICD-10-CM

## 2021-11-17 DIAGNOSIS — Z86.79 HISTORY OF CARDIOMYOPATHY: ICD-10-CM

## 2021-11-17 PROCEDURE — 99214 OFFICE O/P EST MOD 30 MIN: CPT | Performed by: INTERNAL MEDICINE

## 2021-11-17 PROCEDURE — 93000 ELECTROCARDIOGRAM COMPLETE: CPT | Performed by: INTERNAL MEDICINE

## 2021-11-17 RX ORDER — SITAGLIPTIN AND METFORMIN HYDROCHLORIDE 50; 1000 MG/1; MG/1
TABLET, FILM COATED ORAL
COMMUNITY
Start: 2021-11-15

## 2021-11-17 RX ORDER — FERROUS SULFATE TAB 325 MG (65 MG ELEMENTAL FE) 325 (65 FE) MG
TAB ORAL
COMMUNITY
Start: 2021-09-08

## 2021-11-17 RX ORDER — SIMVASTATIN 20 MG/1
TABLET, FILM COATED ORAL
COMMUNITY
Start: 2021-09-13 | End: 2021-11-17

## 2021-11-17 RX ORDER — LISINOPRIL 5 MG/1
TABLET ORAL
COMMUNITY
Start: 2021-11-15

## 2021-11-17 RX ORDER — AMOXICILLIN 500 MG/1
CAPSULE ORAL
COMMUNITY
Start: 2021-11-15

## 2021-11-17 NOTE — PROGRESS NOTES
Angeles Tristan presents today for   Chief Complaint   Patient presents with    Follow-up     overdue yearly        Angeles Tristan preferred language for health care discussion is english/other. Is someone accompanying this pt? no    Is the patient using any DME equipment during [address]? no    Depression Screening:  3 most recent PHQ Screens 11/17/2021   Little interest or pleasure in doing things Not at all   Feeling down, depressed, irritable, or hopeless Not at all   Total Score PHQ 2 0       Learning Assessment:  Learning Assessment 6/27/2019   PRIMARY LEARNER Patient   PRIMARY LANGUAGE ENGLISH   LEARNER PREFERENCE PRIMARY DEMONSTRATION     -   ANSWERED BY patient   RELATIONSHIP SELF       Abuse Screening:  Abuse Screening Questionnaire 6/27/2019   Do you ever feel afraid of your partner? N   Are you in a relationship with someone who physically or mentally threatens you? N   Is it safe for you to go home? Y     Pt currently taking Anticoagulant therapy? no    Coordination of Care:  1. Have you been to the ER, urgent care clinic since your last visit? Hospitalized since your last visit? no    2. Have you seen or consulted any other health care providers outside of the [address] [place] [address] since your last visit? Include any pap smears or colon screening.  no

## 2021-11-17 NOTE — PROGRESS NOTES
History of Present Illness:  57year old male here for follow up. It is his birthday today. He still feels depressed at times and has a lot of stress at home. His wife was diagnosed with cancer and recently had a Whipple procedure. He has not been working as a  as much as he ordinarily would as a result. He has seen a counselor for his depression. No new chest pain, dyspnea, PND, orthopnea or edema. No significant palpitations. Impression:  1. History of depression with loss of his mother a few years ago and now recent stressors with his wife having cancer and recent Whipple procedure. He has followed up with a psychiatrist.  2. History of PVCs, right coronary cusp ablation January, 2010, stable. 3. Transient remote nonischemic cardiomyopathy, improving to normal in 2016.  4. HTN, controlled. 5. Dyslipidemia, on Zocor. Plan: Today was his birthday and overall he is doing very well. I tried to provide reassurance that his blood pressure is controlled, he has not had any recurrent PVCs, his heart function had improved through the years and he is doing quite well from my standpoint. I will plan to see back annually. Past Medical History:   Diagnosis Date    Abnormal nuclear cardiac imaging test 11/30/2006    Mild anterior ischemia. Inferior scar w/border zone ischemia. LVE. EF 26%. (Gated imaging may be inaccurate.)  Global hypk. Neg EKG on max EST. Ex time 10:30.  Aorto-iliac duplex 09/10/2012    No AAA identified.  Carotid duplex 09/10/2012    No significant occlusive disease bilaterallly.  [facility]     Diabetes ([facility]     Echocardiogram 06/02/2015    EF 55%. No WMA. Mild LVH. Gr 1 DDfx. IMELDA. No significant chg from study of 5/17/10.  History of echocardiogram 06/20/2011    EF 50-55%. Gr 2 DDfx. Mild RVE. Mild LAE. Mild-mod IMELDA.      Hypercholesterolemia     Hypertension     Nonischemic cardiomyopathy ([facility]     s/p right coronary cusp PVC ablation  (7/49/07) without complication    [facility]     S/P cardiac cath 12/11/2006    Patent coronary arteries. LVEDP 12.  EF 25-30%. Mod-significant global hypk. Current Outpatient Medications   Medication Sig Dispense Refill    lisinopriL (PRINIVIL, ZESTRIL) 5 mg tablet       FeroSuL 325 mg (65 mg iron) tablet       amoxicillin (AMOXIL) 500 mg capsule       Janumet 50-1,000 mg per tablet       Vitamin D2 1,250 mcg (50,000 unit) capsule TAKE 1 CAPSULE BY MOUTH ONCE A WEEK      tamsulosin (FLOMAX) 0.4 mg capsule       simvastatin (ZOCOR) 10 mg tablet Take 4 Tabs by mouth nightly. 30 Tab 0    cholecalciferol (VITAMIN D3) (400 Units /10 mcg) tab tablet Take 1 Tab by mouth daily. 30 Tab 0    ascorbic acid, vitamin C, (VITAMIN C) 500 mg tablet Take 1 Tab by mouth two (2) times a day. 30 Tab 1    BYDUREON BCISE 2 mg/0.85 mL atIn INJECT 2MG EVERY WEEK UNDER THE SKIN  0    carvedilol (COREG) 12.5 mg tablet Take 1 Tab by mouth two (2) times a day. 60 Tab 6    aspirin delayed-release 81 mg tablet Take 81 mg by mouth daily. Social History   reports that he has quit smoking. He smoked 1.00 pack per day. He has never used smokeless tobacco.   reports current alcohol use. Family History  family history is not on file. Review of Systems  Except as stated above include:  Constitutional: Negative for fever, chills and malaise/fatigue. HEENT: No congestion or recent URI. Gastrointestinal: No nausea, vomiting, abdominal pain, bloody stools. Pulmonary:  Negative except as stated above. Cardiac:  Negative except as stated above. Musculoskeletal: Negative except as stated above. Neurological:  No localized symptoms. Skin:  Negative except as stated above. Psych:  Negative except as stated above. Endocrine:  Negative except as stated above.     PHYSICAL EXAM  BP Readings from Last 3 Encounters:   11/17/21 128/76   05/06/20 103/85   01/24/20 138/73     Pulse Readings from Last 3 Encounters:   11/17/21 67   05/06/20 72   01/24/20 78     Wt Readings from Last 3 Encounters:   11/17/21 83.5 kg (184 lb)   05/18/20 79.4 kg (175 lb)   05/06/20 78.3 kg (172 lb 9.6 oz)     General:   Well developed, well groomed. Head/Neck:   No obvious jugular venous distention     No obvious carotid pulsations. No evidence of xanthelasma. Lungs:   No respiratory distress. Clear bilaterally. Heart:  Regular rate and rhythm. Normal S1/S2. Palpation grossly normal.    No significant murmurs, rubs or gallops. Abdomen:   Non-acute abdomen. No obvious pulsations. Extremities:   Intact peripheral pulses. No significant edema. Neurological:   Alert and oriented to person, place, time. No focal neurological deficit visually. Skin:   No obvious rash    Blood Pressure Metric:  Monitor recommended and adjustments stated if needed.

## 2022-03-18 PROBLEM — U07.1 COVID-19 VIRUS INFECTION: Status: ACTIVE | Noted: 2020-04-16

## 2022-03-18 PROBLEM — J18.9 PNEUMONIA: Status: ACTIVE | Noted: 2020-04-16

## 2022-03-19 PROBLEM — E55.9 VITAMIN D DEFICIENCY: Status: ACTIVE | Noted: 2018-04-17

## 2022-03-19 PROBLEM — Z20.822 SUSPECTED COVID-19 VIRUS INFECTION: Status: ACTIVE | Noted: 2020-04-16

## 2022-10-12 NOTE — PROGRESS NOTES
[facility]  Progress Note    Patient: Suzanne Matos Age: 60 y.o. : 1960 MR#: [id number] SSN: [id number]  Date: 2020     Subjective/24-hour events:     Continues to require Vapotherm but states that shortness of breath is continuing to improve. Remains afebrile. Assessment:   Acute hypoxic respiratory failure  COVID-19 pneumonia with suspected underlying bacterial pneumonia  ARDS secondary to above  JOSE on CKD 3  DM2 with hyperglycemia, hemoglobin A1c 8.2%  HTN  Hyperlipidemia  Hyponatremia resolved  CLL history    Plan:  Vapotherm continued, wean as tolerated. Assess need for home oxygen prior to discharge. The supportive care otherwise. We will continue to follow. DVT prophylaxis: Lovenox    Objective:   VS:   Visit Vitals  /60 (BP 1 Location: Left arm, BP Patient Position: At rest)   Pulse 80   Temp 98.4 °F (36.9 °C)   Resp 14   Ht 5' 9\" (1.753 m)   Wt 78.3 kg (172 lb 9.9 oz)   SpO2 94%   BMI 25.49 kg/m²      Tmax/24hrs: Temp (24hrs), Av.9 °F (36.6 °C), Min:97 °F (36.1 °C), Max:98.4 °F (36.9 °C)      Intake/Output Summary (Last 24 hours) at 2020 1210  Last data filed at 2020 0958  Gross per 24 hour   Intake 600 ml   Output 1200 ml   Net -600 ml       Gen: In NAD. Nontoxic-appearing. Cardiovascular:  RRR  Pulmonary:  Breath sounds decreased bilaterally. No accesory muscle use. GI:  Soft, NTTP. Extremities:  Warm, no edema or ischemia.   Neuro:  Awake and alert, motor grossly nonfocal.    Labs:    Recent Results (from the past 24 hour(s))   LD    Collection Time: 20  3:08 AM   Result Value Ref Range     (H) 81 - 234 U/L   FERRITIN    Collection Time: 20  3:08 AM   Result Value Ref Range    Ferritin 392 (H) 8 - 388 NG/ML   C REACTIVE PROTEIN, QT    Collection Time: 20  3:08 AM   Result Value Ref Range    C-Reactive protein <0.3 0 - 0.3 mg/dL   POTASSIUM    Collection Time: 20  3:08 AM   Result Value Patient experiencing irregular bleeding after recent miscarriage. Should she be seen sooner then first available.     Please advise,   Thank you   Ref Range    Potassium 4.2 3.5 - 5.5 mmol/L   MAGNESIUM    Collection Time: 05/04/20  3:08 AM   Result Value Ref Range    Magnesium 2.1 1.6 - 2.6 mg/dL       Signed By: Shaquille Lowe MD     May 4, 2020

## 2022-11-16 ENCOUNTER — OFFICE VISIT (OUTPATIENT)
Dept: CARDIOLOGY CLINIC | Age: 62
End: 2022-11-16
Payer: COMMERCIAL

## 2022-11-16 VITALS
HEART RATE: 64 BPM | WEIGHT: 186 LBS | BODY MASS INDEX: 27.55 KG/M2 | DIASTOLIC BLOOD PRESSURE: 60 MMHG | OXYGEN SATURATION: 98 % | SYSTOLIC BLOOD PRESSURE: 124 MMHG | HEIGHT: 69 IN

## 2022-11-16 DIAGNOSIS — E11.9 CONTROLLED TYPE 2 DIABETES MELLITUS WITHOUT COMPLICATION, WITHOUT LONG-TERM CURRENT USE OF INSULIN (HCC): ICD-10-CM

## 2022-11-16 DIAGNOSIS — E78.5 HYPERLIPIDEMIA, UNSPECIFIED HYPERLIPIDEMIA TYPE: ICD-10-CM

## 2022-11-16 DIAGNOSIS — Z98.890 HISTORY OF CARDIAC RADIOFREQUENCY ABLATION: ICD-10-CM

## 2022-11-16 DIAGNOSIS — I10 PRIMARY HYPERTENSION: ICD-10-CM

## 2022-11-16 DIAGNOSIS — I49.3 PVC (PREMATURE VENTRICULAR CONTRACTION): Primary | ICD-10-CM

## 2022-11-16 DIAGNOSIS — Z86.79 HISTORY OF CARDIOMYOPATHY: ICD-10-CM

## 2022-11-16 RX ORDER — SIMVASTATIN 20 MG/1
20 TABLET, FILM COATED ORAL DAILY
COMMUNITY
Start: 2022-11-06

## 2022-11-16 NOTE — PROGRESS NOTES
History of Present Illness:  57 YOM here for follow up. From a cardiac standpoint he is doing well without any new chest pain, dyspnea, PND, orthopnea or edema. His wife passed about a year ago with cancer. He has been under a lot of stress trying to keep his family together. He has seen a counselor. Impression:  Hx of PVCs and right coronary cusp ablation January 2010, stable. Hx of transient remote nonischemic cardiomyopathy back in 2010, improving to normal in 2015. HTN, controlled. Dyslipidemia, on statin. Hx of depression and ongoing social stressors with loss of his wife late last year and mother prior to this. Plan:  From a cardiac standpoint his palpitations are absent. His blood pressure is controlled. No signs or symptoms of change in EF. Will hold off on repeat echocardiogram, although it was discussed. I will see back annually. Addendum:  EKG 11/16/22:  Normal sinus rhythm, no acute ST segment or T wave changes. Past Medical History:   Diagnosis Date    Abnormal nuclear cardiac imaging test 11/30/2006    Mild anterior ischemia. Inferior scar w/border zone ischemia. LVE. EF 26%. (Gated imaging may be inaccurate.)  Global hypk. Neg EKG on max EST. Ex time 10:30. Aorto-iliac duplex 09/10/2012    No AAA identified. Carotid duplex 09/10/2012    No significant occlusive disease bilaterallly. CLL (chronic lymphocytic leukemia) (HCC)     Diabetes ([facility]     Echocardiogram 06/02/2015    EF 55%. No WMA. Mild LVH. Gr 1 DDfx. IMELDA. No significant chg from study of 5/17/10. History of echocardiogram 06/20/2011    EF 50-55%. Gr 2 DDfx. Mild RVE. Mild LAE. Mild-mod IMELDA. Hypercholesterolemia     Hypertension     Nonischemic cardiomyopathy (HCC)     s/p right coronary cusp PVC ablation  (2/55/37) without complication    Prostate CA ([facility]     S/P cardiac cath 12/11/2006    Patent coronary arteries. LVEDP 12.  EF 25-30%. Mod-significant global hypk.          Current Outpatient Medications   Medication Sig Dispense Refill    simvastatin (ZOCOR) 20 mg tablet Take 20 mg by mouth daily. lisinopriL (PRINIVIL, ZESTRIL) 5 mg tablet       FeroSuL 325 mg (65 mg iron) tablet       amoxicillin (AMOXIL) 500 mg capsule       Janumet 50-1,000 mg per tablet       Vitamin D2 1,250 mcg (50,000 unit) capsule TAKE 1 CAPSULE BY MOUTH ONCE A WEEK      tamsulosin (FLOMAX) 0.4 mg capsule       cholecalciferol (VITAMIN D3) (400 Units /10 mcg) tab tablet Take 1 Tab by mouth daily. 30 Tab 0    ascorbic acid, vitamin C, (VITAMIN C) 500 mg tablet Take 1 Tab by mouth two (2) times a day. 30 Tab 1    BYDUREON BCISE 2 mg/0.85 mL atIn INJECT 2MG EVERY WEEK UNDER THE SKIN  0    carvedilol (COREG) 12.5 mg tablet Take 1 Tab by mouth two (2) times a day. 60 Tab 6    aspirin delayed-release 81 mg tablet Take 81 mg by mouth daily. Social History   reports that he has quit smoking. His smoking use included cigarettes. He smoked an average of 1 pack per day. He has never used smokeless tobacco.   reports current alcohol use. Family History  family history is not on file. Review of Systems  Except as stated above include:  Constitutional: Negative for fever, chills and malaise/fatigue. HEENT: No congestion or recent URI. Gastrointestinal: No nausea, vomiting, abdominal pain, bloody stools. Pulmonary:  Negative except as stated above. Cardiac:  Negative except as stated above. Musculoskeletal: Negative except as stated above. Neurological:  No localized symptoms. Skin:  Negative except as stated above. Psych:  Negative except as stated above. Endocrine:  Negative except as stated above.     PHYSICAL EXAM  BP Readings from Last 3 Encounters:   11/17/21 128/76   05/06/20 103/85   01/24/20 138/73     Pulse Readings from Last 3 Encounters:   11/17/21 67   05/06/20 72   01/24/20 78     Wt Readings from Last 3 Encounters:   11/17/21 83.5 kg (184 lb)   05/18/20 79.4 kg (175 lb)   05/06/20 78.3 kg (172 lb 9.6 oz)     General:   Well developed, well groomed. Head/Neck:   No obvious jugular venous distention     No obvious carotid pulsations. No evidence of xanthelasma. Lungs:   No respiratory distress. Clear bilaterally. Heart:  Regular rate and rhythm. Normal S1/S2. Palpation grossly normal.    No significant murmurs, rubs or gallops. Abdomen:   Non-acute abdomen. No obvious pulsations. Extremities:   Intact peripheral pulses. No significant edema. Neurological:   Alert and oriented to person, place, time. No focal neurological deficit visually. Skin:   No obvious rash    Blood Pressure Metric:  Monitor recommended and adjustments stated if needed.

## 2022-11-16 NOTE — PROGRESS NOTES
Jude Medeiros presents today for   Chief Complaint   Patient presents with    Follow-up     1 year        Jude Medeiros preferred language for health care discussion is english/other. Is someone accompanying this pt? no    Is the patient using any DME equipment during [address]? no    Depression Screening:  3 most recent PHQ Screens 11/17/2021   Little interest or pleasure in doing things Not at all   Feeling down, depressed, irritable, or hopeless Not at all   Total Score PHQ 2 0       Learning Assessment:  Learning Assessment 6/27/2019   PRIMARY LEARNER Patient   PRIMARY LANGUAGE ENGLISH   LEARNER PREFERENCE PRIMARY DEMONSTRATION     -   ANSWERED BY patient   RELATIONSHIP SELF       Abuse Screening:  Abuse Screening Questionnaire 6/27/2019   Do you ever feel afraid of your partner? N   Are you in a relationship with someone who physically or mentally threatens you? N   Is it safe for you to go home? Y       Pt currently taking Anticoagulant therapy? ASA 81mg every day    Coordination of Care:  1. Have you been to the ER, urgent care clinic since your last visit? Hospitalized since your last visit? no    2. Have you seen or consulted any other health care providers outside of the [address] [place] [address] since your last visit? Include any pap smears or colon screening.  no

## 2024-03-12 ENCOUNTER — OFFICE VISIT (OUTPATIENT)
Age: 64
End: 2024-03-12
Payer: COMMERCIAL

## 2024-03-12 VITALS
DIASTOLIC BLOOD PRESSURE: 77 MMHG | TEMPERATURE: 98.1 F | OXYGEN SATURATION: 98 % | WEIGHT: 182 LBS | HEART RATE: 60 BPM | HEIGHT: 69 IN | BODY MASS INDEX: 26.96 KG/M2 | SYSTOLIC BLOOD PRESSURE: 135 MMHG | RESPIRATION RATE: 16 BRPM

## 2024-03-12 DIAGNOSIS — I49.3 PVC (PREMATURE VENTRICULAR CONTRACTION): ICD-10-CM

## 2024-03-12 DIAGNOSIS — I10 PRIMARY HYPERTENSION: Primary | ICD-10-CM

## 2024-03-12 DIAGNOSIS — R01.1 SYSTOLIC MURMUR: ICD-10-CM

## 2024-03-12 DIAGNOSIS — I42.0 CONGESTIVE CARDIOMYOPATHY (HCC): ICD-10-CM

## 2024-03-12 DIAGNOSIS — E11.9 TYPE 2 DIABETES MELLITUS WITHOUT COMPLICATION, WITHOUT LONG-TERM CURRENT USE OF INSULIN (HCC): ICD-10-CM

## 2024-03-12 DIAGNOSIS — E78.00 HYPERCHOLESTEREMIA: ICD-10-CM

## 2024-03-12 DIAGNOSIS — R94.31 ABNORMAL ECG: ICD-10-CM

## 2024-03-12 PROCEDURE — 3075F SYST BP GE 130 - 139MM HG: CPT | Performed by: INTERNAL MEDICINE

## 2024-03-12 PROCEDURE — 3078F DIAST BP <80 MM HG: CPT | Performed by: INTERNAL MEDICINE

## 2024-03-12 PROCEDURE — 93000 ELECTROCARDIOGRAM COMPLETE: CPT | Performed by: INTERNAL MEDICINE

## 2024-03-12 PROCEDURE — 99204 OFFICE O/P NEW MOD 45 MIN: CPT | Performed by: INTERNAL MEDICINE

## 2024-03-12 RX ORDER — SIMVASTATIN 40 MG
TABLET ORAL NIGHTLY
COMMUNITY
Start: 2023-12-13

## 2024-03-12 RX ORDER — TESTOSTERONE 20.25 MG/1.25G
GEL TOPICAL
COMMUNITY
Start: 2023-12-11

## 2024-03-12 ASSESSMENT — ENCOUNTER SYMPTOMS
EYES NEGATIVE: 1
SHORTNESS OF BREATH: 0
ALLERGIC/IMMUNOLOGIC NEGATIVE: 1
GASTROINTESTINAL NEGATIVE: 1

## 2024-03-12 NOTE — PROGRESS NOTES
Terence Faye (:  1960) is a 63 y.o. male,New patient, here for evaluation of the following chief complaint(s):  New Patient (EST Care Irregular Heartbeat)    Subjective   SUBJECTIVE/OBJECTIVE:  HPI  Patient presents today for evaluation.  He has a history of PVCs noted previously and underwent an ablation several years ago for this reason.  Apparently, he had a dilated cardiomyopathy nonischemic with an ejection fraction of 26%.  His heart function has since increased but has not been reassessed in a few years.  Patient has a history of chronic lymphocytic leukemia as well as other multiple cardiac risk to include hypertension, diabetes and hypercholesterolemia.  He is able to ambulate without restriction but is here now for evaluation as he has recently relocated to [place] from [place] and is reestablishing care.  Patient does not have overt symptoms at this time.  No chest discomfort.  No overt shortness of breath.  No syncope.  No orthopnea.  No history of known coronary disease.    I have carefully reviewed all available medical records, previous office notes, lab, x-ray and procedure reports    Past Medical History:   Diagnosis Date    AAA (abdominal aortic aneurysm) ([facility]) 09/10/2012    No AAA identified.    Abnormal nuclear cardiac imaging test 2006    Mild anterior ischemia.  Inferior scar w/border zone ischemia.  LVE.  EF 26%.  (Gated imaging may be inaccurate.)  Global hypk.  Neg EKG on max EST.  Ex time 10:30.    Carotid bruit 09/10/2012    No significant occlusive disease bilaterallly.    CLL (chronic lymphocytic leukemia) ([facility])     Diabetes ([facility])     Echocardiogram abnormal 2015    EF 55%.  No WMA.  Mild LVH.  Gr 1 DDfx.  ROGERS.  No significant chg from study of 5/17/10.    History of echocardiogram 2011    EF 50-55%.  Gr 2 DDfx.  Mild RVE.  Mild LAE.  Mild-mod ROGERS.     Hypercholesterolemia     Hypertension     Nonischemic cardiomyopathy ([facility])     s/p right coronary cusp

## 2024-03-12 NOTE — PROGRESS NOTES
1. \"Have you been to the ER, urgent care clinic since your last visit?  Hospitalized since your last visit?\" Reviewed by Dr. Keith Andre    2. \"Have you seen or consulted any other health care providers outside of the [facility] since your last visit?\" Reviewed by Dr. Keith Andre

## 2024-10-15 ENCOUNTER — OFFICE VISIT (OUTPATIENT)
Age: 64
End: 2024-10-15
Payer: COMMERCIAL

## 2024-10-15 VITALS
WEIGHT: 183 LBS | OXYGEN SATURATION: 96 % | HEIGHT: 69 IN | BODY MASS INDEX: 27.11 KG/M2 | DIASTOLIC BLOOD PRESSURE: 77 MMHG | HEART RATE: 63 BPM | TEMPERATURE: 98.4 F | SYSTOLIC BLOOD PRESSURE: 127 MMHG

## 2024-10-15 DIAGNOSIS — E78.00 HYPERCHOLESTEROLEMIA: ICD-10-CM

## 2024-10-15 DIAGNOSIS — I10 PRIMARY HYPERTENSION: ICD-10-CM

## 2024-10-15 DIAGNOSIS — I42.8 NONISCHEMIC CARDIOMYOPATHY (HCC): Primary | ICD-10-CM

## 2024-10-15 DIAGNOSIS — I49.3 PVC (PREMATURE VENTRICULAR CONTRACTION): ICD-10-CM

## 2024-10-15 PROCEDURE — 99214 OFFICE O/P EST MOD 30 MIN: CPT

## 2024-10-15 PROCEDURE — 3074F SYST BP LT 130 MM HG: CPT

## 2024-10-15 PROCEDURE — 3078F DIAST BP <80 MM HG: CPT

## 2024-10-15 RX ORDER — BLOOD SUGAR DIAGNOSTIC
STRIP MISCELLANEOUS
COMMUNITY
Start: 2024-10-11

## 2024-10-15 RX ORDER — GEMFIBROZIL 600 MG/1
TABLET, FILM COATED ORAL
COMMUNITY
Start: 2024-10-11

## 2024-10-15 ASSESSMENT — ENCOUNTER SYMPTOMS
NAUSEA: 0
COUGH: 0
WHEEZING: 0
SORE THROAT: 0
ABDOMINAL PAIN: 0
VOMITING: 0
DIARRHEA: 0
SHORTNESS OF BREATH: 0
RHINORRHEA: 0

## 2024-10-15 NOTE — PROGRESS NOTES
Terence Faye (:  1960) is a 63 y.o. male, with history significant for PVCs status post ablation nonischemic dilated cardiomyopathy with since recovered EF, chronic lymphocytic leukemia, hypertension, hypercholesterolemia, type II diabetes who presents for 6-month follow-up    Terence states that he has been doing great since his last appointment with Dr. Andre.  His only issue is his enlarged prostate which she has been seeing a urologist for.  He denies any issues with chest discomfort or shortness of breath.  No palpitations, dumping, nausea, vomiting, fever/chills, PND, orthopnea, lower extremity edema, dizziness or lightheadedness, presyncope or syncope.    Relevant results  Echo 10/1/2024 -     Normal left ventricular systolic function. EF by 2D [employer] is 56%. Normal wall motion. Normal diastolic function.    Normal Right ventricle size and function.    Unable to assess RVSP due to inadequate or insignificant tricuspid regurgitation.    Subjective   SUBJECTIVE/OBJECTIVE:  HPI  See above  Past Medical History:   Diagnosis Date    AAA (abdominal aortic aneurysm) ([facility]) 09/10/2012    No AAA identified.    Abnormal nuclear cardiac imaging test 2006    Mild anterior ischemia.  Inferior scar w/border zone ischemia.  LVE.  EF 26%.  (Gated imaging may be inaccurate.)  Global hypk.  Neg EKG on max EST.  Ex time 10:30.    Carotid bruit 09/10/2012    No significant occlusive disease bilaterallly.    CLL (chronic lymphocytic leukemia) ([facility])     Diabetes ([facility])     Echocardiogram abnormal 2015    EF 55%.  No WMA.  Mild LVH.  Gr 1 DDfx.  ROGERS.  No significant chg from study of 5/17/10.    History of echocardiogram 2011    EF 50-55%.  Gr 2 DDfx.  Mild RVE.  Mild LAE.  Mild-mod ROGERS.     Hypercholesterolemia     Hypertension     Nonischemic cardiomyopathy ([facility])     s/p right coronary cusp PVC ablation  (1/27/10) without complication    Prostate CA ([facility])     S/P cardiac cath 2006

## 2024-10-15 NOTE — PATIENT INSTRUCTIONS
Recommendations:  -Low salt diet - less than 2g daily   -Exercise, low fat diet    Patient Education        DASH Diet: Care Instructions  Your Care Instructions     The DASH diet is an eating plan that can help lower your blood pressure. DASH stands for Dietary Approaches to Stop Hypertension. Hypertension is high blood pressure.  The DASH diet focuses on eating foods that are high in calcium, potassium, and magnesium. These nutrients can lower blood pressure. The foods that are highest in these nutrients are fruits, vegetables, low-fat dairy products, nuts, seeds, and legumes. But taking calcium, potassium, and magnesium supplements instead of eating foods that are high in those nutrients does not have the same effect. The DASH diet also includes whole grains, fish, and poultry.  The DASH diet is one of several lifestyle changes your doctor may recommend to lower your high blood pressure. Your doctor may also want you to decrease the amount of sodium in your diet. Lowering sodium while following the DASH diet can lower blood pressure even further than just the DASH diet alone.  Follow-up care is a key part of your treatment and safety. Be sure to make and go to all appointments, and call your doctor if you are having problems. It's also a good idea to know your test results and keep a list of the medicines you take.  How can you care for yourself at home?  Following the DASH diet  Eat 4 to 5 servings of fruit each day. A serving is 1 medium-sized piece of fruit, 1/2 cup raw or canned fruit, 1/4 cup dried fruit, or 4 ounces (1/2 cup) of fruit juice. Choose fruit more often than fruit juice.  Eat 4 to 5 servings of vegetables each day. A serving is 1 cup of lettuce or raw leafy vegetables, 1/2 cup of chopped or cooked vegetables, or 4 ounces (1/2 cup) of vegetable juice. Choose vegetables more often than vegetable juice.  Get 2 to 3 servings of low-fat and fat-free dairy each day. A serving is 8 ounces of milk, 1 cup

## 2025-06-16 ENCOUNTER — OFFICE VISIT (OUTPATIENT)
Age: 65
End: 2025-06-16
Payer: COMMERCIAL

## 2025-06-16 VITALS
WEIGHT: 183 LBS | HEIGHT: 69 IN | DIASTOLIC BLOOD PRESSURE: 80 MMHG | HEART RATE: 61 BPM | OXYGEN SATURATION: 95 % | BODY MASS INDEX: 27.11 KG/M2 | SYSTOLIC BLOOD PRESSURE: 128 MMHG

## 2025-06-16 DIAGNOSIS — E78.00 HYPERCHOLESTEROLEMIA: ICD-10-CM

## 2025-06-16 DIAGNOSIS — I10 PRIMARY HYPERTENSION: Primary | ICD-10-CM

## 2025-06-16 DIAGNOSIS — I49.3 PVC (PREMATURE VENTRICULAR CONTRACTION): ICD-10-CM

## 2025-06-16 DIAGNOSIS — R94.31 ABNORMAL ECG: ICD-10-CM

## 2025-06-16 DIAGNOSIS — E11.9 TYPE 2 DIABETES MELLITUS WITHOUT COMPLICATION, WITHOUT LONG-TERM CURRENT USE OF INSULIN (HCC): ICD-10-CM

## 2025-06-16 DIAGNOSIS — R01.1 SYSTOLIC MURMUR: ICD-10-CM

## 2025-06-16 PROCEDURE — 99215 OFFICE O/P EST HI 40 MIN: CPT | Performed by: INTERNAL MEDICINE

## 2025-06-16 PROCEDURE — 3074F SYST BP LT 130 MM HG: CPT | Performed by: INTERNAL MEDICINE

## 2025-06-16 PROCEDURE — 1123F ACP DISCUSS/DSCN MKR DOCD: CPT | Performed by: INTERNAL MEDICINE

## 2025-06-16 PROCEDURE — 3079F DIAST BP 80-89 MM HG: CPT | Performed by: INTERNAL MEDICINE

## 2025-06-16 ASSESSMENT — PATIENT HEALTH QUESTIONNAIRE - PHQ9
1. LITTLE INTEREST OR PLEASURE IN DOING THINGS: NOT AT ALL
SUM OF ALL RESPONSES TO PHQ QUESTIONS 1-9: 0
2. FEELING DOWN, DEPRESSED OR HOPELESS: NOT AT ALL

## 2025-06-16 ASSESSMENT — ENCOUNTER SYMPTOMS
ALLERGIC/IMMUNOLOGIC NEGATIVE: 1
EYES NEGATIVE: 1
SHORTNESS OF BREATH: 0
GASTROINTESTINAL NEGATIVE: 1

## 2025-06-16 NOTE — PROGRESS NOTES
1. \"Have you been to the ER, urgent care clinic since your last visit?  Hospitalized since your last visit?\" Reviewed by Dr. Keith Andre     2. \"Have you seen or consulted any other health care providers outside of the [facility] since your last visit?\" Reviewed by Dr. Keith Andre   
previous office notes, lab, x-ray and procedure reports    Past Medical History:   Diagnosis Date    AAA (abdominal aortic aneurysm) 09/10/2012    No AAA identified.    Abnormal nuclear cardiac imaging test 11/30/2006    Mild anterior ischemia.  Inferior scar w/border zone ischemia.  LVE.  EF 26%.  (Gated imaging may be inaccurate.)  Global hypk.  Neg EKG on max EST.  Ex time 10:30.    Carotid bruit 09/10/2012    No significant occlusive disease bilaterallly.    CLL (chronic lymphocytic leukemia) ([facility])     Diabetes ([facility])     Echocardiogram abnormal 06/02/2015    EF 55%.  No WMA.  Mild LVH.  Gr 1 DDfx.  ROGERS.  No significant chg from study of 5/17/10.    History of echocardiogram 06/20/2011    EF 50-55%.  Gr 2 DDfx.  Mild RVE.  Mild LAE.  Mild-mod ROGERS.     Hypercholesterolemia     Hypertension     Nonischemic cardiomyopathy ([facility])     s/p right coronary cusp PVC ablation  (1/27/10) without complication    Prostate CA ([facility])     S/P cardiac cath 12/11/2006    Patent coronary arteries.  LVEDP 12.  EF 25-30%.  Mod-significant global hypk.          Past Surgical History:   Procedure Laterality Date    CARDIAC CATHETERIZATION  12/11/06    The right coronary artery is dominant. It is widely patent. The left main coronary artery is widely patent. The circumflex artery is widely patent. The left anterior descending coronary artery is widely patent. The LVEDP is 12 mmHg. The overall left ventricular systolic function is significantly diminished with an estimated ejection fraction of 25-30%. ** See report.        No Known Allergies     Current Outpatient Medications   Medication Sig Dispense Refill    JARDIANCE 10 MG tablet Take 1 tablet by mouth daily      simvastatin (ZOCOR) 40 MG tablet nightly      aspirin 81 MG EC tablet Take 1 tablet by mouth daily      carvedilol (COREG) 12.5 MG tablet Take 1 tablet by mouth 2 times daily      lisinopril (PRINIVIL;ZESTRIL) 5 MG tablet ceived the following from [facility]:

## 2025-07-14 NOTE — TELEPHONE ENCOUNTER
Patient needed an updated letter. Ok per Dr Maurice Sanchez.  
Verbal order and read back per Javi Giraldo MD 
 
 15-Jul-2025